# Patient Record
Sex: MALE | Race: WHITE | Employment: OTHER | URBAN - METROPOLITAN AREA
[De-identification: names, ages, dates, MRNs, and addresses within clinical notes are randomized per-mention and may not be internally consistent; named-entity substitution may affect disease eponyms.]

---

## 2017-02-17 PROBLEM — N13.8 BPH WITH URINARY OBSTRUCTION: Status: ACTIVE | Noted: 2017-02-17

## 2017-02-17 PROBLEM — N40.1 BPH WITH URINARY OBSTRUCTION: Status: ACTIVE | Noted: 2017-02-17

## 2017-02-17 PROCEDURE — 84403 ASSAY OF TOTAL TESTOSTERONE: CPT | Performed by: UROLOGY

## 2017-02-17 PROCEDURE — 84153 ASSAY OF PSA TOTAL: CPT | Performed by: UROLOGY

## 2017-03-01 ENCOUNTER — HOSPITAL ENCOUNTER (OUTPATIENT)
Dept: INTERVENTIONAL RADIOLOGY/VASCULAR | Facility: HOSPITAL | Age: 77
Discharge: HOME | End: 2017-03-01
Attending: INTERNAL MEDICINE | Admitting: INTERNAL MEDICINE
Payer: MEDICARE

## 2017-03-01 VITALS
HEART RATE: 67 BPM | OXYGEN SATURATION: 96 % | SYSTOLIC BLOOD PRESSURE: 110 MMHG | DIASTOLIC BLOOD PRESSURE: 80 MMHG | RESPIRATION RATE: 18 BRPM | TEMPERATURE: 97 F

## 2017-03-01 DIAGNOSIS — I26.99 PE (PULMONARY THROMBOEMBOLISM) (HCC): ICD-10-CM

## 2017-03-01 LAB
BUN BLD-MCNC: 18 MG/DL (ref 8–20)
CALCIUM BLD-MCNC: 9.4 MG/DL (ref 8.3–10.3)
CHLORIDE: 112 MMOL/L (ref 101–111)
CO2: 28 MMOL/L (ref 22–32)
CREAT BLD-MCNC: 1.26 MG/DL (ref 0.7–1.3)
ERYTHROCYTE [DISTWIDTH] IN BLOOD BY AUTOMATED COUNT: 23.1 % (ref 11.5–16)
GLUCOSE BLD-MCNC: 142 MG/DL (ref 65–99)
GLUCOSE BLD-MCNC: 150 MG/DL (ref 70–99)
HCT VFR BLD AUTO: 53 % (ref 37–53)
HGB BLD-MCNC: 16.4 G/DL (ref 13–17)
INR BLD: 2.92 (ref 0.89–1.11)
INR: 3.3 (ref 0.8–1.3)
MCH RBC QN AUTO: 20.4 PG (ref 27–33.2)
MCHC RBC AUTO-ENTMCNC: 30.9 G/DL (ref 31–37)
MCV RBC AUTO: 65.8 FL (ref 80–99)
PLATELET # BLD AUTO: 150 10(3)UL (ref 150–450)
POTASSIUM SERPL-SCNC: 5.4 MMOL/L (ref 3.6–5.1)
PSA SERPL DL<=0.01 NG/ML-MCNC: 31.1 SECONDS (ref 12–14.3)
RBC # BLD AUTO: 8.05 X10(6)UL (ref 3.8–5.8)
RED CELL DISTRIBUTION WIDTH-SD: 41 FL (ref 35.1–46.3)
SODIUM SERPL-SCNC: 144 MMOL/L (ref 136–144)
WBC # BLD AUTO: 10.2 X10(3) UL (ref 4–13)

## 2017-03-01 PROCEDURE — 80048 BASIC METABOLIC PNL TOTAL CA: CPT | Performed by: RADIOLOGY

## 2017-03-01 PROCEDURE — 37193 REM ENDOVAS VENA CAVA FILTER: CPT

## 2017-03-01 PROCEDURE — 06PY3DZ REMOVAL OF INTRALUMINAL DEVICE FROM LOWER VEIN, PERCUTANEOUS APPROACH: ICD-10-PCS | Performed by: RADIOLOGY

## 2017-03-01 PROCEDURE — 82962 GLUCOSE BLOOD TEST: CPT

## 2017-03-01 PROCEDURE — 99152 MOD SED SAME PHYS/QHP 5/>YRS: CPT

## 2017-03-01 PROCEDURE — 99153 MOD SED SAME PHYS/QHP EA: CPT

## 2017-03-01 PROCEDURE — 85610 PROTHROMBIN TIME: CPT | Performed by: RADIOLOGY

## 2017-03-01 PROCEDURE — 85027 COMPLETE CBC AUTOMATED: CPT | Performed by: RADIOLOGY

## 2017-03-01 RX ORDER — LIDOCAINE HYDROCHLORIDE 10 MG/ML
INJECTION, SOLUTION INFILTRATION; PERINEURAL
Status: COMPLETED
Start: 2017-03-01 | End: 2017-03-01

## 2017-03-01 RX ORDER — MIDAZOLAM HYDROCHLORIDE 1 MG/ML
INJECTION INTRAMUSCULAR; INTRAVENOUS
Status: COMPLETED
Start: 2017-03-01 | End: 2017-03-01

## 2017-03-01 RX ORDER — HEPARIN SODIUM 5000 [USP'U]/ML
INJECTION, SOLUTION INTRAVENOUS; SUBCUTANEOUS
Status: COMPLETED
Start: 2017-03-01 | End: 2017-03-01

## 2017-03-01 RX ORDER — SODIUM CHLORIDE 9 MG/ML
INJECTION, SOLUTION INTRAVENOUS CONTINUOUS
Status: DISCONTINUED | OUTPATIENT
Start: 2017-03-01 | End: 2017-03-01

## 2017-03-01 RX ADMIN — SODIUM CHLORIDE: 9 INJECTION, SOLUTION INTRAVENOUS at 07:30:00

## 2017-03-01 NOTE — PROGRESS NOTES
Pt here for IVC removal. INR 3.3, sent to lab. Dr Sherry Flores aware. Rc'd pt from IR in stable condition. VSS. Dressing to right neck with scant amount drainage, 1 drop of blood. No increase in drainage prior to d/c. See flowsheet.  No c/o pain or disco

## 2017-03-01 NOTE — H&P
Brian 64 Patient Status:  Outpatient in a Bed    1940 MRN [id number]   [place] [address] [facility] Meri Sotelo MD   [facility] Day # 0 PCP Yvonne Hagen MD     [facility] Comment BTS Cystoscopy -Dr Priscilla Plaza HISTORY  5/13/16    Comment BTS Cystoscopy-Dr Campos    OTHER SURGICAL HISTORY  8/15/16    Comment Cysto- Dr. Priscilla Plaza HISTORY  2/17/17    Comment Cysto- Dr. Franny De Jesus

## 2017-03-01 NOTE — PROCEDURES
[facility]  Procedure Note    Terressa Moment Patient Status:  Outpatient in a Bed    1940 MRN [id number]   [facility] Allison Sanders MD   [facility] Day # 0 PCP Mainor Renteria MD     Procedure: I

## 2017-07-18 PROCEDURE — 88305 TISSUE EXAM BY PATHOLOGIST: CPT | Performed by: SPECIALIST

## 2017-08-18 PROCEDURE — 36415 COLL VENOUS BLD VENIPUNCTURE: CPT | Performed by: UROLOGY

## 2017-08-18 PROCEDURE — 84153 ASSAY OF PSA TOTAL: CPT | Performed by: UROLOGY

## 2017-09-20 ENCOUNTER — OFFICE VISIT (OUTPATIENT)
Dept: HEMATOLOGY/ONCOLOGY | Facility: HOSPITAL | Age: 77
End: 2017-09-20
Attending: INTERNAL MEDICINE
Payer: MEDICARE

## 2017-09-20 VITALS
TEMPERATURE: 98 F | HEART RATE: 81 BPM | OXYGEN SATURATION: 98 % | HEIGHT: 70 IN | WEIGHT: 241.5 LBS | BODY MASS INDEX: 34.57 KG/M2 | SYSTOLIC BLOOD PRESSURE: 121 MMHG | RESPIRATION RATE: 16 BRPM | DIASTOLIC BLOOD PRESSURE: 67 MMHG

## 2017-09-20 DIAGNOSIS — D3A.090 CARCINOID TUMOR OF LEFT LUNG: ICD-10-CM

## 2017-09-20 DIAGNOSIS — R71.8 LOW MEAN CORPUSCULAR VOLUME (MCV): ICD-10-CM

## 2017-09-20 LAB
ALBUMIN SERPL-MCNC: 3.9 G/DL (ref 3.5–4.8)
ALP LIVER SERPL-CCNC: 78 U/L (ref 45–117)
ALT SERPL-CCNC: 34 U/L (ref 17–63)
AST SERPL-CCNC: 17 U/L (ref 15–41)
BASOPHILS # BLD AUTO: 0.13 X10(3) UL (ref 0–0.1)
BASOPHILS NFR BLD AUTO: 1.1 %
BILIRUB SERPL-MCNC: 1.4 MG/DL (ref 0.1–2)
BUN BLD-MCNC: 24 MG/DL (ref 8–20)
CALCIUM BLD-MCNC: 9.7 MG/DL (ref 8.3–10.3)
CHLORIDE: 110 MMOL/L (ref 101–111)
CO2: 23 MMOL/L (ref 22–32)
CREAT BLD-MCNC: 1.36 MG/DL (ref 0.7–1.3)
EOSINOPHIL # BLD AUTO: 0.5 X10(3) UL (ref 0–0.3)
EOSINOPHIL NFR BLD AUTO: 4.4 %
ERYTHROCYTE [DISTWIDTH] IN BLOOD BY AUTOMATED COUNT: 21.2 % (ref 11.5–16)
GLUCOSE BLD-MCNC: 140 MG/DL (ref 70–99)
HCT VFR BLD AUTO: 41.5 % (ref 37–53)
HGB BLD-MCNC: 14.1 G/DL (ref 13–17)
IMMATURE GRANULOCYTE COUNT: 0.26 X10(3) UL (ref 0–1)
IMMATURE GRANULOCYTE RATIO %: 2.3 %
LDH: 228 U/L (ref 84–249)
LYMPHOCYTES # BLD AUTO: 2.81 X10(3) UL (ref 0.9–4)
LYMPHOCYTES NFR BLD AUTO: 24.6 %
M PROTEIN MFR SERPL ELPH: 7.5 G/DL (ref 6.1–8.3)
MCH RBC QN AUTO: 23.7 PG (ref 27–33.2)
MCHC RBC AUTO-ENTMCNC: 34 G/DL (ref 31–37)
MCV RBC AUTO: 69.6 FL (ref 80–99)
MONOCYTES # BLD AUTO: 0.96 X10(3) UL (ref 0.1–0.6)
MONOCYTES NFR BLD AUTO: 8.4 %
NEUTROPHIL ABS PRELIM: 6.75 X10 (3) UL (ref 1.3–6.7)
NEUTROPHILS # BLD AUTO: 6.75 X10(3) UL (ref 1.3–6.7)
NEUTROPHILS NFR BLD AUTO: 59.2 %
PLATELET # BLD AUTO: 152 10(3)UL (ref 150–450)
POTASSIUM SERPL-SCNC: 4.7 MMOL/L (ref 3.6–5.1)
RBC # BLD AUTO: 5.96 X10(6)UL (ref 3.8–5.8)
RED CELL DISTRIBUTION WIDTH-SD: 38 FL (ref 35.1–46.3)
SODIUM SERPL-SCNC: 143 MMOL/L (ref 136–144)
WBC # BLD AUTO: 11.4 X10(3) UL (ref 4–13)

## 2017-09-20 PROCEDURE — 99213 OFFICE O/P EST LOW 20 MIN: CPT | Performed by: INTERNAL MEDICINE

## 2017-09-20 NOTE — PROGRESS NOTES
Cancer Center Progress Note    Patient Name: Octaviano Sheldon   YOB: 1940   Medical Record Number: [id number]   CSN: [id number]   Attending Physician: Blanca Johnson M.D.    Referring Physician: Xavi Brand MD      Date of Visit: 9/20/ (241 lb 8 oz)   SpO2 98%   BMI 34.65 kg/m²       Physical Examination:  General: Patient is alert and oriented x 3, not in acute distress. Psych:  Mood and affect appropriate  HEENT: EOMs intact. PERRL. Oropharynx is clear. Neck: No JVD.  No palpable lym x10(3) uL   Basophil Absolute 0.13 (H) 0.00 - 0.10 x10(3) uL   Immature Granulocyte Absolute 0.26 0.00 - 1.00 x10(3) uL   Neutrophil % 59.2 %   Lymphocyte % 24.6 %   Monocyte % 8.4 %   Eosinophil % 4.4 %   Basophil % 1.1 %   Immature Granulocyte % 2.3 % lower lobe. 2. No recurrent lung lesions are noted. 3. No acute process identified.   4. Stable low density left adrenal gland nodules most consistent with benign adenomas.              Dictated by: Kaitlin Sparks DO on 11/04/2016 at 12:50        Approved b

## 2017-09-23 LAB — CHROMOGRANIN A: 183 NG/ML

## 2017-09-30 ENCOUNTER — HOSPITAL ENCOUNTER (OUTPATIENT)
Dept: CT IMAGING | Facility: HOSPITAL | Age: 77
Discharge: HOME | End: 2017-09-30
Attending: INTERNAL MEDICINE
Payer: MEDICARE

## 2017-09-30 DIAGNOSIS — D3A.090 CARCINOID TUMOR OF LEFT LUNG: ICD-10-CM

## 2017-09-30 PROCEDURE — 71260 CT THORAX DX C+: CPT | Performed by: INTERNAL MEDICINE

## 2017-10-03 ENCOUNTER — TELEPHONE (OUTPATIENT)
Dept: HEMATOLOGY/ONCOLOGY | Facility: HOSPITAL | Age: 77
End: 2017-10-03

## 2017-10-03 NOTE — TELEPHONE ENCOUNTER
Pt called in looking for recent CT scan results. Called and LM on above number that CT scan results were good per Dr. Pacheco Overall, pt to call back with any ?'s.

## 2017-12-04 PROCEDURE — 84403 ASSAY OF TOTAL TESTOSTERONE: CPT | Performed by: UROLOGY

## 2017-12-04 PROCEDURE — 36415 COLL VENOUS BLD VENIPUNCTURE: CPT | Performed by: UROLOGY

## 2018-03-16 PROCEDURE — 84403 ASSAY OF TOTAL TESTOSTERONE: CPT | Performed by: UROLOGY

## 2018-05-18 PROCEDURE — 84403 ASSAY OF TOTAL TESTOSTERONE: CPT | Performed by: UROLOGY

## 2018-05-18 PROCEDURE — 36415 COLL VENOUS BLD VENIPUNCTURE: CPT | Performed by: UROLOGY

## 2018-08-10 PROCEDURE — 84154 ASSAY OF PSA FREE: CPT | Performed by: UROLOGY

## 2018-08-10 PROCEDURE — 84153 ASSAY OF PSA TOTAL: CPT | Performed by: UROLOGY

## 2018-08-10 PROCEDURE — 84403 ASSAY OF TOTAL TESTOSTERONE: CPT | Performed by: UROLOGY

## 2018-08-14 ENCOUNTER — TELEPHONE (OUTPATIENT)
Dept: HEMATOLOGY/ONCOLOGY | Facility: HOSPITAL | Age: 78
End: 2018-08-14

## 2018-08-14 DIAGNOSIS — D3A.090 CARCINOID TUMOR OF LEFT LUNG: Primary | ICD-10-CM

## 2018-08-14 NOTE — TELEPHONE ENCOUNTER
Pt calling for PL/MD f/u appt  inbasket message sent to [facility] to call pt and schedule appt

## 2018-08-14 NOTE — TELEPHONE ENCOUNTER
Pt calling for yearly CT scan order. Pt has MD f/u sched for 9/13. Order placed and given number for Central scheduling. Pt will call to schedule.

## 2018-08-25 ENCOUNTER — HOSPITAL ENCOUNTER (OUTPATIENT)
Dept: CT IMAGING | Facility: HOSPITAL | Age: 78
Discharge: HOME | End: 2018-08-25
Attending: INTERNAL MEDICINE
Payer: MEDICARE

## 2018-08-25 DIAGNOSIS — D3A.090 CARCINOID TUMOR OF LEFT LUNG: ICD-10-CM

## 2018-08-25 PROBLEM — R97.20 ELEVATED PSA: Status: ACTIVE | Noted: 2018-08-25

## 2018-08-25 LAB — CREAT SERPL-MCNC: 1.5 MG/DL (ref 0.7–1.3)

## 2018-08-25 PROCEDURE — 71260 CT THORAX DX C+: CPT | Performed by: INTERNAL MEDICINE

## 2018-08-25 PROCEDURE — 82565 ASSAY OF CREATININE: CPT

## 2018-08-27 ENCOUNTER — TELEPHONE (OUTPATIENT)
Dept: HEMATOLOGY/ONCOLOGY | Facility: HOSPITAL | Age: 78
End: 2018-08-27

## 2018-09-11 ENCOUNTER — APPOINTMENT (OUTPATIENT)
Dept: CT IMAGING | Facility: HOSPITAL | Age: 78
DRG: 690 | End: 2018-09-11
Attending: EMERGENCY MEDICINE
Payer: MEDICARE

## 2018-09-11 ENCOUNTER — HOSPITAL ENCOUNTER (INPATIENT)
Facility: HOSPITAL | Age: 78
LOS: 1 days | Discharge: HOME | DRG: 690 | End: 2018-09-14
Attending: EMERGENCY MEDICINE | Admitting: INTERNAL MEDICINE
Payer: MEDICARE

## 2018-09-11 DIAGNOSIS — N12 PYELONEPHRITIS: ICD-10-CM

## 2018-09-11 DIAGNOSIS — I82.220 IVC THROMBOSIS (HCC): ICD-10-CM

## 2018-09-11 DIAGNOSIS — R79.1 ELEVATED INR: ICD-10-CM

## 2018-09-11 DIAGNOSIS — K63.5 POLYPOSIS OF COLON: ICD-10-CM

## 2018-09-11 DIAGNOSIS — R31.9 HEMATURIA, UNSPECIFIED TYPE: Primary | ICD-10-CM

## 2018-09-11 LAB
ALBUMIN SERPL-MCNC: 2.6 G/DL (ref 3.5–4.8)
ALBUMIN/GLOB SERPL: 0.6 {RATIO} (ref 1–2)
ALP LIVER SERPL-CCNC: 70 U/L (ref 45–117)
ALT SERPL-CCNC: 33 U/L (ref 17–63)
ANION GAP SERPL CALC-SCNC: 6 MMOL/L (ref 0–18)
AST SERPL-CCNC: 19 U/L (ref 15–41)
BASOPHILS # BLD AUTO: 0.1 X10(3) UL (ref 0–0.1)
BASOPHILS NFR BLD AUTO: 0.6 %
BILIRUB SERPL-MCNC: 0.8 MG/DL (ref 0.1–2)
BILIRUB UR QL STRIP.AUTO: NEGATIVE
BUN BLD-MCNC: 31 MG/DL (ref 8–20)
BUN/CREAT SERPL: 20.4 (ref 10–20)
CALCIUM BLD-MCNC: 8.6 MG/DL (ref 8.3–10.3)
CHLORIDE SERPL-SCNC: 110 MMOL/L (ref 101–111)
CO2 SERPL-SCNC: 22 MMOL/L (ref 22–32)
CREAT BLD-MCNC: 1.52 MG/DL (ref 0.7–1.3)
EOSINOPHIL # BLD AUTO: 0.36 X10(3) UL (ref 0–0.3)
EOSINOPHIL NFR BLD AUTO: 2.3 %
ERYTHROCYTE [DISTWIDTH] IN BLOOD BY AUTOMATED COUNT: 17.8 % (ref 11.5–16)
GLOBULIN PLAS-MCNC: 4.3 G/DL (ref 2.5–4)
GLUCOSE BLD-MCNC: 88 MG/DL (ref 70–99)
GLUCOSE UR STRIP.AUTO-MCNC: 50 MG/DL
HCT VFR BLD AUTO: 42.9 % (ref 37–53)
HGB BLD-MCNC: 13.6 G/DL (ref 13–17)
IMMATURE GRANULOCYTE COUNT: 0.22 X10(3) UL (ref 0–1)
IMMATURE GRANULOCYTE RATIO %: 1.4 %
INR BLD: 5.94 (ref 0.9–1.1)
KETONES UR STRIP.AUTO-MCNC: NEGATIVE MG/DL
LYMPHOCYTES # BLD AUTO: 1.64 X10(3) UL (ref 0.9–4)
LYMPHOCYTES NFR BLD AUTO: 10.6 %
M PROTEIN MFR SERPL ELPH: 6.9 G/DL (ref 6.1–8.3)
MCH RBC QN AUTO: 19.7 PG (ref 27–33.2)
MCHC RBC AUTO-ENTMCNC: 31.7 G/DL (ref 31–37)
MCV RBC AUTO: 62 FL (ref 80–99)
MONOCYTES # BLD AUTO: 1.42 X10(3) UL (ref 0.1–1)
MONOCYTES NFR BLD AUTO: 9.2 %
NEUTROPHIL ABS PRELIM: 11.69 X10 (3) UL (ref 1.3–6.7)
NEUTROPHILS # BLD AUTO: 11.69 X10(3) UL (ref 1.3–6.7)
NEUTROPHILS NFR BLD AUTO: 75.9 %
NITRITE UR QL STRIP.AUTO: NEGATIVE
OSMOLALITY SERPL CALC.SUM OF ELEC: 292 MOSM/KG (ref 275–295)
PH UR STRIP.AUTO: 6 [PH] (ref 4.5–8)
PLATELET # BLD AUTO: 218 10(3)UL (ref 150–450)
POTASSIUM SERPL-SCNC: 4.6 MMOL/L (ref 3.6–5.1)
PROT UR STRIP.AUTO-MCNC: 100 MG/DL
PSA SERPL DL<=0.01 NG/ML-MCNC: 54.7 SECONDS (ref 12.4–14.7)
RBC # BLD AUTO: 6.92 X10(6)UL (ref 3.8–5.8)
RBC #/AREA URNS AUTO: >10 /HPF
RED CELL DISTRIBUTION WIDTH-SD: 39.5 FL (ref 35.1–46.3)
SODIUM SERPL-SCNC: 138 MMOL/L (ref 136–144)
SP GR UR STRIP.AUTO: 1.01 (ref 1–1.03)
UROBILINOGEN UR STRIP.AUTO-MCNC: <2 MG/DL
WBC # BLD AUTO: 15.4 X10(3) UL (ref 4–13)
WBC CLUMPS UR QL AUTO: PRESENT

## 2018-09-11 PROCEDURE — 80053 COMPREHEN METABOLIC PANEL: CPT | Performed by: EMERGENCY MEDICINE

## 2018-09-11 PROCEDURE — 74176 CT ABD & PELVIS W/O CONTRAST: CPT | Performed by: EMERGENCY MEDICINE

## 2018-09-11 PROCEDURE — 87086 URINE CULTURE/COLONY COUNT: CPT | Performed by: EMERGENCY MEDICINE

## 2018-09-11 PROCEDURE — 84132 ASSAY OF SERUM POTASSIUM: CPT | Performed by: EMERGENCY MEDICINE

## 2018-09-11 PROCEDURE — 99285 EMERGENCY DEPT VISIT HI MDM: CPT

## 2018-09-11 PROCEDURE — 84450 TRANSFERASE (AST) (SGOT): CPT | Performed by: EMERGENCY MEDICINE

## 2018-09-11 PROCEDURE — 96365 THER/PROPH/DIAG IV INF INIT: CPT

## 2018-09-11 PROCEDURE — 85025 COMPLETE CBC W/AUTO DIFF WBC: CPT | Performed by: EMERGENCY MEDICINE

## 2018-09-11 PROCEDURE — 96361 HYDRATE IV INFUSION ADD-ON: CPT

## 2018-09-11 PROCEDURE — 85610 PROTHROMBIN TIME: CPT | Performed by: EMERGENCY MEDICINE

## 2018-09-11 PROCEDURE — 81001 URINALYSIS AUTO W/SCOPE: CPT | Performed by: EMERGENCY MEDICINE

## 2018-09-11 NOTE — ED INITIAL ASSESSMENT (HPI)
C/o L lower back pain with hematuria since Sunday. No fevers. Instructed to come in to r/o kidney stone.

## 2018-09-12 ENCOUNTER — APPOINTMENT (OUTPATIENT)
Dept: CT IMAGING | Facility: HOSPITAL | Age: 78
DRG: 690 | End: 2018-09-12
Attending: PHYSICIAN ASSISTANT
Payer: MEDICARE

## 2018-09-12 PROBLEM — R79.1 ELEVATED INR: Status: ACTIVE | Noted: 2018-09-12

## 2018-09-12 PROBLEM — N12 PYELONEPHRITIS: Status: ACTIVE | Noted: 2018-09-12

## 2018-09-12 PROBLEM — R31.9 HEMATURIA, UNSPECIFIED TYPE: Status: ACTIVE | Noted: 2018-09-12

## 2018-09-12 LAB
GLUCOSE BLD-MCNC: 141 MG/DL (ref 65–99)
GLUCOSE BLD-MCNC: 155 MG/DL (ref 65–99)
GLUCOSE BLD-MCNC: 160 MG/DL (ref 65–99)
GLUCOSE BLD-MCNC: 94 MG/DL (ref 65–99)

## 2018-09-12 PROCEDURE — 74178 CT ABD&PLV WO CNTR FLWD CNTR: CPT | Performed by: PHYSICIAN ASSISTANT

## 2018-09-12 PROCEDURE — 82962 GLUCOSE BLOOD TEST: CPT

## 2018-09-12 PROCEDURE — 88108 CYTOPATH CONCENTRATE TECH: CPT | Performed by: PHYSICIAN ASSISTANT

## 2018-09-12 PROCEDURE — 76377 3D RENDER W/INTRP POSTPROCES: CPT

## 2018-09-12 RX ORDER — ACETAMINOPHEN 325 MG/1
650 TABLET ORAL EVERY 6 HOURS PRN
Status: DISCONTINUED | OUTPATIENT
Start: 2018-09-12 | End: 2018-09-14

## 2018-09-12 RX ORDER — PRAVASTATIN SODIUM 20 MG
20 TABLET ORAL NIGHTLY
Status: DISCONTINUED | OUTPATIENT
Start: 2018-09-12 | End: 2018-09-14

## 2018-09-12 RX ORDER — LISINOPRIL 2.5 MG/1
2.5 TABLET ORAL DAILY
Status: DISCONTINUED | OUTPATIENT
Start: 2018-09-12 | End: 2018-09-14

## 2018-09-12 RX ORDER — SODIUM CHLORIDE 9 MG/ML
INJECTION, SOLUTION INTRAVENOUS CONTINUOUS
Status: DISCONTINUED | OUTPATIENT
Start: 2018-09-12 | End: 2018-09-12

## 2018-09-12 NOTE — PROGRESS NOTES
Spoke with nurse - she reports patient having left flank pain with urination. Urine currently sawyer colored. Spoke with Dr. Ryne De La Cruz to review his history and current admission.     Plan:  Proceed with CT urogram  Increase IV fluids if ok wit

## 2018-09-12 NOTE — PLAN OF CARE
Assumed care @ 0700. A&Ox4, VSS on RA, NSR on tele  Urine dark sawyer. No bright red blood or pink coloration  C/o L flank pain. Pt states pain is moderate at rest and severe with urination. Annelise VERA updated. CT ordered  Up to the chair.  Ambulated in the ha

## 2018-09-12 NOTE — H&P
[facility] Patient Status:  Observation    1940 MRN [id number]   [facility] 7NE-A Attending Lin Boyce MD   Hosp Day # 0 PCP Nayely Esquivel MD     History of Present Illness:  M DEBRIDEMENT SKIN UP TO 10%;  Left      Comment:  leg  7/29/15: OTHER SURGICAL HISTORY      Comment:  cysto, stent removal - Dr. Aray Ayoub  10/14/15: OTHER SURGICAL HISTORY      Comment:  BTS Cystoscopy -Dr Arya Ayoub  1/16/15: OTHER SURGICAL HISTORY      Com Tab Take 1 tablet by mouth daily. Disp: 90 tablet Rfl: 0 Taking   SITagliptin-MetFORMIN HCl (JANUMET)  MG Oral Tab Take 1 tablet by mouth daily.  Disp: 28 tablet Rfl: 0 Taking       Review of Systems:    Physical Exam:  Alert male appearing stated ag The smaller 1.7 x 1.5 cm. LIVER:  Uniform parenchyma. BILIARY:  Nondistended gallbladder. Numerous calcified gallstones. No biliary dilatation. PANCREAS:  Numerous coarse calcifications.   These have increased compared to the prior consistent with chr male     Erectile dysfunction     TIA (transient ischemic attack)     Varicose veins of both lower extremities with complications     Obesity     High cholesterol     Neuropathy     Aneurysm of abdominal aorta (HCC)     Bladder tumor     Controlled type 2

## 2018-09-12 NOTE — PROGRESS NOTES
[facility] Pharmacy Note: Antimicrobial Weight Dose Adjustment for: ceftriaxone (ROCEPHIN)    Esa Ureña is a 78year old male who has been prescribed ceftriaxone (ROCEPHIN) 1000 mg X1.   CrCl is CrCl cannot be calculated (Patient's most recent lab result i

## 2018-09-12 NOTE — ED PROVIDER NOTES
Patient Seen in: [facility] Emergency Department    History   Patient presents with:  Urinary Symptoms (urologic)    Stated Complaint: hematuria    HPI    Is a 72-year-old male presenting to emergency room for hematuria.   Last week patient had hemat Cystoscopy-Dr Campos  8/15/16: OTHER SURGICAL HISTORY      Comment:  Cysto- Dr. Stephanie Arnett  2/17/17: OTHER SURGICAL HISTORY      Comment:  Cysto- Dr. Stephanie Arnett  08/18/2017: OTHER SURGICAL HISTORY      Comment:  Cystoscopy- Dr. Stephanie Arnett  No date: SKIN GR components:    PT 54.7 (*)     INR 5.94 (*)     All other components within normal limits   COMP METABOLIC PANEL (14)   CBC WITH DIFFERENTIAL WITH PLATELET    Narrative:      The following orders were created for panel order CBC WITH DIFFERENTIAL WITH PLATE

## 2018-09-13 ENCOUNTER — APPOINTMENT (OUTPATIENT)
Dept: HEMATOLOGY/ONCOLOGY | Facility: HOSPITAL | Age: 78
End: 2018-09-13
Attending: INTERNAL MEDICINE
Payer: MEDICARE

## 2018-09-13 LAB
GLUCOSE BLD-MCNC: 143 MG/DL (ref 65–99)
GLUCOSE BLD-MCNC: 143 MG/DL (ref 65–99)
GLUCOSE BLD-MCNC: 216 MG/DL (ref 65–99)
GLUCOSE BLD-MCNC: 324 MG/DL (ref 65–99)
GLUCOSE BLD-MCNC: 94 MG/DL (ref 65–99)
INR BLD: 2.75 (ref 0.9–1.1)
PSA SERPL DL<=0.01 NG/ML-MCNC: 30 SECONDS (ref 12.4–14.7)

## 2018-09-13 PROCEDURE — 82962 GLUCOSE BLOOD TEST: CPT

## 2018-09-13 PROCEDURE — 85610 PROTHROMBIN TIME: CPT | Performed by: INTERNAL MEDICINE

## 2018-09-13 RX ORDER — WARFARIN SODIUM 5 MG/1
5 TABLET ORAL
Status: COMPLETED | OUTPATIENT
Start: 2018-09-13 | End: 2018-09-13

## 2018-09-13 NOTE — PROGRESS NOTES
[facility]    Progress Note    Cherelle Yu Patient Status:  Observation    1940 MRN [id number]   [facility] 7NE-A Attending Katy Iyer MD   Hosp Day # 0 PCP Claudine Daniel MD       SUBJECTIVE:  Still voiding ex hydroureter. No right-sided urinary tract calculi. No focal renal cortical mass identified. The right ureter assumes a normal course in caliber. ADRENALS:  There is a 2.6 x 3.3 cm left adrenal mass and a 1.5 x 1.3 cm left adrenal mass.   Yolanda white enlarged. Numerous prostate calcifications are seen. The prostate effaces and indents the floor of the bladder. BONES:  Osseous structures are demineralized. Mild loss of vertebral body height of L5 may reflect compression deformity.   No change from pr (NOVOLOG) 100 UNIT/ML flexpen 1-10 Units 1-10 Units Subcutaneous TID AC and HS   acetaminophen (TYLENOL) tab 650 mg 650 mg Oral Q6H PRN         Assessment  Patient Active Problem List:     Carcinoid tumor of left lung     History of bladder cancer     Hypo

## 2018-09-13 NOTE — PROGRESS NOTES
[facility]  Urology Progress Note    Tamara Small Patient Status:  Observation    1940 MRN [id number]   [facility] 7NE-A Attending Tomas Ventura MD   Hosp Day # 0 PCP Vero Paniagua MD     Subjective:  Bita Rangel coagulopathy (INR 5.94)  history of bladder cancer  -cysto 8/24 negative for tumors, abnormal left UO from previous tur  -afebrile, hgb normal, ucx returned negative, but would still with abx given UA results, symptoms, reflux.    -voiding and comfortable

## 2018-09-13 NOTE — PROGRESS NOTES
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 Tiburcio Mullins.   Based on this ind

## 2018-09-13 NOTE — PLAN OF CARE
Patient alert and oriented times four. Meds given per MAR. Patient up with 1 person standby. Urine is tea/sawyer colored. Tylenol given for flank pain. Vital signs stable. Resting comfortably in bed. Call light in reach.      DISCHARGE PLANNING    • [address]

## 2018-09-14 VITALS
SYSTOLIC BLOOD PRESSURE: 109 MMHG | HEART RATE: 61 BPM | TEMPERATURE: 98 F | WEIGHT: 235 LBS | RESPIRATION RATE: 18 BRPM | HEIGHT: 70 IN | DIASTOLIC BLOOD PRESSURE: 70 MMHG | BODY MASS INDEX: 33.64 KG/M2 | OXYGEN SATURATION: 98 %

## 2018-09-14 LAB
ANION GAP SERPL CALC-SCNC: 7 MMOL/L (ref 0–18)
BUN BLD-MCNC: 34 MG/DL (ref 8–20)
BUN/CREAT SERPL: 24.6 (ref 10–20)
CALCIUM BLD-MCNC: 8.9 MG/DL (ref 8.3–10.3)
CHLORIDE SERPL-SCNC: 110 MMOL/L (ref 101–111)
CO2 SERPL-SCNC: 22 MMOL/L (ref 22–32)
CREAT BLD-MCNC: 1.38 MG/DL (ref 0.7–1.3)
GLUCOSE BLD-MCNC: 118 MG/DL (ref 65–99)
GLUCOSE BLD-MCNC: 176 MG/DL (ref 65–99)
GLUCOSE BLD-MCNC: 179 MG/DL (ref 70–99)
INR BLD: 2.28 (ref 0.9–1.1)
OSMOLALITY SERPL CALC.SUM OF ELEC: 300 MOSM/KG (ref 275–295)
POTASSIUM SERPL-SCNC: 5.1 MMOL/L (ref 3.6–5.1)
PSA SERPL DL<=0.01 NG/ML-MCNC: 25.9 SECONDS (ref 12.4–14.7)
SODIUM SERPL-SCNC: 139 MMOL/L (ref 136–144)

## 2018-09-14 PROCEDURE — 85610 PROTHROMBIN TIME: CPT | Performed by: INTERNAL MEDICINE

## 2018-09-14 PROCEDURE — 82962 GLUCOSE BLOOD TEST: CPT

## 2018-09-14 PROCEDURE — 80048 BASIC METABOLIC PNL TOTAL CA: CPT | Performed by: INTERNAL MEDICINE

## 2018-09-14 RX ORDER — GARLIC EXTRACT 500 MG
1 CAPSULE ORAL DAILY
Status: DISCONTINUED | OUTPATIENT
Start: 2018-09-14 | End: 2018-09-14

## 2018-09-14 RX ORDER — GARLIC EXTRACT 500 MG
1 CAPSULE ORAL DAILY
Qty: 7 CAPSULE | Refills: 0 | Status: SHIPPED | OUTPATIENT
Start: 2018-09-14 | End: 2020-09-23

## 2018-09-14 RX ORDER — WARFARIN SODIUM 10 MG/1
5 TABLET ORAL NIGHTLY
Qty: 30 TABLET | Refills: 1 | Status: ON HOLD | OUTPATIENT
Start: 2018-09-14 | End: 2020-06-15

## 2018-09-14 RX ORDER — CIPROFLOXACIN 250 MG/1
250 TABLET, FILM COATED ORAL 2 TIMES DAILY
Qty: 14 TABLET | Refills: 0 | Status: SHIPPED | OUTPATIENT
Start: 2018-09-14 | End: 2018-09-21

## 2018-09-14 RX ORDER — ASPIRIN 325 MG
325 TABLET ORAL DAILY
Refills: 0 | Status: ON HOLD | COMMUNITY
Start: 2018-09-21 | End: 2020-06-15

## 2018-09-14 RX ORDER — LEVOFLOXACIN 250 MG/1
250 TABLET ORAL DAILY
Qty: 7 TABLET | Refills: 0 | Status: SHIPPED | OUTPATIENT
Start: 2018-09-14 | End: 2018-09-14

## 2018-09-14 RX ORDER — WARFARIN SODIUM 5 MG/1
5 TABLET ORAL
Status: DISCONTINUED | OUTPATIENT
Start: 2018-09-14 | End: 2018-09-14

## 2018-09-14 NOTE — PLAN OF CARE
Assumed care at [address]. AOx4. Denies any pain or discomfort. Remains on IV fluid/ ABT. Pt stated his urine has been clear yellow color since 0500 this morning. Coumadin restarted tonight. Plan of care discussed with pt. Call light within reach.   Report

## 2018-09-14 NOTE — PLAN OF CARE
Assumed care at 2200. Pt A/O x4. RA. NSR on tele. VSS. Denies any pain. Urine remains clear yellow. Call light within reach. Will continue to monitor.     Diabetes/Glucose Control    • Glucose maintained within prescribed range Progressing        DISCHA

## 2018-09-14 NOTE — PROGRESS NOTES
[facility]  Urology Progress Note    Maggie Perez Patient Status:  Inpatient    1940 MRN [id number]   [facility] 7NE-A Attending Raudel Guzmán MD   Hosp Day # 1 PCP Jose Gutierrez MD     Subjective:  Maggie Perez

## 2018-09-14 NOTE — PLAN OF CARE
NURSING DISCHARGE NOTE    Discharged Home via Wheelchair. Accompanied by Support staff  Belongings Taken by patient/family. IV removed without s/s of complications. Discharge instructions and scripts given. Verbalized understanding.  All questions ans

## 2018-09-14 NOTE — PLAN OF CARE
Alert and oriented x 4. Steady and up ad gee. VSS. LBM 3x today. Last BM loose. Reports burning/pain, L flank pain, and frequency improving. Urine now clear yellow. Starting coumadin tonight and to monitor for hematuria.

## 2018-10-17 ENCOUNTER — OFFICE VISIT (OUTPATIENT)
Dept: HEMATOLOGY/ONCOLOGY | Facility: HOSPITAL | Age: 78
End: 2018-10-17
Attending: INTERNAL MEDICINE
Payer: MEDICARE

## 2018-10-17 VITALS
RESPIRATION RATE: 18 BRPM | HEART RATE: 62 BPM | SYSTOLIC BLOOD PRESSURE: 128 MMHG | DIASTOLIC BLOOD PRESSURE: 66 MMHG | TEMPERATURE: 97 F | BODY MASS INDEX: 33 KG/M2 | WEIGHT: 229 LBS | OXYGEN SATURATION: 97 %

## 2018-10-17 DIAGNOSIS — D3A.090 CARCINOID TUMOR OF LEFT LUNG: ICD-10-CM

## 2018-10-17 DIAGNOSIS — R71.8 LOW MEAN CORPUSCULAR VOLUME (MCV): Primary | ICD-10-CM

## 2018-10-17 PROCEDURE — 99213 OFFICE O/P EST LOW 20 MIN: CPT | Performed by: INTERNAL MEDICINE

## 2018-10-17 NOTE — PROGRESS NOTES
Cancer Center Progress Note    Patient Name: Brii Almaraz   YOB: 1940   Medical Record Number: [id number]   CSN: [id number]   Attending Physician: Jessie Aguilar M.D.    Referring Physician: Laith Mcmullen MD      Date of Visit: 10/1 breakfast., Disp: 90 tablet, Rfl: 1    Allergies:    Bacitracin-Neomycin*    RASH  Other                   OTHER (SEE COMMENTS)     Review of Systems:  A 14-point ROS was done with pertinent positives and negative per the HPI    Vital Signs:  /66 (BP CHEST+ABDOMEN DISSECT SET (CPT=71275/27582), 2/04/2016, 23:57. INDICATIONS:  hematuria     TECHNIQUE:  Unenhanced multislice CT scanning from above the kidneys to below the urinary bladder.   2D rendering are generated on the CT scanner workstation to l primarily lingula. OTHER:  None.     =====  CONCLUSION:    1. No obstructing or nonobstructing renal calculus. 2.  There is mild hydronephrosis and hydroureter on the left with perinephric stranding.   The findings may be related to recent passage of a s CHEST WALL:  No mass or axillary adenopathy. LIMITED ABDOMEN:  Cholelithiasis is again noted. No ductal dilatation.   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 No acute pulmonary disease suggested.   VASCULATURE:  Normal.  THORACIC AORTA:  Normal.  MEDIASTINUM/ALEX:  Normal.  CARDIAC:  Normal.  PLEURA:  Normal.  CHEST WALL:  There are stable low density left adrenal gland from the left ureteral vesical junction with the distal left ureter normal   caliber. There is no radiopaque ureteral calculus identified. No gross ureteral mass is appreciated.   There is stranding and relative decreased attenuation within the left kidne There are nonenlarged periaortic and common iliac chain lymph nodes. No adenopathy. BOWEL/MESENTERY:  There is diverticulosis especially in the descending , transverse and sigmoid colon. Normal-appearing appendix. No evidence for bowel obstruction.   Tracy Teran deformity. 6.  Chronic pancreatitis changes. 7.  Enlarged prostate. Dictated by: Elena Gilliland MD on 9/12/2018 at 15:49       Approved by: Elena Gilliland MD                Impression and Plan:  Has been clinically ANA.  Most recent CT s

## 2018-11-01 ENCOUNTER — LAB ENCOUNTER (OUTPATIENT)
Dept: LAB | Facility: HOSPITAL | Age: 78
End: 2018-11-01
Attending: INTERNAL MEDICINE
Payer: MEDICARE

## 2018-11-01 DIAGNOSIS — I26.99 PE (PULMONARY THROMBOEMBOLISM) (HCC): Primary | ICD-10-CM

## 2018-11-01 PROCEDURE — 85610 PROTHROMBIN TIME: CPT

## 2018-11-01 PROCEDURE — 36415 COLL VENOUS BLD VENIPUNCTURE: CPT

## 2018-11-06 ENCOUNTER — APPOINTMENT (OUTPATIENT)
Dept: LAB | Facility: HOSPITAL | Age: 78
End: 2018-11-06
Attending: INTERNAL MEDICINE
Payer: MEDICARE

## 2018-11-06 DIAGNOSIS — I26.99 PE (PULMONARY THROMBOEMBOLISM) (HCC): ICD-10-CM

## 2018-11-06 PROCEDURE — 85610 PROTHROMBIN TIME: CPT

## 2018-11-06 PROCEDURE — 36415 COLL VENOUS BLD VENIPUNCTURE: CPT

## 2018-11-12 ENCOUNTER — APPOINTMENT (OUTPATIENT)
Dept: LAB | Facility: HOSPITAL | Age: 78
End: 2018-11-12
Attending: INTERNAL MEDICINE
Payer: MEDICARE

## 2018-11-12 DIAGNOSIS — I26.99 PE (PULMONARY THROMBOEMBOLISM) (HCC): ICD-10-CM

## 2018-11-12 PROCEDURE — 36415 COLL VENOUS BLD VENIPUNCTURE: CPT

## 2018-11-12 PROCEDURE — 85610 PROTHROMBIN TIME: CPT

## 2018-11-19 ENCOUNTER — APPOINTMENT (OUTPATIENT)
Dept: LAB | Facility: HOSPITAL | Age: 78
End: 2018-11-19
Attending: INTERNAL MEDICINE
Payer: MEDICARE

## 2018-11-19 DIAGNOSIS — I26.99 PE (PULMONARY THROMBOEMBOLISM) (HCC): ICD-10-CM

## 2018-11-19 PROCEDURE — 85610 PROTHROMBIN TIME: CPT

## 2018-11-19 PROCEDURE — 36415 COLL VENOUS BLD VENIPUNCTURE: CPT

## 2018-11-27 ENCOUNTER — APPOINTMENT (OUTPATIENT)
Dept: LAB | Facility: HOSPITAL | Age: 78
End: 2018-11-27
Attending: INTERNAL MEDICINE
Payer: MEDICARE

## 2018-11-27 DIAGNOSIS — I26.99 PE (PULMONARY THROMBOEMBOLISM) (HCC): ICD-10-CM

## 2018-11-27 PROCEDURE — 85610 PROTHROMBIN TIME: CPT

## 2018-11-27 PROCEDURE — 36415 COLL VENOUS BLD VENIPUNCTURE: CPT

## 2018-12-03 ENCOUNTER — APPOINTMENT (OUTPATIENT)
Dept: LAB | Facility: HOSPITAL | Age: 78
End: 2018-12-03
Attending: INTERNAL MEDICINE
Payer: MEDICARE

## 2018-12-03 DIAGNOSIS — I26.99 PE (PULMONARY THROMBOEMBOLISM) (HCC): ICD-10-CM

## 2018-12-03 PROCEDURE — 36415 COLL VENOUS BLD VENIPUNCTURE: CPT

## 2018-12-03 PROCEDURE — 85610 PROTHROMBIN TIME: CPT

## 2018-12-11 ENCOUNTER — APPOINTMENT (OUTPATIENT)
Dept: LAB | Facility: HOSPITAL | Age: 78
End: 2018-12-11
Attending: INTERNAL MEDICINE
Payer: MEDICARE

## 2018-12-11 DIAGNOSIS — I26.99 PE (PULMONARY THROMBOEMBOLISM) (HCC): ICD-10-CM

## 2018-12-11 PROCEDURE — 36415 COLL VENOUS BLD VENIPUNCTURE: CPT

## 2018-12-11 PROCEDURE — 85610 PROTHROMBIN TIME: CPT

## 2019-01-16 ENCOUNTER — APPOINTMENT (OUTPATIENT)
Dept: LAB | Facility: HOSPITAL | Age: 79
End: 2019-01-16
Attending: INTERNAL MEDICINE
Payer: MEDICARE

## 2019-01-16 DIAGNOSIS — I26.99 PE (PULMONARY THROMBOEMBOLISM) (HCC): ICD-10-CM

## 2019-01-16 LAB
INR BLD: 1.84 (ref 0.9–1.1)
PSA SERPL DL<=0.01 NG/ML-MCNC: 21.9 SECONDS (ref 12.4–14.7)

## 2019-01-16 PROCEDURE — 36415 COLL VENOUS BLD VENIPUNCTURE: CPT

## 2019-01-16 PROCEDURE — 85610 PROTHROMBIN TIME: CPT

## 2019-01-25 ENCOUNTER — APPOINTMENT (OUTPATIENT)
Dept: LAB | Facility: HOSPITAL | Age: 79
End: 2019-01-25
Attending: INTERNAL MEDICINE
Payer: MEDICARE

## 2019-01-25 DIAGNOSIS — I26.99 PE (PULMONARY THROMBOEMBOLISM) (HCC): ICD-10-CM

## 2019-01-25 LAB
INR BLD: 1.96 (ref 0.9–1.1)
PSA SERPL DL<=0.01 NG/ML-MCNC: 23 SECONDS (ref 12.4–14.7)

## 2019-01-25 PROCEDURE — 85610 PROTHROMBIN TIME: CPT

## 2019-01-25 PROCEDURE — 36415 COLL VENOUS BLD VENIPUNCTURE: CPT

## 2019-02-18 ENCOUNTER — APPOINTMENT (OUTPATIENT)
Dept: LAB | Facility: HOSPITAL | Age: 79
End: 2019-02-18
Attending: INTERNAL MEDICINE
Payer: MEDICARE

## 2019-02-18 DIAGNOSIS — I26.99 PE (PULMONARY THROMBOEMBOLISM) (HCC): ICD-10-CM

## 2019-02-18 LAB
INR BLD: 1.84 (ref 0.9–1.1)
PSA SERPL DL<=0.01 NG/ML-MCNC: 21.9 SECONDS (ref 12.4–14.7)

## 2019-02-18 PROCEDURE — 36415 COLL VENOUS BLD VENIPUNCTURE: CPT

## 2019-02-18 PROCEDURE — 85610 PROTHROMBIN TIME: CPT

## 2019-02-28 ENCOUNTER — APPOINTMENT (OUTPATIENT)
Dept: LAB | Facility: HOSPITAL | Age: 79
End: 2019-02-28
Attending: INTERNAL MEDICINE
Payer: MEDICARE

## 2019-02-28 DIAGNOSIS — I26.99 PE (PULMONARY THROMBOEMBOLISM) (HCC): ICD-10-CM

## 2019-02-28 LAB
INR BLD: 3.08 (ref 0.9–1.1)
PSA SERPL DL<=0.01 NG/ML-MCNC: 33.9 SECONDS (ref 12.5–14.7)

## 2019-02-28 PROCEDURE — 36415 COLL VENOUS BLD VENIPUNCTURE: CPT

## 2019-02-28 PROCEDURE — 85610 PROTHROMBIN TIME: CPT

## 2019-03-14 ENCOUNTER — APPOINTMENT (OUTPATIENT)
Dept: LAB | Facility: HOSPITAL | Age: 79
End: 2019-03-14
Attending: INTERNAL MEDICINE
Payer: MEDICARE

## 2019-03-14 DIAGNOSIS — I26.99 PE (PULMONARY THROMBOEMBOLISM) (HCC): ICD-10-CM

## 2019-03-14 LAB
INR BLD: 2.48 (ref 0.9–1.1)
PSA SERPL DL<=0.01 NG/ML-MCNC: 28.5 SECONDS (ref 12.5–14.7)

## 2019-03-14 PROCEDURE — 85610 PROTHROMBIN TIME: CPT

## 2019-03-14 PROCEDURE — 36415 COLL VENOUS BLD VENIPUNCTURE: CPT

## 2019-03-28 ENCOUNTER — APPOINTMENT (OUTPATIENT)
Dept: LAB | Facility: HOSPITAL | Age: 79
End: 2019-03-28
Attending: INTERNAL MEDICINE
Payer: MEDICARE

## 2019-03-28 DIAGNOSIS — I26.99 PE (PULMONARY THROMBOEMBOLISM) (HCC): ICD-10-CM

## 2019-03-28 PROCEDURE — 85610 PROTHROMBIN TIME: CPT

## 2019-03-28 PROCEDURE — 36415 COLL VENOUS BLD VENIPUNCTURE: CPT

## 2019-05-01 ENCOUNTER — APPOINTMENT (OUTPATIENT)
Dept: LAB | Facility: HOSPITAL | Age: 79
End: 2019-05-01
Attending: INTERNAL MEDICINE
Payer: MEDICARE

## 2019-05-01 DIAGNOSIS — I26.99 PE (PULMONARY THROMBOEMBOLISM) (HCC): ICD-10-CM

## 2019-05-01 PROCEDURE — 36415 COLL VENOUS BLD VENIPUNCTURE: CPT

## 2019-05-01 PROCEDURE — 85610 PROTHROMBIN TIME: CPT

## 2019-05-16 ENCOUNTER — APPOINTMENT (OUTPATIENT)
Dept: LAB | Facility: HOSPITAL | Age: 79
End: 2019-05-16
Attending: INTERNAL MEDICINE
Payer: MEDICARE

## 2019-05-16 DIAGNOSIS — I26.99 PE (PULMONARY THROMBOEMBOLISM) (HCC): ICD-10-CM

## 2019-05-16 PROCEDURE — 36415 COLL VENOUS BLD VENIPUNCTURE: CPT

## 2019-05-16 PROCEDURE — 85610 PROTHROMBIN TIME: CPT

## 2019-05-17 NOTE — CONSULTS
Pharmacy Dosing Service: Warfarin (Coumadin)  Olga Torres is a 78year old male for whom pharmacy has been dosing warfarin (Coumadin).  Goal INR is 2-3    Recent Labs   Lab  09/11/18   2106  09/13/18   1239  09/14/18   0455   INR  5.94*  2.75*  2.28* severe fat loss & muscle wasting

## 2019-05-30 ENCOUNTER — APPOINTMENT (OUTPATIENT)
Dept: LAB | Facility: HOSPITAL | Age: 79
End: 2019-05-30
Attending: INTERNAL MEDICINE
Payer: MEDICARE

## 2019-05-30 DIAGNOSIS — I26.99 PE (PULMONARY THROMBOEMBOLISM) (HCC): ICD-10-CM

## 2019-05-30 PROCEDURE — 85610 PROTHROMBIN TIME: CPT

## 2019-05-30 PROCEDURE — 36415 COLL VENOUS BLD VENIPUNCTURE: CPT

## 2019-06-14 ENCOUNTER — APPOINTMENT (OUTPATIENT)
Dept: LAB | Facility: HOSPITAL | Age: 79
End: 2019-06-14
Attending: INTERNAL MEDICINE
Payer: MEDICARE

## 2019-06-14 DIAGNOSIS — I26.99 PE (PULMONARY THROMBOEMBOLISM) (HCC): ICD-10-CM

## 2019-06-14 PROCEDURE — 36415 COLL VENOUS BLD VENIPUNCTURE: CPT

## 2019-06-14 PROCEDURE — 85610 PROTHROMBIN TIME: CPT

## 2019-06-20 ENCOUNTER — APPOINTMENT (OUTPATIENT)
Dept: LAB | Facility: HOSPITAL | Age: 79
End: 2019-06-20
Attending: INTERNAL MEDICINE
Payer: MEDICARE

## 2019-06-20 DIAGNOSIS — I26.99 PE (PULMONARY THROMBOEMBOLISM) (HCC): ICD-10-CM

## 2019-06-20 PROCEDURE — 85610 PROTHROMBIN TIME: CPT

## 2019-06-20 PROCEDURE — 36415 COLL VENOUS BLD VENIPUNCTURE: CPT

## 2019-07-05 ENCOUNTER — APPOINTMENT (OUTPATIENT)
Dept: LAB | Facility: HOSPITAL | Age: 79
End: 2019-07-05
Attending: INTERNAL MEDICINE
Payer: MEDICARE

## 2019-07-05 DIAGNOSIS — I26.99 PE (PULMONARY THROMBOEMBOLISM) (HCC): ICD-10-CM

## 2019-07-05 LAB
INR BLD: 2.6 (ref 0.9–1.1)
PSA SERPL DL<=0.01 NG/ML-MCNC: 29.6 SECONDS (ref 12.5–14.7)

## 2019-07-05 PROCEDURE — 36415 COLL VENOUS BLD VENIPUNCTURE: CPT

## 2019-07-05 PROCEDURE — 85610 PROTHROMBIN TIME: CPT

## 2019-07-18 ENCOUNTER — APPOINTMENT (OUTPATIENT)
Dept: LAB | Facility: HOSPITAL | Age: 79
End: 2019-07-18
Attending: INTERNAL MEDICINE
Payer: MEDICARE

## 2019-07-18 DIAGNOSIS — I26.99 PE (PULMONARY THROMBOEMBOLISM) (HCC): ICD-10-CM

## 2019-07-18 LAB
INR BLD: 2.67 (ref 0.9–1.1)
PSA SERPL DL<=0.01 NG/ML-MCNC: 30.2 SECONDS (ref 12.5–14.7)

## 2019-07-18 PROCEDURE — 85610 PROTHROMBIN TIME: CPT

## 2019-07-18 PROCEDURE — 36415 COLL VENOUS BLD VENIPUNCTURE: CPT

## 2019-07-29 ENCOUNTER — HOSPITAL ENCOUNTER (OUTPATIENT)
Dept: ULTRASOUND IMAGING | Age: 79
Discharge: HOME | End: 2019-07-29
Attending: INTERNAL MEDICINE
Payer: MEDICARE

## 2019-07-29 ENCOUNTER — TELEPHONE (OUTPATIENT)
Dept: HEMATOLOGY/ONCOLOGY | Facility: HOSPITAL | Age: 79
End: 2019-07-29

## 2019-07-29 ENCOUNTER — HOSPITAL ENCOUNTER (OUTPATIENT)
Dept: CT IMAGING | Age: 79
Discharge: HOME | End: 2019-07-29
Attending: INTERNAL MEDICINE
Payer: MEDICARE

## 2019-07-29 DIAGNOSIS — D3A.090 CARCINOID TUMOR OF LEFT LUNG: ICD-10-CM

## 2019-07-29 DIAGNOSIS — I71.4 ABDOMINAL AORTIC ANEURYSM (AAA) (HCC): ICD-10-CM

## 2019-07-29 PROCEDURE — 71250 CT THORAX DX C-: CPT | Performed by: INTERNAL MEDICINE

## 2019-07-29 PROCEDURE — 76770 US EXAM ABDO BACK WALL COMP: CPT | Performed by: INTERNAL MEDICINE

## 2019-08-20 ENCOUNTER — APPOINTMENT (OUTPATIENT)
Dept: LAB | Facility: HOSPITAL | Age: 79
End: 2019-08-20
Attending: INTERNAL MEDICINE
Payer: MEDICARE

## 2019-08-20 DIAGNOSIS — I26.99 PE (PULMONARY THROMBOEMBOLISM) (HCC): ICD-10-CM

## 2019-08-20 LAB
INR BLD: 1.8 (ref 0.9–1.1)
PSA SERPL DL<=0.01 NG/ML-MCNC: 21.9 SECONDS (ref 12.5–14.7)

## 2019-08-20 PROCEDURE — 85610 PROTHROMBIN TIME: CPT

## 2019-08-20 PROCEDURE — 36415 COLL VENOUS BLD VENIPUNCTURE: CPT

## 2019-08-27 ENCOUNTER — APPOINTMENT (OUTPATIENT)
Dept: LAB | Facility: HOSPITAL | Age: 79
End: 2019-08-27
Attending: INTERNAL MEDICINE
Payer: MEDICARE

## 2019-08-27 DIAGNOSIS — I26.99 PE (PULMONARY THROMBOEMBOLISM) (HCC): ICD-10-CM

## 2019-08-27 LAB
INR BLD: 2.09 (ref 0.9–1.1)
PSA SERPL DL<=0.01 NG/ML-MCNC: 24.8 SECONDS (ref 12.5–14.7)

## 2019-08-27 PROCEDURE — 85610 PROTHROMBIN TIME: CPT

## 2019-08-27 PROCEDURE — 36415 COLL VENOUS BLD VENIPUNCTURE: CPT

## 2019-09-03 ENCOUNTER — APPOINTMENT (OUTPATIENT)
Dept: LAB | Facility: HOSPITAL | Age: 79
End: 2019-09-03
Attending: INTERNAL MEDICINE
Payer: MEDICARE

## 2019-09-03 DIAGNOSIS — I26.99 PE (PULMONARY THROMBOEMBOLISM) (HCC): ICD-10-CM

## 2019-09-03 LAB
INR BLD: 1.87 (ref 0.9–1.1)
PSA SERPL DL<=0.01 NG/ML-MCNC: 22.6 SECONDS (ref 12.5–14.7)

## 2019-09-03 PROCEDURE — 85610 PROTHROMBIN TIME: CPT

## 2019-09-03 PROCEDURE — 36415 COLL VENOUS BLD VENIPUNCTURE: CPT

## 2019-09-06 ENCOUNTER — LAB ENCOUNTER (OUTPATIENT)
Dept: LAB | Facility: HOSPITAL | Age: 79
End: 2019-09-06
Attending: INTERNAL MEDICINE
Payer: MEDICARE

## 2019-09-06 DIAGNOSIS — R53.83 FATIGUE: Primary | ICD-10-CM

## 2019-09-06 DIAGNOSIS — I26.99 PE (PULMONARY THROMBOEMBOLISM) (HCC): ICD-10-CM

## 2019-09-06 DIAGNOSIS — N40.0 BPH (BENIGN PROSTATIC HYPERPLASIA): ICD-10-CM

## 2019-09-06 LAB
ALBUMIN SERPL-MCNC: 3.8 G/DL (ref 3.4–5)
ALBUMIN/GLOB SERPL: 1.2 {RATIO} (ref 1–2)
ALP LIVER SERPL-CCNC: 82 U/L (ref 45–117)
ALT SERPL-CCNC: 37 U/L (ref 16–61)
ANION GAP SERPL CALC-SCNC: 7 MMOL/L (ref 0–18)
AST SERPL-CCNC: 26 U/L (ref 15–37)
BASOPHILS # BLD AUTO: 0.1 X10(3) UL (ref 0–0.2)
BASOPHILS NFR BLD AUTO: 1.1 %
BILIRUB SERPL-MCNC: 0.6 MG/DL (ref 0.1–2)
BUN BLD-MCNC: 29 MG/DL (ref 7–18)
BUN/CREAT SERPL: 20.3 (ref 10–20)
CALCIUM BLD-MCNC: 9.6 MG/DL (ref 8.5–10.1)
CHLORIDE SERPL-SCNC: 111 MMOL/L (ref 98–112)
CHOLEST SMN-MCNC: 130 MG/DL (ref ?–200)
CO2 SERPL-SCNC: 25 MMOL/L (ref 21–32)
CREAT BLD-MCNC: 1.43 MG/DL (ref 0.7–1.3)
DEPRECATED RDW RBC AUTO: 39.5 FL (ref 35.1–46.3)
EOSINOPHIL # BLD AUTO: 0.43 X10(3) UL (ref 0–0.7)
EOSINOPHIL NFR BLD AUTO: 4.6 %
ERYTHROCYTE [DISTWIDTH] IN BLOOD BY AUTOMATED COUNT: 21.6 % (ref 11–15)
EST. AVERAGE GLUCOSE BLD GHB EST-MCNC: 171 MG/DL (ref 68–126)
GLOBULIN PLAS-MCNC: 3.2 G/DL (ref 2.8–4.4)
GLUCOSE BLD-MCNC: 79 MG/DL (ref 70–99)
HBA1C MFR BLD HPLC: 7.6 % (ref ?–5.7)
HCT VFR BLD AUTO: 38 % (ref 39–53)
HDLC SERPL-MCNC: 40 MG/DL (ref 40–59)
HGB BLD-MCNC: 13.1 G/DL (ref 13–17.5)
IMM GRANULOCYTES # BLD AUTO: 0.13 X10(3) UL (ref 0–1)
IMM GRANULOCYTES NFR BLD: 1.4 %
LDH SERPL L TO P-CCNC: 195 U/L (ref 87–241)
LDLC SERPL CALC-MCNC: 42 MG/DL (ref ?–100)
LYMPHOCYTES # BLD AUTO: 2.26 X10(3) UL (ref 1–4)
LYMPHOCYTES NFR BLD AUTO: 23.9 %
M PROTEIN MFR SERPL ELPH: 7 G/DL (ref 6.4–8.2)
MCH RBC QN AUTO: 25.2 PG (ref 26–34)
MCHC RBC AUTO-ENTMCNC: 34.5 G/DL (ref 31–37)
MCV RBC AUTO: 73.1 FL (ref 80–100)
MONOCYTES # BLD AUTO: 0.93 X10(3) UL (ref 0.1–1)
MONOCYTES NFR BLD AUTO: 9.9 %
NEUTROPHILS # BLD AUTO: 5.59 X10 (3) UL (ref 1.5–7.7)
NEUTROPHILS # BLD AUTO: 5.59 X10(3) UL (ref 1.5–7.7)
NEUTROPHILS NFR BLD AUTO: 59.1 %
NONHDLC SERPL-MCNC: 90 MG/DL (ref ?–130)
OSMOLALITY SERPL CALC.SUM OF ELEC: 301 MOSM/KG (ref 275–295)
PLATELET # BLD AUTO: 157 10(3)UL (ref 150–450)
POTASSIUM SERPL-SCNC: 5.3 MMOL/L (ref 3.5–5.1)
PSA SERPL-MCNC: 2.86 NG/ML (ref ?–4)
RBC # BLD AUTO: 5.2 X10(6)UL (ref 3.8–5.8)
SODIUM SERPL-SCNC: 143 MMOL/L (ref 136–145)
T4 FREE SERPL-MCNC: 0.8 NG/DL (ref 0.8–1.7)
TRIGL SERPL-MCNC: 240 MG/DL (ref 30–149)
TSI SER-ACNC: 1.42 MIU/ML (ref 0.36–3.74)
VLDLC SERPL CALC-MCNC: 48 MG/DL (ref 0–30)
WBC # BLD AUTO: 9.4 X10(3) UL (ref 4–11)

## 2019-09-06 PROCEDURE — 84439 ASSAY OF FREE THYROXINE: CPT

## 2019-09-06 PROCEDURE — 84443 ASSAY THYROID STIM HORMONE: CPT

## 2019-09-06 PROCEDURE — 80053 COMPREHEN METABOLIC PANEL: CPT

## 2019-09-06 PROCEDURE — 83615 LACTATE (LD) (LDH) ENZYME: CPT

## 2019-09-06 PROCEDURE — 85025 COMPLETE CBC W/AUTO DIFF WBC: CPT

## 2019-09-06 PROCEDURE — 80061 LIPID PANEL: CPT

## 2019-09-06 PROCEDURE — 84153 ASSAY OF PSA TOTAL: CPT

## 2019-09-06 PROCEDURE — 36415 COLL VENOUS BLD VENIPUNCTURE: CPT

## 2019-09-06 PROCEDURE — 83036 HEMOGLOBIN GLYCOSYLATED A1C: CPT

## 2019-09-10 ENCOUNTER — APPOINTMENT (OUTPATIENT)
Dept: LAB | Facility: HOSPITAL | Age: 79
End: 2019-09-10
Attending: INTERNAL MEDICINE
Payer: MEDICARE

## 2019-09-10 DIAGNOSIS — I26.99 PE (PULMONARY THROMBOEMBOLISM) (HCC): ICD-10-CM

## 2019-09-10 LAB
INR BLD: 2.46 (ref 0.9–1.1)
PSA SERPL DL<=0.01 NG/ML-MCNC: 28.3 SECONDS (ref 12.5–14.7)

## 2019-09-10 PROCEDURE — 36415 COLL VENOUS BLD VENIPUNCTURE: CPT

## 2019-09-10 PROCEDURE — 85610 PROTHROMBIN TIME: CPT

## 2019-09-16 PROCEDURE — 88108 CYTOPATH CONCENTRATE TECH: CPT | Performed by: UROLOGY

## 2019-09-24 ENCOUNTER — LAB ENCOUNTER (OUTPATIENT)
Dept: LAB | Facility: HOSPITAL | Age: 79
End: 2019-09-24
Attending: INTERNAL MEDICINE
Payer: MEDICARE

## 2019-09-24 DIAGNOSIS — D64.9 ANEMIA: Primary | ICD-10-CM

## 2019-09-24 DIAGNOSIS — E87.5 SERUM POTASSIUM ELEVATED: ICD-10-CM

## 2019-09-24 DIAGNOSIS — I26.99 PE (PULMONARY THROMBOEMBOLISM) (HCC): ICD-10-CM

## 2019-09-24 LAB
DEPRECATED HBV CORE AB SER IA-ACNC: 144.7 NG/ML (ref 30–530)
INR BLD: 2.66 (ref 0.9–1.1)
IRON SATURATION: 23 % (ref 20–50)
IRON SERPL-MCNC: 87 UG/DL (ref 65–175)
POTASSIUM SERPL-SCNC: 4.8 MMOL/L (ref 3.5–5.1)
PSA SERPL DL<=0.01 NG/ML-MCNC: 30.1 SECONDS (ref 12.5–14.7)
TOTAL IRON BINDING CAPACITY: 380 UG/DL (ref 240–450)
TRANSFERRIN SERPL-MCNC: 255 MG/DL (ref 200–360)

## 2019-09-24 PROCEDURE — 82728 ASSAY OF FERRITIN: CPT

## 2019-09-24 PROCEDURE — 85610 PROTHROMBIN TIME: CPT

## 2019-09-24 PROCEDURE — 83540 ASSAY OF IRON: CPT

## 2019-09-24 PROCEDURE — 84132 ASSAY OF SERUM POTASSIUM: CPT

## 2019-09-24 PROCEDURE — 83550 IRON BINDING TEST: CPT

## 2019-09-24 PROCEDURE — 36415 COLL VENOUS BLD VENIPUNCTURE: CPT

## 2019-10-10 ENCOUNTER — OFFICE VISIT (OUTPATIENT)
Dept: HEMATOLOGY/ONCOLOGY | Facility: HOSPITAL | Age: 79
End: 2019-10-10
Attending: INTERNAL MEDICINE
Payer: MEDICARE

## 2019-10-10 VITALS
WEIGHT: 248 LBS | BODY MASS INDEX: 36 KG/M2 | OXYGEN SATURATION: 95 % | DIASTOLIC BLOOD PRESSURE: 73 MMHG | TEMPERATURE: 98 F | SYSTOLIC BLOOD PRESSURE: 116 MMHG | HEART RATE: 80 BPM | RESPIRATION RATE: 18 BRPM

## 2019-10-10 DIAGNOSIS — I26.99 PE (PULMONARY THROMBOEMBOLISM) (HCC): ICD-10-CM

## 2019-10-10 DIAGNOSIS — R71.8 LOW MEAN CORPUSCULAR VOLUME (MCV): ICD-10-CM

## 2019-10-10 DIAGNOSIS — D3A.090 CARCINOID TUMOR OF LEFT LUNG: ICD-10-CM

## 2019-10-10 PROCEDURE — 99213 OFFICE O/P EST LOW 20 MIN: CPT | Performed by: INTERNAL MEDICINE

## 2019-10-10 NOTE — PROGRESS NOTES
Cancer Center Progress Note    Patient Name: Brii Almaraz   YOB: 1940   Medical Record Number: [id number]   CSN: [id number]   Attending Physician: Jessie Aguilar M.D.    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  Dose reduction techniques were used.  Dose information is transmitted to the [facility]) Donte Gibson 35 Adriana Benedict intervention needed. Iron studies showed no evidence of JOLENE. Renal disease could be contributing to mild anemia as well. 4. Indigestion/constipation- trial OTC antacids and bowel regimen. If persistent he will discuss with his PCP.  Takes probiotic as w

## 2019-10-10 NOTE — PATIENT INSTRUCTIONS
For triage nurse: [phone] Monday through Friday 7:30-5:00.  *Please note this is a new phone number*    After hours or weekends for emergent needs:  [phone].      To schedule diagnostic testing: Central Scheduling: [place] [id number]

## 2019-10-24 ENCOUNTER — APPOINTMENT (OUTPATIENT)
Dept: LAB | Facility: HOSPITAL | Age: 79
End: 2019-10-24
Attending: INTERNAL MEDICINE
Payer: MEDICARE

## 2019-10-24 DIAGNOSIS — I26.99 PE (PULMONARY THROMBOEMBOLISM) (HCC): ICD-10-CM

## 2019-10-24 PROCEDURE — 85610 PROTHROMBIN TIME: CPT

## 2019-10-24 PROCEDURE — 36415 COLL VENOUS BLD VENIPUNCTURE: CPT

## 2019-11-14 ENCOUNTER — LAB ENCOUNTER (OUTPATIENT)
Dept: LAB | Facility: HOSPITAL | Age: 79
End: 2019-11-14
Attending: INTERNAL MEDICINE
Payer: MEDICARE

## 2019-11-14 DIAGNOSIS — I26.99 PULMONARY EMBOLISM (HCC): Primary | ICD-10-CM

## 2019-11-14 PROCEDURE — 85610 PROTHROMBIN TIME: CPT

## 2019-11-14 PROCEDURE — 36415 COLL VENOUS BLD VENIPUNCTURE: CPT

## 2019-11-29 ENCOUNTER — APPOINTMENT (OUTPATIENT)
Dept: LAB | Facility: HOSPITAL | Age: 79
End: 2019-11-29
Attending: INTERNAL MEDICINE
Payer: MEDICARE

## 2019-11-29 DIAGNOSIS — I26.99 PULMONARY EMBOLISM (HCC): ICD-10-CM

## 2019-11-29 PROCEDURE — 36415 COLL VENOUS BLD VENIPUNCTURE: CPT

## 2019-11-29 PROCEDURE — 85610 PROTHROMBIN TIME: CPT

## 2019-12-13 ENCOUNTER — APPOINTMENT (OUTPATIENT)
Dept: LAB | Facility: HOSPITAL | Age: 79
End: 2019-12-13
Attending: INTERNAL MEDICINE
Payer: MEDICARE

## 2019-12-13 DIAGNOSIS — I26.99 PULMONARY EMBOLISM (HCC): ICD-10-CM

## 2019-12-13 PROCEDURE — 36415 COLL VENOUS BLD VENIPUNCTURE: CPT

## 2019-12-13 PROCEDURE — 85610 PROTHROMBIN TIME: CPT

## 2020-01-13 ENCOUNTER — APPOINTMENT (OUTPATIENT)
Dept: LAB | Facility: HOSPITAL | Age: 80
End: 2020-01-13
Attending: INTERNAL MEDICINE
Payer: MEDICARE

## 2020-01-13 DIAGNOSIS — I26.99 PULMONARY EMBOLISM (HCC): ICD-10-CM

## 2020-01-13 LAB
INR BLD: 2.23 (ref 0.9–1.1)
PSA SERPL DL<=0.01 NG/ML-MCNC: 26.1 SECONDS (ref 12.5–14.7)

## 2020-01-13 PROCEDURE — 85610 PROTHROMBIN TIME: CPT

## 2020-01-13 PROCEDURE — 36415 COLL VENOUS BLD VENIPUNCTURE: CPT

## 2020-01-21 ENCOUNTER — LAB ENCOUNTER (OUTPATIENT)
Dept: LAB | Facility: HOSPITAL | Age: 80
End: 2020-01-21
Attending: INTERNAL MEDICINE
Payer: MEDICARE

## 2020-01-21 DIAGNOSIS — E87.5 HIGH POTASSIUM: ICD-10-CM

## 2020-01-21 DIAGNOSIS — D64.9 ANEMIA: ICD-10-CM

## 2020-01-21 DIAGNOSIS — E78.5 SERUM LIPIDS HIGH: Primary | ICD-10-CM

## 2020-01-21 DIAGNOSIS — E11.9 DMII (DIABETES MELLITUS, TYPE 2) (HCC): ICD-10-CM

## 2020-01-21 LAB
ANION GAP SERPL CALC-SCNC: 4 MMOL/L (ref 0–18)
BUN BLD-MCNC: 21 MG/DL (ref 7–18)
BUN/CREAT SERPL: 17.1 (ref 10–20)
CALCIUM BLD-MCNC: 9.4 MG/DL (ref 8.5–10.1)
CHLORIDE SERPL-SCNC: 115 MMOL/L (ref 98–112)
CHOLEST SMN-MCNC: 126 MG/DL (ref ?–200)
CO2 SERPL-SCNC: 25 MMOL/L (ref 21–32)
CREAT BLD-MCNC: 1.23 MG/DL (ref 0.7–1.3)
DEPRECATED HBV CORE AB SER IA-ACNC: 84.5 NG/ML (ref 30–530)
DEPRECATED RDW RBC AUTO: 39.2 FL (ref 35.1–46.3)
ERYTHROCYTE [DISTWIDTH] IN BLOOD BY AUTOMATED COUNT: 20.9 % (ref 11–15)
EST. AVERAGE GLUCOSE BLD GHB EST-MCNC: 169 MG/DL (ref 68–126)
GLUCOSE BLD-MCNC: 71 MG/DL (ref 70–99)
HBA1C MFR BLD HPLC: 7.5 % (ref ?–5.7)
HCT VFR BLD AUTO: 38.8 % (ref 39–53)
HDLC SERPL-MCNC: 47 MG/DL (ref 40–59)
HGB BLD-MCNC: 12.6 G/DL (ref 13–17.5)
IRON SATURATION: 24 % (ref 20–50)
IRON SERPL-MCNC: 90 UG/DL (ref 65–175)
LDLC SERPL CALC-MCNC: 43 MG/DL (ref ?–100)
MCH RBC QN AUTO: 23.5 PG (ref 26–34)
MCHC RBC AUTO-ENTMCNC: 32.5 G/DL (ref 31–37)
MCV RBC AUTO: 72.3 FL (ref 80–100)
NONHDLC SERPL-MCNC: 79 MG/DL (ref ?–130)
OSMOLALITY SERPL CALC.SUM OF ELEC: 299 MOSM/KG (ref 275–295)
PATIENT FASTING Y/N/NP: YES
PATIENT FASTING Y/N/NP: YES
PLATELET # BLD AUTO: 153 10(3)UL (ref 150–450)
POTASSIUM SERPL-SCNC: 4.9 MMOL/L (ref 3.5–5.1)
RBC # BLD AUTO: 5.37 X10(6)UL (ref 3.8–5.8)
SODIUM SERPL-SCNC: 144 MMOL/L (ref 136–145)
TOTAL IRON BINDING CAPACITY: 368 UG/DL (ref 240–450)
TRANSFERRIN SERPL-MCNC: 247 MG/DL (ref 200–360)
TRIGL SERPL-MCNC: 180 MG/DL (ref 30–149)
VLDLC SERPL CALC-MCNC: 36 MG/DL (ref 0–30)
WBC # BLD AUTO: 9.5 X10(3) UL (ref 4–11)

## 2020-01-21 PROCEDURE — 80048 BASIC METABOLIC PNL TOTAL CA: CPT

## 2020-01-21 PROCEDURE — 82728 ASSAY OF FERRITIN: CPT

## 2020-01-21 PROCEDURE — 36415 COLL VENOUS BLD VENIPUNCTURE: CPT

## 2020-01-21 PROCEDURE — 83036 HEMOGLOBIN GLYCOSYLATED A1C: CPT

## 2020-01-21 PROCEDURE — 83550 IRON BINDING TEST: CPT

## 2020-01-21 PROCEDURE — 83540 ASSAY OF IRON: CPT

## 2020-01-21 PROCEDURE — 80061 LIPID PANEL: CPT

## 2020-01-21 PROCEDURE — 85027 COMPLETE CBC AUTOMATED: CPT

## 2020-02-11 ENCOUNTER — APPOINTMENT (OUTPATIENT)
Dept: LAB | Facility: HOSPITAL | Age: 80
End: 2020-02-11
Attending: INTERNAL MEDICINE
Payer: MEDICARE

## 2020-02-11 DIAGNOSIS — I26.99 PULMONARY EMBOLISM (HCC): ICD-10-CM

## 2020-02-11 LAB
INR BLD: 1.84 (ref 0.9–1.1)
PSA SERPL DL<=0.01 NG/ML-MCNC: 22.3 SECONDS (ref 12.5–14.7)

## 2020-02-11 PROCEDURE — 36415 COLL VENOUS BLD VENIPUNCTURE: CPT

## 2020-02-11 PROCEDURE — 85610 PROTHROMBIN TIME: CPT

## 2020-03-17 ENCOUNTER — APPOINTMENT (OUTPATIENT)
Dept: LAB | Facility: HOSPITAL | Age: 80
End: 2020-03-17
Attending: INTERNAL MEDICINE
Payer: MEDICARE

## 2020-03-17 DIAGNOSIS — I26.99 PULMONARY EMBOLISM (HCC): ICD-10-CM

## 2020-03-17 LAB
INR BLD: 2.58 (ref 0.9–1.1)
PSA SERPL DL<=0.01 NG/ML-MCNC: 29.4 SECONDS (ref 12.5–14.7)

## 2020-03-17 PROCEDURE — 36415 COLL VENOUS BLD VENIPUNCTURE: CPT

## 2020-03-17 PROCEDURE — 85610 PROTHROMBIN TIME: CPT

## 2020-05-07 ENCOUNTER — APPOINTMENT (OUTPATIENT)
Dept: CT IMAGING | Facility: HOSPITAL | Age: 80
End: 2020-05-07
Attending: EMERGENCY MEDICINE
Payer: MEDICARE

## 2020-05-07 ENCOUNTER — APPOINTMENT (OUTPATIENT)
Dept: GENERAL RADIOLOGY | Facility: HOSPITAL | Age: 80
End: 2020-05-07
Attending: EMERGENCY MEDICINE
Payer: MEDICARE

## 2020-05-07 ENCOUNTER — HOSPITAL ENCOUNTER (EMERGENCY)
Facility: HOSPITAL | Age: 80
Discharge: HOME | End: 2020-05-07
Attending: EMERGENCY MEDICINE
Payer: MEDICARE

## 2020-05-07 VITALS
TEMPERATURE: 97 F | BODY MASS INDEX: 34.36 KG/M2 | OXYGEN SATURATION: 97 % | HEART RATE: 82 BPM | HEIGHT: 70 IN | DIASTOLIC BLOOD PRESSURE: 56 MMHG | WEIGHT: 240 LBS | SYSTOLIC BLOOD PRESSURE: 117 MMHG | RESPIRATION RATE: 18 BRPM
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
## 2020-05-07 DIAGNOSIS — D64.9 ANEMIA, UNSPECIFIED TYPE: ICD-10-CM

## 2020-05-07 PROCEDURE — 73060 X-RAY EXAM OF HUMERUS: CPT | Performed by: EMERGENCY MEDICINE

## 2020-05-07 PROCEDURE — 36415 COLL VENOUS BLD VENIPUNCTURE: CPT

## 2020-05-07 PROCEDURE — 99285 EMERGENCY DEPT VISIT HI MDM: CPT

## 2020-05-07 PROCEDURE — 73502 X-RAY EXAM HIP UNI 2-3 VIEWS: CPT | Performed by: EMERGENCY MEDICINE

## 2020-05-07 PROCEDURE — 73552 X-RAY EXAM OF FEMUR 2/>: CPT | Performed by: EMERGENCY MEDICINE

## 2020-05-07 PROCEDURE — 73030 X-RAY EXAM OF SHOULDER: CPT | Performed by: EMERGENCY MEDICINE

## 2020-05-07 PROCEDURE — 85610 PROTHROMBIN TIME: CPT | Performed by: EMERGENCY MEDICINE

## 2020-05-07 PROCEDURE — 72125 CT NECK SPINE W/O DYE: CPT | Performed by: EMERGENCY MEDICINE

## 2020-05-07 PROCEDURE — 85025 COMPLETE CBC W/AUTO DIFF WBC: CPT | Performed by: EMERGENCY MEDICINE

## 2020-05-07 PROCEDURE — 70450 CT HEAD/BRAIN W/O DYE: CPT | Performed by: EMERGENCY MEDICINE

## 2020-05-07 PROCEDURE — 80053 COMPREHEN METABOLIC PANEL: CPT | Performed by: EMERGENCY MEDICINE

## 2020-05-07 PROCEDURE — 85730 THROMBOPLASTIN TIME PARTIAL: CPT | Performed by: EMERGENCY MEDICINE

## 2020-05-07 RX ORDER — ACETAMINOPHEN 500 MG
1000 TABLET ORAL ONCE
Status: COMPLETED | OUTPATIENT
Start: 2020-05-07 | End: 2020-05-07

## 2020-05-07 NOTE — ED INITIAL ASSESSMENT (HPI)
Pt presented to ED via EMS s/p fall. Pt slipped on water in [facility] c/o left shoulder and left thigh pain.

## 2020-05-07 NOTE — ED NOTES
Attempt made to ambulate pt with assistance of Kendal Gonsales and use of walker. Pt expressed discomfort to left thigh with sitting upright. Pt unable to bear weight to legs without significant discomfort although pt able to easily move legs while lying on cart.  Yary Moss

## 2020-05-07 NOTE — ED NOTES
Pt awake and alert, skin w/d,resps reg/unlabored. Pt states he is feeling better, pain now 2-3/10 when not moving and 5/10 with movement. Pt able to lift both left arm and leg up off cart. Leg and arm supported with blanket rolls.

## 2020-05-07 NOTE — ED PROVIDER NOTES
Patient Seen in: [facility] Emergency Department      History   Patient presents with:  Fall    Stated Complaint: Fall    HPI    Patient is a 68-year-old male who arrives to the ER by ambulance after a fall at the grocery store.   He reports he slipp SURGERY      left leg-screws,pins   • HC CLOSED TX DISTAL TIBIA FRACTURE W MANIPULATION Left    • HC DEBRIDEMENT SKIN UP TO 10% Left     leg   • OTHER SURGICAL HISTORY  7/29/15    cysto, stent removal - Dr. Leger Mu   • OTHER SURGICAL HISTORY  10/14/15 Normocephalic and atraumatic. Eyes:      Conjunctiva/sclera: Conjunctivae normal.      Pupils: Pupils are equal, round, and reactive to light. Neck:      Comments: Cervical collar will be left in place pending CT scan.   Cardiovascular:      Rate and Rh RDW 21.4 (*)     All other components within normal limits   PTT, ACTIVATED - Normal   CBC WITH DIFFERENTIAL WITH PLATELET    Narrative: The following orders were created for panel order CBC WITH DIFFERENTIAL WITH PLATELET.   Procedure Austin Casiano MD on 5/07/2020 at 11:13 AM     Finalized by: Hermes Arellano MD on 5/07/2020 at 11:14 AM          Xr Shoulder, Complete (min 2 Views), Left (cpt=73030)    Result Date: 5/7/2020  PROCEDURE:  XR SHOULDER, COMPLETE (MIN 2 VIEWS), LEFT (CPT=73030) abnormality. Dictated by: Sarah Rea MD on 5/07/2020 at 9:30 AM     Finalized by: Sarah Rea MD on 5/07/2020 at 9:34 AM          Ct Spine Cervical (cpt=72125)    Result Date: 5/7/2020  PROCEDURE:  CT SPINE CERVICAL (CPT=72125)  COMPARISON:  None.   CLEMENTE Mike Manrique MD on 5/07/2020 at 10:56 AM     Finalized by: Ambreen Franco MD on 5/07/2020 at 10:57 AM                MDM     61-year-old male who presents via ambulance after mechanical fall at a grocery store.   He did strike his head, is on Coumadin, has Prescribed:  Current Discharge Medication List

## 2020-05-07 NOTE — ED NOTES
Pt made comfortable on cart. Left arm supported with blanket rolls. Ice pack to left side of head, pt with small amount swelling to left side of head with laceration with no active bleeding. Left leg supported with blanket roll under left knee for comfort.

## 2020-05-07 NOTE — ED NOTES
Patient able to ambulate without assistance from staff. Patient used a walker and felt comfortable going home. Patients wife @ beside states she is comfortable with him going home and will be there 24/7 to help. RN and MD notified.

## 2020-05-07 NOTE — CM/SW NOTE
Pt insistent on wanting to go home. He reports he has a walker at home \"somewhere\" and his granddaughter also lives at home with him and his wife. He is refusing all of my resources including private duty caregiver and PT.   RN and MD updated.

## 2020-06-09 ENCOUNTER — APPOINTMENT (OUTPATIENT)
Dept: ULTRASOUND IMAGING | Facility: HOSPITAL | Age: 80
DRG: 300 | End: 2020-06-09
Payer: MEDICARE

## 2020-06-09 ENCOUNTER — HOSPITAL ENCOUNTER (INPATIENT)
Facility: HOSPITAL | Age: 80
LOS: 6 days | Discharge: HOME | DRG: 300 | End: 2020-06-15
Admitting: HOSPITALIST
Payer: MEDICARE

## 2020-06-09 DIAGNOSIS — D50.8 OTHER IRON DEFICIENCY ANEMIA: ICD-10-CM

## 2020-06-09 DIAGNOSIS — I82.4Y2 ACUTE DEEP VEIN THROMBOSIS (DVT) OF PROXIMAL VEIN OF LEFT LOWER EXTREMITY (HCC): Primary | ICD-10-CM

## 2020-06-09 PROBLEM — I82.402 LEG DVT (DEEP VENOUS THROMBOEMBOLISM), ACUTE, LEFT (HCC): Status: ACTIVE | Noted: 2020-06-09

## 2020-06-09 PROCEDURE — 99223 1ST HOSP IP/OBS HIGH 75: CPT | Performed by: HOSPITALIST

## 2020-06-09 PROCEDURE — 93971 EXTREMITY STUDY: CPT

## 2020-06-09 RX ORDER — SODIUM CHLORIDE 9 MG/ML
INJECTION, SOLUTION INTRAVENOUS CONTINUOUS
Status: ACTIVE | OUTPATIENT
Start: 2020-06-09 | End: 2020-06-09

## 2020-06-09 RX ORDER — HYDROCODONE BITARTRATE AND ACETAMINOPHEN 5; 325 MG/1; MG/1
1 TABLET ORAL EVERY 6 HOURS PRN
Status: DISCONTINUED | OUTPATIENT
Start: 2020-06-09 | End: 2020-06-10

## 2020-06-09 RX ORDER — HEPARIN SODIUM AND DEXTROSE 10000; 5 [USP'U]/100ML; G/100ML
18 INJECTION INTRAVENOUS ONCE
Status: COMPLETED | OUTPATIENT
Start: 2020-06-09 | End: 2020-06-09

## 2020-06-09 RX ORDER — DEXTROSE MONOHYDRATE 25 G/50ML
50 INJECTION, SOLUTION INTRAVENOUS
Status: DISCONTINUED | OUTPATIENT
Start: 2020-06-09 | End: 2020-06-15

## 2020-06-09 RX ORDER — GARLIC EXTRACT 500 MG
1 CAPSULE ORAL DAILY
Status: DISCONTINUED | OUTPATIENT
Start: 2020-06-09 | End: 2020-06-15

## 2020-06-09 RX ORDER — HEPARIN SODIUM AND DEXTROSE 10000; 5 [USP'U]/100ML; G/100ML
INJECTION INTRAVENOUS CONTINUOUS
Status: DISCONTINUED | OUTPATIENT
Start: 2020-06-09 | End: 2020-06-15

## 2020-06-09 RX ORDER — MORPHINE SULFATE 4 MG/ML
1 INJECTION, SOLUTION INTRAMUSCULAR; INTRAVENOUS ONCE
Status: COMPLETED | OUTPATIENT
Start: 2020-06-09 | End: 2020-06-09

## 2020-06-09 RX ORDER — ACETAMINOPHEN 325 MG/1
650 TABLET ORAL EVERY 6 HOURS PRN
Status: DISCONTINUED | OUTPATIENT
Start: 2020-06-09 | End: 2020-06-15

## 2020-06-09 RX ORDER — MORPHINE SULFATE 4 MG/ML
2 INJECTION, SOLUTION INTRAMUSCULAR; INTRAVENOUS ONCE
Status: DISCONTINUED | OUTPATIENT
Start: 2020-06-09 | End: 2020-06-09

## 2020-06-09 RX ORDER — HYDROCODONE BITARTRATE AND ACETAMINOPHEN 5; 325 MG/1; MG/1
1 TABLET ORAL EVERY 6 HOURS PRN
COMMUNITY
End: 2020-06-29 | Stop reason: DRUGHIGH

## 2020-06-09 RX ORDER — HEPARIN SODIUM 5000 [USP'U]/ML
80 INJECTION INTRAVENOUS; SUBCUTANEOUS ONCE
Status: COMPLETED | OUTPATIENT
Start: 2020-06-09 | End: 2020-06-09

## 2020-06-09 RX ORDER — ASPIRIN 325 MG
325 TABLET ORAL DAILY
Status: DISCONTINUED | OUTPATIENT
Start: 2020-06-10 | End: 2020-06-14

## 2020-06-09 RX ORDER — HYDROCODONE BITARTRATE AND ACETAMINOPHEN 5; 325 MG/1; MG/1
1 TABLET ORAL ONCE
Status: COMPLETED | OUTPATIENT
Start: 2020-06-09 | End: 2020-06-09

## 2020-06-09 NOTE — ED PROVIDER NOTES
Patient Seen in: [facility] Emergency Department      History   Patient presents with:  Swelling Edema    Stated Complaint: swelling to left lower extremity, rule out dvt    HPI    Patient is a 70-year-old male presents emergency room with a history lower extremities with complications    • Visual impairment     4-5 yrs ago had blurriness prob, evaled.  no diagnosis              Past Surgical History:   Procedure Laterality Date   • COLONOSCOPY     • COLONOSCOPY      x3, polpypectomy x1, third one liyah All other systems reviewed and negative except as noted above.     Physical Exam     ED Triage Vitals [06/09/20 1458]   /71   Pulse 86   Resp 18   Temp 97.9 °F (36.6 °C)   Temp src Temporal   SpO2 97 %   O2 Device None (Room air)       Current:BP for the following components:       Result Value    Glucose 125 (*)     Chloride 113 (*)     BUN 19 (*)     Calculated Osmolality 298 (*)     Albumin 3.3 (*)     A/G Ratio 0.9 (*)     All other components within normal limits   PROTHROMBIN TIME (PT) - Abno and blood sugar which showed evidence of a hemoglobin of 8.2 which is stable compared to previous. Patient's INR is found to be 2.4 which is therapeutic. Patient's liver function tests are negative. Patient's COVID is negative.   The patient does have ev

## 2020-06-09 NOTE — ED INITIAL ASSESSMENT (HPI)
Patient with increased swelling to LLE after having a fall six weeks ago. Patient with history of DVT, currently on warfarin.

## 2020-06-10 PROCEDURE — 99232 SBSQ HOSP IP/OBS MODERATE 35: CPT | Performed by: HOSPITALIST

## 2020-06-10 PROCEDURE — 99223 1ST HOSP IP/OBS HIGH 75: CPT | Performed by: INTERNAL MEDICINE

## 2020-06-10 RX ORDER — FOLIC ACID 1 MG/1
1 TABLET ORAL DAILY
Status: DISCONTINUED | OUTPATIENT
Start: 2020-06-10 | End: 2020-06-15

## 2020-06-10 RX ORDER — HYDROCODONE BITARTRATE AND ACETAMINOPHEN 5; 325 MG/1; MG/1
1 TABLET ORAL EVERY 4 HOURS PRN
Status: DISCONTINUED | OUTPATIENT
Start: 2020-06-10 | End: 2020-06-12

## 2020-06-10 RX ORDER — POLYETHYLENE GLYCOL 3350 17 G/17G
17 POWDER, FOR SOLUTION ORAL DAILY PRN
Status: DISCONTINUED | OUTPATIENT
Start: 2020-06-10 | End: 2020-06-15

## 2020-06-10 RX ORDER — DOCUSATE SODIUM 100 MG/1
100 CAPSULE, LIQUID FILLED ORAL 2 TIMES DAILY
Status: DISCONTINUED | OUTPATIENT
Start: 2020-06-10 | End: 2020-06-15

## 2020-06-10 NOTE — ED NOTES
Patient sitting up in bed eating dinner. Denies any needs at this time.   Awaiting room assignment to be cleaned

## 2020-06-10 NOTE — PROGRESS NOTES
NURSING ADMISSION NOTE      Patient admitted via cart. Oriented to room. Safety precautions initiated. Bed in low position. Call light in reach. Pt admitted from ED to room 7610. Pt a/o x 4. Reports pain 10/10 to lle upon arrival to the unit.   Hepa

## 2020-06-10 NOTE — PROGRESS NOTES
[place] [facility]  Progress note     Oniel Vernon Patient Status:  Emergency    1940 MRN [id number]   Location [facility] [facility] Attending Miller Esteban MD   Hosp Day # 1 PCP Vince Chan MD     Chief Complaint: / PLAN:     1. Acute DVT   1. Previously on coumadin with therapeutic INR  2. Heparin gtt  3. Hematology on consult  4. Probably provoked by fall several weeks ago; unclear what INR previously has been last several weeks  2.  Carcinoid tumor s/p SOMMER lobecto

## 2020-06-10 NOTE — CONSULTS
Hematology-Oncology Consultation Note    Patient Name: Gene Madrigal   YOB: 1940   Medical Record Number: [id number]   CSN: [id number]   Consulting Physician: Seth Moralez MD  Date of Consultation: 6/10/2020     Reason for Consultatio history of antineoplastic chemotherapy     JUly/2015   • Pulmonary embolism (HCC) 20 YEARS AGO    ON COUMADIN NOW   • TIA (transient ischemic attack)    • Type II or unspecified type diabetes mellitus without mention of complication, not stated as [place] [facility] stone) Brother    • Other (hernia) Brother        Psychosocial History:  Social History    Socioeconomic History      Marital status:       Spouse name: Not on file      Number of children: Not on file      Years of education: Not on file      Highe packet 17 g, 17 g, Oral, Daily PRN  bisacodyl (DULCOLAX) EC tab 5 mg, 5 mg, Oral, Daily PRN  [] 0.9% NaCl infusion, , Intravenous, Continuous  [COMPLETED] Heparin Sodium (Porcine) 5000 UNIT/ML injection 8,900 Units, 80 Units/kg, Intravenous, Once  [ erythema   Neurological: Grossly intact. Lymphatics: There is no palpable lymphadenopathy  Psych/Depression: Mood and affect are appropriate.     Laboratory:  Lab Component   Component Value Date/Time    RBC 3.55 (L) 06/09/2020 2143    RBC 3.36 (L) 06/09/ 09/06/2017 0935    Platelet Count 092 (L) 06/05/2017 0817    .0 06/10/2020 0024    .0 06/09/2020 2143    .0 06/09/2020 1628       Lab Component   Component Value Date/Time    Glucose 164 (H) 06/09/2020 2143    Glucose 125 (H) 06/09/202 1628    Chloride 113 (H) 05/07/2020 0859    Chloride 109 12/06/2017 0804    Chloride 108 06/05/2017 0817    Chloride 107 03/06/2017 0911    CO2 28.0 06/09/2020 2143    CO2 26.0 06/09/2020 1628    CO2 22.0 05/07/2020 0859    Carbon Dioxide 24.7 12/06/2017 0 PATIENT STATED HISTORY: (As transcribed by Technologist)  Patient offered no additional history at this time. FINDINGS:    EXTREMITY EXAMINED:  Left  SAPHENOFEMORAL JUNCTION:  No reflux.   THROMBI:  Partially obstructing acute DVT distal left pop

## 2020-06-10 NOTE — PLAN OF CARE
Assumed care @0730  VSS,   A&Ox4,   RA    NSR , Heparin gtt @15ml/hr. PRN norco has been given for Pain, with relief. Up with 1 assist and cane as tolerated. Tolerating carb controlled diet. Intake and outputs w/d/l.     L thigh hard/redness    Update for signs and symptoms of internal bleeding  - Monitor lab trends  - Patient is to report abnormal signs of bleeding to staff  - Avoid use of toothpicks and dental floss  - Use electric shaver for shaving  - Use soft bristle tooth brush  - Limit straining

## 2020-06-10 NOTE — PHYSICAL THERAPY NOTE
Attempted PT evaluation, pt (+) DVT and on heparin drip starting at 2137 on 6/9. Will hold 24 hours per dept. policy. Will continue to follow.

## 2020-06-10 NOTE — H&P
[facility]  History and Physical     Naseem Guzman Patient Status:  Emergency    1940 MRN [id number]   Location [address] [facility] Attending Michelle Diaz MD   Hosp Day # 0 PCP Britni Romero MD     Chief Comp third one clear   • COLONOSCOPY, POSSIBLE BIOPSY, POSSIBLE POLYPECTOMY 05131 N/A 7/18/2017    Performed by Rafaela Davison MD at [address] TUMOR N/A 7/14/2015    Performed by Blaze Solorio Disp: , Rfl:   MetFORMIN HCl 500 MG Oral Tab, Take 500 mg by mouth 3 (three) times daily. , Disp: , Rfl:   Warfarin Sodium 10 MG Oral Tab, Take 0.5 tablets (5 mg total) by mouth nightly.  (Patient taking differently: Take 10 mg by mouth nightly.  ), Disp: 30 311.0   INR 2.44*       Recent Labs   Lab 06/09/20  1628   *   BUN 19*   CREATSERUM 1.17   GFRAA 69   GFRNAA 60   CA 8.9   ALB 3.3*      K 4.6   *   CO2 26.0   ALKPHO 74   AST 29   ALT 26   BILT 0.7   TP 6.8       Estimated Creatinine Cl

## 2020-06-11 PROCEDURE — 99232 SBSQ HOSP IP/OBS MODERATE 35: CPT | Performed by: INTERNAL MEDICINE

## 2020-06-11 PROCEDURE — 99232 SBSQ HOSP IP/OBS MODERATE 35: CPT | Performed by: HOSPITALIST

## 2020-06-11 RX ORDER — FOLIC ACID 1 MG/1
1 TABLET ORAL DAILY
Qty: 30 TABLET | Refills: 11 | Status: SHIPPED | OUTPATIENT
Start: 2020-06-11 | End: 2020-06-15

## 2020-06-11 RX ORDER — MELATONIN
325
Qty: 30 TABLET | Refills: 11 | Status: SHIPPED | OUTPATIENT
Start: 2020-06-11 | End: 2020-06-15

## 2020-06-11 NOTE — PROGRESS NOTES
[facility]  Progress note     Octaviano Sheldon Patient Status:  Emergency    1940 MRN [id number]   Location [address] [facility] Attending Nilda Doe MD   Hosp Day # 2 PCP Mook Whitney MD     Chief Complaint: for input(s): TROP, CK in the last 168 hours. Imaging: Imaging data reviewed in Epic. ASSESSMENT / PLAN:     1. Acute DVT   1. Seemingly occurred due to leg trauma while therapeutic on warfarin  2. Heparin drip for now in case needs endoscopy  3.  v

## 2020-06-11 NOTE — PROGRESS NOTES
Heme/Onc Progress Note    Patient Name: oRcio Hernadez   YOB: 1940   Medical Record Number: [id number]   CSN: [id number]   Attending Physician: Christo Valdez M.D. Subjective:  He says he is doing ok. Leg maybe a tad better.      Yoselin Hagen 193 - 986 pg/mL 038      FOLATE (FOLIC ACID), SERUM Latest Ref Range: >=8.7 ng/mL    8.0 (L)     Current Medications:  HYDROcodone-acetaminophen (NORCO) 5-325 MG per tab 1 tablet, 1 tablet, Oral, Q4H PRN  docusate sodium (COLACE) cap 100 mg, 100 mg, Oral, rhythm. Abdomen: Soft, non tender with good bowel sounds. Extremities: Pedal pulses are present. LLE edema with erythema   Neurological: Grossly intact. Lymphatics:  There is no palpable lymphadenopathy  Psych/Depression: Mood and affect are appropriat folic acid deficiency. TSH, B12 normal. No evidence of hemolysis. DESIRAE, SPEP and STR pending. Has been started on folic acid and iron supplementation.      3. H/o carcinoid tumor- due for scans in July which has been scheduled. Has been clinically ANA.

## 2020-06-11 NOTE — PHYSICAL THERAPY NOTE
PHYSICAL THERAPY QUICK EVALUATION - INPATIENT    Room Number: 8262/0993-G  Evaluation Date: 6/11/2020  Presenting Problem: LLE DVT  Physician Order: PT Eval and Treat    Problem List  Principal Problem:    Acute deep vein thrombosis (DVT) of proximal vei 10% Left     leg   • OTHER SURGICAL HISTORY  7/29/15    cysto, stent removal - Dr. Snehal Velez   • OTHER SURGICAL HISTORY  10/14/15    BTS Cystoscopy -Dr Snehal Velez   • OTHER SURGICAL HISTORY  1/16/15    BTS Cystoscopy -Dr Snehal Velez   • OTHER SURGICAL HISTORY FORM - BASIC MOBILITY  How much difficulty does the patient currently have. ..  -   Turning over in bed (including adjusting bedclothes, sheets and blankets)?: None   -   Sitting down on and standing up from a chair with arms (e.g., wheelchair, bedside comm presents with no skilled Physical Therapy needs at this time. Patient discharged from Physical Therapy services. Please re-order if a new functional limitation presents during this admission. GOALS  Patient was able to achieve the following goals . ..

## 2020-06-11 NOTE — PLAN OF CARE
Assumed care @0730  VSS,   A&Ox4,   RA    NSR , Heparin gtt @15ml/hr. PRN norco has been given for Pain, with relief. Up with 1 assist and cane as tolerated. Tolerating carb controlled diet. Intake and outputs w/d/l.     L thigh hard/redness  DMG GI c achieve adequate hemostasis  - Assess for signs and symptoms of internal bleeding  - Monitor lab trends  - Patient is to report abnormal signs of bleeding to staff  - Avoid use of toothpicks and dental floss  - Use electric shaver for shaving  - Use soft b

## 2020-06-11 NOTE — PLAN OF CARE
Assumed care at [address]. Denies pain/discomfort at this time. Hep gtt infusing at 15 ml/hr. L thigh swollen and hard. L calf red and hot.   Plan: anemia workup      Problem: METABOLIC/FLUID AND ELECTROLYTES - ADULT  Goal: Glucose maintained within prescribe dental floss  - Use electric shaver for shaving  - Use soft bristle tooth brush  - Limit straining and forceful nose blowing  Outcome: Progressing     Problem: MUSCULOSKELETAL - ADULT  Goal: Return mobility to safest level of function  Description  INTERVE

## 2020-06-12 ENCOUNTER — ANESTHESIA EVENT (OUTPATIENT)
Dept: ENDOSCOPY | Facility: HOSPITAL | Age: 80
DRG: 300 | End: 2020-06-12
Payer: MEDICARE

## 2020-06-12 PROCEDURE — 99232 SBSQ HOSP IP/OBS MODERATE 35: CPT | Performed by: INTERNAL MEDICINE

## 2020-06-12 PROCEDURE — 99232 SBSQ HOSP IP/OBS MODERATE 35: CPT | Performed by: HOSPITALIST

## 2020-06-12 RX ORDER — TRAMADOL HYDROCHLORIDE 50 MG/1
50 TABLET ORAL EVERY 6 HOURS PRN
Status: DISCONTINUED | OUTPATIENT
Start: 2020-06-12 | End: 2020-06-15

## 2020-06-12 RX ORDER — SODIUM CHLORIDE 9 MG/ML
INJECTION, SOLUTION INTRAVENOUS CONTINUOUS
Status: DISCONTINUED | OUTPATIENT
Start: 2020-06-12 | End: 2020-06-15

## 2020-06-12 RX ORDER — MAGNESIUM CARB/ALUMINUM HYDROX 105-160MG
296 TABLET,CHEWABLE ORAL ONCE
Status: COMPLETED | OUTPATIENT
Start: 2020-06-12 | End: 2020-06-12

## 2020-06-12 RX ORDER — OXYCODONE HYDROCHLORIDE 5 MG/1
5 TABLET ORAL EVERY 4 HOURS PRN
Status: DISCONTINUED | OUTPATIENT
Start: 2020-06-12 | End: 2020-06-15

## 2020-06-12 NOTE — PROGRESS NOTES
[facility]    Progress Note    Brii Almaraz Patient Status:  Inpatient    1940 MRN [id number]   [facility] 7NE-A Attending Berenice Will MD   Hosp Day # 3 PCP Laith Mcmullen MD     Subjective:  Brii Almaraz is a( unspecified type     Pyelonephritis     Elevated INR     Gangrene (HCC)     Acute deep vein thrombosis (DVT) of proximal vein of left lower extremity (HCC)     Leg DVT (deep venous thromboembolism), acute, left (HCC)     Microcytic anemia     Benign carcin

## 2020-06-12 NOTE — PROGRESS NOTES
Heme/Onc Progress Note    Patient Name: Adriana Dillon   YOB: 1940   Medical Record Number: [id number]   CSN: [id number]   Attending Physician: Wendel Brunner, M.D. Subjective:  No new complaints.  No bleeding    Objective:    Vitals: Latest Ref Range: 240 - 450 ug/dL 337      Iron Saturation Latest Ref Range: 20 - 50 % 12 (L)      FERRITIN Latest Ref Range: 30.0 - 530.0 ng/mL 84.5      Vitamin B12 Latest Ref Range: 193 - 986 pg/mL 463      FOLATE (FOLIC ACID), SERUM Latest Ref Range: > Examination:  General: Patient is alert and oriented x 3, not in acute distress. HEENT: EOMs intact. PERRL. Oropharynx is clear. Neck: No JVD. No palpable lymphadenopathy. Neck is supple. Chest: Clear to auscultation. Heart: Regular rate and rhythm. planned for tomorrow     2. Microcytic anemia- has h/o thalassemia trait but baseline Hb usually low normal/normal. Drop is new and down to 8s. . Labs so far show evidence of iron and folic acid deficiency.  Hb already improved on folic acid supplementation

## 2020-06-12 NOTE — ANESTHESIA PREPROCEDURE EVALUATION
PRE-OP EVALUATION    Patient Name: Oliver Garcia    Pre-op Diagnosis: Acute Anemia    Procedure(s):  ESOPHAGOGASTRODUODENOSCOPY, COLONOSCOPY  X    Surgeon(s) and Role:     * Nehal Gill MD - Primary    Pre-op vitals reviewed.   Temp: 98.4 °F (36 injection 50 mL, 50 mL, Intravenous, Q15 Min PRN    Or  glucose (DEX4) oral liquid 30 g, 30 g, Oral, Q15 Min PRN    Or  Glucose-Vitamin C (DEX-4) chewable tab 8 tablet, 8 tablet, Oral, Q15 Min PRN  Insulin Aspart Pen (NOVOLOG) 100 UNIT/ML flexpen 1-10 Unit 2,                          Pulmonary  Comment: Lung carcinoid, history of lung partial resection. Negative pulmonary ROS.                        Neuro/Psych                              As per Epic:  Patient Active Problem List:     Carcinoid tumor of lef Cystoscopy-Dr Campos   • OTHER SURGICAL HISTORY  8/15/16    Cysto- Dr. Marry Spring   • OTHER SURGICAL HISTORY  2/17/17    Cysto- Dr. Marry Spring   • OTHER SURGICAL HISTORY  08/18/2017    Cystoscopy- Dr. Marry Spring   • OTHER SURGICAL HISTORY  09/16/2019     B reasonable and comfortable experience with this anesthetic. Aware that general anesthesia is not intended though necessary care may involve deep sedation with transient moments of general anesthesia.   The backup plan for safe patient care may require martinez

## 2020-06-12 NOTE — PROGRESS NOTES
[facility]  Progress note     Ali Bowels Patient Status:  Emergency    1940 MRN [id number]   Location [address] [facility] Attending Zakia Miller MD   Hosp Day # 3 PCP Gato Tilley MD     Chief Complaint: INR 2.44*       No results for input(s): TROP, CK in the last 168 hours. Imaging: Imaging data reviewed in Epic. ASSESSMENT / PLAN:     1. Acute DVT   1. Seemingly occurred due to leg trauma while therapeutic on warfarin  2.  Heparin drip for now

## 2020-06-12 NOTE — CONSULTS
[facility]    Report of Gastroenterology Consultation    Cherelle Yu Patient Status:  Inpatient    1940 MRN [id number]   [facility] 7NE-A Attending Red Holm MD   Hosp Day # 3 PCP Claudine Daniel MD     Date of Ad CYSTOSCOPY TRANSURETHRAL RESECTION BLADDER TUMOR N/A 7/14/2015    Performed by Mesha Ortega DO at [facility] MAIN OR   • EYE SURGERY  plastic sx eye lids   • FRACTURE SURGERY      left leg-screws,pins   • [place] [facility] [place]. CLOSED TX DISTAL TIBIA FRACTURE W MANIPULATION Atul Esqueda Intravenous, Daily  •  folic acid (FOLVITE) tab 1 mg, 1 mg, Oral, Daily  •  heparin (PORCINE) drip 39275kmzow/250mL infusion CONTINUOUS, 200-3,000 Units/hr, Intravenous, Continuous  •  Acidophilus/Pectin (PROBIOTIC) CAPS 1 capsule, 1 capsule, Oral, Daily tuberculosis, chronic cough, spitting up blood, cough up sputum, sleep apnea.   Genitourinary: Denies kidney stones, painful/difficult urination, frequent urinary infections, frequent urination, blood in urine, incontinence, kidney failure, prostate problem 9.2 06/12/2020    HCT 27.3 06/12/2020    .0 06/12/2020    CREATSERUM 1.34 06/12/2020    BUN 26 06/12/2020     06/12/2020    K 4.7 06/12/2020     06/12/2020    CO2 26.0 06/12/2020     06/12/2020    CA 9.7 06/12/2020    PTT 79.0 06/

## 2020-06-12 NOTE — PLAN OF CARE
Assumed care of pt. At 1900  A&O x4  Room Air- lungs clear/diminished  NSR on telemetry  Heparin gtt for positive DVT- PTT redraw this am  Norco PRN for pain management  GI consulted for decrease in hemoglobin- will see pt.  Tomorrow  Up with walker x 1 ass venipuncture sites to achieve adequate hemostasis  - Assess for signs and symptoms of internal bleeding  - Monitor lab trends  - Patient is to report abnormal signs of bleeding to staff  - Avoid use of toothpicks and dental floss  - Use electric shaver for

## 2020-06-13 ENCOUNTER — ANESTHESIA EVENT (OUTPATIENT)
Dept: ENDOSCOPY | Facility: HOSPITAL | Age: 80
DRG: 300 | End: 2020-06-13
Payer: MEDICARE

## 2020-06-13 ENCOUNTER — ANESTHESIA (OUTPATIENT)
Dept: ENDOSCOPY | Facility: HOSPITAL | Age: 80
DRG: 300 | End: 2020-06-13
Payer: MEDICARE

## 2020-06-13 PROCEDURE — 99232 SBSQ HOSP IP/OBS MODERATE 35: CPT | Performed by: INTERNAL MEDICINE

## 2020-06-13 PROCEDURE — 99232 SBSQ HOSP IP/OBS MODERATE 35: CPT | Performed by: HOSPITALIST

## 2020-06-13 PROCEDURE — 0DB98ZX EXCISION OF DUODENUM, VIA NATURAL OR ARTIFICIAL OPENING ENDOSCOPIC, DIAGNOSTIC: ICD-10-PCS | Performed by: INTERNAL MEDICINE

## 2020-06-13 PROCEDURE — 0DB68ZX EXCISION OF STOMACH, VIA NATURAL OR ARTIFICIAL OPENING ENDOSCOPIC, DIAGNOSTIC: ICD-10-PCS | Performed by: INTERNAL MEDICINE

## 2020-06-13 RX ORDER — NALOXONE HYDROCHLORIDE 0.4 MG/ML
80 INJECTION, SOLUTION INTRAMUSCULAR; INTRAVENOUS; SUBCUTANEOUS AS NEEDED
Status: DISCONTINUED | OUTPATIENT
Start: 2020-06-13 | End: 2020-06-13 | Stop reason: HOSPADM

## 2020-06-13 RX ORDER — SODIUM CHLORIDE, SODIUM LACTATE, POTASSIUM CHLORIDE, CALCIUM CHLORIDE 600; 310; 30; 20 MG/100ML; MG/100ML; MG/100ML; MG/100ML
INJECTION, SOLUTION INTRAVENOUS CONTINUOUS
Status: DISCONTINUED | OUTPATIENT
Start: 2020-06-13 | End: 2020-06-15

## 2020-06-13 RX ORDER — MAGNESIUM CARB/ALUMINUM HYDROX 105-160MG
296 TABLET,CHEWABLE ORAL ONCE
Status: COMPLETED | OUTPATIENT
Start: 2020-06-13 | End: 2020-06-13

## 2020-06-13 RX ORDER — SODIUM PHOSPHATE, DIBASIC AND SODIUM PHOSPHATE, MONOBASIC 7; 19 G/133ML; G/133ML
2 ENEMA RECTAL ONCE
Status: COMPLETED | OUTPATIENT
Start: 2020-06-13 | End: 2020-06-13

## 2020-06-13 RX ORDER — LIDOCAINE HYDROCHLORIDE 10 MG/ML
INJECTION, SOLUTION EPIDURAL; INFILTRATION; INTRACAUDAL; PERINEURAL AS NEEDED
Status: DISCONTINUED | OUTPATIENT
Start: 2020-06-13 | End: 2020-06-13 | Stop reason: SURG

## 2020-06-13 RX ORDER — DEXTROSE MONOHYDRATE 25 G/50ML
50 INJECTION, SOLUTION INTRAVENOUS
Status: DISCONTINUED | OUTPATIENT
Start: 2020-06-13 | End: 2020-06-13 | Stop reason: HOSPADM

## 2020-06-13 RX ORDER — SODIUM CHLORIDE, SODIUM LACTATE, POTASSIUM CHLORIDE, CALCIUM CHLORIDE 600; 310; 30; 20 MG/100ML; MG/100ML; MG/100ML; MG/100ML
INJECTION, SOLUTION INTRAVENOUS CONTINUOUS PRN
Status: DISCONTINUED | OUTPATIENT
Start: 2020-06-13 | End: 2020-06-13 | Stop reason: SURG

## 2020-06-13 RX ADMIN — LIDOCAINE HYDROCHLORIDE 25 MG: 10 INJECTION, SOLUTION EPIDURAL; INFILTRATION; INTRACAUDAL; PERINEURAL at 09:07:00

## 2020-06-13 RX ADMIN — SODIUM CHLORIDE, SODIUM LACTATE, POTASSIUM CHLORIDE, CALCIUM CHLORIDE: 600; 310; 30; 20 INJECTION, SOLUTION INTRAVENOUS at 09:05:00

## 2020-06-13 RX ADMIN — SODIUM CHLORIDE, SODIUM LACTATE, POTASSIUM CHLORIDE, CALCIUM CHLORIDE: 600; 310; 30; 20 INJECTION, SOLUTION INTRAVENOUS at 09:26:00

## 2020-06-13 NOTE — PLAN OF CARE
Assumed care at 1900. Alert and oriented x4. Telemetry monitor reading SR. IVF and Heparin drip infusing assessed and maintained. Pain relieved by Oxy. Lt leg redness and edema. Plan for EGD at 0800. Call light in reach at the bedside.  Will continue to mon

## 2020-06-13 NOTE — PLAN OF CARE
Assumed pt care @ 0730. Heparin gtt infusing @ 15cc/hr. Clear liquid diet started, IVF started. Plan for EGD/Colonoscopy tmr, consent signed. Bowel Prep started 1600 per MAR.    Pt c/o left shoulder, left leg pain, prn Norco given with minimal relie venipuncture sites to achieve adequate hemostasis  - Assess for signs and symptoms of internal bleeding  - Monitor lab trends  - Patient is to report abnormal signs of bleeding to staff  - Avoid use of toothpicks and dental floss  - Use electric shaver for evaluate response  - Implement non-pharmacological measures as appropriate and evaluate response  - Consider cultural and social influences on pain and pain management  - Manage/alleviate anxiety  - Utilize distraction and/or relaxation techniques  - Monit support system  Outcome: Progressing

## 2020-06-13 NOTE — ANESTHESIA POSTPROCEDURE EVALUATION
[address] Patient Status:  Inpatient   Age/Gender 70year old male MRN [id number]   Location [address] [facility]. Attending Vivi Payton MD   [facility] Day # 4 PCP Frannie Vázquez MD       Anesthesia Post-op Note    Procedure

## 2020-06-13 NOTE — H&P
History & Physical Examination    Patient Name: Mariann Anderson  MRN: [id number]  CSN: [id number]  YOB: 1940    Diagnosis: Acute deep vein thrombosis (DVT) of proximal vein of left lower extremity ([facility].) [I82.4Y2]      Present Illness:   Donna Martinez 1 mg, 1 mg, Oral, Daily  heparin (PORCINE) drip 32455pznav/250mL infusion CONTINUOUS, 200-3,000 Units/hr, Intravenous, Continuous  Acidophilus/Pectin (PROBIOTIC) CAPS 1 capsule, 1 capsule, Oral, Daily  aspirin tab 325 mg, 325 mg, Oral, Daily  acetaminophen Dr. Abigail Rivero   • OTHER SURGICAL HISTORY  2/17/17    Cysto- Dr. Abigail Rivero   • OTHER SURGICAL HISTORY  08/18/2017    Cystoscopy- Dr. Abigail Rivero   • OTHER SURGICAL HISTORY  09/16/2019     BTS cysto - dr. Ester Joyner    • SKIN GRAFT PROCEDURE      multiple to Tequila Ochoa

## 2020-06-13 NOTE — PROGRESS NOTES
[facility]  Progress note     Olga Torres Patient Status:  Emergency    1940 MRN [id number]   Location [address] [facility] [address] Attending Breann Booth MD   [facility] Day # 4 PCP Yuniel Verduzco MD     Chief Complaint: 47.7 mL/min (A) (based on SCr of 1.34 mg/dL (H)). Recent Labs   Lab 06/09/20  1628 06/12/20  0510   PTP 26.9* 16.6*   INR 2.44* 1.30*       No results for input(s): TROP, CK in the last 168 hours. Imaging: Imaging data reviewed in Epic.       ASSESSME

## 2020-06-13 NOTE — ANESTHESIA PREPROCEDURE EVALUATION
PRE-OP EVALUATION    Patient Name: Jana Khan    Pre-op Diagnosis: acute anemia    Procedure(s): colonoscopy. Surgeon(s) and Role:     * Charles Flores MD - Primary    Pre-op vitals reviewed.   Temp: 98 °F (36.7 °C)  Pulse: 75  Resp: 22  BP Once  [COMPLETED] morphINE sulfate (PF) 4 MG/ML injection 1 mg, 1 mg, Intravenous, Once  Acidophilus/Pectin (PROBIOTIC) CAPS 1 capsule, 1 capsule, Oral, Daily  aspirin tab 325 mg, 325 mg, Oral, Daily  acetaminophen (TYLENOL) tab 650 mg, 650 mg, Oral, Q6H P Other      Anesthesia Evaluation    Patient summary reviewed. Anesthetic Complications  (+) history of anesthetic complications  History of: PONV       GI/Hepatic/Renal    Negative GI/hepatic/renal ROS.                              Cardiovascular    Nega Performed by Taylor Villagomez DO at [facility] MAIN OR   • EYE SURGERY  plastic sx eye lids   • FRACTURE SURGERY      left leg-screws,pins   • HC CLOSED TX DISTAL TIBIA FRACTURE W MANIPULATION Left    • HC DEBRIDEMENT SKIN UP TO 10% Left     leg   • OTHER COBURN distance: > 6 cm  Neck ROM: full Cardiovascular      Rhythm: regular  Rate: normal  (-) murmur   Dental  Comment: Dentition is grossly intact; Patient does not demonstrate loose teeth to inspection. No notable dental history.   Dental appliance(s): upper

## 2020-06-13 NOTE — PROGRESS NOTES
Hematology/Oncology Progress Note    Patient Name: Catherine Burkett Record Number: [id number]    YOB: 1940     Reason for Consultation:  Anthon Sever was seen today for the diagnosis of DVT, anemia    Interval events: Patient edema    Laboratory:  Recent Labs   Lab 06/09/20  1628 06/09/20  2143  06/11/20  0524 06/12/20  0510 06/13/20  0525   WBC 8.1 8.3  --   --  8.4 9.7   HGB 8.2* 8.0*  --   --  9.2* 9.3*   HCT 25.4* 26.2*  --   --  27.3* 33.1*   .0 295.0   < > 314.0 30 carcinoid tumor- due for scans in July which has been scheduled. Has been clinically ANA.      4. H/o high grade noninvasive urothelial carcinoma- followed by  with regular cystoscopies. Has also been ANA.         Carmelo Bedoya MD  Hematology/Medical On

## 2020-06-13 NOTE — OPERATIVE REPORT
OPERATIVE REPORT   PATIENT NAME: Surinder Rust  MRN: [id number]  DATE OF OPERATION: 6/13/2020  PREOPERATIVE DIAGNOSIS: Iron deficiency anemia  POSTOPERATIVE DIAGNOSES   1. Tiny gastric antral erosions  2.  Poor bowel prep in the sigmoid and descending co solid stool material was met. Moderate sigmoid diverticulosis was noted. Inspection of the colon was limited endoscope was removed. FINDINGS   1. Iron deficiency anemia of unknown etiology.   Despite magnesium citrate and a full gallon of GoLYTELYhiro

## 2020-06-14 ENCOUNTER — ANESTHESIA (OUTPATIENT)
Dept: ENDOSCOPY | Facility: HOSPITAL | Age: 80
DRG: 300 | End: 2020-06-14
Payer: MEDICARE

## 2020-06-14 PROCEDURE — 0DBL8ZX EXCISION OF TRANSVERSE COLON, VIA NATURAL OR ARTIFICIAL OPENING ENDOSCOPIC, DIAGNOSTIC: ICD-10-PCS | Performed by: INTERNAL MEDICINE

## 2020-06-14 PROCEDURE — 99232 SBSQ HOSP IP/OBS MODERATE 35: CPT | Performed by: HOSPITALIST

## 2020-06-14 PROCEDURE — 99232 SBSQ HOSP IP/OBS MODERATE 35: CPT | Performed by: INTERNAL MEDICINE

## 2020-06-14 RX ORDER — DEXTROSE MONOHYDRATE 25 G/50ML
50 INJECTION, SOLUTION INTRAVENOUS
Status: DISCONTINUED | OUTPATIENT
Start: 2020-06-14 | End: 2020-06-14

## 2020-06-14 RX ORDER — SODIUM CHLORIDE, SODIUM LACTATE, POTASSIUM CHLORIDE, CALCIUM CHLORIDE 600; 310; 30; 20 MG/100ML; MG/100ML; MG/100ML; MG/100ML
INJECTION, SOLUTION INTRAVENOUS CONTINUOUS
Status: DISCONTINUED | OUTPATIENT
Start: 2020-06-14 | End: 2020-06-15

## 2020-06-14 RX ORDER — NALOXONE HYDROCHLORIDE 0.4 MG/ML
80 INJECTION, SOLUTION INTRAMUSCULAR; INTRAVENOUS; SUBCUTANEOUS AS NEEDED
Status: ACTIVE | OUTPATIENT
Start: 2020-06-14 | End: 2020-06-14

## 2020-06-14 RX ORDER — MIDAZOLAM HYDROCHLORIDE 1 MG/ML
INJECTION INTRAMUSCULAR; INTRAVENOUS AS NEEDED
Status: DISCONTINUED | OUTPATIENT
Start: 2020-06-14 | End: 2020-06-14 | Stop reason: SURG

## 2020-06-14 RX ORDER — HEPARIN SODIUM 5000 [USP'U]/ML
30 INJECTION, SOLUTION INTRAVENOUS; SUBCUTANEOUS ONCE
Status: COMPLETED | OUTPATIENT
Start: 2020-06-14 | End: 2020-06-14

## 2020-06-14 RX ORDER — LIDOCAINE HYDROCHLORIDE 10 MG/ML
INJECTION, SOLUTION EPIDURAL; INFILTRATION; INTRACAUDAL; PERINEURAL AS NEEDED
Status: DISCONTINUED | OUTPATIENT
Start: 2020-06-14 | End: 2020-06-14 | Stop reason: SURG

## 2020-06-14 RX ADMIN — LIDOCAINE HYDROCHLORIDE 50 MG: 10 INJECTION, SOLUTION EPIDURAL; INFILTRATION; INTRACAUDAL; PERINEURAL at 08:02:00

## 2020-06-14 RX ADMIN — MIDAZOLAM HYDROCHLORIDE 2 MG: 1 INJECTION INTRAMUSCULAR; INTRAVENOUS at 08:02:00

## 2020-06-14 NOTE — H&P
History & Physical Examination    Patient Name: Mimi Saunders  MRN: [id number]  CSN: [id number]  YOB: 1940    Diagnosis: Acute deep vein thrombosis (DVT) of proximal vein of left lower extremity ([facility] [I82.4Y2]      Present Illness:   Lyn Ball EC tab 5 mg, 5 mg, Oral, Daily PRN  folic acid (FOLVITE) tab 1 mg, 1 mg, Oral, Daily  heparin (PORCINE) drip 62866kdglm/250mL infusion CONTINUOUS, 200-3,000 Units/hr, Intravenous, Continuous  Acidophilus/Pectin (PROBIOTIC) CAPS 1 capsule, 1 capsule, Oral, 5/13/16    BTS Cystoscopy-Dr Campos   • OTHER SURGICAL HISTORY  8/15/16    Cysto- Dr. Begum Argue   • OTHER SURGICAL HISTORY  2/17/17    Cysto- Dr. Begum Argue   • OTHER SURGICAL HISTORY  08/18/2017    Cystoscopy- Dr. Begum Argue   • OTHER SURGICAL HISTORY  0

## 2020-06-14 NOTE — PLAN OF CARE
Patient is alert and oriented x4. NSR on tele. Oxygenation is 100% on RA. L sounds clear. VSS. Patient denies chest pain, shortness of breath, palpitations. Denies pain. Patient is resting comfortably in the chair at this time.     POC: heparin gtt, colonos to achieve adequate hemostasis  - Assess for signs and symptoms of internal bleeding  - Monitor lab trends  - Patient is to report abnormal signs of bleeding to staff  - Avoid use of toothpicks and dental floss  - Use electric shaver for shaving  - Use sof evaluate response  - Consider cultural and social influences on pain and pain management  - Manage/alleviate anxiety  - Utilize distraction and/or relaxation techniques  - Monitor for opioid side effects  - Notify MD/LIP if interventions unsuccessful or pa

## 2020-06-14 NOTE — PROGRESS NOTES
[facility]  Progress note     Tamara Alva Patient Status:  Emergency    1940 MRN [id number]   Location [address] [facility] Attending Micky Murphy MD   [facility] Day # 5 PCP Vero Paniagua MD     Chief Complaint: 0.7  --   --   --    TP 6.3*  6.8  --   --   --        Estimated Creatinine Clearance: 54.1 mL/min (based on SCr of 1.18 mg/dL).     Recent Labs   Lab 06/09/20  1628 06/12/20  0510   PTP 26.9* 16.6*   INR 2.44* 1.30*       No results for input(s): TROP, CK

## 2020-06-14 NOTE — PROGRESS NOTES
Hematology/Oncology Progress Note    Patient Name: Catherine Burkett Record Number: [id number]    YOB: 1940     Reason for Consultation:  Shalini Moody was seen today for the diagnosis of DVT, anemia    Interval events: Koki Hawkins auscultation bilaterally  GI/Abd: Soft, non-tender   Skin: no rashes or petechiae  Ext: +LLE tense edema. Trace distal erythemia.      Laboratory:  Recent Labs   Lab 06/12/20  0510 06/13/20  0525 06/14/20  0517   WBC 8.4 9.7 8.7   HGB 9.2* 9.3* 8.6*   HCT found for: Po Poe   No results found for: [facility],   No results found for: [facility]    I personally reviewed his peripheral blood smear from today which showed marked anisopoikilocytosis with prominent RBC clumping noted.   Discussed with heme lab t

## 2020-06-14 NOTE — OPERATIVE REPORT
OPERATIVE REPORT   PATIENT NAME: Sweta Valdez  MRN: [id number]  DATE OF OPERATION: 6/14/2020  PREOPERATIVE DIAGNOSIS: poor prep for colonoscopy yesterday; iron deficiency anemia  POSTOPERATIVE DIAGNOSES   1.  Diminutive 5 mm TC polyp removed with cold s in 5 years if good health persists. Resume heparin now without bolus. If progressive JOLENE as inpatient or outpatient, advised small bowel video capsule to determine etiology of JOLENE.     DISCHARGE: The patient was given an after visit summary detailing the

## 2020-06-14 NOTE — ANESTHESIA POSTPROCEDURE EVALUATION
[address] Patient Status:  Inpatient   Age/Gender 70year old male MRN [id number]   Location [address] [facility]. Attending Darnell Logan MD   [facility] Day # 5 PCP Janae Fernando MD       Anesthesia Post-op Note    Procedure 31-Jan-2017

## 2020-06-14 NOTE — PLAN OF CARE
Assumed care at 1900. Alert and oriented x4. Telemetry monitor reading SR. IVF infusing assessed and maintained. Colonoscopy prep given and stools clear. Heparin stopped at 0415 per MD order. Oxy given for pain with relief.  Buttocks excoriated with cream a

## 2020-06-14 NOTE — PLAN OF CARE
Assumed care @0730  VSS,   A&Ox4,   RA    NSR , heparin gtt retarted @15ml/hr. Pain controlled with prn oxy. Up with 1 standby assist as tolerated. Tolerating clear liquid diet. Magnesium citrate given x2.  Intake and outputs   L leg warm/red/tight  I pressure on venipuncture sites to achieve adequate hemostasis  - Assess for signs and symptoms of internal bleeding  - Monitor lab trends  - Patient is to report abnormal signs of bleeding to staff  - Avoid use of toothpicks and dental floss  - Use electri non-pharmacological measures as appropriate and evaluate response  - Consider cultural and social influences on pain and pain management  - Manage/alleviate anxiety  - Utilize distraction and/or relaxation techniques  - Monitor for opioid side effects  - N Progressing

## 2020-06-15 VITALS
SYSTOLIC BLOOD PRESSURE: 117 MMHG | RESPIRATION RATE: 18 BRPM | TEMPERATURE: 98 F | HEIGHT: 70 IN | OXYGEN SATURATION: 96 % | HEART RATE: 74 BPM | DIASTOLIC BLOOD PRESSURE: 59 MMHG | BODY MASS INDEX: 35.48 KG/M2 | WEIGHT: 247.81 LBS

## 2020-06-15 DIAGNOSIS — D50.9 MICROCYTIC ANEMIA: Primary | ICD-10-CM

## 2020-06-15 PROCEDURE — 99232 SBSQ HOSP IP/OBS MODERATE 35: CPT | Performed by: INTERNAL MEDICINE

## 2020-06-15 PROCEDURE — 99239 HOSP IP/OBS DSCHRG MGMT >30: CPT | Performed by: HOSPITALIST

## 2020-06-15 RX ORDER — MELATONIN
325
Qty: 30 TABLET | Refills: 11 | Status: SHIPPED | OUTPATIENT
Start: 2020-06-15 | End: 2020-08-25

## 2020-06-15 RX ORDER — FOLIC ACID 1 MG/1
1 TABLET ORAL DAILY
Qty: 30 TABLET | Refills: 11 | Status: SHIPPED | OUTPATIENT
Start: 2020-06-15 | End: 2020-08-25

## 2020-06-15 NOTE — PROGRESS NOTES
Hematology/Oncology Progress Note    Patient Name: Catherine Burkett Record Number: [id number]    YOB: 1940     Reason for Consultation:  [facility] was seen today for the diagnosis of DVT, anemia    Interval events:   Joanne Bacon < > 139 142 139   K 4.6   < > 4.7 4.2 4.4   *   < > 108 108 108   CO2 26.0   < > 26.0 29.0 28.0   BUN 19*   < > 26* 16 20*   CREATSERUM 1.17   < > 1.34* 1.18 1.31*   GFRAA 69   < > 59* 68 60   GFRNAA 60   < > 51* 59* 52*   *   < > 162* 133* 1 high grade noninvasive urothelial carcinoma- followed by  with regular cystoscopies. Has also been ANA.         May d/c home from a heme/onc standpoint when cleared by everyone else. He will see me in a week for f/u at the [facility].  He will call for

## 2020-06-15 NOTE — PROGRESS NOTES
[facility]OSPITAL[facility]  Progress note     Sally Forward Patient Status:  Emergency    1940 MRN [id number]   Location [address] [facility] Attending Berenice Castelan MD   [facility] Day # 6 PCP Jana Dillon MD     Chief Complaint: 4.6   < > 4.7 4.2 4.4   *   < > 108 108 108   CO2 26.0   < > 26.0 29.0 28.0   ALKPHO 74  --   --  79  --    AST 29  --   --  45*  --    ALT 26  --   --  44  --    BILT 0.7  --   --  1.0  --    TP 6.3*  6.8  --   --  6.3*  --     < > = values in this

## 2020-06-15 NOTE — PLAN OF CARE
Assumed care at [address] [facility]. Patient A&Ox4. Tele SR, on room air. On hep gtt per PE/DVT protocol. Therapeutic this evening. PTT in AM.  Up with SBA and cane, gait steady. Will cont to monitor.

## 2020-06-15 NOTE — PLAN OF CARE
Patient awake, alert and oriented x4  Telemetry, Sinus Rhythm   Left leg DVT   C/o left leg discomfort  Left leg redness noted; left upper leg edema- Neurovascular assessment remains at baseline; pedals palpable; denies sensation disturbances   Left Camille Sánchez Administer supportive blood products/factors as ordered and appropriate  Outcome: Progressing  Goal: Free from bleeding injury  Description  (Example usage: patient with low platelets)  INTERVENTIONS:  - Avoid intramuscular injections, enemas and rectal me PAIN - ADULT  Goal: Verbalizes/displays adequate comfort level or patient's stated pain goal  Description  INTERVENTIONS:  - Encourage pt to monitor pain and request assistance  - Assess pain using appropriate pain scale  - Administer analgesics based on t ability to be responsible for managing their own health  - Refer to Case Management Department for coordinating discharge planning if the patient needs post-hospital services based on physician/LIP order or complex needs related to functional status, cogni

## 2020-06-15 NOTE — PLAN OF CARE
NURSING DISCHARGE NOTE    Discharged Home via Wheelchair. Accompanied by Support staff  Belongings Taken by patient/family. Cleared by all consulting physicians for discharge. AVS Discharge paperwork completed and reviewed with patient.  Reinforced appropriate  6/15/2020 1422 by Avelina Black RN  Outcome: Adequate for Discharge  6/15/2020 1211 by Avelina Black RN  Outcome: Progressing     Problem: HEMATOLOGIC - ADULT  Goal: Maintains hematologic stability  Description  INTERVENTIONS  - 1422 by Josey Cheek, RN  Outcome: Adequate for Discharge  6/15/2020 1211 by Josey Cheek, RN  Outcome: Progressing  Goal: Maintain proper alignment of affected body part  Description  INTERVENTIONS:  - Support and protect limb and body align Free from fall injury  Description  INTERVENTIONS:  - Assess pt frequently for physical needs  - Identify cognitive and physical deficits and behaviors that affect risk of falls.   - [place] fall precautions as indicated by assessment.  - Educate pt/famil

## 2020-06-16 NOTE — DISCHARGE SUMMARY
[facility]OSPITALIST  DISCHARGE SUMMARY     Tamara Alva Patient Status:  Inpatient    1940 MRN [id number]   [facility] 7NE-A Attending No att. 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 Abigail Zavaleta. Some studies still pending at d/c. Patient to f/u closely in clinic with dr. Zehra Guadarrama in one week. No complications.     Lace+ Score mouth every 6 (six) hours as needed for Pain. Refills:  0     lisinopril 2.5 MG Tabs      Take 1 tablet (2.5 mg total) by mouth daily.    Quantity:  90 tablet  Refills:  1     metFORMIN HCl 500 MG Tabs  Commonly known as:  GLUCOPHAGE      Take 500 mg by m nondistended. No rebound, guarding or organomegaly. Neurologic: No focal neurological deficits. Musculoskeletal: Moves all extremities. Extremities: left leg less swollen and tender with ana hose today  Integument: No rashes or lesions.    Psychiatric

## 2020-06-19 ENCOUNTER — HOSPITAL ENCOUNTER (OUTPATIENT)
Dept: MRI IMAGING | Facility: HOSPITAL | Age: 80
Discharge: HOME | End: 2020-06-19
Attending: INTERNAL MEDICINE
Payer: MEDICARE

## 2020-06-19 ENCOUNTER — TELEPHONE (OUTPATIENT)
Dept: HEMATOLOGY/ONCOLOGY | Facility: HOSPITAL | Age: 80
End: 2020-06-19

## 2020-06-19 ENCOUNTER — OFFICE VISIT (OUTPATIENT)
Dept: HEMATOLOGY/ONCOLOGY | Facility: HOSPITAL | Age: 80
End: 2020-06-19
Attending: INTERNAL MEDICINE
Payer: MEDICARE

## 2020-06-19 VITALS
RESPIRATION RATE: 16 BRPM | SYSTOLIC BLOOD PRESSURE: 133 MMHG | BODY MASS INDEX: 35.13 KG/M2 | DIASTOLIC BLOOD PRESSURE: 79 MMHG | TEMPERATURE: 98 F | WEIGHT: 245.38 LBS | HEIGHT: 70 IN | HEART RATE: 90 BPM | OXYGEN SATURATION: 95 %

## 2020-06-19 DIAGNOSIS — C68.9 UROTHELIAL CANCER (HCC): ICD-10-CM

## 2020-06-19 DIAGNOSIS — I26.99 PE (PULMONARY THROMBOEMBOLISM) (HCC): ICD-10-CM

## 2020-06-19 DIAGNOSIS — D3A.090 CARCINOID TUMOR OF LEFT LUNG: ICD-10-CM

## 2020-06-19 DIAGNOSIS — R71.8 LOW MEAN CORPUSCULAR VOLUME (MCV): ICD-10-CM

## 2020-06-19 DIAGNOSIS — M25.512 SEVERE PAIN OF LEFT SHOULDER: ICD-10-CM
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
## 2020-06-19 DIAGNOSIS — D59.12 COLD AGGLUTININ DISEASE (HCC): ICD-10-CM

## 2020-06-19 DIAGNOSIS — E53.8 FOLIC ACID DEFICIENCY: ICD-10-CM

## 2020-06-19 DIAGNOSIS — D50.8 OTHER IRON DEFICIENCY ANEMIA: ICD-10-CM

## 2020-06-19 DIAGNOSIS — D59.19 OTHER AUTOIMMUNE HEMOLYTIC ANEMIAS: Primary | ICD-10-CM

## 2020-06-19 PROCEDURE — 73221 MRI JOINT UPR EXTREM W/O DYE: CPT | Performed by: INTERNAL MEDICINE

## 2020-06-19 PROCEDURE — 99215 OFFICE O/P EST HI 40 MIN: CPT | Performed by: INTERNAL MEDICINE

## 2020-06-19 RX ORDER — DOCUSATE SODIUM 100 MG/1
100 CAPSULE, LIQUID FILLED ORAL 2 TIMES DAILY
COMMUNITY
End: 2020-09-23

## 2020-06-19 RX ORDER — CYCLOBENZAPRINE HCL 10 MG
10 TABLET ORAL NIGHTLY
COMMUNITY
End: 2020-09-23

## 2020-06-19 RX ORDER — ALLOPURINOL 300 MG/1
TABLET ORAL EVERY 8 HOURS PRN
COMMUNITY
End: 2020-09-23

## 2020-06-19 NOTE — PROGRESS NOTES
Cancer Center Progress Note    Patient Name: Sunitha Cotton   YOB: 1940   Medical Record Number: [id number]   CSN: [id number]   Attending Physician: Harpal Valdez M.D.    Referring Physician: Yunior Agee MD      Date of Visit: 6/19 held.     He was noted to have Hb of 8. He has a h/o thalassemia trait but baseline Hb usually low normal/normal. Work-up revealed iron and folate deficiency. TSH and B12 normal. LDH and retic elevated, haptoglobin low.  ROCIO poly positive, IgG negative and Oral Tab, Take 500 mg by mouth 4 (four) times daily. , Disp: , Rfl:   •  Acidophilus/Pectin Oral Cap, Take 1 capsule by mouth daily. , Disp: 7 capsule, Rfl: 0  •  simvastatin 20 MG Oral Tab, Take 1 tablet (20 mg total) by mouth nightly.  (Patient taking dif 06/14/2020    CREATSERUM 1.31 (H) 06/14/2020    ANIONGAP 3 06/14/2020    GFR 51 (L) 09/20/2017    GFRNAA 52 (L) 06/14/2020    GFRAA 60 06/14/2020    CA 9.2 06/14/2020    OSMOCALC 295 06/14/2020    ALKPHO 79 06/14/2020    AST 45 (H) 06/14/2020    ALT 44 06/ Absolute Latest Ref Range: 0.00 - 1.00 x10(3) uL 0.25    Neutrophils % Latest Units: % 56.7    Lymphocytes % Latest Units: % 23.3    Monocytes % Latest Units: % 11.5    Eosinophils % Latest Units: % 5.0    Basophils % Latest Units: % 1.0    Immature Granul 9/30/2017, 8:05. ELENA , CT CHEST(CONTRAST ONLY) (CPT=71260), 8/25/2018, 14:23.      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  Dose reduction te -will continue compression stocking for ongoing swelling.   Encouraged activity but to keep elevated when at rest.       2. Microcytic anemia  - has h/o thalassemia trait but baseline Hb usually low normal/normal.  - Work-up revealed iron and folate defic and social support systems and currently there are no active problems.     Isauro Rodriguez MD  [facility]

## 2020-06-28 ENCOUNTER — APPOINTMENT (OUTPATIENT)
Dept: ULTRASOUND IMAGING | Facility: HOSPITAL | Age: 80
End: 2020-06-28
Attending: EMERGENCY MEDICINE
Payer: MEDICARE

## 2020-06-28 ENCOUNTER — HOSPITAL ENCOUNTER (EMERGENCY)
Facility: HOSPITAL | Age: 80
Discharge: HOME | End: 2020-06-28
Attending: EMERGENCY MEDICINE
Payer: MEDICARE

## 2020-06-28 VITALS
DIASTOLIC BLOOD PRESSURE: 67 MMHG | WEIGHT: 240 LBS | HEIGHT: 70 IN | TEMPERATURE: 98 F | BODY MASS INDEX: 34.36 KG/M2 | SYSTOLIC BLOOD PRESSURE: 106 MMHG | HEART RATE: 80 BPM | RESPIRATION RATE: 18 BRPM | OXYGEN SATURATION: 98 %

## 2020-06-28 DIAGNOSIS — S80.12XA HEMATOMA OF LEG, LEFT, INITIAL ENCOUNTER: Primary | ICD-10-CM

## 2020-06-28 DIAGNOSIS — I82.532 CHRONIC DEEP VEIN THROMBOSIS (DVT) OF POPLITEAL VEIN OF LEFT LOWER EXTREMITY (HCC): ICD-10-CM

## 2020-06-28 LAB
ALBUMIN SERPL-MCNC: 3.6 G/DL (ref 3.4–5)
ALBUMIN/GLOB SERPL: 0.9 {RATIO} (ref 1–2)
ALP LIVER SERPL-CCNC: 80 U/L (ref 45–117)
ALT SERPL-CCNC: 41 U/L (ref 16–61)
ANION GAP SERPL CALC-SCNC: 5 MMOL/L (ref 0–18)
AST SERPL-CCNC: 29 U/L (ref 15–37)
BASOPHILS # BLD AUTO: 0.08 X10(3) UL (ref 0–0.2)
BASOPHILS NFR BLD AUTO: 0.9 %
BILIRUB SERPL-MCNC: 0.4 MG/DL (ref 0.1–2)
BUN BLD-MCNC: 29 MG/DL (ref 7–18)
BUN/CREAT SERPL: 21 (ref 10–20)
CALCIUM BLD-MCNC: 9.4 MG/DL (ref 8.5–10.1)
CHLORIDE SERPL-SCNC: 112 MMOL/L (ref 98–112)
CO2 SERPL-SCNC: 25 MMOL/L (ref 21–32)
CREAT BLD-MCNC: 1.38 MG/DL (ref 0.7–1.3)
D-DIMER: 2.61 UG/ML FEU (ref ?–0.77)
DEPRECATED RDW RBC AUTO: 46.1 FL (ref 35.1–46.3)
EOSINOPHIL # BLD AUTO: 0.4 X10(3) UL (ref 0–0.7)
EOSINOPHIL NFR BLD AUTO: 4.4 %
ERYTHROCYTE [DISTWIDTH] IN BLOOD BY AUTOMATED COUNT: 24.2 % (ref 11–15)
GLOBULIN PLAS-MCNC: 3.9 G/DL (ref 2.8–4.4)
GLUCOSE BLD-MCNC: 138 MG/DL (ref 70–99)
HCT VFR BLD AUTO: 33.3 % (ref 39–53)
HGB BLD-MCNC: 10.1 G/DL (ref 13–17.5)
IMM GRANULOCYTES # BLD AUTO: 0.1 X10(3) UL (ref 0–1)
IMM GRANULOCYTES NFR BLD: 1.1 %
LYMPHOCYTES # BLD AUTO: 2.07 X10(3) UL (ref 1–4)
LYMPHOCYTES NFR BLD AUTO: 22.7 %
M PROTEIN MFR SERPL ELPH: 7.5 G/DL (ref 6.4–8.2)
MCH RBC QN AUTO: 23.4 PG (ref 26–34)
MCHC RBC AUTO-ENTMCNC: 30.3 G/DL (ref 31–37)
MCV RBC AUTO: 77.3 FL (ref 80–100)
MONOCYTES # BLD AUTO: 0.68 X10(3) UL (ref 0.1–1)
MONOCYTES NFR BLD AUTO: 7.5 %
NEUTROPHILS # BLD AUTO: 5.77 X10 (3) UL (ref 1.5–7.7)
NEUTROPHILS # BLD AUTO: 5.77 X10(3) UL (ref 1.5–7.7)
NEUTROPHILS NFR BLD AUTO: 63.4 %
OSMOLALITY SERPL CALC.SUM OF ELEC: 302 MOSM/KG (ref 275–295)
PLATELET # BLD AUTO: 226 10(3)UL (ref 150–450)
PLATELET MORPHOLOGY: NORMAL
POTASSIUM SERPL-SCNC: 4.7 MMOL/L (ref 3.5–5.1)
RBC # BLD AUTO: 4.31 X10(6)UL (ref 3.8–5.8)
SODIUM SERPL-SCNC: 142 MMOL/L (ref 136–145)
WBC # BLD AUTO: 9.1 X10(3) UL (ref 4–11)

## 2020-06-28 PROCEDURE — 99284 EMERGENCY DEPT VISIT MOD MDM: CPT

## 2020-06-28 PROCEDURE — 93971 EXTREMITY STUDY: CPT | Performed by: EMERGENCY MEDICINE

## 2020-06-28 PROCEDURE — 36415 COLL VENOUS BLD VENIPUNCTURE: CPT

## 2020-06-28 PROCEDURE — 85379 FIBRIN DEGRADATION QUANT: CPT | Performed by: EMERGENCY MEDICINE

## 2020-06-28 PROCEDURE — 85025 COMPLETE CBC W/AUTO DIFF WBC: CPT | Performed by: EMERGENCY MEDICINE

## 2020-06-28 PROCEDURE — 76882 US LMTD JT/FCL EVL NVASC XTR: CPT | Performed by: EMERGENCY MEDICINE

## 2020-06-28 PROCEDURE — 80053 COMPREHEN METABOLIC PANEL: CPT | Performed by: EMERGENCY MEDICINE

## 2020-06-28 NOTE — ED INITIAL ASSESSMENT (HPI)
71 yo M complaining of bruise and swelling of posterior left thigh. Hx of DVT of LLE. States a family member noticed it this morning. Pt also reports he has been having a lot of pain since may 7, 2020 since he had the fall. Pt denies chest pain, SOB.

## 2020-06-28 NOTE — ED PROVIDER NOTES
Patient Seen in: [facility] Emergency Department      History   Patient presents with:  Pain    Stated Complaint: c/o bruise on back of L leg from fall in May.  History of DVT in L leg     HPI    14-year-old male presents to the emerge department for polpypectomy x1, third one clear   • COLONOSCOPY N/A 6/14/2020    Performed by Nataliia Phillips MD at [facility] ENDOSCOPY   • COLONOSCOPY N/A 6/13/2020    Performed by Nataliia Phillips MD at [facility] ENDOSCOPY   • COLONOSCOPY, POSSIBLE BIOPSY, POSSIBLE POLYPECTOM from fall in May. History of DVT in L leg   Other systems are as noted in HPI. Constitutional and vital signs reviewed. All other systems reviewed and negative except as noted above.     Physical Exam     ED Triage Vitals [06/28/20 1633]   /66 within normal limits   RBC MORPHOLOGY SCAN - Abnormal; Notable for the following components:    RBC Morphology See morphology below (*)     Microcytosis 2+ (*)     All other components within normal limits   CBC W/ DIFFERENTIAL - Abnormal; Notable for the and the mid and distal posterior tibial veins are patent. There is no evidence of DVT in the common femoral, profunda femoral, superficial femoral and proximal popliteal vein. The severe of femoral junction is patent. CONCLUSION:  No significant change. Us Leg Left Limited (FKE=71605)    Result Date: 6/28/2020  PROCEDURE:  US LEG LEFT LIMITED (NJQ=56548)  COMPARISON:  None.   INDICATIONS:  Eval of hematoma on left hamstring  TECHNIQUE:  Sonography was performed of the clinically requested area of inter for a visit      Rayna García MD  [address] [facility] [address]  [phone]    Call in 1 day            Medications Prescribed:  Discharge Medication List as of 6/28/2020  6:51 PM

## 2020-06-29 ENCOUNTER — TELEPHONE (OUTPATIENT)
Dept: HEMATOLOGY/ONCOLOGY | Facility: HOSPITAL | Age: 80
End: 2020-06-29

## 2020-06-29 ENCOUNTER — OFFICE VISIT (OUTPATIENT)
Dept: HEMATOLOGY/ONCOLOGY | Facility: HOSPITAL | Age: 80
End: 2020-06-29
Attending: INTERNAL MEDICINE
Payer: MEDICARE

## 2020-06-29 DIAGNOSIS — S80.12XA LEG HEMATOMA, LEFT, INITIAL ENCOUNTER: Primary | ICD-10-CM

## 2020-06-29 DIAGNOSIS — I82.4Y2 ACUTE DEEP VEIN THROMBOSIS (DVT) OF PROXIMAL VEIN OF LEFT LOWER EXTREMITY (HCC): ICD-10-CM

## 2020-06-29 PROCEDURE — 99213 OFFICE O/P EST LOW 20 MIN: CPT | Performed by: CLINICAL NURSE SPECIALIST

## 2020-06-29 RX ORDER — HYDROCODONE BITARTRATE AND ACETAMINOPHEN 10; 325 MG/1; MG/1
TABLET ORAL
COMMUNITY
Start: 2020-05-21 | End: 2020-09-23

## 2020-06-29 NOTE — TELEPHONE ENCOUNTER
Gudelia Estrada called saying he was in the ER yesterday. They took him off his blood thinners and told him to see Juancarlos Borja today. The patient is scared he will have to go back to the ER. Is there any time I can schedule him for today?

## 2020-06-29 NOTE — PROGRESS NOTES
Patient presents with:  Contusion: APN assessment - sick call    Pt is here for a sick call - new bruise/hematoma in left calf. Pt has a known DVT in leg and was on coumadin - switched to eliquis after DVT.  New bruise/hematoma without trauma; was seen in E

## 2020-06-30 ENCOUNTER — LAB ENCOUNTER (OUTPATIENT)
Dept: LAB | Facility: HOSPITAL | Age: 80
End: 2020-06-30
Attending: CLINICAL NURSE SPECIALIST
Payer: MEDICARE

## 2020-06-30 DIAGNOSIS — I82.409 DVT (DEEP VENOUS THROMBOSIS) (HCC): ICD-10-CM

## 2020-06-30 LAB — SARS-COV-2 RNA RESP QL NAA+PROBE: NOT DETECTED

## 2020-07-01 ENCOUNTER — TELEPHONE (OUTPATIENT)
Dept: HEMATOLOGY/ONCOLOGY | Facility: HOSPITAL | Age: 80
End: 2020-07-01

## 2020-07-01 ENCOUNTER — HOSPITAL ENCOUNTER (OUTPATIENT)
Dept: INTERVENTIONAL RADIOLOGY/VASCULAR | Facility: HOSPITAL | Age: 80
Discharge: HOME | End: 2020-07-01
Attending: INTERNAL MEDICINE | Admitting: INTERNAL MEDICINE
Payer: MEDICARE

## 2020-07-01 VITALS
SYSTOLIC BLOOD PRESSURE: 115 MMHG | DIASTOLIC BLOOD PRESSURE: 56 MMHG | OXYGEN SATURATION: 97 % | TEMPERATURE: 97 F | HEART RATE: 63 BPM | RESPIRATION RATE: 16 BRPM

## 2020-07-01 DIAGNOSIS — I82.4Y2 ACUTE DEEP VEIN THROMBOSIS (DVT) OF PROXIMAL VEIN OF LEFT LOWER EXTREMITY (HCC): ICD-10-CM

## 2020-07-01 DIAGNOSIS — S80.12XA LEG HEMATOMA, LEFT, INITIAL ENCOUNTER: ICD-10-CM

## 2020-07-01 DIAGNOSIS — I82.409 DVT (DEEP VENOUS THROMBOSIS) (HCC): Primary | ICD-10-CM

## 2020-07-01 LAB
GLUCOSE BLD-MCNC: 202 MG/DL (ref 70–99)
INR: 1 (ref 0.8–1.3)

## 2020-07-01 PROCEDURE — 85610 PROTHROMBIN TIME: CPT

## 2020-07-01 PROCEDURE — 06H03DZ INSERTION OF INTRALUMINAL DEVICE INTO INFERIOR VENA CAVA, PERCUTANEOUS APPROACH: ICD-10-PCS | Performed by: RADIOLOGY

## 2020-07-01 PROCEDURE — 82962 GLUCOSE BLOOD TEST: CPT

## 2020-07-01 PROCEDURE — 37191 INS ENDOVAS VENA CAVA FILTR: CPT

## 2020-07-01 RX ORDER — MIDAZOLAM HYDROCHLORIDE 1 MG/ML
INJECTION INTRAMUSCULAR; INTRAVENOUS
Status: COMPLETED
Start: 2020-07-01 | End: 2020-07-01

## 2020-07-01 RX ORDER — HEPARIN SODIUM 5000 [USP'U]/ML
INJECTION, SOLUTION INTRAVENOUS; SUBCUTANEOUS
Status: COMPLETED
Start: 2020-07-01 | End: 2020-07-01

## 2020-07-01 RX ORDER — LIDOCAINE HYDROCHLORIDE 10 MG/ML
INJECTION, SOLUTION INFILTRATION; PERINEURAL
Status: COMPLETED
Start: 2020-07-01 | End: 2020-07-01

## 2020-07-01 RX ORDER — SODIUM CHLORIDE 9 MG/ML
INJECTION, SOLUTION INTRAVENOUS CONTINUOUS
Status: DISCONTINUED | OUTPATIENT
Start: 2020-07-01 | End: 2020-07-01

## 2020-07-01 NOTE — PROCEDURES
[facility]  Procedure Note    Ruby Fenton Patient Status:  Outpatient in a Bed    1940 MRN [id number]   Location [address] [facility] Attending Dhiraj Allen MD   Hosp Day # 0 PCP Frannie Vázquez MD     Procedure:

## 2020-07-01 NOTE — TELEPHONE ENCOUNTER
Daughter called, patient had filter placed today and had been holding Apixaban as instructed.   They need to know if he should restart and when, also they were told to make apt with Dr Dylon Montoya to discuss medication but was unable to make that apt because of

## 2020-07-01 NOTE — TELEPHONE ENCOUNTER
Jihan Enrique at [phone] patient's daughter os calling to get an appointment for next week for her father with Dr. Leticia Strickland.

## 2020-07-01 NOTE — PROGRESS NOTES
Pt s/p IVC filter in IR. Pt recovered in holding. Pt daughter at bedside. Pt flat for one hour, then HOB elevated one hour. +pedals. Discharge instructions reviewed with pt and pt daughter, copy given.  After recovery, pt up to bathroom and pillai with RN wit

## 2020-07-02 ENCOUNTER — TELEPHONE (OUTPATIENT)
Dept: HEMATOLOGY/ONCOLOGY | Facility: HOSPITAL | Age: 80
End: 2020-07-02

## 2020-07-06 ENCOUNTER — OFFICE VISIT (OUTPATIENT)
Dept: HEMATOLOGY/ONCOLOGY | Facility: HOSPITAL | Age: 80
End: 2020-07-06
Attending: INTERNAL MEDICINE
Payer: MEDICARE

## 2020-07-06 VITALS
WEIGHT: 243 LBS | SYSTOLIC BLOOD PRESSURE: 120 MMHG | HEART RATE: 75 BPM | HEIGHT: 70 IN | OXYGEN SATURATION: 99 % | TEMPERATURE: 97 F | DIASTOLIC BLOOD PRESSURE: 74 MMHG | RESPIRATION RATE: 16 BRPM | BODY MASS INDEX: 34.79 KG/M2

## 2020-07-06 DIAGNOSIS — C68.9 UROTHELIAL CANCER (HCC): ICD-10-CM

## 2020-07-06 DIAGNOSIS — M79.605 LEFT LEG PAIN: ICD-10-CM

## 2020-07-06 DIAGNOSIS — I82.4Y2 ACUTE DEEP VEIN THROMBOSIS (DVT) OF PROXIMAL VEIN OF LEFT LOWER EXTREMITY (HCC): Primary | ICD-10-CM

## 2020-07-06 DIAGNOSIS — D59.19 OTHER AUTOIMMUNE HEMOLYTIC ANEMIAS: ICD-10-CM

## 2020-07-06 DIAGNOSIS — S80.12XS HEMATOMA OF LEFT LOWER EXTREMITY, SEQUELA: ICD-10-CM

## 2020-07-06 DIAGNOSIS — M79.89 LEFT LEG SWELLING: ICD-10-CM

## 2020-07-06 LAB
ANION GAP SERPL CALC-SCNC: 6 MMOL/L (ref 0–18)
BASOPHILS # BLD AUTO: 0.09 X10(3) UL (ref 0–0.2)
BASOPHILS NFR BLD AUTO: 0.9 %
BUN BLD-MCNC: 28 MG/DL (ref 7–18)
BUN/CREAT SERPL: 21.1 (ref 10–20)
CALCIUM BLD-MCNC: 9.4 MG/DL (ref 8.5–10.1)
CHLORIDE SERPL-SCNC: 110 MMOL/L (ref 98–112)
CO2 SERPL-SCNC: 24 MMOL/L (ref 21–32)
CREAT BLD-MCNC: 1.33 MG/DL (ref 0.7–1.3)
DEPRECATED RDW RBC AUTO: 45.7 FL (ref 35.1–46.3)
EOSINOPHIL # BLD AUTO: 0.3 X10(3) UL (ref 0–0.7)
EOSINOPHIL NFR BLD AUTO: 3 %
ERYTHROCYTE [DISTWIDTH] IN BLOOD BY AUTOMATED COUNT: 24.4 % (ref 11–15)
GLUCOSE BLD-MCNC: 133 MG/DL (ref 70–99)
HCT VFR BLD AUTO: 32 % (ref 39–53)
HGB BLD-MCNC: 10.9 G/DL (ref 13–17.5)
IMM GRANULOCYTES # BLD AUTO: 0.11 X10(3) UL (ref 0–1)
IMM GRANULOCYTES NFR BLD: 1.1 %
LYMPHOCYTES # BLD AUTO: 1.84 X10(3) UL (ref 1–4)
LYMPHOCYTES NFR BLD AUTO: 18.5 %
MCH RBC QN AUTO: 27.1 PG (ref 26–34)
MCHC RBC AUTO-ENTMCNC: 34.1 G/DL (ref 31–37)
MCV RBC AUTO: 79.6 FL (ref 80–100)
MONOCYTES # BLD AUTO: 0.65 X10(3) UL (ref 0.1–1)
MONOCYTES NFR BLD AUTO: 6.5 %
NEUTROPHILS # BLD AUTO: 6.97 X10 (3) UL (ref 1.5–7.7)
NEUTROPHILS # BLD AUTO: 6.97 X10(3) UL (ref 1.5–7.7)
NEUTROPHILS NFR BLD AUTO: 70 %
OSMOLALITY SERPL CALC.SUM OF ELEC: 297 MOSM/KG (ref 275–295)
PATIENT FASTING Y/N/NP: NO
PLATELET # BLD AUTO: 195 10(3)UL (ref 150–450)
PLATELET MORPHOLOGY: NORMAL
POTASSIUM SERPL-SCNC: 4.8 MMOL/L (ref 3.5–5.1)
RBC # BLD AUTO: 4.02 X10(6)UL (ref 3.8–5.8)
SODIUM SERPL-SCNC: 140 MMOL/L (ref 136–145)
WBC # BLD AUTO: 10 X10(3) UL (ref 4–11)

## 2020-07-06 PROCEDURE — 99214 OFFICE O/P EST MOD 30 MIN: CPT | Performed by: NURSE PRACTITIONER

## 2020-07-06 NOTE — PROGRESS NOTES
ANP Visit Note    Patient Name: Gene Madrigal   YOB: 1940   Medical Record Number: [id number]   [facility]: [id number]   Date of visit: 7/6/2020       Chief Complaint/Reason for Visit:  Patient presents with:   Follow - Up: APN assessment s/p IVC COMMENTS)    Medications:    Current Outpatient Medications:   •  HYDROcodone-acetaminophen  MG Oral Tab, 1 tablet as needed every 6 hrs Orally 30 days, Disp: , Rfl:   •  docusate sodium (STOOL SOFTENER) 100 MG Oral Cap, Take 100 mg by mouth 2 (two) (07/06 1140)  General: Well developed, casually dressed, chronically ill appearing,  appropriately groomed, alert and oriented x 3, not in acute distress.   HEENT: Anicteric, conjunctivae and sclerae clear  Chest: Clear to auscultation and percussion, respi RDW-SD 45.7 35.1 - 46.3 fL    Neutrophil Absolute Prelim 6.97 1.50 - 7.70 x10 (3) uL    Neutrophil Absolute 6.97 1.50 - 7.70 x10(3) uL    Lymphocyte Absolute 1.84 1.00 - 4.00 x10(3) uL    Monocyte Absolute 0.65 0.10 - 1.00 x10(3) uL    Eosinophil Absolu NP-C  Nurse Practitioner  [address] [facility]

## 2020-07-06 NOTE — PROGRESS NOTES
Patient presents with: Follow - Up: APN assessment s/p IVC filter    Pt is here for follow up; s/p IVC filter 7/1/2020. Pt reports he has continued pain in the left lower extremity; pain in the thigh and calf. He believes the hematoma is improving.  Eating

## 2020-07-07 NOTE — PROGRESS NOTES
Cancer Center Progress Note    Patient Name: Cherelle Yu   YOB: 1940   Medical Record Number: [id number]   CSN: [id number]   Date of visit: 6/29/2020   Provider: ERIK Zamarripa  Referring Physician: Claudine Boogie HYDROcodone-acetaminophen  MG Oral Tab, 1 tablet as needed every 6 hrs Orally 30 days, Disp: , Rfl:   •  docusate sodium (STOOL SOFTENER) 100 MG Oral Cap, Take 100 mg by mouth 2 (two) times daily. , Disp: , Rfl:   •  cyclobenzaprine 10 MG Oral Tab, Ta intact          Impression/Plan:    Large L posterior thigh hematoma due to anticoagulation:  Holding Eliquis. IVC filter. Follow up afterward for reevaluation for resumption of anticoagulation. Case d/w Dr. Cassie Agustin.     LLE DVT:  Hold Eliquis as above

## 2020-07-09 ENCOUNTER — TELEPHONE (OUTPATIENT)
Dept: HEMATOLOGY/ONCOLOGY | Facility: HOSPITAL | Age: 80
End: 2020-07-09

## 2020-07-09 DIAGNOSIS — M79.605 LEFT LEG PAIN: Primary | ICD-10-CM

## 2020-07-09 DIAGNOSIS — M79.89 LEFT LEG SWELLING: ICD-10-CM

## 2020-07-09 NOTE — TELEPHONE ENCOUNTER
Patient was not able to do his MRI today because he was choking on his spit and he need to discuss the order that was placed, please call Cathy Gaming.

## 2020-07-10 ENCOUNTER — HOSPITAL ENCOUNTER (OUTPATIENT)
Dept: CT IMAGING | Facility: HOSPITAL | Age: 80
Discharge: HOME | End: 2020-07-10
Attending: INTERNAL MEDICINE
Payer: MEDICARE

## 2020-07-10 DIAGNOSIS — M79.605 LEFT LEG PAIN: ICD-10-CM

## 2020-07-10 DIAGNOSIS — M79.89 LEFT LEG SWELLING: ICD-10-CM

## 2020-07-10 PROCEDURE — 73701 CT LOWER EXTREMITY W/DYE: CPT | Performed by: INTERNAL MEDICINE

## 2020-07-13 ENCOUNTER — TELEPHONE (OUTPATIENT)
Dept: HEMATOLOGY/ONCOLOGY | Facility: HOSPITAL | Age: 80
End: 2020-07-13

## 2020-07-16 ENCOUNTER — OFFICE VISIT (OUTPATIENT)
Dept: HEMATOLOGY/ONCOLOGY | Facility: HOSPITAL | Age: 80
End: 2020-07-16
Attending: INTERNAL MEDICINE
Payer: MEDICARE

## 2020-07-16 VITALS
OXYGEN SATURATION: 98 % | HEIGHT: 70 IN | DIASTOLIC BLOOD PRESSURE: 69 MMHG | RESPIRATION RATE: 16 BRPM | TEMPERATURE: 98 F | WEIGHT: 244.63 LBS | SYSTOLIC BLOOD PRESSURE: 124 MMHG | HEART RATE: 79 BPM | BODY MASS INDEX: 35.02 KG/M2

## 2020-07-16 DIAGNOSIS — M79.89 LEFT LEG SWELLING: ICD-10-CM

## 2020-07-16 DIAGNOSIS — L03.116 CELLULITIS OF LEFT LOWER EXTREMITY: ICD-10-CM

## 2020-07-16 DIAGNOSIS — D59.12 COLD AGGLUTININ DISEASE (HCC): ICD-10-CM

## 2020-07-16 DIAGNOSIS — I82.4Y2 ACUTE DEEP VEIN THROMBOSIS (DVT) OF PROXIMAL VEIN OF LEFT LOWER EXTREMITY (HCC): ICD-10-CM

## 2020-07-16 DIAGNOSIS — D3A.090 CARCINOID TUMOR OF LEFT LUNG: ICD-10-CM

## 2020-07-16 DIAGNOSIS — C68.9 UROTHELIAL CANCER (HCC): ICD-10-CM

## 2020-07-16 DIAGNOSIS — E53.8 FOLIC ACID DEFICIENCY: ICD-10-CM

## 2020-07-16 DIAGNOSIS — S80.12XS HEMATOMA OF LEFT LOWER EXTREMITY, SEQUELA: ICD-10-CM

## 2020-07-16 DIAGNOSIS — D69.9 HEMORRHAGIC CONDITION, UNSPECIFIED (HCC): ICD-10-CM

## 2020-07-16 DIAGNOSIS — M79.605 PAIN OF LEFT LOWER EXTREMITY: Primary | ICD-10-CM

## 2020-07-16 DIAGNOSIS — D50.8 OTHER IRON DEFICIENCY ANEMIA: ICD-10-CM

## 2020-07-16 DIAGNOSIS — R71.8 LOW MEAN CORPUSCULAR VOLUME (MCV): ICD-10-CM

## 2020-07-16 DIAGNOSIS — I26.99 PE (PULMONARY THROMBOEMBOLISM) (HCC): ICD-10-CM

## 2020-07-16 DIAGNOSIS — D59.19 OTHER AUTOIMMUNE HEMOLYTIC ANEMIAS: ICD-10-CM

## 2020-07-16 PROCEDURE — 99215 OFFICE O/P EST HI 40 MIN: CPT | Performed by: INTERNAL MEDICINE

## 2020-07-16 RX ORDER — CEPHALEXIN 500 MG/1
500 CAPSULE ORAL 4 TIMES DAILY
Qty: 28 CAPSULE | Refills: 0 | Status: SHIPPED | OUTPATIENT
Start: 2020-07-16 | End: 2020-07-27

## 2020-07-16 NOTE — PROGRESS NOTES
Cancer Center Progress Note    Patient Name: Katya Dewitt   YOB: 1940   Medical Record Number: [id number]   CSN: [id number]   Attending Physician: Lizett Stoner M.D.    Referring Physician: Alvin Jacob MD      Date of Visit: 7/16 and retic elevated, haptoglobin low. ROCIO poly positive, IgG negative and C3D positive. Serum immunoglobulins were normal, SPEP and GENNY showed no monoclonal protein. Kappa light chains were elevated. Cold agglutinin titer was 128.  Findings c/w cold agglutin ankle. Area is very tender. His is unable to sit for a prolonged periods. 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 He just lays at home he says as the pain limits his activity. He has not noticed any bleeding elsewhere.  He has been [place] glimepiride 1 MG Oral Tab, Take 1 tablet (1 mg total) by mouth daily with breakfast., Disp: 90 tablet, Rfl: 1    Past Medical History:  Past Medical History:   Diagnosis Date   • Anesthesia complication     broke out in rashes generalized - unknown source Dr. Brittni Pace   • OTHER SURGICAL HISTORY  10/14/15    BTS Cystoscopy -Dr Brittni Pace   • OTHER SURGICAL HISTORY  1/16/15    BTS Cystoscopy -Dr Brittni Pace   • OTHER SURGICAL HISTORY  5/13/16    BTS Cystoscopy-Dr Campos   • OTHER SURGICAL HISTORY  8/15/16 connections:        Talks on phone: Not on file        Gets together: Not on file        Attends Mandaeism service: Not on file        Active member of club or organization: Not on file        Attends meetings of clubs or organizations: Not on file Take 1 tablet (325 mg total) by mouth daily with breakfast., Disp: 30 tablet, Rfl: 11  •  MetFORMIN HCl 500 MG Oral Tab, Take 500 mg by mouth 4 (four) times daily. , Disp: , Rfl:   •  Acidophilus/Pectin Oral Cap, Take 1 capsule by mouth daily. , Disp: 7 ca Collection Time: 07/16/20  9:44 AM   Result Value Ref Range    Retic% 1.1 0.5 - 2.5 %    Retic Absolute 63.6 22.5 - 147.5 x10(3) uL    Retic IRF 0.124 0.100 - 0.300 Ratio    Reticulocyte Hemoglobin Equivalent 23.7 (L) 28.2 - 36.6 pg   BILIRUBIN, DIRECT (C3D) Unknown  Positive   WBC Latest Ref Range: 4.0 - 11.0 x10(3) uL 10.1 10.7   Hemoglobin Latest Ref Range: 13.0 - 17.5 g/dL 8.8 (L) 9.3 (L)   Hematocrit Latest Ref Range: 39.0 - 53.0 % 31.8 (L) 33.2 (L)   Platelet Count Latest Ref Range: 150.0 - 450.0 1 Latest Ref Range: 240 - 450 ug/dL  337    Iron Saturation Latest Ref Range: 20 - 50 %  12 (L)    FERRITIN Latest Ref Range: 30.0 - 530.0 ng/mL  84.5    Vitamin B12 Latest Ref Range: 193 - 986 pg/mL  881    FOLATE (FOLIC ACID), SERUM Latest Ref Range: >=8.7 common femoral,   profunda femoral, superficial femoral and proximal popliteal vein. The severe of femoral junction is patent. CONCLUSION:  No significant change.   Nonocclusive thrombus of the distal left popliteal vein is again demonstrated with no n postsurgical changes with pleural parenchymal scarring. Right middle and lower lobe linear densities consistent with atelectasis and or scar.     VASCULATURE:  Thrombus cannot be excluded without intravenous contrast.    ALEX:  Postsurgical changes with le anticoagulation. Unsure when he developed the tear but could be from his fall in May. He has had significant pain in his leg since that fall.  Symptoms were initially attributed to his DVT but pain seemed to be persistent despite anticoagulation and out of folic acid supplementation   - Hb today improved to 11.3. Haptoglobin, LDH and retic have normalized and does not appear to be actively hemolyzing. As levels improved will continue close monitoring with expectant management.      3. Lung carcinoid- Has been

## 2020-07-24 DIAGNOSIS — L03.116 CELLULITIS OF LEFT LOWER EXTREMITY: ICD-10-CM

## 2020-07-24 DIAGNOSIS — I82.4Y2 ACUTE DEEP VEIN THROMBOSIS (DVT) OF PROXIMAL VEIN OF LEFT LOWER EXTREMITY (HCC): Primary | ICD-10-CM

## 2020-07-24 DIAGNOSIS — M79.89 LEFT LEG SWELLING: ICD-10-CM

## 2020-07-27 RX ORDER — CEPHALEXIN 500 MG/1
500 CAPSULE ORAL 4 TIMES DAILY
Qty: 28 CAPSULE | Refills: 0 | Status: SHIPPED | OUTPATIENT
Start: 2020-07-27 | End: 2020-08-06

## 2020-07-27 NOTE — TELEPHONE ENCOUNTER
Called patient back to get update on the left extremity cellulitis. He said it is not completely better, still will some redness. Per Dr. Diana Guerrero, ok to refill Cephalexin for 7 more days if not better. Refill sent to patient's preferred pharmacy.

## 2020-07-29 ENCOUNTER — HOSPITAL ENCOUNTER (OUTPATIENT)
Dept: CT IMAGING | Facility: HOSPITAL | Age: 80
Discharge: HOME | End: 2020-07-29
Attending: INTERNAL MEDICINE
Payer: MEDICARE

## 2020-07-29 DIAGNOSIS — C68.9 UROTHELIAL CANCER (HCC): ICD-10-CM

## 2020-07-29 DIAGNOSIS — D59.19 OTHER AUTOIMMUNE HEMOLYTIC ANEMIAS: ICD-10-CM

## 2020-07-29 DIAGNOSIS — D3A.090 CARCINOID TUMOR OF LEFT LUNG: ICD-10-CM

## 2020-07-29 PROCEDURE — 71260 CT THORAX DX C+: CPT | Performed by: INTERNAL MEDICINE

## 2020-07-29 PROCEDURE — 74177 CT ABD & PELVIS W/CONTRAST: CPT | Performed by: INTERNAL MEDICINE

## 2020-08-06 ENCOUNTER — TELEPHONE (OUTPATIENT)
Dept: HEMATOLOGY/ONCOLOGY | Facility: HOSPITAL | Age: 80
End: 2020-08-06

## 2020-08-06 ENCOUNTER — OFFICE VISIT (OUTPATIENT)
Dept: HEMATOLOGY/ONCOLOGY | Facility: HOSPITAL | Age: 80
End: 2020-08-06
Attending: INTERNAL MEDICINE
Payer: MEDICARE

## 2020-08-06 VITALS
HEIGHT: 70 IN | DIASTOLIC BLOOD PRESSURE: 77 MMHG | BODY MASS INDEX: 34.33 KG/M2 | TEMPERATURE: 98 F | OXYGEN SATURATION: 98 % | HEART RATE: 70 BPM | RESPIRATION RATE: 18 BRPM | SYSTOLIC BLOOD PRESSURE: 126 MMHG | WEIGHT: 239.81 LBS

## 2020-08-06 DIAGNOSIS — M79.605 PAIN OF LEFT LOWER EXTREMITY: ICD-10-CM

## 2020-08-06 DIAGNOSIS — D50.8 OTHER IRON DEFICIENCY ANEMIA: ICD-10-CM

## 2020-08-06 DIAGNOSIS — D3A.090 CARCINOID TUMOR OF LEFT LUNG: ICD-10-CM

## 2020-08-06 DIAGNOSIS — I82.4Y2 ACUTE DEEP VEIN THROMBOSIS (DVT) OF PROXIMAL VEIN OF LEFT LOWER EXTREMITY (HCC): Primary | ICD-10-CM

## 2020-08-06 DIAGNOSIS — E53.8 FOLIC ACID DEFICIENCY: ICD-10-CM

## 2020-08-06 DIAGNOSIS — C68.9 UROTHELIAL CANCER (HCC): ICD-10-CM

## 2020-08-06 DIAGNOSIS — D59.12 COLD AGGLUTININ DISEASE (HCC): ICD-10-CM

## 2020-08-06 DIAGNOSIS — M79.89 LEFT LEG SWELLING: ICD-10-CM

## 2020-08-06 DIAGNOSIS — L03.116 CELLULITIS OF LEFT LOWER EXTREMITY: ICD-10-CM

## 2020-08-06 DIAGNOSIS — S80.12XS HEMATOMA OF LEFT LOWER EXTREMITY, SEQUELA: ICD-10-CM

## 2020-08-06 DIAGNOSIS — I26.99 PE (PULMONARY THROMBOEMBOLISM) (HCC): ICD-10-CM

## 2020-08-06 PROCEDURE — 99215 OFFICE O/P EST HI 40 MIN: CPT | Performed by: INTERNAL MEDICINE

## 2020-08-06 NOTE — PROGRESS NOTES
Cancer Center Progress Note    Patient Name: Sincere Boyd   YOB: 1940   Medical Record Number: [id number]   CSN: [id number]   Attending Physician: Rajesh Cole M.D.    Referring Physician: MD Dr. Ximena Correa Dr. re-evaluation by one of our APNs 7/6. He continues to c/o of pain and swelling of the LLE. Not worse but still pretty significant and is frustrated.  At his insistence as he was worried about being off anticoagulation despite having a filter in place, Larry venous insuffiiciency and possible lymphedema. He recommended repeat doppler US of the LLE with plan of having IVC filter removed in 3-6 months. He then saw ortho who discussed treatment options for the hematoma.  As he is on blood thinners aspiration under daily., Disp: 90 tablet, Rfl: 1  •  glimepiride 1 MG Oral Tab, Take 1 tablet (1 mg total) by mouth daily with breakfast., Disp: 90 tablet, Rfl: 1    Past Medical History:  Past Medical History:   Diagnosis Date   • Anesthesia complication     broke out in 7/29/15    cysto, stent removal - Dr. Begum Argue   • OTHER SURGICAL HISTORY  10/14/15    BTS Cystoscopy -Dr Begum Argue   • OTHER SURGICAL HISTORY  1/16/15    BTS Cystoscopy -Dr Begum Argue   • OTHER SURGICAL HISTORY  5/13/16    BTS Cystoscopy-Dr Campos   • Relationships      Social connections:        Talks on phone: Not on file        Gets together: Not on file        Attends Nondenominational service: Not on file        Active member of club or organization: Not on file        Attends meetings of clubs or [place] 500 MG Oral Tab, Take 500 mg by mouth 4 (four) times daily. , Disp: , Rfl:   •  Acidophilus/Pectin Oral Cap, Take 1 capsule by mouth daily. , Disp: 7 capsule, Rfl: 0  •  simvastatin 20 MG Oral Tab, Take 1 tablet (20 mg total) by mouth nightly.  (Patient art 08/06/2020    NEPRELIM 6.47 08/06/2020    NEPERCENT 65.8 08/06/2020    LYPERCENT 21.2 08/06/2020    MOPERCENT 8.3 08/06/2020    EOPERCENT 2.6 08/06/2020    BAPERCENT 1.1 08/06/2020    NE 6.47 08/06/2020    LYMABS 2.09 08/06/2020    MOABSO 0.82 08/06/2020 34.0 pg 19.4 (L) 19.5 (L)   MCHC Latest Ref Range: 31.0 - 37.0 g/dL 27.7 (L) 28.0 (L)   MCV Latest Ref Range: 80.0 - 100.0 fL 70.0 (L) 69.7 (L)   RDW Latest Ref Range: 11.0 - 15.0 % 18.6 (H) 18.6 (H)   RDW-SD Latest Ref Range: 35.1 - 46.3 fL 44.8 45.5   Pr 5.21 g/dL 3.63 (L)     ALPHA-1-GLOBULINS Latest Ref Range: 0.19 - 0.46 g/dL 0.40     ALPHA-2-GLOBULINS Latest Ref Range: 0.48 - 1.05 g/dL 0.63     BETA GLOBULINS Latest Ref Range: 0.68 - 1.23 g/dL 0.70     GAMMA GLOBULINS Latest Ref Range: 0.62 - 1.70 g/dL aortic valve leaflets. PLEURA:  No mass or effusion. CHEST WALL:  No mass or axillary adenopathy. AORTA:  No aneurysm or dissection. Minimal mural calcified plaque. No dissection.     VASCULATURE:  No visible pulmonary arterial thrombus or attenuat unchanged. Moderate size bridging osteophytes mid to lower dorsal spine without fracture. Mild scoliosis.          =====  CONCLUSION:    1. Stable CT of the chest.  Status post left lung surgery. 2. No acute intra-abdominal or pelvic process.   Stable in Dictated by: Crist Seip, MD on 6/28/2020 at 6:23 PM       Finalized by: Crist Seip, MD on 6/28/2020 at 6:24 PM      Dictated by: Crist Seip, MD on 6/28/2020 at 6:26 PM       Finalized by: Crist Seip, MD on 6/28/2020 at 6:26 PM        PROCEDURE: or adenopathy. CARDIAC:  Coronary artery calcifications. PLEURA:  No mass or effusion. Lingular/lower lobe pleural thickening with subjacent interstitial densities are stable most likely postsurgical.  THORACIC AORTA:  Slight atherosclerosis.    CHEST improved may defer      2. Microcytic anemia  - has h/o thalassemia trait but baseline Hb usually low normal/normal.  - Work-up during his hospital stay in June revealed iron and folate deficiency.  TSH and B12 normal. LDH and retic elevated, haptoglobin lo distress, coping difficulties and social support systems and currently there are no active problems.     MD [facility]

## 2020-08-24 ENCOUNTER — TELEPHONE (OUTPATIENT)
Dept: HEMATOLOGY/ONCOLOGY | Facility: HOSPITAL | Age: 80
End: 2020-08-24

## 2020-08-24 ENCOUNTER — HOSPITAL ENCOUNTER (OUTPATIENT)
Dept: ULTRASOUND IMAGING | Facility: HOSPITAL | Age: 80
Discharge: HOME | End: 2020-08-24
Attending: SURGERY
Payer: MEDICARE

## 2020-08-24 DIAGNOSIS — I82.409 DVT (DEEP VENOUS THROMBOSIS) (HCC): ICD-10-CM

## 2020-08-24 DIAGNOSIS — I82.432: ICD-10-CM

## 2020-08-24 PROCEDURE — 93970 EXTREMITY STUDY: CPT | Performed by: SURGERY

## 2020-08-24 NOTE — TELEPHONE ENCOUNTER
Patient's daughter just heard the voice mail message Sam Koyanagi, RN left. Her father wants to know since Dr Melton Child reviewed the ultra sound report, does he need to go back and see Dr Coffey Mom?   She is requesting the call be returned to her father at [phone]

## 2020-08-31 ENCOUNTER — APPOINTMENT (OUTPATIENT)
Dept: LAB | Facility: HOSPITAL | Age: 80
End: 2020-08-31
Attending: INTERNAL MEDICINE
Payer: MEDICARE

## 2020-08-31 ENCOUNTER — TELEPHONE (OUTPATIENT)
Dept: HEMATOLOGY/ONCOLOGY | Facility: HOSPITAL | Age: 80
End: 2020-08-31

## 2020-08-31 DIAGNOSIS — I82.409 DVT (DEEP VENOUS THROMBOSIS) (HCC): ICD-10-CM

## 2020-09-01 LAB — SARS-COV-2 RNA RESP QL NAA+PROBE: NOT DETECTED

## 2020-09-03 ENCOUNTER — HOSPITAL ENCOUNTER (OUTPATIENT)
Dept: INTERVENTIONAL RADIOLOGY/VASCULAR | Facility: HOSPITAL | Age: 80
Discharge: HOME | End: 2020-09-03
Attending: INTERNAL MEDICINE | Admitting: INTERNAL MEDICINE
Payer: MEDICARE

## 2020-09-03 VITALS
OXYGEN SATURATION: 95 % | SYSTOLIC BLOOD PRESSURE: 117 MMHG | HEART RATE: 66 BPM | DIASTOLIC BLOOD PRESSURE: 63 MMHG | BODY MASS INDEX: 33.64 KG/M2 | WEIGHT: 235 LBS | TEMPERATURE: 97 F | RESPIRATION RATE: 15 BRPM | HEIGHT: 70 IN

## 2020-09-03 DIAGNOSIS — I82.4Y2 ACUTE DEEP VEIN THROMBOSIS (DVT) OF PROXIMAL VEIN OF LEFT LOWER EXTREMITY (HCC): ICD-10-CM

## 2020-09-03 DIAGNOSIS — I82.409 DVT (DEEP VENOUS THROMBOSIS) (HCC): Primary | ICD-10-CM

## 2020-09-03 LAB
GLUCOSE BLD-MCNC: 184 MG/DL (ref 70–99)
INR: 1 (ref 0.8–1.3)

## 2020-09-03 PROCEDURE — 99152 MOD SED SAME PHYS/QHP 5/>YRS: CPT

## 2020-09-03 PROCEDURE — 82962 GLUCOSE BLOOD TEST: CPT

## 2020-09-03 PROCEDURE — 37193 REM ENDOVAS VENA CAVA FILTER: CPT

## 2020-09-03 PROCEDURE — 06PY3DZ REMOVAL OF INTRALUMINAL DEVICE FROM LOWER VEIN, PERCUTANEOUS APPROACH: ICD-10-PCS | Performed by: RADIOLOGY

## 2020-09-03 PROCEDURE — 85610 PROTHROMBIN TIME: CPT

## 2020-09-03 RX ORDER — SODIUM CHLORIDE 9 MG/ML
INJECTION, SOLUTION INTRAVENOUS CONTINUOUS
Status: DISCONTINUED | OUTPATIENT
Start: 2020-09-03 | End: 2020-09-03

## 2020-09-03 RX ORDER — HEPARIN SODIUM 5000 [USP'U]/ML
INJECTION, SOLUTION INTRAVENOUS; SUBCUTANEOUS
Status: COMPLETED
Start: 2020-09-03 | End: 2020-09-03

## 2020-09-03 RX ORDER — MIDAZOLAM HYDROCHLORIDE 1 MG/ML
INJECTION INTRAMUSCULAR; INTRAVENOUS
Status: COMPLETED
Start: 2020-09-03 | End: 2020-09-03

## 2020-09-03 RX ORDER — LIDOCAINE HYDROCHLORIDE 10 MG/ML
INJECTION, SOLUTION INFILTRATION; PERINEURAL
Status: COMPLETED
Start: 2020-09-03 | End: 2020-09-03

## 2020-09-03 RX ADMIN — SODIUM CHLORIDE: 9 INJECTION, SOLUTION INTRAVENOUS at 10:15:00

## 2020-09-03 NOTE — PROGRESS NOTES
Patient had IVC filter removed today with Dr. Brett Marie. Right neck dressing CDI, soft. VSS. Patient denies any pain. Wife @ bedside. Patient completed recovery time. Patient tolerating po intake. Discharge instructions reviewed.  Patient instructed on resu

## 2020-09-03 NOTE — PROCEDURES
[facility]  Procedure Note    Niki Van Patient Status:  Outpatient in a Bed    1940 MRN [id number]   [facility]   Hosp Day # 0 PCP Rosaura Song MD     Procedure:

## 2020-09-21 PROCEDURE — 88108 CYTOPATH CONCENTRATE TECH: CPT | Performed by: UROLOGY

## 2020-09-23 ENCOUNTER — OFFICE VISIT (OUTPATIENT)
Dept: HEMATOLOGY/ONCOLOGY | Facility: HOSPITAL | Age: 80
End: 2020-09-23
Attending: INTERNAL MEDICINE
Payer: MEDICARE

## 2020-09-23 VITALS
BODY MASS INDEX: 34 KG/M2 | TEMPERATURE: 98 F | OXYGEN SATURATION: 96 % | WEIGHT: 240 LBS | HEART RATE: 102 BPM | DIASTOLIC BLOOD PRESSURE: 67 MMHG | SYSTOLIC BLOOD PRESSURE: 129 MMHG | RESPIRATION RATE: 16 BRPM

## 2020-09-23 DIAGNOSIS — D50.9 MICROCYTIC ANEMIA: ICD-10-CM

## 2020-09-23 DIAGNOSIS — I82.4Y2 ACUTE DEEP VEIN THROMBOSIS (DVT) OF PROXIMAL VEIN OF LEFT LOWER EXTREMITY (HCC): Primary | ICD-10-CM

## 2020-09-23 DIAGNOSIS — D50.8 OTHER IRON DEFICIENCY ANEMIA: ICD-10-CM

## 2020-09-23 DIAGNOSIS — D3A.090 CARCINOID TUMOR OF LEFT LUNG: ICD-10-CM

## 2020-09-23 DIAGNOSIS — D59.12 COLD AGGLUTININ DISEASE (HCC): ICD-10-CM

## 2020-09-23 DIAGNOSIS — C68.9 UROTHELIAL CANCER (HCC): ICD-10-CM

## 2020-09-23 DIAGNOSIS — L03.116 CELLULITIS OF LEFT LOWER EXTREMITY: ICD-10-CM

## 2020-09-23 DIAGNOSIS — I26.99 PE (PULMONARY THROMBOEMBOLISM) (HCC): ICD-10-CM

## 2020-09-23 DIAGNOSIS — E53.8 FOLIC ACID DEFICIENCY: ICD-10-CM

## 2020-09-23 DIAGNOSIS — M79.89 LEFT LEG SWELLING: ICD-10-CM

## 2020-09-23 DIAGNOSIS — M79.605 PAIN OF LEFT LOWER EXTREMITY: ICD-10-CM

## 2020-09-23 DIAGNOSIS — S80.12XS HEMATOMA OF LEFT LOWER EXTREMITY, SEQUELA: ICD-10-CM

## 2020-09-23 LAB
BASOPHILS # BLD AUTO: 0.09 X10(3) UL (ref 0–0.2)
BASOPHILS NFR BLD AUTO: 0.6 %
DEPRECATED RDW RBC AUTO: 41.2 FL (ref 35.1–46.3)
EOSINOPHIL # BLD AUTO: 0.25 X10(3) UL (ref 0–0.7)
EOSINOPHIL NFR BLD AUTO: 1.6 %
ERYTHROCYTE [DISTWIDTH] IN BLOOD BY AUTOMATED COUNT: 23.1 % (ref 11–15)
HCT VFR BLD AUTO: 36.6 %
HGB BLD-MCNC: 12.4 G/DL
HGB RETIC QN AUTO: 22.7 PG (ref 28.2–36.6)
IMM GRANULOCYTES # BLD AUTO: 0.16 X10(3) UL (ref 0–1)
IMM GRANULOCYTES NFR BLD: 1 %
IMM RETICS NFR: 0.18 RATIO (ref 0.1–0.3)
IRON SATURATION: 6 %
IRON SERPL-MCNC: 20 UG/DL
LDH SERPL L TO P-CCNC: 174 U/L
LYMPHOCYTES # BLD AUTO: 1.27 X10(3) UL (ref 1–4)
LYMPHOCYTES NFR BLD AUTO: 8.2 %
MCH RBC QN AUTO: 25.4 PG (ref 26–34)
MCHC RBC AUTO-ENTMCNC: 33.9 G/DL (ref 31–37)
MCV RBC AUTO: 74.8 FL
MONOCYTES # BLD AUTO: 1.21 X10(3) UL (ref 0.1–1)
MONOCYTES NFR BLD AUTO: 7.8 %
NEUTROPHILS # BLD AUTO: 12.48 X10 (3) UL (ref 1.5–7.7)
NEUTROPHILS # BLD AUTO: 12.48 X10(3) UL (ref 1.5–7.7)
NEUTROPHILS NFR BLD AUTO: 80.8 %
PLATELET # BLD AUTO: 146 10(3)UL (ref 150–450)
PLATELET MORPHOLOGY: NORMAL
RBC # BLD AUTO: 4.89 X10(6)UL
RETICS # AUTO: 53.3 X10(3) UL (ref 22.5–147.5)
RETICS/RBC NFR AUTO: 1.1 %
TOTAL IRON BINDING CAPACITY: 334 UG/DL (ref 240–450)
TRANSFERRIN SERPL-MCNC: 224 MG/DL (ref 200–360)
WBC # BLD AUTO: 15.5 X10(3) UL (ref 4–11)

## 2020-09-23 PROCEDURE — 99215 OFFICE O/P EST HI 40 MIN: CPT | Performed by: INTERNAL MEDICINE

## 2020-09-23 NOTE — PROGRESS NOTES
Cancer Center Progress Note    Patient Name: Jesu City   YOB: 1940   Medical Record Number: [id number]   CSN: [id number]   Attending Physician: Apoorva Mckeon M.D.    Referring Physician: MD Dr. Miguel Ángel Lopez Dr. Not worse but still pretty significant and is frustrated. At his insistence as he was worried about being off anticoagulation despite having a filter in place, Eliquis was resumed. MRI of the LLE was ordered to evaluate his leg.  This was attempted but coul venous thromboembolism. EGD/c-scope showed tiny gastric erosions. Bowel prep was poor in the sigmoid and descending colon and procedure was aborted. Colonoscopy was done 6/14/20 which showed extensive pan-diverticulosis and a small TC polyp.             His chemotherapy     JUly/2015   • Pulmonary embolism (HCC) 20 YEARS AGO    ON COUMADIN NOW   • TIA (transient ischemic attack)    • Type II or unspecified type diabetes mellitus without mention of complication, not stated as uncontrolled    • Varicose veins o Performed by Margareth Farrar, Erskin Brittle, MD at [address] GRAFT,FEM-FEM      to left leg       Family Medical History:  Family History   Problem Relation Age of Onset   • Heart Disorder Father    • Hypertension Mother    • Arthri file        Allergies:    Bacitracin-Neomycin*    RASH  Other                   OTHER (SEE COMMENTS)     Review of Systems:  A 14-point ROS was done with pertinent positives and negative per the HPI    Vital Signs:  /67 (BP Location: Left arm, Patien Pedal pulses are present. BLE edema L>>R. Area of previous hematoma much softer with no ecchymosis posterior thigh. Left leg is brawny and taut with varicosities noted and no erythema but hyperpigmented  Neurological: Grossly intact.        Laboratory:  Re 08/06/2020    CREATSERUM 1.24 08/06/2020    ANIONGAP 3 08/06/2020    GFR 51 (L) 09/20/2017    GFRNAA 56 (L) 08/06/2020    GFRAA 64 08/06/2020    CA 9.3 08/06/2020    OSMOCALC 296 (H) 08/06/2020    ALKPHO 77 08/06/2020    AST 21 08/06/2020    ALT 34 08/06/2 Latest Ref Range: 1.00 - 4.00 x10(3) uL 2.36    Monocytes Absolute Latest Ref Range: 0.10 - 1.00 x10(3) uL 1.16 (H)    Eosinophils Absolute Latest Ref Range: 0.00 - 0.70 x10(3) uL 0.51    Basophils Absolute Latest Ref Range: 0.00 - 0.20 x10(3) uL 0.10    I electrophoresis. ... IMMUNOFIXATION Unknown No monoclonal protein detected by immunofixation. ...      SOLUBLE TRANSFERRIN RECEPTOR Latest Ref Range: 2.2 - 5.0 mg/L  9.7 (H)        Radiology:  PROCEDURE:  US VENOUS DOPPLER LEG BILAT - DIAG IMG (CPT=93970) 2:10 PM       Finalized by (CST): Marycarmen Peng MD on 8/24/2020 at 2:19 PM         PROCEDURE:  CT CHEST+ABDOMEN+PELVIS(ALL CNTRST ONLY)(CPT=71260/10125)     COMPARISON:  [facility], CT CHEST (CPT=71250), 7/29/2019, 12:16 PM.     INDICATIONS:  C68.9 Malig dilation. PANCREAS:  Atrophic pancreas with multiple diffusely distributed calcifications ranging in size from 1-3 mm in consistent with changes of chronic pancreatitis. No evidence of acute pancreatitis or mass. No dilation of the pancreatic duct.   SPL characteristics 6.           Dictated by (CST): Donna Forbes MD on 7/30/2020 at 7:18 AM       Finalized by (CST): Donna Forbes MD on 7/30/2020 at 7:31 AM       CORRECTION  Corrected on: 6/28/2020;         PROCEDURE:  [address] LEFT - Technologist)           FINDINGS:    A complex hypoechoic mass consistent with hematoma is noted within the posterior right thigh along the superficial aspect of the muscles which corresponds to the visible area of bruising.   This hematoma measures 19.3 x a sequela of chronic pancreatitis. BONES:  Stable.       =====  CONCLUSION:    1. Allowing for differences in technique, stable findings when compared to most recent CT of the chest performed 8/25/2018.         Dictated by: James Shoemaker MD on 7/29/2019 at thromboembolism. - w/u to determine whether CAD is primary or secondary. DESIRAE is negative and unlikely autoimmune. No monoclonal protein seen, IgM levels normal and unlikely associated with lymphoid malignancy. He reports no prior/recent infection.  Scans

## 2020-10-05 DIAGNOSIS — I82.4Y2 ACUTE DEEP VEIN THROMBOSIS (DVT) OF PROXIMAL VEIN OF LEFT LOWER EXTREMITY (HCC): ICD-10-CM

## 2020-10-05 RX ORDER — APIXABAN 5 MG/1
TABLET, FILM COATED ORAL
Qty: 60 TABLET | Refills: 2 | Status: SHIPPED | OUTPATIENT
Start: 2020-10-05 | End: 2020-12-28

## 2020-12-16 ENCOUNTER — APPOINTMENT (OUTPATIENT)
Dept: HEMATOLOGY/ONCOLOGY | Facility: HOSPITAL | Age: 80
End: 2020-12-16
Attending: INTERNAL MEDICINE
Payer: MEDICARE

## 2020-12-17 ENCOUNTER — OFFICE VISIT (OUTPATIENT)
Dept: HEMATOLOGY/ONCOLOGY | Facility: HOSPITAL | Age: 80
End: 2020-12-17
Attending: INTERNAL MEDICINE
Payer: MEDICARE

## 2020-12-17 VITALS
TEMPERATURE: 97 F | HEART RATE: 84 BPM | HEIGHT: 70 IN | WEIGHT: 230.5 LBS | SYSTOLIC BLOOD PRESSURE: 120 MMHG | RESPIRATION RATE: 16 BRPM | OXYGEN SATURATION: 98 % | BODY MASS INDEX: 33 KG/M2 | DIASTOLIC BLOOD PRESSURE: 76 MMHG

## 2020-12-17 DIAGNOSIS — D59.12 COLD AGGLUTININ DISEASE (HCC): ICD-10-CM

## 2020-12-17 DIAGNOSIS — L03.116 CELLULITIS OF LEFT LOWER EXTREMITY: ICD-10-CM

## 2020-12-17 DIAGNOSIS — D50.9 MICROCYTIC ANEMIA: ICD-10-CM

## 2020-12-17 DIAGNOSIS — M79.89 LEFT LEG SWELLING: ICD-10-CM

## 2020-12-17 DIAGNOSIS — I26.99 PE (PULMONARY THROMBOEMBOLISM) (HCC): ICD-10-CM

## 2020-12-17 DIAGNOSIS — D3A.090 CARCINOID TUMOR OF LEFT LUNG: ICD-10-CM

## 2020-12-17 DIAGNOSIS — I82.4Y2 ACUTE DEEP VEIN THROMBOSIS (DVT) OF PROXIMAL VEIN OF LEFT LOWER EXTREMITY (HCC): ICD-10-CM

## 2020-12-17 DIAGNOSIS — D50.8 OTHER IRON DEFICIENCY ANEMIA: ICD-10-CM

## 2020-12-17 DIAGNOSIS — C68.9 UROTHELIAL CANCER (HCC): ICD-10-CM

## 2020-12-17 DIAGNOSIS — R82.90 CLOUDY URINE: Primary | ICD-10-CM

## 2020-12-17 PROCEDURE — 99215 OFFICE O/P EST HI 40 MIN: CPT | Performed by: INTERNAL MEDICINE

## 2020-12-17 NOTE — PROGRESS NOTES
Cancer Center Progress Note    Patient Name: Anthon Sever   YOB: 1940   Medical Record Number: [id number]   CSN: [id number]   Attending Physician: Sharlee Lesches, M.D.    Referring Physician: MD Dr. Yaz Lopez Dr. placed by IR on 7/1/20. He was seen for re-evaluation by one of our APNs 7/6. He continues to c/o of pain and swelling of the LLE. Not worse but still pretty significant and is frustrated.  At his insistence as he was worried about being off anticoagulat and GENNY showed no monoclonal protein. Kappa light chains were elevated. Cold agglutinin titer was 128. Findings c/w cold agglutinin disease (CAD) which can be associated with venous thromboembolism. EGD/c-scope showed tiny gastric erosions.  Bowel prep was NOW   • TIA (transient ischemic attack)    • Type II or unspecified type diabetes mellitus without mention of complication, not stated as uncontrolled    • Varicose veins of both lower extremities with complications    • Visual impairment     4-5 yrs ago h GRAFT,FEM-FEM      to left leg       Family Medical History:  Family History   Problem Relation Age of Onset   • Heart Disorder Father    • Hypertension Mother    • Arthritis Mother    • Heart Disorder Son    • Other (kidney stone) Brother    • Other (gerardo COMMENTS)     Review of Systems:  A 14-point ROS was done with pertinent positives and negative per the HPI    Vital Signs:  /76 (BP Location: Left arm, Patient Position: Sitting, Cuff Size: large)   Pulse 84   Temp 97.1 °F (36.2 °C) (Temporal)   Res L>>R. Area of previous hematoma much softer with no ecchymosis posterior thigh. Left leg is brawny and taut with varicosities noted and no erythema but hyperpigmented  Neurological: Grossly intact.        Laboratory:  Lab Results   Component Value Date    W Ref Range: 0.100 - 0.300 Ratio 0.120   Reticulocyte Hemoglobin Equivalent Latest Ref Range: 28.2 - 36.6 pg 24.5 (L)      Ref.  Range 7/16/2020 09:44   Total Bilirubin Latest Ref Range: 0.1 - 2.0 mg/dL 0.5   Bilirubin, Direct Latest Ref Range: 0.0 - 0.2 mg/d involves the right mid superficial femoral vein of indeterminate age and re-identified within the left popliteal vein. Continued clinical correlation recommended.            Dictated by (CST): Shu Crowell MD on 8/24/2020 at 2:10 PM       Finalized by (CST): There is mild fatty infiltration of the liver without suspicious mass. Mild hepatomegaly. BILIARY:  Numerous gallstones within a contracted gallbladder. The largest gallstone measures 1.8 cm. No biliary tree dilation.   PANCREAS:  Atrophic pancreas wit infiltration of the liver, uncomplicated cholelithiasis, moderate to severe uncomplicated diverticulosis, chronic pancreatitis type changes. No acute   pancreatitis, stable left adrenal nodule with benign characteristics 6.           Dictated by (CST): Carlos COMPARISON:  None.      INDICATIONS:  Eval of hematoma on left hamstring     TECHNIQUE:  Sonography was performed of the clinically requested area of interest.     PATIENT STATED HISTORY: (As transcribed by Technologist)           FINDINGS:    A complex h adenopathy. LIMITED ABDOMEN:  Limited images of the upper abdomen re-demonstrate bilateral adrenal adenomas measuring 2.7 cm on the left and 1.1 cm on the right as well as pancreatic calcifications most consistent a sequela of chronic pancreatitis.     B positive, IgG negative and C3D positive. Serum immunoglobulins were normal, SPEP and GENNY showed no monoclonal protein. Kappa light chains were elevated.  Cold agglutinin titer was 128.   - Findings c/w cold agglutinin disease (CAD) which can be associated w

## 2020-12-18 RX ORDER — LEVOFLOXACIN 500 MG/1
500 TABLET, FILM COATED ORAL DAILY
Qty: 3 TABLET | Refills: 0 | Status: SHIPPED | OUTPATIENT
Start: 2020-12-18 | End: 2020-12-21

## 2020-12-26 DIAGNOSIS — I82.4Y2 ACUTE DEEP VEIN THROMBOSIS (DVT) OF PROXIMAL VEIN OF LEFT LOWER EXTREMITY (HCC): ICD-10-CM

## 2020-12-28 RX ORDER — APIXABAN 5 MG/1
TABLET, FILM COATED ORAL
Qty: 60 TABLET | Refills: 2 | Status: SHIPPED | OUTPATIENT
Start: 2020-12-28 | End: 2021-03-17

## 2021-01-26 DIAGNOSIS — Z23 NEED FOR VACCINATION: ICD-10-CM

## 2021-01-26 NOTE — TELEPHONE ENCOUNTER
Daughter called had not received call about apt for Monday, please call [phone] with apt time and place.
show

## 2021-01-29 ENCOUNTER — IMMUNIZATION (OUTPATIENT)
Dept: LAB | Age: 81
End: 2021-01-29
Attending: HOSPITALIST
Payer: MEDICARE

## 2021-01-29 DIAGNOSIS — Z23 NEED FOR VACCINATION: Primary | ICD-10-CM

## 2021-01-29 PROCEDURE — 0001A SARSCOV2 VAC 30MCG/0.3ML IM: CPT

## 2021-02-19 ENCOUNTER — IMMUNIZATION (OUTPATIENT)
Dept: LAB | Age: 81
End: 2021-02-19
Attending: HOSPITALIST
Payer: MEDICARE

## 2021-02-19 DIAGNOSIS — Z23 NEED FOR VACCINATION: Primary | ICD-10-CM

## 2021-02-19 PROCEDURE — 0002A SARSCOV2 VAC 30MCG/0.3ML IM: CPT

## 2021-03-17 DIAGNOSIS — I82.4Y2 ACUTE DEEP VEIN THROMBOSIS (DVT) OF PROXIMAL VEIN OF LEFT LOWER EXTREMITY (HCC): ICD-10-CM

## 2021-03-17 RX ORDER — APIXABAN 5 MG/1
TABLET, FILM COATED ORAL
Qty: 60 TABLET | Refills: 2 | Status: SHIPPED | OUTPATIENT
Start: 2021-03-17 | End: 2021-06-08

## 2021-06-08 DIAGNOSIS — I82.4Y2 ACUTE DEEP VEIN THROMBOSIS (DVT) OF PROXIMAL VEIN OF LEFT LOWER EXTREMITY (HCC): ICD-10-CM

## 2021-06-08 RX ORDER — APIXABAN 5 MG/1
TABLET, FILM COATED ORAL
Qty: 60 TABLET | Refills: 3 | Status: SHIPPED | OUTPATIENT
Start: 2021-06-08

## 2021-06-17 ENCOUNTER — OFFICE VISIT (OUTPATIENT)
Dept: HEMATOLOGY/ONCOLOGY | Facility: HOSPITAL | Age: 81
End: 2021-06-17
Attending: INTERNAL MEDICINE
Payer: MEDICARE

## 2021-06-17 VITALS
OXYGEN SATURATION: 96 % | SYSTOLIC BLOOD PRESSURE: 121 MMHG | HEART RATE: 79 BPM | BODY MASS INDEX: 35.07 KG/M2 | DIASTOLIC BLOOD PRESSURE: 74 MMHG | RESPIRATION RATE: 16 BRPM | HEIGHT: 70 IN | WEIGHT: 245 LBS | TEMPERATURE: 98 F

## 2021-06-17 DIAGNOSIS — D59.12 COLD AGGLUTININ DISEASE (HCC): ICD-10-CM

## 2021-06-17 DIAGNOSIS — I82.4Y2 ACUTE DEEP VEIN THROMBOSIS (DVT) OF PROXIMAL VEIN OF LEFT LOWER EXTREMITY (HCC): ICD-10-CM

## 2021-06-17 DIAGNOSIS — Z85.51 HISTORY OF BLADDER CANCER: ICD-10-CM

## 2021-06-17 DIAGNOSIS — D50.8 OTHER IRON DEFICIENCY ANEMIA: ICD-10-CM

## 2021-06-17 DIAGNOSIS — D3A.090 CARCINOID TUMOR OF LEFT LUNG: Primary | ICD-10-CM

## 2021-06-17 DIAGNOSIS — D50.9 MICROCYTIC ANEMIA: ICD-10-CM

## 2021-06-17 DIAGNOSIS — M79.89 LEFT LEG SWELLING: ICD-10-CM

## 2021-06-17 PROCEDURE — 99214 OFFICE O/P EST MOD 30 MIN: CPT | Performed by: INTERNAL MEDICINE

## 2021-07-26 ENCOUNTER — HOSPITAL ENCOUNTER (OUTPATIENT)
Dept: CT IMAGING | Facility: HOSPITAL | Age: 81
Discharge: HOME | End: 2021-07-26
Attending: INTERNAL MEDICINE
Payer: MEDICARE

## 2021-07-26 DIAGNOSIS — D3A.090 CARCINOID TUMOR OF LEFT LUNG: ICD-10-CM

## 2021-07-26 PROCEDURE — 71250 CT THORAX DX C-: CPT | Performed by: INTERNAL MEDICINE

## 2021-09-09 ENCOUNTER — TELEPHONE (OUTPATIENT)
Dept: HEMATOLOGY/ONCOLOGY | Facility: HOSPITAL | Age: 81
End: 2021-09-09

## 2021-09-09 NOTE — TELEPHONE ENCOUNTER
Patient is calling and asking if he can go back to Warfarin instead of Eliquis because he lost his insurance for medication and cannot get it again until next year. He can not afford $580.00 per month for Eliquis. Please call patient to discuss.

## 2022-01-12 ENCOUNTER — LAB ENCOUNTER (OUTPATIENT)
Dept: LAB | Facility: HOSPITAL | Age: 82
End: 2022-01-12
Attending: INTERNAL MEDICINE
Payer: MEDICARE

## 2022-01-12 DIAGNOSIS — D50.8 OTHER IRON DEFICIENCY ANEMIA: ICD-10-CM

## 2022-01-12 DIAGNOSIS — D3A.090 CARCINOID TUMOR OF LEFT LUNG: ICD-10-CM

## 2022-01-12 DIAGNOSIS — D50.9 MICROCYTIC ANEMIA: ICD-10-CM

## 2022-01-12 DIAGNOSIS — D59.12 COLD AGGLUTININ DISEASE (HCC): ICD-10-CM

## 2022-01-12 LAB
BASOPHILS # BLD AUTO: 0.19 X10(3) UL (ref 0–0.2)
BASOPHILS NFR BLD AUTO: 1.8 %
DEPRECATED HBV CORE AB SER IA-ACNC: 77.2 NG/ML
EOSINOPHIL # BLD AUTO: 0.7 X10(3) UL (ref 0–0.7)
EOSINOPHIL NFR BLD AUTO: 6.7 %
ERYTHROCYTE [DISTWIDTH] IN BLOOD BY AUTOMATED COUNT: 18.6 %
HCT VFR BLD AUTO: 42.8 %
HGB BLD-MCNC: 12.5 G/DL
IMM GRANULOCYTES # BLD AUTO: 0.36 X10(3) UL (ref 0–1)
IMM GRANULOCYTES NFR BLD: 3.4 %
IRON SATN MFR SERPL: 20 %
IRON SERPL-MCNC: 76 UG/DL
LDH SERPL L TO P-CCNC: 173 U/L
LYMPHOCYTES # BLD AUTO: 2.78 X10(3) UL (ref 1–4)
LYMPHOCYTES NFR BLD AUTO: 26.5 %
MCH RBC QN AUTO: 19.6 PG (ref 26–34)
MCHC RBC AUTO-ENTMCNC: 29.2 G/DL (ref 31–37)
MCV RBC AUTO: 67.1 FL
MONOCYTES # BLD AUTO: 0.72 X10(3) UL (ref 0.1–1)
MONOCYTES NFR BLD AUTO: 6.9 %
NEUTROPHILS # BLD AUTO: 5.73 X10 (3) UL (ref 1.5–7.7)
NEUTROPHILS # BLD AUTO: 5.73 X10(3) UL (ref 1.5–7.7)
NEUTROPHILS NFR BLD AUTO: 54.7 %
PLATELET # BLD AUTO: 181 10(3)UL (ref 150–450)
RBC # BLD AUTO: 6.38 X10(6)UL
TIBC SERPL-MCNC: 373 UG/DL (ref 240–450)
TRANSFERRIN SERPL-MCNC: 250 MG/DL (ref 200–360)
WBC # BLD AUTO: 10.5 X10(3) UL (ref 4–11)

## 2022-01-12 PROCEDURE — 36415 COLL VENOUS BLD VENIPUNCTURE: CPT

## 2022-01-12 PROCEDURE — 85025 COMPLETE CBC W/AUTO DIFF WBC: CPT

## 2022-01-12 PROCEDURE — 83615 LACTATE (LD) (LDH) ENZYME: CPT

## 2022-01-12 PROCEDURE — 83550 IRON BINDING TEST: CPT

## 2022-01-12 PROCEDURE — 83540 ASSAY OF IRON: CPT

## 2022-01-12 PROCEDURE — 82728 ASSAY OF FERRITIN: CPT

## 2022-04-11 ENCOUNTER — TELEPHONE (OUTPATIENT)
Dept: ORTHOPEDICS CLINIC | Facility: CLINIC | Age: 82
End: 2022-04-11

## 2022-04-11 NOTE — TELEPHONE ENCOUNTER
Spoke with patient who wanted to know if Dr. Milady Prasad would perform a carpal tunnel release. Pt notified that, depending on his symptoms/prognosis, this could be something discussed with Dr. Milady Prasad. Pt asked if he could schedule an appt to discuss this with him. Pt transferred to the appt desk for scheduling.

## 2022-04-11 NOTE — TELEPHONE ENCOUNTER
Patient has never seen Dr. Joseph Alcazar before, but he would like to know what kind of surgery he performs for carpal tunnel. Patient is interested in surgery. Please advise.

## 2022-09-02 DIAGNOSIS — I82.4Y2 ACUTE DEEP VEIN THROMBOSIS (DVT) OF PROXIMAL VEIN OF LEFT LOWER EXTREMITY (HCC): ICD-10-CM

## 2022-09-02 RX ORDER — APIXABAN 5 MG/1
TABLET, FILM COATED ORAL
Qty: 60 TABLET | Refills: 0 | Status: SHIPPED | OUTPATIENT
Start: 2022-09-02

## 2022-10-04 DIAGNOSIS — I82.4Y2 ACUTE DEEP VEIN THROMBOSIS (DVT) OF PROXIMAL VEIN OF LEFT LOWER EXTREMITY (HCC): ICD-10-CM

## 2022-10-05 RX ORDER — APIXABAN 5 MG/1
TABLET, FILM COATED ORAL
Qty: 60 TABLET | Refills: 0 | Status: SHIPPED | OUTPATIENT
Start: 2022-10-05 | End: 2022-11-14

## 2022-10-14 ENCOUNTER — OFFICE VISIT (OUTPATIENT)
Dept: HEMATOLOGY/ONCOLOGY | Facility: HOSPITAL | Age: 82
End: 2022-10-14
Attending: INTERNAL MEDICINE
Payer: MEDICARE

## 2022-10-14 VITALS
DIASTOLIC BLOOD PRESSURE: 76 MMHG | SYSTOLIC BLOOD PRESSURE: 136 MMHG | TEMPERATURE: 97 F | OXYGEN SATURATION: 98 % | BODY MASS INDEX: 34 KG/M2 | RESPIRATION RATE: 18 BRPM | HEART RATE: 76 BPM | WEIGHT: 238 LBS

## 2022-10-14 DIAGNOSIS — I82.4Y2 ACUTE DEEP VEIN THROMBOSIS (DVT) OF PROXIMAL VEIN OF LEFT LOWER EXTREMITY (HCC): ICD-10-CM

## 2022-10-14 DIAGNOSIS — D59.12 COLD AGGLUTININ DISEASE (HCC): ICD-10-CM

## 2022-10-14 DIAGNOSIS — I71.41 PARARENAL ABDOMINAL AORTIC ANEURYSM (AAA) WITHOUT RUPTURE: ICD-10-CM

## 2022-10-14 DIAGNOSIS — Z85.51 HISTORY OF BLADDER CANCER: ICD-10-CM

## 2022-10-14 DIAGNOSIS — D50.8 OTHER IRON DEFICIENCY ANEMIA: ICD-10-CM

## 2022-10-14 DIAGNOSIS — D50.9 MICROCYTIC ANEMIA: Primary | ICD-10-CM

## 2022-10-14 DIAGNOSIS — C68.9 UROTHELIAL CANCER (HCC): ICD-10-CM

## 2022-10-14 DIAGNOSIS — D3A.090 CARCINOID TUMOR OF LEFT LUNG: ICD-10-CM

## 2022-10-14 LAB
ALBUMIN SERPL-MCNC: 3.4 G/DL (ref 3.4–5)
ALBUMIN/GLOB SERPL: 1.1 {RATIO} (ref 1–2)
ALP LIVER SERPL-CCNC: 73 U/L
ALT SERPL-CCNC: 21 U/L
ANION GAP SERPL CALC-SCNC: 2 MMOL/L (ref 0–18)
AST SERPL-CCNC: 12 U/L (ref 15–37)
BASOPHILS # BLD AUTO: 0.13 X10(3) UL (ref 0–0.2)
BASOPHILS NFR BLD AUTO: 1.7 %
BILIRUB SERPL-MCNC: 0.6 MG/DL (ref 0.1–2)
BUN BLD-MCNC: 31 MG/DL (ref 7–18)
CALCIUM BLD-MCNC: 9.3 MG/DL (ref 8.5–10.1)
CHLORIDE SERPL-SCNC: 116 MMOL/L (ref 98–112)
CO2 SERPL-SCNC: 20 MMOL/L (ref 21–32)
CREAT BLD-MCNC: 1.4 MG/DL
DEPRECATED HBV CORE AB SER IA-ACNC: 112.7 NG/ML
EOSINOPHIL # BLD AUTO: 0.42 X10(3) UL (ref 0–0.7)
EOSINOPHIL NFR BLD AUTO: 5.4 %
ERYTHROCYTE [DISTWIDTH] IN BLOOD BY AUTOMATED COUNT: 19.5 %
GFR SERPLBLD BASED ON 1.73 SQ M-ARVRAT: 51 ML/MIN/1.73M2 (ref 60–?)
GLOBULIN PLAS-MCNC: 3.1 G/DL (ref 2.8–4.4)
GLUCOSE BLD-MCNC: 204 MG/DL (ref 70–99)
HCT VFR BLD AUTO: 40.9 %
HGB BLD-MCNC: 11.7 G/DL
IMM GRANULOCYTES # BLD AUTO: 0.18 X10(3) UL (ref 0–1)
IMM GRANULOCYTES NFR BLD: 2.3 %
IRON SATN MFR SERPL: 21 %
IRON SERPL-MCNC: 77 UG/DL
LDH SERPL L TO P-CCNC: 170 U/L
LYMPHOCYTES # BLD AUTO: 2.02 X10(3) UL (ref 1–4)
LYMPHOCYTES NFR BLD AUTO: 25.8 %
MCH RBC QN AUTO: 21.2 PG (ref 26–34)
MCHC RBC AUTO-ENTMCNC: 28.6 G/DL (ref 31–37)
MCV RBC AUTO: 74 FL
MONOCYTES # BLD AUTO: 0.64 X10(3) UL (ref 0.1–1)
MONOCYTES NFR BLD AUTO: 8.2 %
NEUTROPHILS # BLD AUTO: 4.44 X10 (3) UL (ref 1.5–7.7)
NEUTROPHILS # BLD AUTO: 4.44 X10(3) UL (ref 1.5–7.7)
NEUTROPHILS NFR BLD AUTO: 56.6 %
OSMOLALITY SERPL CALC.SUM OF ELEC: 298 MOSM/KG (ref 275–295)
PLATELET # BLD AUTO: 71 10(3)UL (ref 150–450)
POTASSIUM SERPL-SCNC: 5 MMOL/L (ref 3.5–5.1)
PROT SERPL-MCNC: 6.5 G/DL (ref 6.4–8.2)
RBC # BLD AUTO: 5.53 X10(6)UL
SODIUM SERPL-SCNC: 138 MMOL/L (ref 136–145)
TIBC SERPL-MCNC: 359 UG/DL (ref 240–450)
TRANSFERRIN SERPL-MCNC: 241 MG/DL (ref 200–360)
WBC # BLD AUTO: 7.8 X10(3) UL (ref 4–11)

## 2022-10-14 PROCEDURE — 99215 OFFICE O/P EST HI 40 MIN: CPT | Performed by: INTERNAL MEDICINE

## 2022-10-14 RX ORDER — FOLIC ACID 1 MG/1
1 TABLET ORAL DAILY
COMMUNITY

## 2022-10-14 RX ORDER — FERROUS SULFATE TAB EC 324 MG (65 MG FE EQUIVALENT) 324 (65 FE) MG
TABLET DELAYED RESPONSE ORAL 2 TIMES DAILY
COMMUNITY

## 2022-10-26 ENCOUNTER — TELEPHONE (OUTPATIENT)
Dept: HEMATOLOGY/ONCOLOGY | Facility: HOSPITAL | Age: 82
End: 2022-10-26

## 2022-10-26 ENCOUNTER — HOSPITAL ENCOUNTER (OUTPATIENT)
Dept: CT IMAGING | Facility: HOSPITAL | Age: 82
Discharge: HOME | End: 2022-10-26
Attending: INTERNAL MEDICINE
Payer: MEDICARE

## 2022-10-26 DIAGNOSIS — D3A.090 CARCINOID TUMOR OF LEFT LUNG: ICD-10-CM

## 2022-10-26 DIAGNOSIS — D50.9 MICROCYTIC ANEMIA: ICD-10-CM

## 2022-10-26 DIAGNOSIS — Z85.51 HISTORY OF BLADDER CANCER: ICD-10-CM

## 2022-10-26 DIAGNOSIS — I71.41 PARARENAL ABDOMINAL AORTIC ANEURYSM (AAA) WITHOUT RUPTURE: ICD-10-CM

## 2022-10-26 DIAGNOSIS — I82.4Y2 ACUTE DEEP VEIN THROMBOSIS (DVT) OF PROXIMAL VEIN OF LEFT LOWER EXTREMITY (HCC): ICD-10-CM

## 2022-10-26 PROCEDURE — 71250 CT THORAX DX C-: CPT | Performed by: INTERNAL MEDICINE

## 2022-10-26 PROCEDURE — 74176 CT ABD & PELVIS W/O CONTRAST: CPT | Performed by: INTERNAL MEDICINE

## 2022-10-26 NOTE — TELEPHONE ENCOUNTER
Called Tayo and went over CT results. From my standpoint scans look great but did show new moderate left hydro and hydroureter. AA ectasia slightly increased. He is followed by  given h/o urothelial cancer and had cysto just about 2 weeks ago. Will forward results to his PCP and  and also advised to reach out to  to discuss next steps.

## 2022-11-13 DIAGNOSIS — I82.4Y2 ACUTE DEEP VEIN THROMBOSIS (DVT) OF PROXIMAL VEIN OF LEFT LOWER EXTREMITY (HCC): ICD-10-CM

## 2022-11-14 RX ORDER — APIXABAN 5 MG/1
TABLET, FILM COATED ORAL
Qty: 60 TABLET | Refills: 2 | Status: SHIPPED | OUTPATIENT
Start: 2022-11-14

## 2023-01-20 ENCOUNTER — HOSPITAL ENCOUNTER (EMERGENCY)
Facility: HOSPITAL | Age: 83
Discharge: HOME | End: 2023-01-20
Attending: EMERGENCY MEDICINE
Payer: MEDICARE

## 2023-01-20 VITALS
SYSTOLIC BLOOD PRESSURE: 111 MMHG | DIASTOLIC BLOOD PRESSURE: 68 MMHG | TEMPERATURE: 97 F | HEART RATE: 67 BPM | OXYGEN SATURATION: 99 % | HEIGHT: 70.87 IN | RESPIRATION RATE: 16 BRPM | BODY MASS INDEX: 32.9 KG/M2 | WEIGHT: 235 LBS

## 2023-01-20 DIAGNOSIS — N40.1 BPH WITH URINARY OBSTRUCTION: Primary | ICD-10-CM

## 2023-01-20 DIAGNOSIS — N13.8 BPH WITH URINARY OBSTRUCTION: Primary | ICD-10-CM

## 2023-01-20 LAB
ALBUMIN SERPL-MCNC: 3.6 G/DL (ref 3.4–5)
ALBUMIN/GLOB SERPL: 1.1 {RATIO} (ref 1–2)
ALP LIVER SERPL-CCNC: 86 U/L
ALT SERPL-CCNC: 22 U/L
ANION GAP SERPL CALC-SCNC: 0 MMOL/L (ref 0–18)
AST SERPL-CCNC: 12 U/L (ref 15–37)
BASOPHILS # BLD AUTO: 0.11 X10(3) UL (ref 0–0.2)
BASOPHILS NFR BLD AUTO: 1.2 %
BILIRUB SERPL-MCNC: 0.8 MG/DL (ref 0.1–2)
BILIRUB UR QL STRIP.AUTO: NEGATIVE
BUN BLD-MCNC: 30 MG/DL (ref 7–18)
CALCIUM BLD-MCNC: 9.7 MG/DL (ref 8.5–10.1)
CHLORIDE SERPL-SCNC: 110 MMOL/L (ref 98–112)
CLARITY UR REFRACT.AUTO: CLEAR
CO2 SERPL-SCNC: 27 MMOL/L (ref 21–32)
COLOR UR AUTO: COLORLESS
CREAT BLD-MCNC: 1.5 MG/DL
EOSINOPHIL # BLD AUTO: 0.65 X10(3) UL (ref 0–0.7)
EOSINOPHIL NFR BLD AUTO: 6.9 %
ERYTHROCYTE [DISTWIDTH] IN BLOOD BY AUTOMATED COUNT: 21.3 %
GFR SERPLBLD BASED ON 1.73 SQ M-ARVRAT: 47 ML/MIN/1.73M2 (ref 60–?)
GLOBULIN PLAS-MCNC: 3.3 G/DL (ref 2.8–4.4)
GLUCOSE BLD-MCNC: 136 MG/DL (ref 70–99)
GLUCOSE UR STRIP.AUTO-MCNC: NEGATIVE MG/DL
HCT VFR BLD AUTO: 37.6 %
HGB BLD-MCNC: 12.3 G/DL
IMM GRANULOCYTES # BLD AUTO: 0.18 X10(3) UL (ref 0–1)
IMM GRANULOCYTES NFR BLD: 1.9 %
KETONES UR STRIP.AUTO-MCNC: NEGATIVE MG/DL
LEUKOCYTE ESTERASE UR QL STRIP.AUTO: NEGATIVE
LYMPHOCYTES # BLD AUTO: 2.21 X10(3) UL (ref 1–4)
LYMPHOCYTES NFR BLD AUTO: 23.4 %
MCH RBC QN AUTO: 23.7 PG (ref 26–34)
MCHC RBC AUTO-ENTMCNC: 32.7 G/DL (ref 31–37)
MCV RBC AUTO: 72.3 FL
MONOCYTES # BLD AUTO: 0.74 X10(3) UL (ref 0.1–1)
MONOCYTES NFR BLD AUTO: 7.8 %
NEUTROPHILS # BLD AUTO: 5.54 X10 (3) UL (ref 1.5–7.7)
NEUTROPHILS # BLD AUTO: 5.54 X10(3) UL (ref 1.5–7.7)
NEUTROPHILS NFR BLD AUTO: 58.8 %
NITRITE UR QL STRIP.AUTO: NEGATIVE
OSMOLALITY SERPL CALC.SUM OF ELEC: 292 MOSM/KG (ref 275–295)
PH UR STRIP.AUTO: 6 [PH] (ref 5–8)
PLATELET # BLD AUTO: 167 10(3)UL (ref 150–450)
POTASSIUM SERPL-SCNC: 4.8 MMOL/L (ref 3.5–5.1)
POTASSIUM SERPL-SCNC: 5.6 MMOL/L (ref 3.5–5.1)
PROT SERPL-MCNC: 6.9 G/DL (ref 6.4–8.2)
PROT UR STRIP.AUTO-MCNC: NEGATIVE MG/DL
RBC # BLD AUTO: 5.2 X10(6)UL
RBC UR QL AUTO: NEGATIVE
SODIUM SERPL-SCNC: 137 MMOL/L (ref 136–145)
SP GR UR STRIP.AUTO: 1.01 (ref 1–1.03)
UROBILINOGEN UR STRIP.AUTO-MCNC: <2 MG/DL
WBC # BLD AUTO: 9.4 X10(3) UL (ref 4–11)

## 2023-01-20 PROCEDURE — 80053 COMPREHEN METABOLIC PANEL: CPT | Performed by: EMERGENCY MEDICINE

## 2023-01-20 PROCEDURE — 36415 COLL VENOUS BLD VENIPUNCTURE: CPT

## 2023-01-20 PROCEDURE — 81003 URINALYSIS AUTO W/O SCOPE: CPT | Performed by: EMERGENCY MEDICINE

## 2023-01-20 PROCEDURE — 99285 EMERGENCY DEPT VISIT HI MDM: CPT

## 2023-01-20 PROCEDURE — 85025 COMPLETE CBC W/AUTO DIFF WBC: CPT

## 2023-01-20 PROCEDURE — 80053 COMPREHEN METABOLIC PANEL: CPT

## 2023-01-20 PROCEDURE — 85025 COMPLETE CBC W/AUTO DIFF WBC: CPT | Performed by: EMERGENCY MEDICINE

## 2023-01-20 PROCEDURE — 99283 EMERGENCY DEPT VISIT LOW MDM: CPT

## 2023-01-20 PROCEDURE — 84132 ASSAY OF SERUM POTASSIUM: CPT | Performed by: EMERGENCY MEDICINE

## 2023-01-20 NOTE — ED INITIAL ASSESSMENT (HPI)
Pt states that two weeks ago he had a fall and has low back pain that worsened today. Pt denies having an evaluation after the fall. Denies head injury. + blood thinners. Pt also c/o of urinary retention since this morning.  Pt c/o of lower abdominal pain

## 2023-01-20 NOTE — ED QUICK NOTES
Assisted pt to use urinal, Pt voided 225ml clear yellow urine. Pt states \"he is squeezing his muscles to be able to void and it hurts his back. \"

## 2023-02-17 DIAGNOSIS — I82.4Y2 ACUTE DEEP VEIN THROMBOSIS (DVT) OF PROXIMAL VEIN OF LEFT LOWER EXTREMITY (HCC): ICD-10-CM

## 2023-02-17 RX ORDER — APIXABAN 5 MG/1
TABLET, FILM COATED ORAL
Qty: 60 TABLET | Refills: 11 | Status: SHIPPED | OUTPATIENT
Start: 2023-02-17

## 2023-05-31 ENCOUNTER — TELEPHONE (OUTPATIENT)
Dept: HEMATOLOGY/ONCOLOGY | Facility: HOSPITAL | Age: 83
End: 2023-05-31

## 2023-06-03 ENCOUNTER — APPOINTMENT (OUTPATIENT)
Dept: NUCLEAR MEDICINE | Facility: HOSPITAL | Age: 83
End: 2023-06-03
Attending: EMERGENCY MEDICINE
Payer: MEDICARE

## 2023-06-03 ENCOUNTER — APPOINTMENT (OUTPATIENT)
Dept: GENERAL RADIOLOGY | Facility: HOSPITAL | Age: 83
End: 2023-06-03
Attending: EMERGENCY MEDICINE
Payer: MEDICARE

## 2023-06-03 ENCOUNTER — HOSPITAL ENCOUNTER (EMERGENCY)
Facility: HOSPITAL | Age: 83
Discharge: HOME | End: 2023-06-03
Attending: EMERGENCY MEDICINE
Payer: MEDICARE

## 2023-06-03 VITALS
SYSTOLIC BLOOD PRESSURE: 126 MMHG | TEMPERATURE: 98 F | BODY MASS INDEX: 35.77 KG/M2 | HEART RATE: 64 BPM | DIASTOLIC BLOOD PRESSURE: 65 MMHG | WEIGHT: 236 LBS | OXYGEN SATURATION: 97 % | HEIGHT: 68 IN | RESPIRATION RATE: 20 BRPM

## 2023-06-03 DIAGNOSIS — R07.9 CHEST PAIN OF UNCERTAIN ETIOLOGY: Primary | ICD-10-CM

## 2023-06-03 LAB
ALBUMIN SERPL-MCNC: 3.7 G/DL (ref 3.4–5)
ALBUMIN/GLOB SERPL: 1.1 {RATIO} (ref 1–2)
ALP LIVER SERPL-CCNC: 94 U/L
ALT SERPL-CCNC: 28 U/L
ANION GAP SERPL CALC-SCNC: 3 MMOL/L (ref 0–18)
AST SERPL-CCNC: 13 U/L (ref 15–37)
ATRIAL RATE: 77 BPM
BASOPHILS # BLD AUTO: 0.1 X10(3) UL (ref 0–0.2)
BASOPHILS NFR BLD AUTO: 1 %
BILIRUB SERPL-MCNC: 1 MG/DL (ref 0.1–2)
BUN BLD-MCNC: 34 MG/DL (ref 7–18)
CALCIUM BLD-MCNC: 10.3 MG/DL (ref 8.5–10.1)
CHLORIDE SERPL-SCNC: 106 MMOL/L (ref 98–112)
CO2 SERPL-SCNC: 27 MMOL/L (ref 21–32)
CREAT BLD-MCNC: 1.66 MG/DL
EOSINOPHIL # BLD AUTO: 0.57 X10(3) UL (ref 0–0.7)
EOSINOPHIL NFR BLD AUTO: 5.5 %
ERYTHROCYTE [DISTWIDTH] IN BLOOD BY AUTOMATED COUNT: 20.8 %
GFR SERPLBLD BASED ON 1.73 SQ M-ARVRAT: 41 ML/MIN/1.73M2 (ref 60–?)
GLOBULIN PLAS-MCNC: 3.5 G/DL (ref 2.8–4.4)
GLUCOSE BLD-MCNC: 264 MG/DL (ref 70–99)
HCT VFR BLD AUTO: 40 %
HGB BLD-MCNC: 12.8 G/DL
IMM GRANULOCYTES # BLD AUTO: 0.16 X10(3) UL (ref 0–1)
IMM GRANULOCYTES NFR BLD: 1.5 %
LYMPHOCYTES # BLD AUTO: 2.17 X10(3) UL (ref 1–4)
LYMPHOCYTES NFR BLD AUTO: 20.9 %
MCH RBC QN AUTO: 22.6 PG (ref 26–34)
MCHC RBC AUTO-ENTMCNC: 32 G/DL (ref 31–37)
MCV RBC AUTO: 70.5 FL
MONOCYTES # BLD AUTO: 0.71 X10(3) UL (ref 0.1–1)
MONOCYTES NFR BLD AUTO: 6.9 %
NEUTROPHILS # BLD AUTO: 6.65 X10 (3) UL (ref 1.5–7.7)
NEUTROPHILS # BLD AUTO: 6.65 X10(3) UL (ref 1.5–7.7)
NEUTROPHILS NFR BLD AUTO: 64.2 %
OSMOLALITY SERPL CALC.SUM OF ELEC: 299 MOSM/KG (ref 275–295)
P AXIS: 20 DEGREES
P-R INTERVAL: 206 MS
PLATELET # BLD AUTO: 162 10(3)UL (ref 150–450)
POTASSIUM SERPL-SCNC: 5.1 MMOL/L (ref 3.5–5.1)
PROT SERPL-MCNC: 7.2 G/DL (ref 6.4–8.2)
Q-T INTERVAL: 406 MS
QRS DURATION: 158 MS
QTC CALCULATION (BEZET): 459 MS
R AXIS: 77 DEGREES
RBC # BLD AUTO: 5.67 X10(6)UL
SODIUM SERPL-SCNC: 136 MMOL/L (ref 136–145)
T AXIS: 1 DEGREES
TROPONIN I HIGH SENSITIVITY: 5 NG/L
TROPONIN I HIGH SENSITIVITY: 5 NG/L
VENTRICULAR RATE: 77 BPM
WBC # BLD AUTO: 10.4 X10(3) UL (ref 4–11)

## 2023-06-03 PROCEDURE — 71045 X-RAY EXAM CHEST 1 VIEW: CPT | Performed by: EMERGENCY MEDICINE

## 2023-06-03 PROCEDURE — 93005 ELECTROCARDIOGRAM TRACING: CPT

## 2023-06-03 PROCEDURE — 36415 COLL VENOUS BLD VENIPUNCTURE: CPT

## 2023-06-03 PROCEDURE — 99285 EMERGENCY DEPT VISIT HI MDM: CPT

## 2023-06-03 PROCEDURE — 80053 COMPREHEN METABOLIC PANEL: CPT | Performed by: EMERGENCY MEDICINE

## 2023-06-03 PROCEDURE — 93010 ELECTROCARDIOGRAM REPORT: CPT

## 2023-06-03 PROCEDURE — 84484 ASSAY OF TROPONIN QUANT: CPT | Performed by: EMERGENCY MEDICINE

## 2023-06-03 PROCEDURE — 85025 COMPLETE CBC W/AUTO DIFF WBC: CPT | Performed by: EMERGENCY MEDICINE

## 2023-06-03 PROCEDURE — 78582 LUNG VENTILAT&PERFUS IMAGING: CPT | Performed by: EMERGENCY MEDICINE

## 2023-06-03 NOTE — ED INITIAL ASSESSMENT (HPI)
Patient to ED with c/o right sided CP that began three hours ago when he was getting out of bed. Dyspnea at rest, no nausea. Pain does not radiate.

## 2023-08-31 ENCOUNTER — OFFICE VISIT (OUTPATIENT)
Dept: HEMATOLOGY/ONCOLOGY | Facility: HOSPITAL | Age: 83
End: 2023-08-31
Attending: INTERNAL MEDICINE
Payer: MEDICARE

## 2023-08-31 VITALS
DIASTOLIC BLOOD PRESSURE: 83 MMHG | HEIGHT: 67.99 IN | OXYGEN SATURATION: 96 % | WEIGHT: 249.5 LBS | BODY MASS INDEX: 37.81 KG/M2 | TEMPERATURE: 98 F | RESPIRATION RATE: 18 BRPM | SYSTOLIC BLOOD PRESSURE: 148 MMHG | HEART RATE: 81 BPM

## 2023-08-31 DIAGNOSIS — I82.4Y2 ACUTE DEEP VEIN THROMBOSIS (DVT) OF PROXIMAL VEIN OF LEFT LOWER EXTREMITY (HCC): ICD-10-CM

## 2023-08-31 DIAGNOSIS — D50.9 MICROCYTIC ANEMIA: ICD-10-CM

## 2023-08-31 DIAGNOSIS — D59.12 COLD AGGLUTININ DISEASE (HCC): ICD-10-CM

## 2023-08-31 DIAGNOSIS — Z79.01 CHRONIC ANTICOAGULATION: ICD-10-CM

## 2023-08-31 DIAGNOSIS — I71.41 PARARENAL ABDOMINAL AORTIC ANEURYSM (AAA) WITHOUT RUPTURE (HCC): ICD-10-CM

## 2023-08-31 DIAGNOSIS — Z85.51 HISTORY OF BLADDER CANCER: ICD-10-CM

## 2023-08-31 DIAGNOSIS — R05.9 COUGH IN ADULT: Primary | ICD-10-CM

## 2023-08-31 DIAGNOSIS — C68.9 UROTHELIAL CANCER (HCC): ICD-10-CM

## 2023-08-31 DIAGNOSIS — D3A.090 CARCINOID TUMOR OF LEFT LUNG: ICD-10-CM

## 2023-08-31 LAB
BASOPHILS # BLD AUTO: 0.1 X10(3) UL (ref 0–0.2)
BASOPHILS NFR BLD AUTO: 1 %
CREAT BLD-MCNC: 1.62 MG/DL
DEPRECATED HBV CORE AB SER IA-ACNC: 101.9 NG/ML
EGFRCR SERPLBLD CKD-EPI 2021: 43 ML/MIN/1.73M2 (ref 60–?)
EOSINOPHIL # BLD AUTO: 0.94 X10(3) UL (ref 0–0.7)
EOSINOPHIL NFR BLD AUTO: 9.6 %
ERYTHROCYTE [DISTWIDTH] IN BLOOD BY AUTOMATED COUNT: 20.2 %
HCT VFR BLD AUTO: 39.3 %
HGB BLD-MCNC: 12.3 G/DL
IMM GRANULOCYTES # BLD AUTO: 0.2 X10(3) UL (ref 0–1)
IMM GRANULOCYTES NFR BLD: 2 %
IRON SATN MFR SERPL: 18 %
IRON SERPL-MCNC: 59 UG/DL
LDH SERPL L TO P-CCNC: 167 U/L
LYMPHOCYTES # BLD AUTO: 2.17 X10(3) UL (ref 1–4)
LYMPHOCYTES NFR BLD AUTO: 22.2 %
MCH RBC QN AUTO: 21.2 PG (ref 26–34)
MCHC RBC AUTO-ENTMCNC: 31.3 G/DL (ref 31–37)
MCV RBC AUTO: 67.8 FL
MONOCYTES # BLD AUTO: 0.66 X10(3) UL (ref 0.1–1)
MONOCYTES NFR BLD AUTO: 6.8 %
NEUTROPHILS # BLD AUTO: 5.69 X10 (3) UL (ref 1.5–7.7)
NEUTROPHILS # BLD AUTO: 5.69 X10(3) UL (ref 1.5–7.7)
NEUTROPHILS NFR BLD AUTO: 58.4 %
PLATELET # BLD AUTO: 160 10(3)UL (ref 150–450)
RBC # BLD AUTO: 5.8 X10(6)UL
TIBC SERPL-MCNC: 323 UG/DL (ref 240–450)
TRANSFERRIN SERPL-MCNC: 217 MG/DL (ref 200–360)
VIT B12 SERPL-MCNC: 1058 PG/ML (ref 193–986)
WBC # BLD AUTO: 9.8 X10(3) UL (ref 4–11)

## 2023-08-31 PROCEDURE — 99215 OFFICE O/P EST HI 40 MIN: CPT | Performed by: INTERNAL MEDICINE

## 2023-09-08 ENCOUNTER — HOSPITAL ENCOUNTER (OUTPATIENT)
Dept: CT IMAGING | Facility: HOSPITAL | Age: 83
Discharge: HOME | End: 2023-09-08
Attending: INTERNAL MEDICINE
Payer: MEDICARE

## 2023-09-08 DIAGNOSIS — D3A.090 CARCINOID TUMOR OF LEFT LUNG: ICD-10-CM

## 2023-09-08 DIAGNOSIS — R05.9 COUGH IN ADULT: ICD-10-CM

## 2023-09-08 PROCEDURE — 74160 CT ABDOMEN W/CONTRAST: CPT | Performed by: INTERNAL MEDICINE

## 2023-09-08 PROCEDURE — 71260 CT THORAX DX C+: CPT | Performed by: INTERNAL MEDICINE

## 2023-09-11 ENCOUNTER — TELEPHONE (OUTPATIENT)
Dept: HEMATOLOGY/ONCOLOGY | Facility: HOSPITAL | Age: 83
End: 2023-09-11

## 2023-09-22 ENCOUNTER — APPOINTMENT (OUTPATIENT)
Dept: CT IMAGING | Facility: HOSPITAL | Age: 83
End: 2023-09-22
Attending: EMERGENCY MEDICINE
Payer: MEDICARE

## 2023-09-22 ENCOUNTER — HOSPITAL ENCOUNTER (EMERGENCY)
Facility: HOSPITAL | Age: 83
Discharge: HOME | End: 2023-09-22
Attending: EMERGENCY MEDICINE
Payer: MEDICARE

## 2023-09-22 ENCOUNTER — APPOINTMENT (OUTPATIENT)
Dept: GENERAL RADIOLOGY | Facility: HOSPITAL | Age: 83
End: 2023-09-22
Attending: EMERGENCY MEDICINE
Payer: MEDICARE

## 2023-09-22 VITALS
WEIGHT: 245 LBS | HEIGHT: 68 IN | OXYGEN SATURATION: 100 % | DIASTOLIC BLOOD PRESSURE: 82 MMHG | BODY MASS INDEX: 37.13 KG/M2 | SYSTOLIC BLOOD PRESSURE: 124 MMHG | HEART RATE: 69 BPM | RESPIRATION RATE: 16 BRPM | TEMPERATURE: 98 F
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
## 2023-09-22 DIAGNOSIS — S01.511A LIP LACERATION, INITIAL ENCOUNTER: ICD-10-CM

## 2023-09-22 PROCEDURE — 99285 EMERGENCY DEPT VISIT HI MDM: CPT

## 2023-09-22 PROCEDURE — 73562 X-RAY EXAM OF KNEE 3: CPT | Performed by: EMERGENCY MEDICINE

## 2023-09-22 PROCEDURE — 12011 RPR F/E/E/N/L/M 2.5 CM/<: CPT

## 2023-09-22 PROCEDURE — 70450 CT HEAD/BRAIN W/O DYE: CPT | Performed by: EMERGENCY MEDICINE

## 2023-09-22 PROCEDURE — 99284 EMERGENCY DEPT VISIT MOD MDM: CPT

## 2023-09-22 NOTE — ED INITIAL ASSESSMENT (HPI)
Tripped and fell landing on knees, forearms. Witness reports pt hitting head. Laceration to upper lip. No LOC. C/O head pain 10/10 worse at forehead. C/O left forearm pain. Abrasion and pain to left knee.

## 2024-01-04 ENCOUNTER — LAB ENCOUNTER (OUTPATIENT)
Dept: LAB | Facility: HOSPITAL | Age: 84
End: 2024-01-04
Attending: INTERNAL MEDICINE
Payer: MEDICARE

## 2024-01-04 DIAGNOSIS — R74.8 ABNORMAL LIVER ENZYMES: Primary | ICD-10-CM

## 2024-01-04 DIAGNOSIS — R53.83 FATIGUE: ICD-10-CM

## 2024-01-04 DIAGNOSIS — Z79.899 MEDICATION MANAGEMENT: ICD-10-CM

## 2024-01-04 DIAGNOSIS — M19.90 ARTHRITIS: ICD-10-CM

## 2024-01-04 LAB
ALBUMIN SERPL-MCNC: 3.2 G/DL (ref 3.4–5)
ALBUMIN/GLOB SERPL: 0.9 {RATIO} (ref 1–2)
ALP LIVER SERPL-CCNC: 87 U/L
ALT SERPL-CCNC: 19 U/L
ANION GAP SERPL CALC-SCNC: 4 MMOL/L (ref 0–18)
AST SERPL-CCNC: 13 U/L (ref 15–37)
BILIRUB SERPL-MCNC: 0.5 MG/DL (ref 0.1–2)
BUN BLD-MCNC: 34 MG/DL (ref 9–23)
CALCIUM BLD-MCNC: 9.6 MG/DL (ref 8.5–10.1)
CHLORIDE SERPL-SCNC: 109 MMOL/L (ref 98–112)
CO2 SERPL-SCNC: 28 MMOL/L (ref 21–32)
CREAT BLD-MCNC: 1.4 MG/DL
EGFRCR SERPLBLD CKD-EPI 2021: 50 ML/MIN/1.73M2 (ref 60–?)
ERYTHROCYTE [SEDIMENTATION RATE] IN BLOOD: 20 MM/HR
FASTING STATUS PATIENT QL REPORTED: NO
GLOBULIN PLAS-MCNC: 3.4 G/DL (ref 2.8–4.4)
GLUCOSE BLD-MCNC: 290 MG/DL (ref 70–99)
HAV IGM SER QL: NONREACTIVE
HBV CORE IGM SER QL: NONREACTIVE
HBV SURFACE AG SERPL QL IA: NONREACTIVE
HCV AB SERPL QL IA: NONREACTIVE
OSMOLALITY SERPL CALC.SUM OF ELEC: 310 MOSM/KG (ref 275–295)
POTASSIUM SERPL-SCNC: 5.3 MMOL/L (ref 3.5–5.1)
PROT SERPL-MCNC: 6.6 G/DL (ref 6.4–8.2)
SODIUM SERPL-SCNC: 141 MMOL/L (ref 136–145)

## 2024-01-04 PROCEDURE — 80074 ACUTE HEPATITIS PANEL: CPT

## 2024-01-04 PROCEDURE — 83516 IMMUNOASSAY NONANTIBODY: CPT

## 2024-01-04 PROCEDURE — 86376 MICROSOMAL ANTIBODY EACH: CPT

## 2024-01-04 PROCEDURE — 36415 COLL VENOUS BLD VENIPUNCTURE: CPT

## 2024-01-04 PROCEDURE — 80053 COMPREHEN METABOLIC PANEL: CPT

## 2024-01-04 PROCEDURE — 85652 RBC SED RATE AUTOMATED: CPT

## 2024-01-05 LAB
ACTIN SMOOTH MUSCLE AB: 16 UNITS
LIVER-KIDNEY MICRO AB: 1.7 UNITS

## 2024-01-11 DIAGNOSIS — I82.4Y2 ACUTE DEEP VEIN THROMBOSIS (DVT) OF PROXIMAL VEIN OF LEFT LOWER EXTREMITY (HCC): ICD-10-CM

## 2024-01-11 RX ORDER — APIXABAN 5 MG/1
TABLET, FILM COATED ORAL
Qty: 60 TABLET | Refills: 8 | Status: SHIPPED | OUTPATIENT
Start: 2024-01-11

## 2024-02-13 ENCOUNTER — LAB ENCOUNTER (OUTPATIENT)
Dept: LAB | Facility: HOSPITAL | Age: 84
End: 2024-02-13
Attending: INTERNAL MEDICINE
Payer: MEDICARE

## 2024-02-13 DIAGNOSIS — R73.09 ELEVATED GLUCOSE: Primary | ICD-10-CM

## 2024-02-13 DIAGNOSIS — N28.9 IMPAIRED RENAL FUNCTION: ICD-10-CM

## 2024-02-13 DIAGNOSIS — E87.5 SERUM POTASSIUM ELEVATED: ICD-10-CM

## 2024-02-13 LAB
ALBUMIN SERPL-MCNC: 3.5 G/DL (ref 3.4–5)
ALBUMIN/GLOB SERPL: 1.1 {RATIO} (ref 1–2)
ALP LIVER SERPL-CCNC: 91 U/L
ALT SERPL-CCNC: 34 U/L
ANION GAP SERPL CALC-SCNC: 8 MMOL/L (ref 0–18)
AST SERPL-CCNC: 22 U/L (ref 15–37)
BILIRUB SERPL-MCNC: 1 MG/DL (ref 0.1–2)
BUN BLD-MCNC: 35 MG/DL (ref 9–23)
CALCIUM BLD-MCNC: 9.9 MG/DL (ref 8.5–10.1)
CHLORIDE SERPL-SCNC: 108 MMOL/L (ref 98–112)
CO2 SERPL-SCNC: 23 MMOL/L (ref 21–32)
CREAT BLD-MCNC: 1.53 MG/DL
EGFRCR SERPLBLD CKD-EPI 2021: 45 ML/MIN/1.73M2 (ref 60–?)
FASTING STATUS PATIENT QL REPORTED: YES
GLOBULIN PLAS-MCNC: 3.2 G/DL (ref 2.8–4.4)
GLUCOSE BLD-MCNC: 290 MG/DL (ref 70–99)
OSMOLALITY SERPL CALC.SUM OF ELEC: 307 MOSM/KG (ref 275–295)
POTASSIUM SERPL-SCNC: 5.4 MMOL/L (ref 3.5–5.1)
PROT SERPL-MCNC: 6.7 G/DL (ref 6.4–8.2)
SODIUM SERPL-SCNC: 139 MMOL/L (ref 136–145)

## 2024-02-13 PROCEDURE — 80053 COMPREHEN METABOLIC PANEL: CPT

## 2024-02-13 PROCEDURE — 36415 COLL VENOUS BLD VENIPUNCTURE: CPT

## 2024-03-03 ENCOUNTER — LAB ENCOUNTER (OUTPATIENT)
Dept: LAB | Facility: HOSPITAL | Age: 84
End: 2024-03-03
Attending: INTERNAL MEDICINE
Payer: MEDICARE

## 2024-03-03 DIAGNOSIS — R39.9 ABNORMAL RENAL FINDING: ICD-10-CM

## 2024-03-03 DIAGNOSIS — E87.5 HYPERKALEMIA: ICD-10-CM

## 2024-03-03 DIAGNOSIS — Z79.899 ENCOUNTER FOR LONG-TERM (CURRENT) DRUG USE: ICD-10-CM

## 2024-03-03 LAB
ANION GAP SERPL CALC-SCNC: 4 MMOL/L (ref 0–18)
BUN BLD-MCNC: 31 MG/DL (ref 9–23)
CALCIUM BLD-MCNC: 9.8 MG/DL (ref 8.5–10.1)
CHLORIDE SERPL-SCNC: 107 MMOL/L (ref 98–112)
CO2 SERPL-SCNC: 27 MMOL/L (ref 21–32)
CREAT BLD-MCNC: 1.74 MG/DL
EGFRCR SERPLBLD CKD-EPI 2021: 39 ML/MIN/1.73M2 (ref 60–?)
FASTING STATUS PATIENT QL REPORTED: YES
GLUCOSE BLD-MCNC: 189 MG/DL (ref 70–99)
OSMOLALITY SERPL CALC.SUM OF ELEC: 298 MOSM/KG (ref 275–295)
POTASSIUM SERPL-SCNC: 4.6 MMOL/L (ref 3.5–5.1)
SODIUM SERPL-SCNC: 138 MMOL/L (ref 136–145)

## 2024-03-03 PROCEDURE — 36415 COLL VENOUS BLD VENIPUNCTURE: CPT

## 2024-03-03 PROCEDURE — 80048 BASIC METABOLIC PNL TOTAL CA: CPT

## 2024-04-02 ENCOUNTER — HOSPITAL ENCOUNTER (EMERGENCY)
Facility: HOSPITAL | Age: 84
Discharge: HOME | End: 2024-04-02
Attending: EMERGENCY MEDICINE
Payer: MEDICARE

## 2024-04-02 VITALS
TEMPERATURE: 98 F | SYSTOLIC BLOOD PRESSURE: 131 MMHG | BODY MASS INDEX: 36.37 KG/M2 | HEIGHT: 68 IN | WEIGHT: 240 LBS | RESPIRATION RATE: 18 BRPM | HEART RATE: 88 BPM | OXYGEN SATURATION: 95 % | DIASTOLIC BLOOD PRESSURE: 78 MMHG

## 2024-04-02 DIAGNOSIS — N17.9 ACUTE KIDNEY INJURY (HCC): ICD-10-CM

## 2024-04-02 DIAGNOSIS — E87.5 HYPERKALEMIA: Primary | ICD-10-CM

## 2024-04-02 LAB
ALBUMIN SERPL-MCNC: 3.4 G/DL (ref 3.4–5)
ALBUMIN/GLOB SERPL: 1.1 {RATIO} (ref 1–2)
ALP LIVER SERPL-CCNC: 79 U/L
ALT SERPL-CCNC: 24 U/L
ANION GAP SERPL CALC-SCNC: 4 MMOL/L (ref 0–18)
AST SERPL-CCNC: 19 U/L (ref 15–37)
BASOPHILS # BLD AUTO: 0.13 X10(3) UL (ref 0–0.2)
BASOPHILS NFR BLD AUTO: 0.9 %
BILIRUB SERPL-MCNC: 0.6 MG/DL (ref 0.1–2)
BUN BLD-MCNC: 45 MG/DL (ref 9–23)
CALCIUM BLD-MCNC: 9.8 MG/DL (ref 8.5–10.1)
CHLORIDE SERPL-SCNC: 112 MMOL/L (ref 98–112)
CO2 SERPL-SCNC: 27 MMOL/L (ref 21–32)
CREAT BLD-MCNC: 2.16 MG/DL
EGFRCR SERPLBLD CKD-EPI 2021: 30 ML/MIN/1.73M2 (ref 60–?)
EOSINOPHIL # BLD AUTO: 0.43 X10(3) UL (ref 0–0.7)
EOSINOPHIL NFR BLD AUTO: 3.1 %
ERYTHROCYTE [DISTWIDTH] IN BLOOD BY AUTOMATED COUNT: 23 %
GLOBULIN PLAS-MCNC: 3.2 G/DL (ref 2.8–4.4)
GLUCOSE BLD-MCNC: 125 MG/DL (ref 70–99)
HCT VFR BLD AUTO: 35.7 %
HGB BLD-MCNC: 10.7 G/DL
IMM GRANULOCYTES # BLD AUTO: 0.5 X10(3) UL (ref 0–1)
IMM GRANULOCYTES NFR BLD: 3.6 %
LYMPHOCYTES # BLD AUTO: 2.96 X10(3) UL (ref 1–4)
LYMPHOCYTES NFR BLD AUTO: 21.1 %
MCH RBC QN AUTO: 20.7 PG (ref 26–34)
MCHC RBC AUTO-ENTMCNC: 30 G/DL (ref 31–37)
MCV RBC AUTO: 69.2 FL
MONOCYTES # BLD AUTO: 1.03 X10(3) UL (ref 0.1–1)
MONOCYTES NFR BLD AUTO: 7.3 %
NEUTROPHILS # BLD AUTO: 8.97 X10 (3) UL (ref 1.5–7.7)
NEUTROPHILS # BLD AUTO: 8.97 X10(3) UL (ref 1.5–7.7)
NEUTROPHILS NFR BLD AUTO: 64 %
OSMOLALITY SERPL CALC.SUM OF ELEC: 309 MOSM/KG (ref 275–295)
PLATELET # BLD AUTO: 216 10(3)UL (ref 150–450)
PLATELET MORPHOLOGY: NORMAL
PLATELETS.RETICULATED NFR BLD AUTO: 6.8 % (ref 0–7)
POTASSIUM SERPL-SCNC: 5.2 MMOL/L (ref 3.5–5.1)
PROT SERPL-MCNC: 6.6 G/DL (ref 6.4–8.2)
RBC # BLD AUTO: 5.16 X10(6)UL
SODIUM SERPL-SCNC: 143 MMOL/L (ref 136–145)
WBC # BLD AUTO: 14 X10(3) UL (ref 4–11)

## 2024-04-02 PROCEDURE — 36415 COLL VENOUS BLD VENIPUNCTURE: CPT

## 2024-04-02 PROCEDURE — 99284 EMERGENCY DEPT VISIT MOD MDM: CPT

## 2024-04-02 PROCEDURE — 80053 COMPREHEN METABOLIC PANEL: CPT

## 2024-04-02 PROCEDURE — 80053 COMPREHEN METABOLIC PANEL: CPT | Performed by: EMERGENCY MEDICINE

## 2024-04-02 PROCEDURE — 99283 EMERGENCY DEPT VISIT LOW MDM: CPT

## 2024-04-02 PROCEDURE — 85025 COMPLETE CBC W/AUTO DIFF WBC: CPT

## 2024-04-02 PROCEDURE — 85025 COMPLETE CBC W/AUTO DIFF WBC: CPT | Performed by: EMERGENCY MEDICINE

## 2024-04-02 PROCEDURE — 93010 ELECTROCARDIOGRAM REPORT: CPT

## 2024-04-02 PROCEDURE — 93005 ELECTROCARDIOGRAM TRACING: CPT

## 2024-04-03 LAB
ATRIAL RATE: 80 BPM
ATRIAL RATE: 85 BPM
P AXIS: 29 DEGREES
P AXIS: 33 DEGREES
P-R INTERVAL: 198 MS
P-R INTERVAL: 198 MS
Q-T INTERVAL: 392 MS
Q-T INTERVAL: 398 MS
QRS DURATION: 146 MS
QRS DURATION: 84 MS
QTC CALCULATION (BEZET): 459 MS
QTC CALCULATION (BEZET): 466 MS
R AXIS: 35 DEGREES
R AXIS: 63 DEGREES
T AXIS: 21 DEGREES
T AXIS: 29 DEGREES
VENTRICULAR RATE: 80 BPM
VENTRICULAR RATE: 85 BPM

## 2024-04-03 NOTE — ED INITIAL ASSESSMENT (HPI)
PATIENT  HAD HIS FOLLOW UP VISIT TO HIS DOCTOR AND BLOOD TEST DONE TODAY . HIS BLOOD TEST SHOWS HIGH POTASSIUM. HIS DOCTOR CALLED HIM AND ADVISED TO GO TO ER . DENIES OTHER COMPLAINTS

## 2024-04-03 NOTE — ED PROVIDER NOTES
Patient Seen in: [facility] Emergency Department      History     Chief Complaint   Patient presents with    Abnormal Result     Stated Complaint: ABNORMAL BLOODWORK TODAY, ELEVATED K+    Subjective:   HPI    83-year-old male here for hyperkalemia.  The patient states he has had some joint swelling and pain diagnosis arthritis and been seeing a rheumatologist recently has been on prednisone he had outpatient labs drawn earlier today yesterday and was told by his doctor his potassium was high and he needs to come the ER.  He states otherwise he feels fine he is able to drive here and ambulates without difficulty no chest pain vomiting diarrhea.    Objective:   Past Medical History:   Diagnosis Date    Anesthesia complication     broke out in rashes generalized - unknown source (after every anaesthesia)    Aneurysm of abdominal aorta (HCC)     Bladder tumor     recesected July/2015    Cancer (HCC)     SKIN CANCERS ON ARM AND NOSE    Deep vein thrombosis (HCC)     Erectile dysfunction     Gall stones     High blood pressure     High cholesterol     History of blood clots     Neuropathy     HAnds from DM    Obesity     Osteoarthritis     Personal history of antineoplastic chemotherapy     JUly/2015    Pulmonary embolism (HCC) 20 YEARS AGO    ON COUMADIN NOW    TIA (transient ischemic attack)     Type II or unspecified type diabetes mellitus without mention of complication, not stated as uncontrolled     Varicose veins of both lower extremities with complications     Visual impairment     4-5 yrs ago had blurriness prob, evaled. no diagnosis              Past Surgical History:   Procedure Laterality Date    COLONOSCOPY      COLONOSCOPY      x3, polpypectomy x1, third one clear    COLONOSCOPY N/A 7/18/2017    Procedure: COLONOSCOPY, POSSIBLE BIOPSY, POSSIBLE POLYPECTOMY 10318;  Surgeon: Marisela Morris MD;  [place] [facility] [place] [facility]    COLONOSCOPY N/A 6/14/2020    Procedure: COLONOSCOPY;  Surgeon: Domingo  Jewel MUSE MD;  Location:  ENDOSCOPY    COLONOSCOPY N/A 2020    adenoma- repeat 5 yrs    EYE SURGERY  plastic sx eye lids    FRACTURE SURGERY      left leg-screws,pins    HC CLOSED TX DISTAL TIBIA FRACTURE W MANIPULATION Left     HC DEBRIDEMENT SKIN UP TO 10% Left     leg    OTHER SURGICAL HISTORY  7/29/15    cysto, stent removal - Dr. Campos    OTHER SURGICAL HISTORY  10/14/15    BTS Cystoscopy -Dr Campos    OTHER SURGICAL HISTORY  1/16/15    BTS Cystoscopy -Dr Campos    OTHER SURGICAL HISTORY  16    BTS Cystoscopy-Dr Campos    OTHER SURGICAL HISTORY  8/15/16    Cysto- Dr. Campos    OTHER SURGICAL HISTORY  17    Cysto- Dr. Campos    OTHER SURGICAL HISTORY  2017    Cystoscopy- Dr. Campos    OTHER SURGICAL HISTORY  2019     BTS cysto - dr. campos     OTHER SURGICAL HISTORY  2020    Cysto Dr. Campos    OTHER SURGICAL HISTORY  2021    Cystoscopy- Dr. Campos     SKIN GRAFT PROCEDURE      multiple to Left leg    TONSILLECTOMY      VEIN BYPASS GRAFT,FEM-FEM      to left leg                Social History     Socioeconomic History    Marital status:    Tobacco Use    Smoking status: Former     Packs/day: 1.00     Years: 30.00     Additional pack years: 0.00     Total pack years: 30.00     Types: Cigarettes     Quit date: 1995     Years since quittin.7    Smokeless tobacco: Never   Vaping Use    Vaping Use: Never used   Substance and Sexual Activity    Alcohol use: Not Currently    Drug use: No              Review of Systems    Positive for stated complaint: ABNORMAL BLOODWORK TODAY, ELEVATED K+  Other systems are as noted in HPI.  Constitutional and vital signs reviewed.      All other systems reviewed and negative except as noted above.    Physical Exam     ED Triage Vitals [24]   /78   Pulse 88   Resp 18   Temp 97.8 °F (36.6 °C)   Temp src Temporal   SpO2 95 %   O2 Device None (Room air)       Current:/78   Pulse  88   Temp 97.8 °F (36.6 °C) (Temporal)   Resp 18   Ht 172.7 cm (5' 8\")   Wt 108.9 kg   SpO2 95%   BMI 36.49 kg/m²         Physical Exam    Patient is alert orient x 3 no acute distress HEENT exam within normal limits neck there is no lymphadenopathy or JVD lungs are clear cardiovascular exam shows regular rate and rhythm without murmurs abdomen soft nontender.    ED Course     Labs Reviewed   COMP METABOLIC PANEL (14) - Abnormal; Notable for the following components:       Result Value    Glucose 125 (*)     Potassium 5.2 (*)     BUN 45 (*)     Creatinine 2.16 (*)     Calculated Osmolality 309 (*)     eGFR-Cr 30 (*)     All other components within normal limits   RBC MORPHOLOGY SCAN - Abnormal; Notable for the following components:    RBC Morphology See morphology below (*)     Microcytosis 2+ (*)     All other components within normal limits   CBC W/ DIFFERENTIAL - Abnormal; Notable for the following components:    WBC 14.0 (*)     HGB 10.7 (*)     HCT 35.7 (*)     MCV 69.2 (*)     MCH 20.7 (*)     MCHC 30.0 (*)     Neutrophil Absolute Prelim 8.97 (*)     Neutrophil Absolute 8.97 (*)     Monocyte Absolute 1.03 (*)     All other components within normal limits   CBC WITH DIFFERENTIAL WITH PLATELET    Narrative:     The following orders were created for panel order CBC With Differential With Platelet.  Procedure                               Abnormality         Status                     ---------                               -----------         ------                     CBC W/ DIFFERENTIAL[[id number]]          Abnormal            Final result                 Please view results for these tests on the individual orders.   RAINBOW DRAW LAVENDER   RAINBOW DRAW LIGHT GREEN   RAINBOW DRAW BLUE     EKG    Rate, intervals and axes as noted on EKG Report.  Rate: 85  Rhythm: Sinus Rhythm  Reading: Nonspecific intraventricular conduction delay this is an abnormal EKG.  The computer was reading this as ST elevation MI I  disagree this EKG is unchanged from prior EKGs.  This is an abnormal EKG    Repeat EKG at 2044 shows a ventricular rate of 80 normal sinus rhythm with the rate axis interval reviewed there is a nonspecific interventricular conduction delay this is unchanged from the earlier EKG and from the EKG from Olivia 3, 2023 this is an abnormal EKG         Labs reviewed from care everywhere showed a potassium 6.2 creatinine 1.70 BUN 40  Labs today in the emergency department showed a potassium 5.2 creatinine 2.14 BUN 45 white blood cell count 14                 MDM      Initial differential diagnosis includes hyperkalemia related to medications for renal failure or factitious hyperkalemia related to hemolysis.    The patient's potassium is just slightly elevated here I do not think this is warrants hospital admission I believe that his previous potassium earlier today of 6.2 was related to probable hemolysis I did discuss his increase of his creatinine from 1.70-2.14 with nephrology who stated he could follow-up as an outpatient.                                   MDM    Disposition and Plan     Clinical Impression:  1. Hyperkalemia    2. Acute kidney injury (HCC)         Disposition:  Discharge  4/2/2024 10:08 pm    Follow-up:  Tiffany Pierce MD  [address] [facility]   [facility] [address]  [facility] [address]  [phone]    Follow up in 1 week(s)      Lissette Sauceda MD  [address]  [address]  [facility] [address] [phone]    Follow up in 1 week(s)            Medications Prescribed:  Discharge Medication List as of 4/2/2024 10:13 PM

## 2024-04-03 NOTE — DISCHARGE INSTRUCTIONS
Follow-up with your primary care doctor and the nephrologist over the next few days return for worsening symptoms.

## 2024-04-19 ENCOUNTER — APPOINTMENT (OUTPATIENT)
Dept: ULTRASOUND IMAGING | Facility: HOSPITAL | Age: 84
End: 2024-04-19
Attending: EMERGENCY MEDICINE
Payer: MEDICARE

## 2024-04-19 ENCOUNTER — APPOINTMENT (OUTPATIENT)
Dept: GENERAL RADIOLOGY | Facility: HOSPITAL | Age: 84
End: 2024-04-19
Attending: EMERGENCY MEDICINE
Payer: MEDICARE

## 2024-04-19 ENCOUNTER — HOSPITAL ENCOUNTER (INPATIENT)
Facility: HOSPITAL | Age: 84
LOS: 5 days | Discharge: HOME | End: 2024-04-24
Attending: EMERGENCY MEDICINE | Admitting: HOSPITALIST
Payer: MEDICARE

## 2024-04-19 ENCOUNTER — APPOINTMENT (OUTPATIENT)
Dept: CT IMAGING | Facility: HOSPITAL | Age: 84
End: 2024-04-19
Attending: EMERGENCY MEDICINE
Payer: MEDICARE

## 2024-04-19 DIAGNOSIS — L03.116 CELLULITIS OF LEFT LOWER EXTREMITY: ICD-10-CM

## 2024-04-19 DIAGNOSIS — G93.41 ENCEPHALOPATHY IN SEPSIS: ICD-10-CM

## 2024-04-19 DIAGNOSIS — N18.9 CHRONIC KIDNEY DISEASE, UNSPECIFIED CKD STAGE: ICD-10-CM

## 2024-04-19 DIAGNOSIS — R65.20 SEVERE SEPSIS (HCC): Primary | ICD-10-CM

## 2024-04-19 DIAGNOSIS — A41.9 SEVERE SEPSIS (HCC): Primary | ICD-10-CM

## 2024-04-19 DIAGNOSIS — N30.00 ACUTE CYSTITIS WITHOUT HEMATURIA: ICD-10-CM

## 2024-04-19 DIAGNOSIS — E87.5 HYPERKALEMIA: ICD-10-CM

## 2024-04-19 PROBLEM — R65.21 SEPTIC SHOCK (HCC): Status: ACTIVE | Noted: 2024-04-19

## 2024-04-19 LAB
ALBUMIN SERPL-MCNC: 3.6 G/DL (ref 3.4–5)
ALBUMIN/GLOB SERPL: 1.1 {RATIO} (ref 1–2)
ALP LIVER SERPL-CCNC: 88 U/L
ALT SERPL-CCNC: 27 U/L
ANION GAP SERPL CALC-SCNC: 7 MMOL/L (ref 0–18)
ANION GAP SERPL CALC-SCNC: 9 MMOL/L (ref 0–18)
APTT PPP: 28.2 SECONDS (ref 23.3–35.6)
ARTERIAL PATENCY WRIST A: POSITIVE
AST SERPL-CCNC: 13 U/L (ref 15–37)
ATRIAL RATE: 150 BPM
BASE EXCESS BLDA CALC-SCNC: -8.8 MMOL/L (ref ?–2)
BASOPHILS # BLD AUTO: 0.08 X10(3) UL (ref 0–0.2)
BASOPHILS NFR BLD AUTO: 0.4 %
BILIRUB SERPL-MCNC: 0.8 MG/DL (ref 0.1–2)
BILIRUB UR QL STRIP.AUTO: NEGATIVE
BODY TEMPERATURE: 98.6 F
BUN BLD-MCNC: 43 MG/DL (ref 9–23)
BUN BLD-MCNC: 44 MG/DL (ref 9–23)
CA-I BLD-SCNC: 1.35 MMOL/L (ref 0.95–1.32)
CALCIUM BLD-MCNC: 10 MG/DL (ref 8.5–10.1)
CALCIUM BLD-MCNC: 8.1 MG/DL (ref 8.5–10.1)
CHLORIDE SERPL-SCNC: 108 MMOL/L (ref 98–112)
CHLORIDE SERPL-SCNC: 113 MMOL/L (ref 98–112)
CO2 SERPL-SCNC: 18 MMOL/L (ref 21–32)
CO2 SERPL-SCNC: 22 MMOL/L (ref 21–32)
COHGB MFR BLD: 0 % SAT (ref 0–3)
COLOR UR AUTO: YELLOW
CREAT BLD-MCNC: 1.97 MG/DL
CREAT BLD-MCNC: 2.12 MG/DL
EGFRCR SERPLBLD CKD-EPI 2021: 31 ML/MIN/1.73M2 (ref 60–?)
EGFRCR SERPLBLD CKD-EPI 2021: 34 ML/MIN/1.73M2 (ref 60–?)
EOSINOPHIL # BLD AUTO: 0.01 X10(3) UL (ref 0–0.7)
EOSINOPHIL NFR BLD AUTO: 0 %
ERYTHROCYTE [DISTWIDTH] IN BLOOD BY AUTOMATED COUNT: 23.9 %
EST. AVERAGE GLUCOSE BLD GHB EST-MCNC: 212 MG/DL (ref 68–126)
FLUAV + FLUBV RNA SPEC NAA+PROBE: NEGATIVE
FLUAV + FLUBV RNA SPEC NAA+PROBE: NEGATIVE
GLOBULIN PLAS-MCNC: 3.3 G/DL (ref 2.8–4.4)
GLUCOSE BLD-MCNC: 203 MG/DL (ref 70–99)
GLUCOSE BLD-MCNC: 222 MG/DL (ref 70–99)
GLUCOSE BLD-MCNC: 225 MG/DL (ref 70–99)
GLUCOSE BLD-MCNC: 242 MG/DL (ref 70–99)
GLUCOSE BLD-MCNC: 254 MG/DL (ref 70–99)
GLUCOSE UR STRIP.AUTO-MCNC: NORMAL MG/DL
HBA1C MFR BLD: 9 % (ref ?–5.7)
HCO3 BLDA-SCNC: 18 MEQ/L (ref 21–27)
HCT VFR BLD AUTO: 35.8 %
HGB BLD-MCNC: 11.8 G/DL
HGB BLD-MCNC: 12 G/DL
IMM GRANULOCYTES # BLD AUTO: 0.43 X10(3) UL (ref 0–1)
IMM GRANULOCYTES NFR BLD: 2.1 %
INR BLD: 1.31 (ref 0.8–1.2)
KETONES UR STRIP.AUTO-MCNC: NEGATIVE MG/DL
L/M: 6 L/MIN
LACTATE BLD-SCNC: 5.2 MMOL/L (ref 0.5–2)
LACTATE SERPL-SCNC: 3.2 MMOL/L (ref 0.4–2)
LACTATE SERPL-SCNC: 3.4 MMOL/L (ref 0.4–2)
LACTATE SERPL-SCNC: 3.7 MMOL/L (ref 0.4–2)
LEUKOCYTE ESTERASE UR QL STRIP.AUTO: 500
LYMPHOCYTES # BLD AUTO: 1.26 X10(3) UL (ref 1–4)
LYMPHOCYTES NFR BLD AUTO: 6.2 %
MCH RBC QN AUTO: 24 PG (ref 26–34)
MCHC RBC AUTO-ENTMCNC: 33 G/DL (ref 31–37)
MCV RBC AUTO: 72.9 FL
METHGB MFR BLD: 0 % SAT (ref 0.4–1.5)
MONOCYTES # BLD AUTO: 0.94 X10(3) UL (ref 0.1–1)
MONOCYTES NFR BLD AUTO: 4.6 %
MRSA DNA SPEC QL NAA+PROBE: NEGATIVE
NEUTROPHILS # BLD AUTO: 17.51 X10 (3) UL (ref 1.5–7.7)
NEUTROPHILS # BLD AUTO: 17.51 X10(3) UL (ref 1.5–7.7)
NEUTROPHILS NFR BLD AUTO: 86.7 %
NITRITE UR QL STRIP.AUTO: NEGATIVE
NT-PROBNP SERPL-MCNC: 143 PG/ML (ref ?–450)
OSMOLALITY SERPL CALC.SUM OF ELEC: 302 MOSM/KG (ref 275–295)
OSMOLALITY SERPL CALC.SUM OF ELEC: 310 MOSM/KG (ref 275–295)
OXYHGB MFR BLDA: 93.6 % (ref 92–100)
PCO2 BLDA: 41 MM HG (ref 35–45)
PH BLDA: 7.25 [PH] (ref 7.35–7.45)
PH UR STRIP.AUTO: 5.5 [PH] (ref 5–8)
PLATELET # BLD AUTO: 186 10(3)UL (ref 150–450)
PLATELET MORPHOLOGY: NORMAL
PLATELETS.RETICULATED NFR BLD AUTO: 7.5 % (ref 0–7)
PO2 BLDA: 76 MM HG (ref 80–100)
POTASSIUM BLD-SCNC: 5.1 MMOL/L (ref 3.6–5.1)
POTASSIUM SERPL-SCNC: 4.3 MMOL/L (ref 3.5–5.1)
POTASSIUM SERPL-SCNC: 5.3 MMOL/L (ref 3.5–5.1)
PROT SERPL-MCNC: 6.9 G/DL (ref 6.4–8.2)
PROT UR STRIP.AUTO-MCNC: 50 MG/DL
PROTHROMBIN TIME: 16.4 SECONDS (ref 11.6–14.8)
Q-T INTERVAL: 302 MS
QRS DURATION: 118 MS
QTC CALCULATION (BEZET): 485 MS
R AXIS: 122 DEGREES
RBC # BLD AUTO: 4.91 X10(6)UL
RBC #/AREA URNS AUTO: >10 /HPF
RSV RNA SPEC NAA+PROBE: NEGATIVE
SARS-COV-2 RNA RESP QL NAA+PROBE: NOT DETECTED
SODIUM BLD-SCNC: 134 MMOL/L (ref 135–145)
SODIUM SERPL-SCNC: 137 MMOL/L (ref 136–145)
SODIUM SERPL-SCNC: 140 MMOL/L (ref 136–145)
SP GR UR STRIP.AUTO: 1.01 (ref 1–1.03)
T AXIS: -2 DEGREES
TROPONIN I SERPL HS-MCNC: 15 NG/L
UROBILINOGEN UR STRIP.AUTO-MCNC: NORMAL MG/DL
VENTRICULAR RATE: 155 BPM
WBC # BLD AUTO: 20.2 X10(3) UL (ref 4–11)
WBC #/AREA URNS AUTO: >50 /HPF
WBC CLUMPS UR QL AUTO: PRESENT /HPF

## 2024-04-19 PROCEDURE — 75635 CT ANGIO ABDOMINAL ARTERIES: CPT | Performed by: EMERGENCY MEDICINE

## 2024-04-19 PROCEDURE — 93971 EXTREMITY STUDY: CPT | Performed by: EMERGENCY MEDICINE

## 2024-04-19 PROCEDURE — 99291 CRITICAL CARE FIRST HOUR: CPT | Performed by: NURSE PRACTITIONER

## 2024-04-19 PROCEDURE — 99291 CRITICAL CARE FIRST HOUR: CPT | Performed by: HOSPITALIST

## 2024-04-19 PROCEDURE — 71045 X-RAY EXAM CHEST 1 VIEW: CPT | Performed by: EMERGENCY MEDICINE

## 2024-04-19 RX ORDER — DEXTROSE MONOHYDRATE 25 G/50ML
50 INJECTION, SOLUTION INTRAVENOUS
Status: DISCONTINUED | OUTPATIENT
Start: 2024-04-19 | End: 2024-04-24

## 2024-04-19 RX ORDER — HYDROMORPHONE HYDROCHLORIDE 1 MG/ML
0.5 INJECTION, SOLUTION INTRAMUSCULAR; INTRAVENOUS; SUBCUTANEOUS ONCE
Status: COMPLETED | OUTPATIENT
Start: 2024-04-19 | End: 2024-04-19

## 2024-04-19 RX ORDER — LIDOCAINE HYDROCHLORIDE 20 MG/ML
10 JELLY TOPICAL ONCE
Status: COMPLETED | OUTPATIENT
Start: 2024-04-19 | End: 2024-04-19

## 2024-04-19 RX ORDER — ACETAMINOPHEN 650 MG/1
SUPPOSITORY RECTAL
Status: COMPLETED
Start: 2024-04-19 | End: 2024-04-19

## 2024-04-19 RX ORDER — HYDROMORPHONE HYDROCHLORIDE 1 MG/ML
INJECTION, SOLUTION INTRAMUSCULAR; INTRAVENOUS; SUBCUTANEOUS
Status: COMPLETED
Start: 2024-04-19 | End: 2024-04-19

## 2024-04-19 RX ORDER — METOCLOPRAMIDE HYDROCHLORIDE 5 MG/ML
5 INJECTION INTRAMUSCULAR; INTRAVENOUS EVERY 8 HOURS PRN
Status: DISCONTINUED | OUTPATIENT
Start: 2024-04-19 | End: 2024-04-24

## 2024-04-19 RX ORDER — VANCOMYCIN HYDROCHLORIDE
1250
Status: DISCONTINUED | OUTPATIENT
Start: 2024-04-21 | End: 2024-04-22

## 2024-04-19 RX ORDER — NICOTINE POLACRILEX 4 MG
15 LOZENGE BUCCAL
Status: DISCONTINUED | OUTPATIENT
Start: 2024-04-19 | End: 2024-04-24

## 2024-04-19 RX ORDER — LIDOCAINE HYDROCHLORIDE 20 MG/ML
JELLY TOPICAL
Status: COMPLETED
Start: 2024-04-19 | End: 2024-04-19

## 2024-04-19 RX ORDER — SODIUM CHLORIDE 9 MG/ML
INJECTION, SOLUTION INTRAVENOUS CONTINUOUS
Status: DISCONTINUED | OUTPATIENT
Start: 2024-04-19 | End: 2024-04-19

## 2024-04-19 RX ORDER — ACETAMINOPHEN 500 MG
1000 TABLET ORAL ONCE
Status: DISCONTINUED | OUTPATIENT
Start: 2024-04-19 | End: 2024-04-19

## 2024-04-19 RX ORDER — BISACODYL 10 MG
10 SUPPOSITORY, RECTAL RECTAL
Status: DISCONTINUED | OUTPATIENT
Start: 2024-04-19 | End: 2024-04-24

## 2024-04-19 RX ORDER — ACETAMINOPHEN 650 MG/1
1300 SUPPOSITORY RECTAL ONCE
Status: DISCONTINUED | OUTPATIENT
Start: 2024-04-19 | End: 2024-04-19

## 2024-04-19 RX ORDER — IPRATROPIUM BROMIDE AND ALBUTEROL SULFATE 2.5; .5 MG/3ML; MG/3ML
3 SOLUTION RESPIRATORY (INHALATION) ONCE
Status: COMPLETED | OUTPATIENT
Start: 2024-04-19 | End: 2024-04-19

## 2024-04-19 RX ORDER — ACETAMINOPHEN 500 MG
1000 TABLET ORAL EVERY 6 HOURS PRN
Status: DISCONTINUED | OUTPATIENT
Start: 2024-04-19 | End: 2024-04-24

## 2024-04-19 RX ORDER — HYDROMORPHONE HYDROCHLORIDE 1 MG/ML
0.2 INJECTION, SOLUTION INTRAMUSCULAR; INTRAVENOUS; SUBCUTANEOUS EVERY 2 HOUR PRN
Status: DISCONTINUED | OUTPATIENT
Start: 2024-04-19 | End: 2024-04-24

## 2024-04-19 RX ORDER — LORAZEPAM 2 MG/ML
INJECTION INTRAMUSCULAR
Status: DISPENSED
Start: 2024-04-19 | End: 2024-04-20

## 2024-04-19 RX ORDER — ACETAMINOPHEN 650 MG/1
650 SUPPOSITORY RECTAL ONCE
Status: COMPLETED | OUTPATIENT
Start: 2024-04-19 | End: 2024-04-19

## 2024-04-19 RX ORDER — POLYETHYLENE GLYCOL 3350 17 G/17G
17 POWDER, FOR SOLUTION ORAL DAILY PRN
Status: DISCONTINUED | OUTPATIENT
Start: 2024-04-19 | End: 2024-04-24

## 2024-04-19 RX ORDER — HYDROMORPHONE HYDROCHLORIDE 1 MG/ML
0.8 INJECTION, SOLUTION INTRAMUSCULAR; INTRAVENOUS; SUBCUTANEOUS EVERY 2 HOUR PRN
Status: DISCONTINUED | OUTPATIENT
Start: 2024-04-19 | End: 2024-04-24

## 2024-04-19 RX ORDER — LORAZEPAM 2 MG/ML
2 INJECTION INTRAMUSCULAR ONCE
Status: COMPLETED | OUTPATIENT
Start: 2024-04-19 | End: 2024-04-19

## 2024-04-19 RX ORDER — FOLIC ACID 1 MG/1
1 TABLET ORAL DAILY
Status: DISCONTINUED | OUTPATIENT
Start: 2024-04-20 | End: 2024-04-24

## 2024-04-19 RX ORDER — LORAZEPAM 2 MG/ML
1 INJECTION INTRAMUSCULAR ONCE
Status: DISCONTINUED | OUTPATIENT
Start: 2024-04-19 | End: 2024-04-19

## 2024-04-19 RX ORDER — NICOTINE POLACRILEX 4 MG
30 LOZENGE BUCCAL
Status: DISCONTINUED | OUTPATIENT
Start: 2024-04-19 | End: 2024-04-24

## 2024-04-19 RX ORDER — HYDROMORPHONE HYDROCHLORIDE 1 MG/ML
0.4 INJECTION, SOLUTION INTRAMUSCULAR; INTRAVENOUS; SUBCUTANEOUS EVERY 2 HOUR PRN
Status: DISCONTINUED | OUTPATIENT
Start: 2024-04-19 | End: 2024-04-24

## 2024-04-19 RX ORDER — ACETAMINOPHEN 500 MG
TABLET ORAL
Status: DISCONTINUED
Start: 2024-04-19 | End: 2024-04-19 | Stop reason: WASHOUT

## 2024-04-19 RX ORDER — SENNOSIDES 8.6 MG
17.2 TABLET ORAL NIGHTLY PRN
Status: DISCONTINUED | OUTPATIENT
Start: 2024-04-19 | End: 2024-04-24

## 2024-04-19 RX ORDER — HYDROMORPHONE HYDROCHLORIDE 1 MG/ML
0.5 INJECTION, SOLUTION INTRAMUSCULAR; INTRAVENOUS; SUBCUTANEOUS EVERY 30 MIN PRN
Status: DISCONTINUED | OUTPATIENT
Start: 2024-04-19 | End: 2024-04-19

## 2024-04-19 RX ORDER — SODIUM CHLORIDE, SODIUM LACTATE, POTASSIUM CHLORIDE, CALCIUM CHLORIDE 600; 310; 30; 20 MG/100ML; MG/100ML; MG/100ML; MG/100ML
INJECTION, SOLUTION INTRAVENOUS CONTINUOUS
Status: DISCONTINUED | OUTPATIENT
Start: 2024-04-19 | End: 2024-04-21

## 2024-04-19 NOTE — ED QUICK NOTES
This RN spoke with patient's wife, Felicity, who will drive to the ED with family. 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 No neuro or physical deficits.

## 2024-04-19 NOTE — ED INITIAL ASSESSMENT (HPI)
To the ED via [facility] from [facility] with c/o full body pains. [facility] was [facility] for left calf pain, during exam patient started c/o full body pains. Patient arrives screaming in pain, unable to touch patient or place patient in gown without patient screaming in pain. Patient was also tachy at [facility] and with EMS. No significant hx except diabetes.

## 2024-04-19 NOTE — CONSULTS
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 He was noted to have wide complex tachycardia and cardiology was consulted. LE ultrasound negative for DVT. CTA pelvis with bilateral leg runoff showed soft tissue edema within the left lower leg representing likely cellulitis and patent bilateral leg arteries.  He has been intermittently screaming in pain followed by lethargy induced by pain medication.  He was started on Levophed due to hypotension.      PMH:  Past Medical History:    Anesthesia complication    broke out in rashes generalized - unknown source (after every anaesthesia)    Aneurysm of abdominal aorta (HCC)    Bladder tumor    recesected 2015    Cancer (HCC)    SKIN CANCERS ON ARM AND NOSE    Deep vein thrombosis (HCC)    Erectile dysfunction    Gall stones    High blood pressure    High cholesterol    History of blood clots    Neuropathy    HAnds from DM    Obesity    Osteoarthritis    Personal history of antineoplastic chemotherapy    2015    Pulmonary embolism (HCC)    ON COUMADIN NOW    TIA (transient ischemic attack)    Type II or unspecified type diabetes mellitus without mention of complication, not stated as uncontrolled    Varicose veins of both lower extremities with complications    Visual impairment    4-5 yrs ago had blurriness prob, evaled. no diagnosis       Social Hx:  Social History     Socioeconomic History    Marital status:    Tobacco Use    Smoking status: Former     Current packs/day: 0.00     Average packs/day: 1 pack/day for 30.0 years (30.0 ttl pk-yrs)     Types: Cigarettes     Start date: 1965     Quit  date: 1995     Years since quittin.8    Smokeless tobacco: Never   Vaping Use    Vaping status: Never Used   Substance and Sexual Activity    Alcohol use: Not Currently    Drug use: No       Family Hx:  Family History   Problem Relation Age of Onset    Heart Disorder Father     Hypertension Mother     Arthritis Mother     Heart Disorder Son     Other (kidney stone) Brother     Other (hernia) Brother          Review of Systems:   A comprehensive 10 point review of systems was completed.  Pertinent positives and negatives noted in the HPI.    Current Facility-Administered Medications   Medication Dose Route Frequency    HYDROmorphone (Dilaudid) 1 MG/ML injection 0.5 mg  0.5 mg Intravenous Q30 Min PRN    amiodarone (Cordarone) 150 mg in dextrose 5% 100 mL IV bolus  150 mg Intravenous Once    Followed by    amiodarone (Cordarone) 450 mg in dextrose 5% 250 mL infusion  1 mg/min Intravenous Continuous    Followed by    amiodarone (Cordarone) 450 mg in dextrose 5% 250 mL infusion  0.5 mg/min Intravenous Continuous    vancomycin (Vancocin) 2.25 g in sodium chloride 0.9% 500 mL IVPB premix  25 mg/kg Intravenous Once    norepinephrine (Levophed) 4 mg/250mL infusion premix  0.5-30 mcg/min Intravenous Continuous       OBJECTIVE  Vitals:  /64   Pulse (!) 147   Temp (!) 100.6 °F (38.1 °C) (Temporal)   Resp 23   Wt 240 lb 1.3 oz (108.9 kg)   SpO2 100%   BMI 36.50 kg/m²       Physical Exam:    General Appearance: Lethargic, awakens easily but falls back asleep, cooperative, appears stated age  Neck: No JVD, neck supple, no adenopathy, trachea midline, no carotid bruits  Lungs: Clear to auscultation bilaterally, diminished left base, respirations unlabored  Heart: Regular rate and rhythm, S1 and S2 normal, no murmur, rub or gallop  Abdomen: Soft, tender with palpation over mid lower quadrant, bowel sounds active all four quadrants, no masses, no organomegaly  Extremities:  no cyanosis, reddened left calf,  lesions to left outer ankle with small open wounds, 3+pitting LLE edema, capillary refill <3 sec.    Pulses: 2+ and symmetric all extremities  Skin: Skin color, texture, turgor normal for ethnicity, no rashes, warm and dry, LLE with reddened calf and foot, left outer ankle with small open wounds  Neurologic: CNII-XII intact, normal strength    Data this admission:  CTA PELVIS W CTA BILAT LEG RUNOFF W IV CONTRAST(CPT=75635)    Result Date: 4/19/2024  CONCLUSION:  1. Vasculature as described above.  There is poor evaluation of the vessels within the lower leg secondary to motion artifact and poor contrast opacification. 2. Soft tissue edema noted within the left lower leg which is nonspecific but could represent cellulitis. 3. Diffuse bladder wall thickening.  Correlation with urinalysis is recommended. 4. Scrotal wall thickening which is nonspecific but could be secondary to infectious etiology. 5. Prominent left inguinal lymph node with surrounding inflammatory stranding measuring 2.8 x 1.9 cm which may be reactive in nature. 6. Ectasia of the infrarenal abdominal aorta measuring 3.3 x 3.2 cm. 7. Hepatic steatosis. 8. Dilated left ureter without evidence of obstructing urolithiasis. 9. Colonic diverticulosis without evidence of diverticulitis.    LOCATION:  [id number]   Dictated by (CST): Claire Antonio MD on 4/19/2024 at 3:14 PM     Finalized by (CST): Claire Antonio MD on 4/19/2024 at 3:23 PM       XR CHEST AP PORTABLE  (CPT=71045)    Result Date: 4/19/2024  CONCLUSION:  Mild interstitial opacities which may represent mild edema.   LOCATION:  [id number]      Dictated by (CST): Claire Antonio MD on 4/19/2024 at 2:32 PM     Finalized by (CST): Claire Antonio MD on 4/19/2024 at 2:32 PM       US VENOUS DOPPLER LEG LEFT - DIAG IMG (CPT=93971)    Result Date: 4/19/2024  CONCLUSION:  No acute deep vein thrombosis.   LOCATION:  [id number]    Dictated by (CST): Claire Antonio MD on 4/19/2024 at 2:16 PM     Finalized by  (CST): Claire Antonio MD on 4/19/2024 at 2:18 PM         Labs:  Lab Results   Component Value Date    WBC 20.2 04/19/2024    HGB 11.8 04/19/2024    HCT 35.8 04/19/2024    .0 04/19/2024    CREATSERUM 2.12 04/19/2024    BUN 44 04/19/2024     04/19/2024    K 4.3 04/19/2024     04/19/2024    CO2 18.0 04/19/2024     04/19/2024    CA 8.1 04/19/2024    ALB 3.6 04/19/2024    ALKPHO 88 04/19/2024    BILT 0.8 04/19/2024    TP 6.9 04/19/2024    AST 13 04/19/2024    ALT 27 04/19/2024           Assessment/Plan:  Septic shock 2/2 to cellulitis LLE  - Leukocytosis, tmax 104.0- PRN tylenol  - Lactic acid elevated, trend for clearance  - Check Pct  - Urine culture pending- cystitis on CT  - Blood cultures pending  - Chest xray with mild edema, no focal consolidation  - s/p sepsis bolus in ED  - Vasopressors to maitain MAP >65, currently on norepinephrine  - Stress dose steroids  - Zosyn (4/19- ) Vanc (4/19- )    NAGMA  TONJA on CKD  - IVF- change to LR due to hyperchloremia  - Strict I/O  - Avoid nephrotoxins  - Daily BMP  - Consider renal consult if no improvement    Wide complex tachycardia - EKG with ST  - IV amiodarone- held  - Cardiology following  - TTE pending    LLE pain  - LE doppler negative for DVT  - LLE cellulitis on CT scan, Patent arteries  - Pain management  - IV antibiotics as above    HTN  - Home meds on hold in the setting of shock    DM2  - A1c pending  - Home PO meds on hold  - Accuchecks  - SSI    History of PE/DVT  - Eliquis    Seronegative rheumatoid arthritis        - Hold Prednisone while on IV steroids        - Methotrexate on hold    History of lung and bladder cancer    F/E/N  - IVF  - Replete electrolytes per protocol  - ADAT    Proph  - Eliquis  - SCDs  - PT/OT    Dispo  - Full code  - ICU monitoring    Plan of care discussed with intensivist on-call, Dr. Maria.    A total of 35 minutes of critical care time (exclusive of billable procedures) was administered. This involved  direct patient intervention, complex decision making, and/or extensive discussions (>50% face to face time) with the patient, family, and clinical staff.    Nancy VALENCIA  Critical Care Nurse Practitioner  [facility] [address]

## 2024-04-19 NOTE — H&P
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 Patient was in significant amount of pain on arrival he was given analgesics, anxiolytics and is snoring at the time of visit. Family at bedside. Patient is private and does not share information. Per wife patient with LLE pain prompting IC visit today and was then sent to the ER. Patient hypotensive after IVF and IV abx, vasopressors started.     Patient with wide complex tachycardia on arrival > Amiodarone     History/Other:    Past Medical History:  Past Medical History:    Anesthesia complication    broke out in rashes generalized - unknown source (after every anaesthesia)    Aneurysm of abdominal aorta (HCC)    Bladder tumor    recesected 2015    Cancer (HCC)    SKIN CANCERS ON ARM AND NOSE    Deep vein thrombosis (HCC)    Erectile dysfunction    Gall stones    High blood pressure    High cholesterol    History of blood clots    Neuropathy    HAnds from DM    Obesity    Osteoarthritis    Personal history of antineoplastic chemotherapy    2015    Pulmonary embolism (HCC)    ON COUMADIN NOW    TIA (transient ischemic attack)    Type II or unspecified type diabetes mellitus without mention of complication, not stated as uncontrolled    Varicose veins of both lower extremities with complications    Visual impairment    4-5 yrs ago had blurriness prob, evaled. no diagnosis     Past Surgical History:   Past Surgical History:   Procedure Laterality Date    Colonoscopy      Colonoscopy       x3, polpypectomy x1, third one clear    Colonoscopy N/A 7/18/2017    Procedure: COLONOSCOPY, POSSIBLE BIOPSY, POSSIBLE POLYPECTOMY 33128;  Surgeon: Marisela Morris MD;  [place] [facility] [place] [facility]    Colonoscopy N/A 6/14/2020    Procedure: COLONOSCOPY;  Surgeon: Jewel Lane MD;  Location:  ENDOSCOPY    Colonoscopy N/A 6/13/2020    adenoma- repeat 5 yrs    Eye surgery  plastic sx eye lids    Fracture surgery      left leg-screws,pins    Hc closed tx distal tibia fracture w manipulation Left     Hc debridement skin up to 10% Left     leg    Other surgical history  7/29/15    cysto, stent removal - Dr. Campos    Other surgical history  10/14/15    BTS Cystoscopy -Dr Campos    Other surgical history  1/16/15    BTS Cystoscopy -Dr Campos    Other surgical history  5/13/16    BTS Cystoscopy-Dr Campos    Other surgical history  8/15/16    Cysto- Dr. Campos    Other surgical history  2/17/17    Cysto- Dr. Campos    Other surgical history  08/18/2017    Cystoscopy- Dr. Campos    Other surgical history  09/16/2019     BTS cysto - dr. campos     Other surgical history  09/21/2020    Cysto Dr. Campos    Other surgical history  09/27/2021    Cystoscopy- Dr. Campos     Skin graft procedure      multiple to Left leg    Tonsillectomy      Vein bypass graft,fem-fem      to left leg      Family History:   Family History   Problem Relation Age of Onset    Heart Disorder Father     Hypertension Mother     Arthritis Mother     Heart Disorder Son     Other (kidney stone) Brother     Other (hernia) Brother      Social History:    reports that he quit smoking about 28 years ago. He started smoking about 58 years ago. He has a 30 pack-year smoking history. He has never used smokeless tobacco. He reports that he does not currently use alcohol. He reports that he does not use drugs.     Allergies:   Allergies   Allergen Reactions    Bacitracin-Neomycin-Polymyxin [Neomycin-Bacitracin-Polymyxin]  RASH    Other OTHER (SEE COMMENTS)       Medications:    No current facility-administered medications on file prior to encounter.     Current Outpatient Medications on File Prior to Encounter   Medication Sig Dispense Refill    apixaban (ELIQUIS) 5 MG Oral Tab Take 1 tablet (5 mg total) by mouth 2 (two) times daily. 60 tablet 8    folic acid 1 MG Oral Tab Take 1 tablet (1 mg total) by mouth daily.      Cyanocobalamin (VITAMIN B12) 1000 MCG Oral Tab CR Take by mouth.      MetFORMIN HCl 500 MG Oral Tab Take 1 tablet (500 mg total) by mouth 4 (four) times daily.      simvastatin 20 MG Oral Tab Take 1 tablet (20 mg total) by mouth nightly. (Patient taking differently: Take 1 tablet (20 mg total) by mouth every morning.) 90 tablet 1    lisinopril 2.5 MG Oral Tab Take 1 tablet (2.5 mg total) by mouth daily. 90 tablet 1    glimepiride 1 MG Oral Tab Take 1 tablet (1 mg total) by mouth daily with breakfast. 90 tablet 1       Review of Systems:   Limited d/t patient factors    Objective:   Physical Exam:    /55   Pulse (!) 126   Temp 98.2 °F (36.8 °C) (Temporal)   Resp 20   Wt 241 lb 6.5 oz (109.5 kg)   SpO2 100%   BMI 36.71 kg/m²   General: No acute distress  Respiratory: Poor effort  Cardiovascular: S1, S2. Regular rate and rhythm  Abdomen: Soft, Non-tender, non-distended, positive bowel sounds, obese  Neuro: Unable to assess  Extremities: No edema, erythema noted to LLE, prior surgical site, per wife has hardware    Results:    Labs:      Labs Last 24 Hours:    Recent Labs   Lab 04/19/24  1325   RBC 4.91   HGB 11.8*   HCT 35.8*   MCV 72.9*   MCH 24.0*   MCHC 33.0   RDW 23.9   NEPRELIM 17.51*   WBC 20.2*   .0       Recent Labs   Lab 04/19/24  1325 04/19/24  1622   * 254*   BUN 43* 44*   CREATSERUM 1.97* 2.12*   EGFRCR 34* 31*   CA 10.0 8.1*   ALB 3.6  --     140   K 5.3* 4.3    113*   CO2 22.0 18.0*   ALKPHO 88  --    AST 13*  --    ALT 27  --    BILT 0.8  --    TP 6.9  --        Lab  Results   Component Value Date    INR 1.31 (H) 04/19/2024    INR 1.0 09/03/2020    INR 1.0 07/01/2020       Recent Labs   Lab 04/19/24  1325   TROPHS 15       Recent Labs   Lab 04/19/24  1325   PBNP 143       No results for input(s): \"PCT\" in the last 168 hours.    Imaging: Imaging data reviewed in Epic.    Assessment & Plan:      #Acute encephalopathy d/t medication  #Cerebrovascular disease  -If mental status does not improve, would proceed with imaging    #Septic shock d/t LLE cellulitis and UTI  #Leukocytosis  #Lactic acidosis  -IVF bolus (additional) given in ER  -IV abx  -Start vasopressors  -Stress dose steroids (hold PTA Prednisone for seronegative RA)  -Follow-up cultures  -Will need further imaging of LLE  -Monitor hemodynamics  -Telemetry  -CCM consult  -Consider ID consult     #Wide complex tachycardia  #Essential hypertension  #Dyslipidemia  #AAA  #PVD  -Amiodarone  -ECHO  -Telemetry  -Cardiology consult    #Acute kidney injury on chronic kidney disease - on Bactrim Px d/t steroid use   -IVF  -Monitor UOP, creatinine and electrolytes  -May need nephrology consult if renal function worse tomorrow    #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    Elia Church MD    Critical care time 67 minutes    Supplementary Documentation:     The 21st Century Cures Act makes medical notes like these available to patients in the interest of transparency. Please be advised this is a medical document. Medical documents are intended to carry relevant information, facts as evident, and the clinical opinion of the practitioner. The medical note is intended as peer to peer communication and may appear blunt or direct. It is written in medical language and may  contain abbreviations or verbiage that are unfamiliar.

## 2024-04-19 NOTE — HISTORICAL OFFICE NOTE
Facility Logo [facility]  [address], 4th floor, [place] [address]  [phone]      Thiago Alcantara  Consult  Demographics:  Name: Thiago Alcantara YOB: 1940  Age: 83, Male Medical Record No: [id number]  Visited Date/Time: 01/26/2024 09:10 AM    Chief Complaints  Consult per [place] - AAA  History of Present Illness  New patient visit for Mr. Alcantara.  83-year-old male with history of abdominal aortic aneurysm measured at 3.3 cm on most recent imaging here in our office.  Also has a history of prior PE in the form of a saddle embolus currently on Eliquis without any bleeding problems.  He has had lung cancer with majority of his left lung being surgically resected about 10 years ago but is currently in remission.  Most recently he has been bothered by significant arthritis and is currently on methotrexate.  In the office today he denies any active cardiac complaints.  He has no limitations from shortness of breath or chest pain.  Denies palpitations.  Stress test last month showed normal perfusion.  Cardiac risk factors Former smoker  Past Medical History  1.Abdominal aortic aneurysm, without rupture, unspecified  2.Edema of lower extremity  3.Transient ischemic attack (TIA)  4.Chest pain, unspecified  5.Hypercholesteremia  6.DM (diabetes mellitus) Type 2  7.History of DVT (deep vein thrombosis) - Hx  Family History  Family history unknown.  Social History  Smoking status Former smoker  Tobacco usage - No (Non-smoker (finding))  Review of systems  Cardiovascular RUIZ  No history of Chest pain, Palpitations, Syncope, PND, Orthopnea, Edema and Claudication  Physical Examination  Vitals Right Arm Sitting  / 60 mmHg, Pulse rate 86 bpm, Height in 5' 8\", BMI: 37.6, Weight in 247 lbs (or) 112.04 kgs and BSA : 2.37 cc/m²  General Appearance No Acute Distress  Cardiovascular s1, s2 reg, no murmur and  Lower Extremities no edema  EKG/Other abnormalities  HEENT:  EOMI,  PERRL  Neck: trachea midline  Lungs: CTAB  Skin: warm and dry  Neuro: moving extremities appropriately  Allergies  1.Bacitracin-neomycin-polymyxin [neomycin-bacitracin-polymyxin](Reaction:Rash, Severity:Unknown)  Medications  1.Eliquis 5 mg tablet, Take 1 tablet orally 2 times a day.  2.folic acid 1 mg tablet, Take 1 tablet orally once a day.  3.glimepiride 1 mg tablet, Take 1 tablet orally once a day.  4.lisinopriL 2.5 mg tablet, Take 1 tablet orally once a day.  5.metFORMIN 500 mg tablet, Take 1 tablet orally 4 times a day.  6.methotrexate sodium 2.5 mg tablet, Take 6 tablets orally once a week  7.omeprazole 20 mg capsule,delayed release, Take 1 capsule orally once a day.  8.predniSONE 10 mg tablet, Take 1 tablet orally 2 times a day.  9.simvastatin 20 mg tablet, Take 1 tablet orally once a day.  Impression  1.Abdominal aortic aneurysm, without rupture, unspecified  2.Hypercholesteremia  3.DM (diabetes mellitus) Type 2  4.History of DVT (deep vein thrombosis) - Hx  Assessment & Plan  Compensated cardiac status.  Blood pressure under excellent control.  I would like to repeat an abdominal ultrasound in 12 months prior to return visit.  At that time we will also check a fast lipid profile and a CMP.  His risk factors otherwise well-controlled.  Follow-up with me in a year.  Labs and Diagnostics ordered  1.Lipid Panel_Fasting (1 Year)  2.CMP (comprehensive metabolic panel) (1 Year)  3.Abdominal Aorta Ultrasound (1 Year)  Future appointments  1.Referral Visit - Tiffany Pierce ([email or web address]) : (Today)  2.Follow up visit - Massimo Junior MD (1 Year)  3.Referral Visit - Tiffany Pierce ([email or web address]) : (Today)  Miscellaneous  1.Weight monitoring (regime/therapy)  Nurses documentation  Upcoming surgeries: None  Assistive devices: None   Refills: None  EKG: No   (TISH GO)   Patient instructions  Medications:   1. Continue current medications.     Testin.  Fasting bloodwork in 1 year  2. Schedule an abdominal ultrasound in 1 year    AAA (abdominal aortic arterial ultrasound):   Your provider has ordered an ultrasound of the abdominal aorta. This is the main blood vessel leading away from the heart. Please have a low fiber diet the day before and nothing to eat or drink after midnight prior to procedure. You are allowed to take your medications with a sip of water. Diabetics may have a few saltine crackers in the morning if needed. Diabetics are ALLOWED /NOT ALLOWED to take insulin the morning of the exam.  You will be required to expose your abdominal area.  This test takes approximately 30 to 60 minute       Provider Follow Up:  1. Follow up with Dr. Junior in 1 year    Please bring in you medication bottles or updated medicine list to your next appointment.  Call [facility] if you have any problems or concerns at [phone]     Diagnostics Details  Nuclear PET 12/11/2023  1.Stress EKG is normal.    1.This is a normal perfusion study, no perfusion defects noted.    2.The left ventricular cavity is noted to be normal on the stress studies. The stress left ventricular ejection fraction was calculated to be 63% and left ventricular global function is normal. The rest left ventricular cavity is noted to be normal. The rest left ventricular ejection fraction was calculated to be 68% and rest left ventricular global function is normal.    Lower Extremity Venous Ultrasound 12/07/2023  1.The study quality is good.    2.Evidence of non-occlusive chronic thrombus noted in the right mid femoral and popliteal veins with recanalization of blood flow and fibrin strands.    3.Deep venous reflux noted in the right mid and distal femoral veins, popliteal vein, and posterior tibial vein x1.    4.Evidence of a possible right Baker's Cyst at the popliteal fossa measuring 5.7 x3.1 x1.2 cm.    5.Evidence of non-occlusive chronic thrombus noted in one of the duplicated left proximal, mid and  distal femoral veins, popliteal vein , and gastrocnemius vein x1 with recanalization of blood flow and fibrin strands.    6.Deep venous reflux noted in the left femoral, popliteal, and gastrocnemius vein x1.    7.Remaining deep veins exhibits normal compressibility, augmentation and phasic flow in the lower extremity venous sysptem bilaterally with no evidence of thrombus.    Abdominal Aorta 12/07/2023  1.The study quality is average.    2.Technically challenging study due to body habitus and overlying bowel gas.    3.Evidence of an infrarenal abdominal aneurysm measuring 3.2 x 3.2 cm .    4.Scattered atherosclerotic plaque but no elevated velocity or velocity gradient to suggest any significant stenosis.    Trans Thoracic Echocardiogram 12/04/2023  1.The study quality is below average.    2.Technically difficult study due to patients body habitus.    3.The left ventricle is normal in size. Global left ventricular systolic function is normal. The left ventricular ejection fraction is 52%. The left ventricle diastolic function is impaired (Grade I) with normal left atrial pressure. The wall thickness is within normal limits. No regional wall motion abnormality is noted    4.The right ventricle is normal in size. Right ventricular systolic function is normal.    5.No significant valvular regurgitation or stenosis.    CPOE Orders carried out by: Abigail Vail  Care Providers: Massimo Junior MD, Abigail Vail and Angelika Nash  Electronically Authenticated by  Massimo Junior MD  01/26/2024 10:13:12 AM  Disclaimer: Components of this note were documented using voice recognition system and are subject to errors not corrected at proofreading. Contact the author of this note for any clarifications.

## 2024-04-19 NOTE — ED PROVIDER NOTES
Patient Seen in: [facility] Emergency Department      History     Chief Complaint   Patient presents with    Pain     Stated Complaint: Full body pains    Subjective:   HPI    83-year-old male history of abdominal aortic aneurysm, carcinoid tumor of left lung, diabetes, hypertension, bladder cancer presents to ED with complaint of severe left leg pain, screaming in pain.  Patient arrived temp 104, heart rate 165.  Patient unable to answer any questions due to screaming in pain.     Objective:   Past Medical History:    Anesthesia complication    broke out in rashes generalized - unknown source (after every anaesthesia)    Aneurysm of abdominal aorta (HCC)    Bladder tumor    recesected July/2015    Cancer (HCC)    SKIN CANCERS ON ARM AND NOSE    Deep vein thrombosis (HCC)    Erectile dysfunction    Gall stones    High blood pressure    High cholesterol    History of blood clots    Neuropathy    HAnds from DM    Obesity    Osteoarthritis    Personal history of antineoplastic chemotherapy    JUly/2015    Pulmonary embolism (HCC)    ON COUMADIN NOW    TIA (transient ischemic attack)    Type II or unspecified type diabetes mellitus without mention of complication, not stated as uncontrolled    Varicose veins of both lower extremities with complications    Visual impairment    4-5 yrs ago had blurriness prob, evaled. no diagnosis              Past Surgical History:   Procedure Laterality Date    Colonoscopy      Colonoscopy      x3, polpypectomy x1, third one clear    Colonoscopy N/A 7/18/2017    Procedure: COLONOSCOPY, POSSIBLE BIOPSY, POSSIBLE POLYPECTOMY 64378;  Surgeon: Marisela Morris MD;  [place] [facility] [place] [facility]    Colonoscopy N/A 6/14/2020    Procedure: COLONOSCOPY;  Surgeon: Jewel Lane MD;  Location:  ENDOSCOPY    Colonoscopy N/A 6/13/2020    adenoma- repeat 5 yrs    Eye surgery  plastic sx eye lids    Fracture surgery      left leg-screws,pins    Hc closed tx distal tibia fracture w  manipulation Left     Hc debridement skin up to 10% Left     leg    Other surgical history  7/29/15    cysto, stent removal - Dr. Campos    Other surgical history  10/14/15    BTS Cystoscopy -Dr Campos    Other surgical history  1/16/15    BTS Cystoscopy -Dr Campos    Other surgical history  16    BTS Cystoscopy-Dr Campos    Other surgical history  8/15/16    Cysto- Dr. Campos    Other surgical history  17    Cysto- Dr. Campos    Other surgical history  2017    Cystoscopy- Dr. Campos    Other surgical history  2019     BTS cysto - dr. campos     Other surgical history  2020    Cysto Dr. Campos    Other surgical history  2021    Cystoscopy- Dr. Campos     Skin graft procedure      multiple to Left leg    Tonsillectomy      Vein bypass graft,fem-fem      to left leg                Social History     Socioeconomic History    Marital status:    Tobacco Use    Smoking status: Former     Current packs/day: 0.00     Average packs/day: 1 pack/day for 30.0 years (30.0 ttl pk-yrs)     Types: Cigarettes     Start date: 1965     Quit date: 1995     Years since quittin.8    Smokeless tobacco: Never   Vaping Use    Vaping status: Never Used   Substance and Sexual Activity    Alcohol use: Not Currently    Drug use: No              Review of Systems    Positive for stated complaint: Full body pains  Other systems are as noted in HPI.  Constitutional and vital signs reviewed.      All other systems reviewed and negative except as noted above.    Physical Exam     ED Triage Vitals   BP 24 1323 131/76   Pulse 24 1323 (!) 165   Resp 24 1323 24   Temp 24 1343 (!) 104 °F (40 °C)   Temp src 24 1343 Temporal   SpO2 24 1323 100 %   O2 Device 24 1323 None (Room air)       Current:/59   Pulse 90   Temp 97.2 °F (36.2 °C) (Temporal)   Resp 26   Wt 109.5 kg   SpO2 100%   BMI 36.71 kg/m²         Physical  Exam    Vital signs reviewed.  Nursing note reviewed.  Constitutional: Alert, screaming in pain, febrile  Head: Normocephalic, atraumatic  Mouth: Moist  Eyes: Extraocular muscles intact, pupils equal  Cardiovascular: Tachycardic rate and rhythm.  2+ DP pulse bilaterally  Pulmonary: Effort normal, breath sounds normal  Abdomen: Soft, nontender obese  Skin: Left leg erythema, warmth in lower leg noted  Musculoskeletal range of motion grossly normal all extremities  Neuro: Alert, screaming in pain, not cooperative.  Moves all extremities against gravity  Psych: Agitated          ED Course     Labs Reviewed   COMP METABOLIC PANEL (14) - Abnormal; Notable for the following components:       Result Value    Glucose 222 (*)     Potassium 5.3 (*)     BUN 43 (*)     Creatinine 1.97 (*)     Calculated Osmolality 302 (*)     eGFR-Cr 34 (*)     AST 13 (*)     All other components within normal limits   LACTIC ACID, PLASMA - Abnormal; Notable for the following components:    Lactic Acid 3.7 (*)     All other components within normal limits   URINALYSIS WITH CULTURE REFLEX - Abnormal; Notable for the following components:    Clarity Urine Turbid (*)     Blood Urine 2+ (*)     Protein Urine 50 (*)     Leukocyte Esterase Urine 500 (*)     WBC Urine >50 (*)     RBC Urine >10 (*)     WBC Clump Present (*)     All other components within normal limits   RBC MORPHOLOGY SCAN - Abnormal; Notable for the following components:    RBC Morphology See morphology below (*)     All other components within normal limits   ABG PANEL W ELECT AND LACTATE - Abnormal; Notable for the following components:    ABG pH 7.25 (*)     ABG pO2 76 (*)     ABG HCO3 18.0 (*)     ABG Base Excess -8.8 (*)     Total Hemoglobin 12.0 (*)     Methemoglobin 0.0 (*)     Ionized Calcium 1.35 (*)     Sodium Blood Gas 134 (*)     Lactic Acid (Blood Gas) 5.2 (*)     All other components within normal limits   LACTIC ACID REFLEX POST POSTIVE - Abnormal; Notable for the  following components:    Lactic Acid 3.2 (*)     All other components within normal limits   BASIC METABOLIC PANEL (8) - Abnormal; Notable for the following components:    Glucose 254 (*)     Chloride 113 (*)     CO2 18.0 (*)     BUN 44 (*)     Creatinine 2.12 (*)     Calcium, Total 8.1 (*)     Calculated Osmolality 310 (*)     eGFR-Cr 31 (*)     All other components within normal limits   LACTIC ACID REFLEX POST POSITIVE [id number] - Abnormal; Notable for the following components:    Lactic Acid 3.4 (*)     All other components within normal limits   HEMOGLOBIN A1C - Abnormal; Notable for the following components:    HgbA1C 9.0 (*)     Estimated Average Glucose 212 (*)     All other components within normal limits   PROTHROMBIN TIME (PT) - Abnormal; Notable for the following components:    PT 16.4 (*)     INR 1.31 (*)     All other components within normal limits   COMP METABOLIC PANEL (14) - Abnormal; Notable for the following components:    Glucose 266 (*)     Chloride 113 (*)     BUN 39 (*)     Creatinine 1.87 (*)     Calculated Osmolality 307 (*)     eGFR-Cr 36 (*)     AST 11 (*)     Total Protein 5.6 (*)     Albumin 2.6 (*)     A/G Ratio 0.9 (*)     All other components within normal limits   PROCALCITONIN - Abnormal; Notable for the following components:    Procalcitonin 4.94 (*)     All other components within normal limits    Narrative:     Resulted by: batch: K, CO2, CL, NA, CA, CREA, BUN, GLUC,    POCT GLUCOSE - Abnormal; Notable for the following components:    POC Glucose 203 (*)     All other components within normal limits   POCT GLUCOSE - Abnormal; Notable for the following components:    POC Glucose 225 (*)     All other components within normal limits   POCT GLUCOSE - Abnormal; Notable for the following components:    POC Glucose 242 (*)     All other components within normal limits   POCT GLUCOSE - Abnormal; Notable for the following components:    POC Glucose 244 (*)     All other components within  normal limits   CBC W/ DIFFERENTIAL - Abnormal; Notable for the following components:    WBC 20.2 (*)     HGB 11.8 (*)     HCT 35.8 (*)     Immature Platelet Fraction 7.5 (*)     MCV 72.9 (*)     MCH 24.0 (*)     Neutrophil Absolute Prelim 17.51 (*)     Neutrophil Absolute 17.51 (*)     All other components within normal limits   CBC W/ DIFFERENTIAL - Abnormal; Notable for the following components:    WBC 29.9 (*)     HGB 10.8 (*)     HCT 32.9 (*)     MCV 71.2 (*)     MCH 23.4 (*)     Neutrophil Absolute Prelim 26.66 (*)     Neutrophil Absolute 26.66 (*)     Monocyte Absolute 1.55 (*)     All other components within normal limits   TROPONIN I HIGH SENSITIVITY - Normal   PRO BETA NATRIURETIC PEPTIDE - Normal   PTT, ACTIVATED - Normal   SARS-COV-2/FLU A AND B/RSV BY PCR (GENEXPERT) - Normal    Narrative:     This test is intended for the qualitative detection and differentiation of SARS-CoV-2, influenza A, influenza B, and respiratory syncytial virus (RSV) viral RNA in nasopharyngeal or nares swabs from individuals suspected of respiratory viral infection consistent with COVID-19 by their healthcare provider. Signs and symptoms of respiratory viral infection due to SARS-CoV-2, influenza, and RSV can be similar.    Test performed using the Xpert Xpress SARS-CoV-2/FLU/RSV (real time RT-PCR)  assay on the GeneXpert instrument, [email or web address], [email or web address] [address].   This test is being used under the Food and Drug Administration's Emergency Use Authorization.    The authorized Fact Sheet for Healthcare Providers for this assay is available upon request from the laboratory.   ED/MRSA SCREEN BY PCR-CC - Normal   CBC WITH DIFFERENTIAL WITH PLATELET    Narrative:     The following orders were created for panel order CBC With Differential With Platelet.  Procedure                               Abnormality         Status                     ---------                               -----------         ------                     CBC W/  DIFFERENTIAL[[id number]]          Abnormal            Final result                 Please view results for these tests on the individual orders.   CBC WITH DIFFERENTIAL WITH PLATELET    Narrative:     The following orders were created for panel order CBC With Differential With Platelet.  Procedure                               Abnormality         Status                     ---------                               -----------         ------                     CBC W/ DIFFERENTIAL[[id number]]          Abnormal            Final result                 Please view results for these tests on the individual orders.   RAINBOW DRAW LAVENDER   RAINBOW DRAW LIGHT GREEN   RAINBOW DRAW BLUE   BLOOD CULTURE   BLOOD CULTURE   URINE CULTURE, ROUTINE     EKG #1    Rate, intervals and axes as noted on EKG Report.  Rate: 155  Rhythm: Undetermined rhythm  Reading: QRS prolonged 118, similar T wave inversions in lead III as seen on prior EKG         EKG #2 rate 137, sinus tachycardia, nonspecific intraventricular block, no new ST-T wave changes        CTA PELVIS W CTA BILAT LEG RUNOFF W IV CONTRAST(CPT=75635)    Result Date: 4/19/2024  PROCEDURE:  CTA PELVIS W CTA BILAT LEG RUNOFF W IV CONTRAST(CPT=75635)  COMPARISON:  [facility] , CT, CT CHEST+ABDOMEN+PELVIS(CPT=71250/48880), 10/26/2022, 7:19 AM.  [facility] , CT, CT CHEST+ABDOMEN+PELVIS(ALL [facility] ONLY)(CPT=71260/28859), 7/29/2020, 5:46 PM.  INDICATIONS:  left leg pain  TECHNIQUE:  CT images of the pelvis, and lower extremities were obtained pre- and post- injection of non-ionic intravenous contrast material. Multi-planar reformatted/3-D images were created to optimize visualization of vascular anatomy. Dose reduction techniques were used. Dose information is transmitted to the ACR (American College of Radiology) NRDR (National Radiology Data Registry) which includes the Dose Index Registry.  PATIENT STATED HISTORY:(As transcribed by Technologist)    CONTRAST USED:   FINDINGS:   LIVER:  Only a  portion liver is visualized.  Hepatic steatosis. BILIARY:  Cholelithiasis with no gallbladder wall thickening. KIDNEYS:  Kidneys are symmetrical in size without evidence of hydronephrosis.  There is left ureteral dilatation without obstructing urolithiasis. BOWEL/MESENTERY:  Bowel is normal in caliber. No evidence of obstruction.  The appendix is normal appearance.  Colonic diverticulosis without evidence of diverticulitis.  Portions the colon are not visualized on this examination. PELVIS:  Diffuse bladder wall thickening.  Prostatic calcifications.  No free pelvic fluid.  Surgical clips within the left inguinal region.  There is a prominent left inguinal lymph node measuring 2.8 x 1.9 cm with surrounding inflammatory stranding.  There is scrotal wall thickening.   BONES:  Normal.  No bony lesion or fracture.  VASCULATURE:   ABDOMEN/PELVIS: There is ectasia of the infrarenal abdominal aorta measuring 3.3 x 3.2 cm.  Atheromatous calcifications of the aorta.  The common iliac, internal iliac, and external iliac arteries are patent without significant stenosis.  RIGHT LOWER EXTREMITY: The right common femoral artery is patent.  The profunda femoral and superficial femoral arteries are patent without significant stenosis.  There are atheromatous calcifications present.  The popliteal artery is patent.  Atheromatous calcifications is within the lower leg are present.  Poor contrast opacification limits evaluation as there is considerable motion artifact on initial run within the lower leg.  The anterior and posterior tibial artery appear to provide some flow to the foot.  The peroneal artery terminates within the distal calf.  LEFT LOWER EXTREMITY: Considerable edema within the subcutaneous fat of the left lower extremity.  The femoral, superficial femoral, and profunda femoral arteries are patent.  There is motion artifact which limits interpretation.  The popliteal artery is patent.  Atheromatous  calcifications and  poor contrast opacification within lower legs limits evaluation.  Anterior tibial artery does appear patent to the foot.  The peroneal artery likely terminates within distal calf.  The posterior tibial artery appears to provide flow to the foot.             CONCLUSION:  1. Vasculature as described above.  There is poor evaluation of the vessels within the lower leg secondary to motion artifact and poor contrast opacification. 2. Soft tissue edema noted within the left lower leg which is nonspecific but could represent cellulitis. 3. Diffuse bladder wall thickening.  Correlation with urinalysis is recommended. 4. Scrotal wall thickening which is nonspecific but could be secondary to infectious etiology. 5. Prominent left inguinal lymph node with surrounding inflammatory stranding measuring 2.8 x 1.9 cm which may be reactive in nature. 6. Ectasia of the infrarenal abdominal aorta measuring 3.3 x 3.2 cm. 7. Hepatic steatosis. 8. Dilated left ureter without evidence of obstructing urolithiasis. 9. Colonic diverticulosis without evidence of diverticulitis.    LOCATION:  [id number]   Dictated by (CST): Claire Antonio MD on 4/19/2024 at 3:14 PM     Finalized by (CST): Claire Antonio MD on 4/19/2024 at 3:23 PM       XR CHEST AP PORTABLE  (CPT=71045)    Result Date: 4/19/2024  PROCEDURE:  XR CHEST AP PORTABLE  (CPT=71045)  TECHNIQUE:  AP chest radiograph was obtained.  COMPARISON:  [facility] , XR, XR CHEST AP PORTABLE  (CPT=71045), 6/03/2023, 8:35 AM.  [facility] , XR, XR CHEST AP PORTABLE  (CPT=71010), 4/04/2016, 6:32 AM.  INDICATIONS:  Full body pains  PATIENT STATED HISTORY: (As transcribed by Technologist)  Patient offered no additional history at this time.    FINDINGS:  Stable cardiac size.  Defibrillator pad overlies left hemithorax.  Mild interstitial opacities.  No pleural effusions.  No pneumothorax.  Osteoarthritic changes within the shoulders.            CONCLUSION:  Mild interstitial opacities which may represent mild  edema.   LOCATION:  [id number]      Dictated by (CST): Claire Antonio MD on 4/19/2024 at 2:32 PM     Finalized by (CST): Claire Antonio MD on 4/19/2024 at 2:32 PM       US VENOUS DOPPLER LEG LEFT - DIAG IMG (CPT=93971)    Result Date: 4/19/2024  PROCEDURE:  US VENOUS DOPPLER LEG LEFT - DIAG IMG (CPT=93971)  COMPARISON:  [facility] , US, US LEG LEFT LIMITED (CPT=76882), 6/28/2020, 5:37 PM.  [facility] , US, US VENOUS DOPPLER LEG LEFT - DIAG IMG (CPT=93971), 6/28/2020, 5:18 PM.  INDICATIONS:  eval for DVT, redness to left leg, pain  TECHNIQUE:  Real time, grey scale, and duplex ultrasound was used to evaluate the lower extremity venous system. B-mode two-dimensional images of the vascular structures, Doppler spectral analysis, and color flow.  Doppler imaging were performed.  The following veins were imaged:  Common, deep, and superficial femoral, popliteal, sapheno-femoral junction, posterior tibial veins, and the contralateral common femoral vein.  PATIENT STATED HISTORY: (As transcribed by Technologist)     FINDINGS:  EXTREMITY EXAMINED:  Left lower extremity THROMBI:  None visible. COMPRESSION:  Normal compressibility, phasicity, and augmentation. OTHER:  Negative.            CONCLUSION:  No acute deep vein thrombosis.   LOCATION:  [id number]    Dictated by (CST): Claire Antonio MD on 4/19/2024 at 2:16 PM     Finalized by (CST): Claire Antonio MD on 4/19/2024 at 2:18 PM               [facility]      Medical Decision Making:    Differential diagnosis before testing includes sepsis due to cellulitis, UTI ; necrotizing fasciitis potential life threatening diagnosis which is a medical condition that poses a threat to life/function.     Comorbidities that add complexity to management: See HPI    I reviewed prior external ED notes including Dr. Harvey, oncology, regarding history of carcinoid tumor of left lung st 1 dx 2016 per note 6/21, history of high-grade urothelial carcinoma diagnosed 2015 followed by urology    Discussions  of management with: Cardiology    I personally reviewed the radiographs and my independent interpretation includes mild edema    Shared decision making:  Admission disposition: 4/19/2024  4:18 PM         #1 sepsis due to cellulitis/UTI  Broad-spectrum antibiotics, IV fluids, Levophed  Admitted to ICU, d/w pulm/crit care     #2 leg pain consistent with cellulitis  Neurovascular intact  DVT ultrasound negative  CT leg with runoff consistent with cellulitis    #3 tachycardic with wide-complex QRS V. Tach vs sinus tach  amiodarone 150 mg IV bolus and drip started  Cardiology consulted, Dr. Junior saw pt in ED, stopped Amio drip, repeat EKG reveals sinus tach   Continue IVFs    #4 Altered mental from baseline  Encephalopathic likely secondary to #1   Patient very agitated and screaming on arrival, he was given Dilaudid 0.5 mg x 2, Ativan 2 mg IV and fentanyl; after these meds, he was sonorous      Actually elevated 5.3, repeated was 4.3 after IV fluids.          Patient was reevaluated in the ED several hours after arrival, he was sitting upright with eyes open answering questions.  Wife and daughter at bedside.  Patient calm and cooperative at this time, much improved mental status from earlier.  Although, he was still complaining of left leg pain.  IV fentanyl ordered.    Critical Care Statement:   Upon my evaluation, this patient had a high probability of imminent or life-threatening deterioration which required my direct attention, intervention and personal management. Critical care time is exclusive of time spent on separately billable procedures. Time includes review of laboratory data, radiology results, discussion with consultants, and close monitoring for potential decompensation. It involved high complexity of medical decision making to assess, manipulate and support vital system functions to treat single or multiple organ system failure and/or to prevent further life threatening deterioration of the  patient's condition. Interventions were performed as documented above.  This involved direct patient intervention, complex decision making and/or extensive discussions with patient, family and clinical staff.    TOTAL CRITICAL CARE TIME ELAPSED: 75   BODY SYSTEM AT RISK/SPECIFIC INDICATION FOR CRITICAL CARE: One-on-one at bedside significant amount of time for V. tach concerns initially, patient screaming in pain assuring no life-threatening etiology. After pain meds and Ativan, patient more sedate to the point of sonorous require me to bedside to ensure airway secure as questionable hypoxia 84%. He was on NRB and eventually weaned down to 4L NC.            Medical Decision Making      Disposition and Plan     Clinical Impression:  1. Severe sepsis (HCC)    2. Cellulitis of left lower extremity    3. Chronic kidney disease, unspecified CKD stage    4. Hyperkalemia    5. Encephalopathy in sepsis    6. Acute cystitis without hematuria         Disposition:  Admit  4/19/2024  4:18 pm    Follow-up:  No follow-up provider specified.        Medications Prescribed:  Current Discharge Medication List                            Hospital Problems       Present on Admission  Date Reviewed: 4/5/2024            ICD-10-CM Noted POA    * (Principal) Severe sepsis (HCC) A41.9, R65.20 4/19/2024 Unknown    Acute cystitis without hematuria N30.00 4/19/2024 Unknown    TONJA (acute kidney injury) (HCC) N17.9 4/20/2024 Unknown    Cellulitis of left lower extremity L03.116 4/19/2024 Unknown    Chronic kidney disease, unspecified CKD stage N18.9 4/19/2024 Unknown    Encephalopathy in sepsis G93.41 4/19/2024 Unknown    Hyperkalemia E87.5 4/19/2024 Unknown    Metabolic acidosis E87.20 4/20/2024 Unknown    Pain of left lower extremity M79.605 4/20/2024 Unknown    Septic shock (HCC) A41.9, R65.21 4/19/2024 Unknown    Tachycardia R00.0 4/20/2024 Unknown

## 2024-04-19 NOTE — HISTORICAL OFFICE NOTE
[facility]ility [facility]  [address] [facility] [address] [facility], 4th floor, [place] [address]  [phone]      Thiago Alcantara  Progress Note  Demographics:  Name: Thiago Alcantara YOB: 1940  Age: 82, Male Medical Record No: [id number]  Visited Date/Time: 12/04/2023 08:10 AM    Chief Complaints  6 month follow up, patient has swelling in hands and legs. Patient is concerned about AAA that was diagnosed  years ago.  History of Present Illness  82-year-old male here for evaluation for cardiac risk assessment for retina surgery    Patient was in the hospital recently with chest discomfort that was likely to be musculoskeletal.  It was nonexertional.  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  No uncontrolled hypertension or arrhythmias.    The surgery he is going to have his going to be local without general anesthesia    Patient has a baseline right bundle branch block which is chronic    No prior cardiac testing    Cardiac risk factors include diabetes and hypertension    Current visit:  Patient had his cataract surgery.  He is having more swelling in his arms and legs.  He also has a history of a AAA and is concerned about this.  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  Currently has CCS Class 1 symptoms.  Cardiac risk factors Former smoker  Past Medical History  1.Edema of lower extremity  2.Transient ischemic attack (TIA)  3.Chest pain, unspecified  4.Hypercholesteremia  5.Abdominal aortic aneurysm, without rupture, unspecified  6.DM (diabetes mellitus) Type 2  7.History of DVT (deep vein thrombosis) - Hx  Family History  Family history unknown.  Social History  Smoking status Former smoker  Tobacco  usage - No (Smokeless tobacco non-user (finding))  Review of systems  Cardiovascular Edema  No history of Chest pain, RUIZ, Palpitations, Syncope, PND, Orthopnea and Claudication  Physical Examination  Vitals Right Arm Sitting  / 61 mmHg, Pulse rate 73 bpm, Height in 5' 8\", BMI: 38.2, Weight in 251 lbs (or) 114 kgs and BSA : 2.39 cc/m²  General Appearance No Acute Distress, Well groomed and Normal Body habitus  Cardiovascular   EKG/Other abnormalities  General - NAD  PEERLA/EOMI  JVD - normal  CTA Bilaterally  CV- RRR, S1/S2, No murmurs  GI- Soft, Nontender  Extremities normal pulses  Mood/Affect Congruent  Skin no lesions  Joint/Musculoskeletal - Normal  Allergies  1.Bacitracin-neomycin-polymyxin [neomycin-bacitracin-polymyxin](Reaction:Rash, Severity:Unknown)  Medications  1.Eliquis 5 mg tablet, Take 1 tablet orally 2 times a day.  2.folic acid 1 mg tablet, Take 1 tablet orally once a day.  3.glimepiride 1 mg tablet, Take 1 tablet orally once a day.  4.lisinopriL 2.5 mg tablet, Take 1 tablet orally once a day.  5.metFORMIN 500 mg tablet, Take 1 tablet orally 4 times a day.  6.simvastatin 20 mg tablet, Take 1 tablet orally once a day.  Impression  1.Edema of lower extremity  2.Transient ischemic attack (TIA)  3.Chest pain, unspecified  4.Hypercholesteremia  5.Abdominal aortic aneurysm, without rupture, unspecified  6.DM (diabetes mellitus) Type 2  7.History of DVT (deep vein thrombosis) - Hx  Assessment & Plan  ===============================  Cardiac Testing Personally Reviewed    ECG Reviewed -sinus rhythm right bundle branch block    Echo Results []    Stress Test Results []    Monitor Results []    EP Procedures: []  ==============================    Problem List:         #Risk factors  - Patient has a history of a AAA and I would like to do an abdominal ultrasound for screening  - Patient history of a asymmetric lower extremity edema would like to get bilateral lower extremity venous ultrasounds  -  Because of increasing lower extremity edema I would like to do a 2D echo to evaluate LV function  - Vonnie PET to evaluate for coronary artery disease in a patient with new cardiovascular symptoms     #Prior saddle pulmonary embolism on Eliquis     #Type 2 diabetes     #Essential hypertension      Continue current meds except as outlined above.  =====================================    Check out Items:  After the above testing  Labs and Diagnostics ordered  1.EKG (electrocardiogram) (Today)  2.Echocardiography - Complete (Schedule next available)  3.Abdominal Aorta Ultrasound (Schedule next available)  4.Cardiac PET/CT Scan (28055) (Schedule next available)  5.Venous (Lower Bilateral) duplex study to assess for any evidence of DVT (deep venous thrombosis) (Schedule next available)  Future appointments  1.Referral Visit - Tiffany Pierce ([email or web address]) : (Today)  Miscellaneous  1.Weight monitoring (regime/therapy)  Nurses documentation  Refills: none  Upcoming surgeries: none   Use of assisted devices: none  EKG: Done  (AR, CMA)     Patient instructions  - Abdominal ultrasound for screening:   Your provider has ordered an ultrasound of the abdominal aorta. This is the main blood vessel leading away from the heart. Please have a low fiber diet the day before and nothing to eat or drink 12 hours  prior to procedure. You are allowed to take your medications with a sip of water. Diabetics may have a few saltine crackers in the morning if needed.  No smoking or gum chewing.  You will be required to expose your abdominal area.  This test takes approximately 30 to 60 minutes.    - Venous ultrasound:   Your provider has ordered a venous ultrasound.  This test will check the circulation in the large veins.   This will require compression of your veins which may cause some discomfort. Do not wear compression stockings 3 days prior to ultrasound as it may give invalid results.   You will be required  to remove your shoes, socks, and pants. If the ultrasound is of the arm, you will be required to remove your shirt. Please no coveralls.  You are allowed to wear underwear; the test will stop in your groin area. This test takes approximately 1-2 hours.    -Echocardiogram:  Your provider has ordered an echocardiogram. This is an ultrasound of your heart to further assess its function and valves. You may require an IV.  You will be required to remove your shirt, no all in-in-ones, dresses or coveralls. This test takes approximately 45 to 60 minutes.     -PET Perfusion study :   Your provider has ordered a nuclear PET perfusion study. This is a stress test for your heart. It will help detect the presence of blockages in your heart. Please refer to the Patient Guide for Perfusion Study for further information.             It is important not to have any caffeine for 12 hours before the test.  Caffeine includes soft drinks, tea, coffee - including decaffeinated products, chocolate, energy drinks, diet pills, Excedrin, Anacin, Butalbital, Fioricet, and some other migraine medications.             Do not have anything to eat or drink, with the exception of small sips of water to take medications for 6-8 hours prior to your test.  -Wear loose fitting clothing and wear your bra.  -[facility]. Walk up the ramp towards the Emergency Dpt.  At the top of the ramp you will see an information desk. To the right of the information desk there is a seating area with a large blue sign for CARDIAC PET testing with our phone number for you to call once you arrive.  Please have a seat and contact the phone number [phone].  They will first get you checked in over the phone, then they will greet you and escort you to the testing unit.      follow up after testing is completed  Please bring in you medication bottles or updated medicine list to your next appointment.  Call [facility] if you have any  problems or concerns at [phone]   CPOE Orders carried out by: Lindsey JOHNSON  Care Providers: Norma Colón MD, Lindsey JOHNSON and Leta Christianson  Electronically Authenticated by  Norma Colón MD  12/05/2023 08:19:21 AM  Disclaimer: Components of this note were documented using voice recognition system and are subject to errors not corrected at proofreading. Contact the author of this note for any clarifications.

## 2024-04-19 NOTE — CONSULTS
[facility]  Cardiology Consultation    Thiago Alcantara Patient Status:  Emergency    1940 MRN [id number]   Location [facility] EMERGENCY DEPARTMENT Attending Radha Vera,    Hosp Day # 0 PCP Tiffany Pierce MD     Reason for Consultation:  Abnormal ECG    History of Present Illness:  Thiago Alcantara is a a(n) 83 year old diabetic male with hx of HTN, HLP, prior saddle PE/DVT on eliquis who is brought by EMS from [facility] immediate care.  He has altered mental status and appears ill.  On exam he is tachycardic with warm extremities and purpura. Initial ECG with baseline artifact - wide complex tachycardia suspect sinus tach with BBB.    Blood gas shows lactic acidosis  Repeat ECG with him more sedated clearly shows sinus tachycardia    LE US negative for DVT today  CT abemonen/pelvis with runoff pending  History:  Past Medical History:    Anesthesia complication    broke out in rashes generalized - unknown source (after every anaesthesia)    Aneurysm of abdominal aorta (HCC)    Bladder tumor    recesected 2015    Cancer (HCC)    SKIN CANCERS ON ARM AND NOSE    Deep vein thrombosis (HCC)    Erectile dysfunction    Gall stones    High blood pressure    High cholesterol    History of blood clots    Neuropathy    HAnds from DM    Obesity    Osteoarthritis    Personal history of antineoplastic chemotherapy    2015    Pulmonary embolism (HCC)    ON COUMADIN NOW    TIA (transient ischemic attack)    Type II or unspecified type diabetes mellitus without mention of complication, not stated as uncontrolled    Varicose veins of both lower extremities with complications    Visual impairment    4-5 yrs ago had blurriness prob, evaled. no diagnosis     Past Surgical History:   Procedure Laterality Date    Colonoscopy      Colonoscopy      x3, polpypectomy x1, third one clear    Colonoscopy N/A 2017    Procedure: COLONOSCOPY, POSSIBLE BIOPSY, POSSIBLE POLYPECTOMY 09681;  Surgeon: Alexandra  MD Marisela;  Location: [facility] SURGICAL [facility]    Colonoscopy N/A 6/14/2020    Procedure: COLONOSCOPY;  Surgeon: Jewel Lane MD;  Location:  ENDOSCOPY    Colonoscopy N/A 6/13/2020    adenoma- repeat 5 yrs    Eye surgery  plastic sx eye lids    Fracture surgery      left leg-screws,pins    Hc closed tx distal tibia fracture w manipulation Left     Hc debridement skin up to 10% Left     leg    Other surgical history  7/29/15    cysto, stent removal - Dr. Campos    Other surgical history  10/14/15    BTS Cystoscopy -Dr Campos    Other surgical history  1/16/15    BTS Cystoscopy -Dr Campos    Other surgical history  5/13/16    BTS Cystoscopy-Dr Campos    Other surgical history  8/15/16    Cysto- Dr. Campos    Other surgical history  2/17/17    Cysto- Dr. Campos    Other surgical history  08/18/2017    Cystoscopy- Dr. Campos    Other surgical history  09/16/2019     BTS cysto - dr. campos     Other surgical history  09/21/2020    Cysto Dr. Campos    Other surgical history  09/27/2021    Cystoscopy- Dr. Campos     Skin graft procedure      multiple to Left leg    Tonsillectomy      Vein bypass graft,fem-fem      to left leg     Family History   Problem Relation Age of Onset    Heart Disorder Father     Hypertension Mother     Arthritis Mother     Heart Disorder Son     Other (kidney stone) Brother     Other (hernia) Brother       reports that he quit smoking about 28 years ago. He started smoking about 58 years ago. He has a 30 pack-year smoking history. He has never used smokeless tobacco. He reports that he does not currently use alcohol. He reports that he does not use drugs.    Allergies:  Allergies   Allergen Reactions    Bacitracin-Neomycin-Polymyxin [Neomycin-Bacitracin-Polymyxin] RASH    Other OTHER (SEE COMMENTS)       Medications:    Current Facility-Administered Medications:     HYDROmorphone (Dilaudid) 1 MG/ML injection 0.5 mg, 0.5 mg, Intravenous, Q30 Min PRN     amiodarone (Cordarone) 150 mg in dextrose 5% 100 mL IV bolus, 150 mg, Intravenous, Once **FOLLOWED BY** amiodarone (Cordarone) 450 mg in dextrose 5% 250 mL infusion, 1 mg/min, Intravenous, Continuous **FOLLOWED BY** amiodarone (Cordarone) 450 mg in dextrose 5% 250 mL infusion, 0.5 mg/min, Intravenous, Continuous    ipratropium-albuterol (Duoneb) 0.5-2.5 (3) MG/3ML inhalation solution 3 mL, 3 mL, Nebulization, Once    vancomycin (Vancocin) 2.25 g in sodium chloride 0.9% 500 mL IVPB premix, 25 mg/kg, Intravenous, Once    sodium chloride 0.9 % IV bolus 1,000 mL, 1,000 mL, Intravenous, Once    Review of Systems:  A comprehensive review of systems was negative if not otherwise mention in above HPI.    /76   Pulse (!) 165   Temp (!) 103.4 °F (39.7 °C) (Rectal)   Resp 24   SpO2 100%   Temp (24hrs), Av.7 °F (39.8 °C), Min:103.4 °F (39.7 °C), Max:104 °F (40 °C)       Intake/Output Summary (Last 24 hours) at 2024 1459  Last data filed at 2024 1356  Gross per 24 hour   Intake 100 ml   Output 300 ml   Net -200 ml     Wt Readings from Last 3 Encounters:   24 240 lb   23 245 lb   23 249 lb 8 oz       Physical Exam:   General: confused  HEENT: Normocephalic   Neck: No JVD   Cardiac: tachy, s1, s2  Lungs: Clear anteriorly  Extremities: warm, + rash  Neurologic: Alert and oriented   Skin: Warm and dry.     Laboratory Data:  Lab Results   Component Value Date    WBC 20.2 2024    HGB 11.8 2024    HCT 35.8 2024    .0 2024    CREATSERUM 1.97 2024    BUN 43 2024     2024    K 5.3 2024     2024    CO2 22.0 2024     2024    CA 10.0 2024    ALB 3.6 2024    ALKPHO 88 2024    BILT 0.8 2024    TP 6.9 2024    AST 13 2024    ALT 27 2024    PGLU 203 2024       Imaging:  Nuclear PET 2023  1.Stress EKG is normal.     1.This is a normal perfusion study, no  perfusion defects noted.     2.The left ventricular cavity is noted to be normal on the stress studies. The stress left ventricular ejection fraction was calculated to be 63% and left ventricular global function is normal. The rest left ventricular cavity is noted to be normal. The rest left ventricular ejection fraction was calculated to be 68% and rest left ventricular global function is normal.     Lower Extremity Venous Ultrasound 12/07/2023  1.The study quality is good.     2.Evidence of non-occlusive chronic thrombus noted in the right mid femoral and popliteal veins with recanalization of blood flow and fibrin strands.     3.Deep venous reflux noted in the right mid and distal femoral veins, popliteal vein, and posterior tibial vein x1.     4.Evidence of a possible right Baker's Cyst at the popliteal fossa measuring 5.7 x3.1 x1.2 cm.     5.Evidence of non-occlusive chronic thrombus noted in one of the duplicated left proximal, mid and distal femoral veins, popliteal vein , and gastrocnemius vein x1 with recanalization of blood flow and fibrin strands.     6.Deep venous reflux noted in the left femoral, popliteal, and gastrocnemius vein x1.     7.Remaining deep veins exhibits normal compressibility, augmentation and phasic flow in the lower extremity venous sysptem bilaterally with no evidence of thrombus.     Abdominal Aorta 12/07/2023  1.The study quality is average.     2.Technically challenging study due to body habitus and overlying bowel gas.     3.Evidence of an infrarenal abdominal aneurysm measuring 3.2 x 3.2 cm .     4.Scattered atherosclerotic plaque but no elevated velocity or velocity gradient to suggest any significant stenosis.     Trans Thoracic Echocardiogram 12/04/2023  1.The study quality is below average.     2.Technically difficult study due to patients body habitus.     3.The left ventricle is normal in size. Global left ventricular systolic function is normal. The left ventricular  ejection fraction is 52%. The left ventricle diastolic function is impaired (Grade I) with normal left atrial pressure. The wall thickness is within normal limits. No regional wall motion abnormality is noted     4.The right ventricle is normal in size. Right ventricular systolic function is normal.     5.No significant valvular regurgitation or stenosis.    Impression/Recs:  Altered mental status in septic appearing gentleman.  Would start aggressive fluid hydration (echo in last 6 months with LVEF 50-55%) and empiric broad spectrum antibiotics.  Obtain echocardiogram once on the floor - will likely need MICU bed.  Doubt PE as he is on oral anticoagulation and LE venous US negative for DVT earlier today in ED.    Thank you for allowing me to participate in the care of your patient.    Massimo Junior MD  4/19/2024  2:59 PM

## 2024-04-20 ENCOUNTER — APPOINTMENT (OUTPATIENT)
Dept: CV DIAGNOSTICS | Facility: HOSPITAL | Age: 84
End: 2024-04-20
Payer: MEDICARE

## 2024-04-20 PROBLEM — N17.9 AKI (ACUTE KIDNEY INJURY) (HCC): Status: ACTIVE | Noted: 2024-04-20

## 2024-04-20 PROBLEM — E87.20 METABOLIC ACIDOSIS: Status: ACTIVE | Noted: 2024-04-20

## 2024-04-20 PROBLEM — R00.0 TACHYCARDIA: Status: ACTIVE | Noted: 2024-04-20

## 2024-04-20 PROBLEM — M79.605 PAIN OF LEFT LOWER EXTREMITY: Status: ACTIVE | Noted: 2024-04-20

## 2024-04-20 LAB
ALBUMIN SERPL-MCNC: 2.6 G/DL (ref 3.4–5)
ALBUMIN/GLOB SERPL: 0.9 {RATIO} (ref 1–2)
ALP LIVER SERPL-CCNC: 66 U/L
ALT SERPL-CCNC: 18 U/L
ANION GAP SERPL CALC-SCNC: 5 MMOL/L (ref 0–18)
AST SERPL-CCNC: 11 U/L (ref 15–37)
ATRIAL RATE: 137 BPM
BASOPHILS # BLD AUTO: 0.08 X10(3) UL (ref 0–0.2)
BASOPHILS NFR BLD AUTO: 0.3 %
BILIRUB SERPL-MCNC: 0.7 MG/DL (ref 0.1–2)
BUN BLD-MCNC: 39 MG/DL (ref 9–23)
CALCIUM BLD-MCNC: 8.9 MG/DL (ref 8.5–10.1)
CHLORIDE SERPL-SCNC: 113 MMOL/L (ref 98–112)
CO2 SERPL-SCNC: 21 MMOL/L (ref 21–32)
CREAT BLD-MCNC: 1.87 MG/DL
EGFRCR SERPLBLD CKD-EPI 2021: 36 ML/MIN/1.73M2 (ref 60–?)
EOSINOPHIL # BLD AUTO: 0.07 X10(3) UL (ref 0–0.7)
EOSINOPHIL NFR BLD AUTO: 0.2 %
ERYTHROCYTE [DISTWIDTH] IN BLOOD BY AUTOMATED COUNT: 23.6 %
GLOBULIN PLAS-MCNC: 3 G/DL (ref 2.8–4.4)
GLUCOSE BLD-MCNC: 190 MG/DL (ref 70–99)
GLUCOSE BLD-MCNC: 234 MG/DL (ref 70–99)
GLUCOSE BLD-MCNC: 244 MG/DL (ref 70–99)
GLUCOSE BLD-MCNC: 245 MG/DL (ref 70–99)
GLUCOSE BLD-MCNC: 266 MG/DL (ref 70–99)
HCT VFR BLD AUTO: 32.9 %
HGB BLD-MCNC: 10.8 G/DL
IMM GRANULOCYTES # BLD AUTO: 0.33 X10(3) UL (ref 0–1)
IMM GRANULOCYTES NFR BLD: 1.1 %
LYMPHOCYTES # BLD AUTO: 1.19 X10(3) UL (ref 1–4)
LYMPHOCYTES NFR BLD AUTO: 4 %
MCH RBC QN AUTO: 23.4 PG (ref 26–34)
MCHC RBC AUTO-ENTMCNC: 32.8 G/DL (ref 31–37)
MCV RBC AUTO: 71.2 FL
MONOCYTES # BLD AUTO: 1.55 X10(3) UL (ref 0.1–1)
MONOCYTES NFR BLD AUTO: 5.2 %
NEUTROPHILS # BLD AUTO: 26.66 X10 (3) UL (ref 1.5–7.7)
NEUTROPHILS # BLD AUTO: 26.66 X10(3) UL (ref 1.5–7.7)
NEUTROPHILS NFR BLD AUTO: 89.2 %
OSMOLALITY SERPL CALC.SUM OF ELEC: 307 MOSM/KG (ref 275–295)
P-R INTERVAL: 120 MS
PLATELET # BLD AUTO: 163 10(3)UL (ref 150–450)
PLATELETS.RETICULATED NFR BLD AUTO: 4.4 % (ref 0–7)
POTASSIUM SERPL-SCNC: 4.9 MMOL/L (ref 3.5–5.1)
PROCALCITONIN SERPL-MCNC: 4.94 NG/ML (ref ?–0.16)
PROT SERPL-MCNC: 5.6 G/DL (ref 6.4–8.2)
Q-T INTERVAL: 338 MS
QRS DURATION: 132 MS
QTC CALCULATION (BEZET): 510 MS
R AXIS: 133 DEGREES
RBC # BLD AUTO: 4.62 X10(6)UL
SODIUM SERPL-SCNC: 139 MMOL/L (ref 136–145)
T AXIS: -8 DEGREES
VENTRICULAR RATE: 137 BPM
WBC # BLD AUTO: 29.9 X10(3) UL (ref 4–11)

## 2024-04-20 PROCEDURE — 99233 SBSQ HOSP IP/OBS HIGH 50: CPT | Performed by: INTERNAL MEDICINE

## 2024-04-20 PROCEDURE — 93306 TTE W/DOPPLER COMPLETE: CPT

## 2024-04-20 RX ORDER — LIDOCAINE HYDROCHLORIDE 20 MG/ML
10 JELLY TOPICAL ONCE
Status: COMPLETED | OUTPATIENT
Start: 2024-04-20 | End: 2024-04-20

## 2024-04-20 RX ORDER — INSULIN DEGLUDEC 100 U/ML
10 INJECTION, SOLUTION SUBCUTANEOUS NIGHTLY
Status: DISCONTINUED | OUTPATIENT
Start: 2024-04-20 | End: 2024-04-24

## 2024-04-20 RX ORDER — ATORVASTATIN CALCIUM 10 MG/1
10 TABLET, FILM COATED ORAL NIGHTLY
Status: DISCONTINUED | OUTPATIENT
Start: 2024-04-20 | End: 2024-04-24

## 2024-04-20 RX ORDER — DOCUSATE SODIUM 100 MG/1
100 CAPSULE, LIQUID FILLED ORAL 2 TIMES DAILY
Status: DISCONTINUED | OUTPATIENT
Start: 2024-04-20 | End: 2024-04-24

## 2024-04-20 RX ORDER — DOCUSATE SODIUM 100 MG/1
100 CAPSULE, LIQUID FILLED ORAL 2 TIMES DAILY
Status: DISCONTINUED | OUTPATIENT
Start: 2024-04-20 | End: 2024-04-20

## 2024-04-20 NOTE — PROGRESS NOTES
ICU  Critical Care APRN Progress Note    NAME: Thiago Alcantara - ROOM: 459 - MRN: [id number] - Age: 83 year old - :1940    Subjective: No acute events overnight.  Patient remains on levophed.    Current Facility-Administered Medications   Medication Dose Route Frequency    docusate sodium (Colace) cap 100 mg  100 mg Oral BID    apixaban (Eliquis) tab 5 mg  5 mg Oral BID    folic acid (Folvite) tab 1 mg  1 mg Oral Daily    glucose (Dex4) 15 GM/59ML oral liquid 15 g  15 g Oral Q15 Min PRN    Or    glucose (Glutose) 40% oral gel 15 g  15 g Oral Q15 Min PRN    Or    glucose-vitamin C (Dex-4) chewable tab 4 tablet  4 tablet Oral Q15 Min PRN    Or    dextrose 50% injection 50 mL  50 mL Intravenous Q15 Min PRN    Or    glucose (Dex4) 15 GM/59ML oral liquid 30 g  30 g Oral Q15 Min PRN    Or    glucose (Glutose) 40% oral gel 30 g  30 g Oral Q15 Min PRN    Or    glucose-vitamin C (Dex-4) chewable tab 8 tablet  8 tablet Oral Q15 Min PRN    acetaminophen (Tylenol Extra Strength) tab 1,000 mg  1,000 mg Oral Q6H PRN    norepinephrine (Levophed) 4 mg/250mL infusion premix  0.5-30 mcg/min Intravenous Continuous PRN    vasopressin (Vasostrict) 20 Units in sodium chloride 0.9% 100 mL infusion for septic shock  0.01-0.03 Units/min Intravenous Continuous PRN    metoclopramide (Reglan) 5 mg/mL injection 5 mg  5 mg Intravenous Q8H PRN    polyethylene glycol (PEG 3350) (Miralax) 17 g oral packet 17 g  17 g Oral Daily PRN    bisacodyl (Dulcolax) 10 MG rectal suppository 10 mg  10 mg Rectal Daily PRN    sennosides (Senokot) tab 17.2 mg  17.2 mg Oral Nightly PRN    hydrocortisone Na succinate PF (Solu-CORTEF) injection 50 mg  50 mg Intravenous Q6H    piperacillin-tazobactam (Zosyn) 4.5 g in dextrose 5% 100 mL IVPB-ADDV  4.5 g Intravenous Q8H    insulin aspart (NovoLOG) 100 Units/mL FlexPen 1-10 Units  1-10 Units Subcutaneous TID AC and HS    insulin aspart (NovoLOG) 100 Units/mL FlexPen 1-68 Units  1-68 Units Subcutaneous TID CC     lactated ringers infusion   Intravenous Continuous    [START ON 4/21/2024] vancomycin (Vancocin) 1.25 g in sodium chloride 0.9% 250mL IVPB premix  1,250 mg Intravenous Q36H    HYDROmorphone (Dilaudid) 1 MG/ML injection 0.2 mg  0.2 mg Intravenous Q2H PRN    Or    HYDROmorphone (Dilaudid) 1 MG/ML injection 0.4 mg  0.4 mg Intravenous Q2H PRN    Or    HYDROmorphone (Dilaudid) 1 MG/ML injection 0.8 mg  0.8 mg Intravenous Q2H PRN       OBJECTIVE  Vitals:  /59   Pulse 90   Temp 97.2 °F (36.2 °C) (Temporal)   Resp 26   Wt 241 lb 6.5 oz (109.5 kg)   SpO2 100%   BMI 36.71 kg/m²       Physical Exam:    General Appearance: Lethargic, awakens easily but falls back asleep, cooperative, appears stated age  Neck: No JVD, neck supple, no adenopathy, trachea midline, no carotid bruits  Lungs: Clear to auscultation bilaterally, diminished left base, respirations unlabored  Heart: Regular rate and rhythm, S1 and S2 normal, no murmur, rub or gallop  Abdomen: Soft, tender with palpation over mid lower quadrant, bowel sounds active all four quadrants, no masses, no organomegaly  Extremities:  no cyanosis, reddened left calf, lesions to left outer ankle with small open wounds, 3+pitting LLE edema, capillary refill <3 sec.    Pulses: 2+ and symmetric all extremities  Skin: Skin color, texture, turgor normal for ethnicity, no rashes, warm and dry, LLE with reddened calf and foot, left outer ankle with small open wounds  Neurologic: CNII-XII intact, normal strength    Data this admission:  CTA PELVIS W CTA BILAT LEG RUNOFF W IV CONTRAST(CPT=75635)    Result Date: 4/19/2024  CONCLUSION:  1. Vasculature as described above.  There is poor evaluation of the vessels within the lower leg secondary to motion artifact and poor contrast opacification. 2. Soft tissue edema noted within the left lower leg which is nonspecific but could represent cellulitis. 3. Diffuse bladder wall thickening.  Correlation with urinalysis is recommended. 4.  Scrotal wall thickening which is nonspecific but could be secondary to infectious etiology. 5. Prominent left inguinal lymph node with surrounding inflammatory stranding measuring 2.8 x 1.9 cm which may be reactive in nature. 6. Ectasia of the infrarenal abdominal aorta measuring 3.3 x 3.2 cm. 7. Hepatic steatosis. 8. Dilated left ureter without evidence of obstructing urolithiasis. 9. Colonic diverticulosis without evidence of diverticulitis.    LOCATION:  [id number]   Dictated by (CST): Claire Antonio MD on 4/19/2024 at 3:14 PM     Finalized by (CST): Claire Antonio MD on 4/19/2024 at 3:23 PM       XR CHEST AP PORTABLE  (CPT=71045)    Result Date: 4/19/2024  CONCLUSION:  Mild interstitial opacities which may represent mild edema.   LOCATION:  [id number]      Dictated by (CST): Claire Antonio MD on 4/19/2024 at 2:32 PM     Finalized by (CST): Claire Antonio MD on 4/19/2024 at 2:32 PM       US VENOUS DOPPLER LEG LEFT - DIAG IMG (CPT=93971)    Result Date: 4/19/2024  CONCLUSION:  No acute deep vein thrombosis.   LOCATION:  [id number]    Dictated by (CST): Claire Antonio MD on 4/19/2024 at 2:16 PM     Finalized by (CST): Claire Antonio MD on 4/19/2024 at 2:18 PM         Labs:  Lab Results   Component Value Date    WBC 29.9 04/20/2024    HGB 10.8 04/20/2024    HCT 32.9 04/20/2024    .0 04/20/2024    CREATSERUM 1.87 04/20/2024    BUN 39 04/20/2024     04/20/2024    K 4.9 04/20/2024     04/20/2024    CO2 21.0 04/20/2024     04/20/2024    CA 8.9 04/20/2024    ALB 2.6 04/20/2024    ALKPHO 66 04/20/2024    BILT 0.7 04/20/2024    TP 5.6 04/20/2024    AST 11 04/20/2024    ALT 18 04/20/2024    PTT 28.2 04/19/2024    INR 1.31 04/19/2024           Assessment/Plan:  Septic shock 2/2 to cellulitis LLE  - Leukocytosis, tmax 104.0- PRN tylenol  - Lactic acid elevated, trend for clearance  - Procal elevated  - Urine culture pending- cystitis on CT  - Blood cultures pending  - Chest xray with mild edema,  no focal consolidation  - s/p sepsis bolus in ED  - Vasopressors to maitain MAP >65, currently on norepinephrine  - Stress dose steroids  - Zosyn (4/19- ) Vanc (4/19- )    NAGMA  TONJA on CKD  - IVF- change to LR due to hyperchloremia  - Strict I/O  - Avoid nephrotoxins  - Daily BMP  - Consider renal consult if no improvement  -urinary retention, urology consulted    Wide complex tachycardia - EKG with ST  - IV amiodarone- held  - Cardiology following  - TTE EF 55-60%, bowing of MV    LLE pain  - LE doppler negative for DVT  - LLE cellulitis on CT scan, Patent arteries  - Pain management  - IV antibiotics as above    HTN  - Home meds on hold in the setting of shock    DM2  - A1c 9  - Home PO meds on hold  - Accuchecks  - SSI    History of PE/DVT  - Eliquis    Seronegative rheumatoid arthritis        - Hold Prednisone while on IV steroids        - Methotrexate on hold    History of lung and bladder cancer    F/E/N  - IVF  - Replete electrolytes per protocol  - ADAT    Proph  - Eliquis  - SCDs  - PT/OT    Dispo  - Full code  - ICU monitoring    Plan of care discussed with intensivist on-call, Dr. Maria.    Jessica Mccarthy, [facility]  Critical Care NP  Phone [phone]    Intensivist Addendum:  I agree with APN note above. I have independently seen & evaluated the patient.  I agree with the management plan outlined above with the following changes/additions:    Patient with septic shock requiring vasopressors.  Severe cellulitis to LE with minor improvement overnight.  Negative CT for gas prodcing organisms.  Continue broad spectrum antibiotics at this time.  Await cardiology input for AAA and likely ischemic CM.  Continue ICU level of care at this time    Critical Care Time: 47 minutes spent (excluding separate billable procedures) was spent in the evaluation and management of the patient, including chart review and discussion    Julio Maria, DO  Pulmonary & Critical Care Medicine  [facility]

## 2024-04-20 NOTE — PROGRESS NOTES
[facility]   part of [facility]     Hospitalist Progress Note     Thigao Alcantara Patient Status:  Inpatient    1940 MRN [id number]   Location [facility] 4SW-A Attending Elia Church MD   Hosp Day # 1 PCP Tiffany Pierce MD     Chief Complaint: LLE pain    Subjective:     Patient remains on vasopressors this AM but being weaned  Complaining of back/joint pain      Objective:    Review of Systems:   A comprehensive review of systems was completed; pertinent positive and negatives stated in subjective.    Vital signs:  Temp:  [97.2 °F (36.2 °C)-104 °F (40 °C)] 97.8 °F (36.6 °C)  Pulse:  [] 85  Resp:  [15-32] 19  BP: ()/() 116/76  SpO2:  [93 %-100 %] 93 %    Physical Exam:    General: No acute distress  Respiratory: No wheezes, no rhonchi  Cardiovascular: S1, S2, regular rate and rhythm  Abdomen: Soft, Non-tender, non-distended, positive bowel sounds  Neuro: No new focal deficits.   Extremities: LLE erythema/edema, well demarcated     Diagnostic Data:    Labs:  Recent Labs   Lab 24  1325 24  1848 24  0455   WBC 20.2*  --  29.9*   HGB 11.8*  --  10.8*   MCV 72.9*  --  71.2*   .0  --  163.0   INR  --  1.31*  --        Recent Labs   Lab 24  1325 24  1622 24  0455   * 254* 266*   BUN 43* 44* 39*   CREATSERUM 1.97* 2.12* 1.87*   CA 10.0 8.1* 8.9   ALB 3.6  --  2.6*    140 139   K 5.3* 4.3 4.9    113* 113*   CO2 22.0 18.0* 21.0   ALKPHO 88  --  66   AST 13*  --  11*   ALT 27  --  18   BILT 0.8  --  0.7   TP 6.9  --  5.6*       Estimated Creatinine Clearance: 30 mL/min (A) (based on SCr of 1.87 mg/dL (H)).    Recent Labs   Lab 24  1325   TROPHS 15       Recent Labs   Lab 24  1848   PTP 16.4*   INR 1.31*                  Microbiology    Hospital Encounter on 24   1. Urine Culture, Routine     Status: None (Preliminary result)    Collection Time: 24  1:47 PM    Specimen: Urine, clean catch    Result Value Ref Range    Urine Culture No Growth at <18 hours N/A         Imaging: Reviewed in Epic.    Medications:    docusate sodium  100 mg Oral BID    apixaban  5 mg Oral BID    folic acid  1 mg Oral Daily    hydrocortisone Na succinate PF  50 mg Intravenous Q6H    piperacillin-tazobactam  4.5 g Intravenous Q8H    insulin aspart  1-10 Units Subcutaneous TID AC and HS    insulin aspart  1-68 Units Subcutaneous TID CC    [START ON 4/21/2024] vancomycin  1,250 mg Intravenous Q36H       Assessment & Plan:      #Septic shock d/t LLE cellulitis and UTI  -wean vasopressors per critical care  -Follow Cultures     #Cerebrovascular disease  #VTE history  -Elqiuis      #Wide complex tachycardia  -Amiodarone  -ECHO preserved EF, Grade 1 diastolic dysfunction   -Telemetry  -Cardiology consult     #Acute kidney injury on chronic kidney disease III   -IVF  -Monitor UOP, creatinine and electrolytes     #VTE on DOAC  -DOAC     #Diabetes mellitus  -exacerbated by steroids   -ISS  -Degludec      #Seronegative rheumatoid arthritis  -wean back to chronic Prednisone from stress dose per critical care   -Hold Methotrexate     #History of lung cancer  #Bladder cancer sp resection of tumor, Urinary retention - Wat[place], Urology on consult          Shin Bose DO    Supplementary Documentation:     Quality:  DVT Mechanical Prophylaxis:        DVT Pharmacologic Prophylaxis   Medication    apixaban (Eliquis) tab 5 mg                Code Status: Full Code  [place]: No urinary catheter in place    The 21st Century Cures Act makes medical notes like these available to patients in the interest of transparency. Please be advised this is a medical document. Medical documents are intended to carry relevant information, facts as evident, and the clinical opinion of the practitioner. The medical note is intended as peer to peer communication and may appear blunt or direct. It is written in medical language and may contain abbreviations or verbiage  that are unfamiliar.             **Certification      PHYSICIAN Certification of Need for Inpatient Hospitalization - Initial Certification    Patient will require inpatient services that will reasonably be expected to span two midnight's based on the clinical documentation in H+P.   Based on patients current state of illness, I anticipate that, after discharge, patient will require TBD.

## 2024-04-20 NOTE — PROGRESS NOTES
Progress Note  Thiago Alcantara Patient Status:  Inpatient    1940 MRN [id number]   [facility] 4SW-A Attending Elia Church MD   Hosp Day # 1 PCP Tiffany Pierce MD     Subjective:  No cardiac events over night.     Objective:  /76   Pulse 85   Temp 97.8 °F (36.6 °C) (Temporal)   Resp 19   Wt 241 lb 6.5 oz (109.5 kg)   SpO2 93%   BMI 36.71 kg/m²     Telemetry: SR 85 BPM.      Intake/Output:    Intake/Output Summary (Last 24 hours) at 2024 1543  Last data filed at 2024 0941  Gross per 24 hour   Intake 5927 ml   Output 1075 ml   Net 4852 ml       Last 3 Weights   24 1839 241 lb 6.5 oz (109.5 kg)   24 1547 240 lb 1.3 oz (108.9 kg)   24 2029 240 lb (108.9 kg)   23 1529 245 lb (111.1 kg)       Labs:  Recent Labs   Lab 24  1325 24  1622 24  0455   * 254* 266*   BUN 43* 44* 39*   CREATSERUM 1.97* 2.12* 1.87*   EGFRCR 34* 31* 36*   CA 10.0 8.1* 8.9    140 139   K 5.3* 4.3 4.9    113* 113*   CO2 22.0 18.0* 21.0     Recent Labs   Lab 24  1325 24  0455   RBC 4.91 4.62   HGB 11.8* 10.8*   HCT 35.8* 32.9*   MCV 72.9* 71.2*   MCH 24.0* 23.4*   MCHC 33.0 32.8   RDW 23.9 23.6   NEPRELIM 17.51* 26.66*   WBC 20.2* 29.9*   .0 163.0         Recent Labs   Lab 24  1325   TROPHS 15   EK24:  ST LBBB HR: 1367 BPM, QRS: 132 ms, QTc: 510 ms, DC: 120 ms.   Echo: 24:  1. Left ventricle: The cavity size was normal. Wall thickness was normal.      Systolic function was normal. The estimated ejection fraction was 55-60%,      by visual assessment. No diagnostic evidence for regional wall motion      abnormalities. Doppler parameters are consistent with abnormal left      ventricular relaxation - grade 1 diastolic dysfunction.   2. Right ventricle: Systolic function was normal.   3. Left atrium: The left atrial volume was normal.   4. Mitral valve: Systolic bowing without prolapse, involving the  anterior      leaflet and the posterior leaflet. There was trivial regurgitation.   5. Pulmonary arteries: Systolic pressure was mildly increased, in the range      of 35mm Hg to 40mm Hg. Estimated pulmonary artery diastolic pressure was      13mm Hg.   Impressions:  No previous study from [facility] was   available for comparison.   *     Physical Exam:  Gen: Sleepy.  Heent: pupils equal, reactive. Mucous membranes moist.   Neck: no jvd  Cardiac: regular rate and rhythm, normal S1,S2, no murmur, gallop or rub.   Lungs: Clear upper, Dim bases.   Abd: soft, NT/ND +bs  Ext: no edema  Skin: Warm, dry  Neuro: See hPI.     Medications:     docusate sodium  100 mg Oral BID    insulin degludec  10 Units Subcutaneous Nightly    atorvastatin  10 mg Oral Nightly    apixaban  5 mg Oral BID    folic acid  1 mg Oral Daily    hydrocortisone Na succinate PF  50 mg Intravenous Q6H    piperacillin-tazobactam  4.5 g Intravenous Q8H    insulin aspart  1-10 Units Subcutaneous TID AC and HS    insulin aspart  1-68 Units Subcutaneous TID CC    [START ON 4/21/2024] vancomycin  1,250 mg Intravenous Q36H      norepinephrine 0.5 mcg/min (04/20/24 1245)    vasopressin (Vasostrict) 20 Units in sodium chloride 0.9% 100 mL infusion for septic shock      lactated ringers 100 mL/hr at 04/20/24 0632     Assessment:  Septic shock/ LLE cellulitis/UTI:  Initially febrile at 104.   Levophed. IV Hydration.  Viral panel neg.   Elevated inflammatory markers.   CT possible cystitis. Vanco.   WBC: 29.9 (Solucortef). Zosyn.   WCT:  upon admission at 137 BPM.   Received IV Amio bolus 150 mg.   LVEF:  55-60 %, PAP02: 35-40 mmHg.   12/11/23:  Nuclear PET Scan: No evidence of ischemia. EF: 52 %.   SR 85 BPM.  Hx TIA:  Long term Eliquis.     TONJA:  CKD stage 2: Cr:  1.87 GFR: 36- improving. Baseline Cr: 1.4-1.5  Hx AAA:  Infra renal stable at 3.3 cm.   Hx DVT and Saddle PE:  Eliquis 5 mg Bid- long term.   4/19: Le Venous doppler:  No DVT.    Dyslipidemia: Atorvastatin 10 mg.   Type 2 DM: A1C: 9.0  Insulin.   Hx Bladder CA  Hx Lung Ca.     Plan:  Recent septic shock secondary to LE cellulitis/UTI.   No further WCT  Remains SR. Repeat EKG.  LVEF preserved.   TONJA improving. Follow renal function.   Levophed for pressure support.     Plan of care discussed with patient, RN.    Rubina Benson, APRN  4/20/2024  3:43 PM  [phone]  [facility] [phone]

## 2024-04-20 NOTE — PROGRESS NOTES
[facility] Pharmacy Dosing Service      Initial Pharmacokinetic Consult for Vancomycin Dosing     Thiago Alcantara is a 83 year old male who is being initiated on vancomycin therapy for cellulitis.  Pharmacy has been asked to dose vancomycin by Dr. Church.  The initial treatment and monitoring approach will be non-AUC strategy.        Weight and Temperature:    Wt Readings from Last 1 Encounters:   24 109.5 kg (241 lb 6.5 oz)        Temp Readings from Last 1 Encounters:   24 98.2 °F (36.8 °C) (Temporal)      Labs:   Recent Labs   Lab 24  1325 24  1622   CREATSERUM 1.97* 2.12*      Estimated Creatinine Clearance: 26.4 mL/min (A) (based on SCr of 2.12 mg/dL (H)).     Recent Labs   Lab 24  1325   WBC 20.2*          The Pharmacokinetic Target is:    Trough/random 10-15 mg/L    Renal Dosing Considerations:    TONJA/ARF     Assessment/Plan:   Initial/Loading dose: Has received 2250 mg IV (25 mg/kg, capped at 2250 mg) x 1 loading dose.      Maintenance dose: Pharmacy will dose vancomycin at 1250 mg IV every 36 hours    Monitorin) Plan for vancomycin trough to be obtained  iin less than 72 hours.    2) Pharmacy will order SCr as clinically indicated to assess renal function.    3) Pharmacy will monitor for toxicity and efficacy, adjust vancomycin dose and/or frequency, and order vancomycin levels as appropriate per the Pharmacy and Therapeutics Committee approved protocol until discontinuation of the medication.       We appreciate the opportunity to assist in the care of this patient.     Meryl Pantoja, PharmD  2024  7:56 PM  [facility]  Pharmacy Extension: [phone]

## 2024-04-20 NOTE — PLAN OF CARE
Pt from ED to 459, report received from marguerite chavez. Initial patient assessment done. Additional piv site inserted. ST on tele, afebrile, levo infusing titrated to ordered bp parameters, 2 L nasal cannula. Pt screaming out in pain, localizes to LLE and generalized. LLE discolored and small wound to lateral foot (photographed). AMS pt able to state name and general hospital setting, pt drowsy/drifts to sleep during assessment. Needs echo. Seen by ICU apn. Iv fluids started per order.

## 2024-04-20 NOTE — PHYSICAL THERAPY NOTE
PHYSICAL THERAPY EVALUATION - INPATIENT     Room Number: 459/459-A  Evaluation Date: 4/20/2024  Type of Evaluation: Initial  Physician Order: PT Eval and Treat    Presenting Problem: severe sepsis, shock due to L LE cellulitis  Co-Morbidities : PVD, AAA, HTN, cerebrovascular disease, DM, h/o lung and bladder cancer, RA, dyslipidemia  Reason for Therapy: Mobility Dysfunction and Discharge Planning    PHYSICAL THERAPY ASSESSMENT   Patient is currently functioning below baseline with bed mobility, transfers, gait, and stair negotiation.  Prior to admission, patient's baseline is mod I with all functional mobility.  Patient is requiring contact guard assist and minimal assist as a result of the following impairments: decreased functional strength, decreased endurance/aerobic capacity, pain, impaired static and dynamic balance, and decreased muscular endurance.  Physical Therapy will continue to follow for duration of hospitalization.    Patient will benefit from continued skilled PT Services at discharge to promote functional independence in home.  Anticipate patient will return home with home health PT.    PLAN  PT Treatment Plan: Bed mobility;Body mechanics;Endurance;Patient education;Gait training;Strengthening;Stair training;Transfer training;Balance training  Rehab Potential : Good  Frequency (Obs): 3-5x/week  Number of Visits to Meet Established Goals: 4      CURRENT GOALS    Goal #1 Patient is able to demonstrate supine - sit EOB @ level: modified independent     Goal #2 Patient is able to demonstrate transfers Sit to/from Stand at assistance level: modified independent     Goal #3 Patient is able to ambulate 200 feet with assist device:  RW or cane  at assistance level: supervision     Goal #4 Pt is able to ascend/descend full flight of stairs with railing and supervision   Goal #5    Goal #6    Goal Comments: Goals established on 4/20/2024      PHYSICAL THERAPY MEDICAL/SOCIAL HISTORY  History related to  current admission: Patient is a 83 year old male admitted on 4/19/2024 from home for severe L LE pain.  Pt diagnosed with septic shock due to L LE cellulitis and with acute encephalopathy. L LE US doppler negative for DVT.      HOME SITUATION  Type of Home: House   Home Layout: Two level  Stairs to Enter : 1     Stairs to Bedroom: 14  Railing: Yes    Lives With: Spouse;Son     Patient Owned Equipment: Cane       Prior Level of [place]: Pt typically independent/mod I with all functional mobility. Pt usually ambulates without assistive device but does use cane occasionally if needed. Pt does grocery shopping, cooking, etc at home. Bedroom and bathroom upstairs, Daughter lives ~15 min away.    SUBJECTIVE  Pt states he would like to walk \"at least around the block\" today.      OBJECTIVE  Precautions: Hard of hearing  Fall Risk: High fall risk    WEIGHT BEARING RESTRICTION  Weight Bearing Restriction: None                PAIN ASSESSMENT  Rating: Unable to rate  Location: penis due to catheter  Management Techniques: Repositioning;Relaxation    COGNITION  Overall Cognitive Status:  WFL - within functional limits    RANGE OF MOTION AND STRENGTH ASSESSMENT  Upper extremity ROM and strength are within functional limits     Lower extremity ROM is within functional limits     Lower extremity strength is within functional limits       BALANCE  Static Sitting: Good  Dynamic Sitting: Fair +  Static Standing: Fair -  Dynamic Standing: Poor +    ADDITIONAL TESTS                                    ACTIVITY TOLERANCE           BP: 102/59  BP Location: Left arm  BP Method: Automatic  Patient Position: Semi-[place]    O2 WALK  Oxygen Therapy  SPO2% on Room Air at Rest: 100    NEUROLOGICAL FINDINGS                        AM-PAC '6-Clicks' INPATIENT SHORT FORM - BASIC MOBILITY  How much difficulty does the patient currently have...  Patient Difficulty: Turning over in bed (including adjusting bedclothes, sheets and blankets)?: A  Little   Patient Difficulty: Sitting down on and standing up from a chair with arms (e.g., wheelchair, bedside commode, etc.): A Little   Patient Difficulty: Moving from lying on back to sitting on the side of the bed?: A Little   How much help from another person does the patient currently need...   Help from Another: Moving to and from a bed to a chair (including a wheelchair)?: A Little   Help from Another: Need to walk in hospital room?: A Little   Help from Another: Climbing 3-5 steps with a railing?: A Little       AM-PAC Score:  Raw Score: 18   Approx Degree of Impairment: 46.58%   Standardized Score (AM-PAC Scale): 43.63   CMS Modifier (G-Code): CK    FUNCTIONAL ABILITY STATUS  Gait Assessment   Functional Mobility/Gait Assessment  Gait Assistance: Contact guard assist  Distance (ft): 120  Assistive Device: Rolling walker    Skilled Therapy Provided     Bed Mobility:  Supine to sit: min A   Sit to supine: NT     Transfer Mobility:  Sit to stand: min A from bed and from commode   Stand to sit: CGA with cues for hand placement  Gait = pt ambulated 120' with RW and CGA; slightly shortened step length on R and toeing out on L    Therapist's Comments: Pt agreeable to PT; wife and dtr present. Redness noted L LE. VSS during session.    Exercise/Education Provided:  Functional activity tolerated  Gait training  Posture  Transfer training    Patient End of Session: Up in chair;Needs met;Call light within reach;RN aware of session/findings;All patient questions and concerns addressed;With  staff;Family present      Patient Evaluation Complexity Level:  History Moderate - 1 or 2 personal factors and/or co-morbidities   Examination of body systems Low - addressing 1-2 elements   Clinical Presentation Low - Stable   Clinical Decision Making Low - Stable       PT Session Time: 43 minutes  Gait Training: 10 minutes  Therapeutic Activity: 15 minutes

## 2024-04-20 NOTE — PLAN OF CARE
Assumed care of pt for the night shift. Pt alert complaining of severe pain, KAYLAH MAI aware orders received for PRN Dilaudid, see MAR. Levophed Infusing per orders. Pt retaining urine, straight cath per protocol. Unable to successfully straight cath this morning due to meeting resistance and patient discomfort. Attempted to have patient stand at side of bed to void, pt tolerated activity well and was able to void 125ml. Pt encouraged to try to void again this morning. KAYLAH MAI aware.

## 2024-04-20 NOTE — CONSULTS
[facility]  Report of Consultation    Thiago Alcantara Patient Status:  Inpatient    1940 MRN [id number]   Location [facility] 4SW-A Attending Elia Church MD   Hosp Day # 1 PCP Tiffany Pierce MD     Reason for Consultation:  Urinary retention, dilated distal ureter, history of bladder CA    History of Present Illness:  Thiago Alcantara is a a(n) 83 year old male presents with acute onset of severe left leg pain.  Patient was also toxic with a temperature of 104 and pulse in the 160s.the patient does have a history of high-grade bladder cancer status post resection back in . Pt comfortable at bedside. Pt had bladder scan noting 800+ urine in the bladder. Pt states slowing of stream over the past 6 months.  Patient is not on any BPH medications, specifically Flomax or Proscar.  Patient does note some tenderness in the scrotal area left equal to right.    History:  Past Medical History:    Anesthesia complication    broke out in rashes generalized - unknown source (after every anaesthesia)    Aneurysm of abdominal aorta (HCC)    Bladder tumor    recesected 2015    Cancer (HCC)    SKIN CANCERS ON ARM AND NOSE    Deep vein thrombosis (HCC)    Erectile dysfunction    Gall stones    High blood pressure    High cholesterol    History of blood clots    Neuropathy    HAnds from DM    Obesity    Osteoarthritis    Personal history of antineoplastic chemotherapy    2015    Pulmonary embolism (HCC)    ON COUMADIN NOW    TIA (transient ischemic attack)    Type II or unspecified type diabetes mellitus without mention of complication, not stated as uncontrolled    Varicose veins of both lower extremities with complications    Visual impairment    4-5 yrs ago had blurriness prob, evaled. no diagnosis     Past Surgical History:   Procedure Laterality Date    Colonoscopy      Colonoscopy      x3, polpypectomy x1, third one clear    Colonoscopy N/A 2017    Procedure: COLONOSCOPY, POSSIBLE  BIOPSY, POSSIBLE POLYPECTOMY 11320;  Surgeon: Marisela Morris MD;  [place] [facility]    Colonoscopy N/A 6/14/2020    Procedure: COLONOSCOPY;  Surgeon: Jewel Lane MD;  Location:  ENDOSCOPY    Colonoscopy N/A 6/13/2020    adenoma- repeat 5 yrs    Eye surgery  plastic sx eye lids    Fracture surgery      left leg-screws,pins    Hc closed tx distal tibia fracture w manipulation Left     Hc debridement skin up to 10% Left     leg    Other surgical history  7/29/15    cysto, stent removal - Dr. Campos    Other surgical history  10/14/15    BTS Cystoscopy -Dr Campos    Other surgical history  1/16/15    BTS Cystoscopy -Dr Campos    Other surgical history  5/13/16    BTS Cystoscopy-Dr Campos    Other surgical history  8/15/16    Cysto- Dr. Campos    Other surgical history  2/17/17    Cysto- Dr. Campos    Other surgical history  08/18/2017    Cystoscopy- Dr. Campos    Other surgical history  09/16/2019     BTS cysto - dr. campos     Other surgical history  09/21/2020    Cysto Dr. Campos    Other surgical history  09/27/2021    Cystoscopy- Dr. Campos     Skin graft procedure      multiple to Left leg    Tonsillectomy      Vein bypass graft,fem-fem      to left leg     Family History   Problem Relation Age of Onset    Heart Disorder Father     Hypertension Mother     Arthritis Mother     Heart Disorder Son     Other (kidney stone) Brother     Other (hernia) Brother       reports that he quit smoking about 28 years ago. He started smoking about 58 years ago. He has a 30 pack-year smoking history. He has never used smokeless tobacco. He reports that he does not currently use alcohol. He reports that he does not use drugs.    Allergies:  Allergies   Allergen Reactions    Bacitracin-Neomycin-Polymyxin [Neomycin-Bacitracin-Polymyxin] RASH    Other OTHER (SEE COMMENTS)       Medications:    Current Facility-Administered Medications:     docusate sodium (Colace) cap 100 mg,  100 mg, Oral, BID    lidocaine (Urojet) 2 % urethral jelly 10 mL, 10 mL, Urethral, Once    apixaban (Eliquis) tab 5 mg, 5 mg, Oral, BID    folic acid (Folvite) tab 1 mg, 1 mg, Oral, Daily    glucose (Dex4) 15 GM/59ML oral liquid 15 g, 15 g, Oral, Q15 Min PRN **OR** glucose (Glutose) 40% oral gel 15 g, 15 g, Oral, Q15 Min PRN **OR** glucose-vitamin C (Dex-4) chewable tab 4 tablet, 4 tablet, Oral, Q15 Min PRN **OR** dextrose 50% injection 50 mL, 50 mL, Intravenous, Q15 Min PRN **OR** glucose (Dex4) 15 GM/59ML oral liquid 30 g, 30 g, Oral, Q15 Min PRN **OR** glucose (Glutose) 40% oral gel 30 g, 30 g, Oral, Q15 Min PRN **OR** glucose-vitamin C (Dex-4) chewable tab 8 tablet, 8 tablet, Oral, Q15 Min PRN    acetaminophen (Tylenol Extra Strength) tab 1,000 mg, 1,000 mg, Oral, Q6H PRN    norepinephrine (Levophed) 4 mg/250mL infusion premix, 0.5-30 mcg/min, Intravenous, Continuous PRN    vasopressin (Vasostrict) 20 Units in sodium chloride 0.9% 100 mL infusion for septic shock, 0.01-0.03 Units/min, Intravenous, Continuous PRN    metoclopramide (Reglan) 5 mg/mL injection 5 mg, 5 mg, Intravenous, Q8H PRN    polyethylene glycol (PEG 3350) (Miralax) 17 g oral packet 17 g, 17 g, Oral, Daily PRN    bisacodyl (Dulcolax) 10 MG rectal suppository 10 mg, 10 mg, Rectal, Daily PRN    sennosides (Senokot) tab 17.2 mg, 17.2 mg, Oral, Nightly PRN    hydrocortisone Na succinate PF (Solu-CORTEF) injection 50 mg, 50 mg, Intravenous, Q6H    piperacillin-tazobactam (Zosyn) 4.5 g in dextrose 5% 100 mL IVPB-ADDV, 4.5 g, Intravenous, Q8H    insulin aspart (NovoLOG) 100 Units/mL FlexPen 1-10 Units, 1-10 Units, Subcutaneous, TID AC and HS    insulin aspart (NovoLOG) 100 Units/mL FlexPen 1-68 Units, 1-68 Units, Subcutaneous, TID CC    lactated ringers infusion, , Intravenous, Continuous    [START ON 4/21/2024] vancomycin (Vancocin) 1.25 g in sodium chloride 0.9% 250mL IVPB premix, 1,250 mg, Intravenous, Q36H    HYDROmorphone (Dilaudid) 1 MG/ML  injection 0.2 mg, 0.2 mg, Intravenous, Q2H PRN **OR** HYDROmorphone (Dilaudid) 1 MG/ML injection 0.4 mg, 0.4 mg, Intravenous, Q2H PRN **OR** HYDROmorphone (Dilaudid) 1 MG/ML injection 0.8 mg, 0.8 mg, Intravenous, Q2H PRN    Review of Systems:  A comprehensive review of systems was negative.    Physical Exam:  /56   Pulse 83   Temp 97.8 °F (36.6 °C) (Temporal)   Resp (!) 32   Wt 241 lb 6.5 oz (109.5 kg)   SpO2 100%   BMI 36.71 kg/m²   General appearance: alert, appears stated age, cooperative, and mild distress  Head: Normocephalic, without obvious abnormality, atraumatic  Neck: supple, symmetrical, trachea midline  Lungs:  clear  Heart: regular rate and rhythm  Abdomen: soft, non-tender; bowel sounds normal; no masses,  no organomegaly  Male genitalia: Normal distended testes, tender epididymis left and right equal and degree of mild to moderate discomfort to palpation.  Scrotal skin normal no evidence of cellulitis or infection.  Penis normal indwelling 14 Czech Waterman catheter.  Skin: Left lower extremity swelling and erythema consistent with cellulitis.  Neurologic: Grossly normal    Laboratory Data:  Lab Results   Component Value Date    WBC 29.9 04/20/2024    HGB 10.8 04/20/2024    HCT 32.9 04/20/2024    .0 04/20/2024    CREATSERUM 1.87 04/20/2024    BUN 39 04/20/2024     04/20/2024    K 4.9 04/20/2024     04/20/2024    CO2 21.0 04/20/2024     04/20/2024    CA 8.9 04/20/2024    ALB 2.6 04/20/2024    ALKPHO 66 04/20/2024    BILT 0.7 04/20/2024    TP 5.6 04/20/2024    AST 11 04/20/2024    ALT 18 04/20/2024    PTT 28.2 04/19/2024    INR 1.31 04/19/2024    PTP 16.4 04/19/2024    PGLU 244 04/20/2024     Lab Results   Component Value Date    COLORUR Yellow 04/19/2024    CLARITY Turbid 04/19/2024    SPECGRAVITY 1.015 04/19/2024    GLUUR Normal 04/19/2024    BILUR Negative 04/19/2024    KETUR Negative 04/19/2024    BLOODURINE 2+ 04/19/2024    PHURINE 5.5 04/19/2024    PROUR 50  04/19/2024    UROBILINOGEN Normal 04/19/2024    NITRITE Negative 04/19/2024    LEUUR 500 04/19/2024       Imaging:  CT Scan  Impression   CONCLUSION:    1. Vasculature as described above.  There is poor evaluation of the vessels within the lower leg secondary to motion artifact and poor contrast opacification.  2. Soft tissue edema noted within the left lower leg which is nonspecific but could represent cellulitis.  3. Diffuse bladder wall thickening.  Correlation with urinalysis is recommended.  4. Scrotal wall thickening which is nonspecific but could be secondary to infectious etiology.  5. Prominent left inguinal lymph node with surrounding inflammatory stranding measuring 2.8 x 1.9 cm which may be reactive in nature.  6. Ectasia of the infrarenal abdominal aorta measuring 3.3 x 3.2 cm.  7. Hepatic steatosis.  8. Dilated left ureter without evidence of obstructing urolithiasis.  9. Colonic diverticulosis without evidence of diverticulitis.            Impression:  Patient Active Problem List   Diagnosis    Carcinoid tumor of left lung (HCC)    History of bladder cancer    Hypogonadism in male    Erectile dysfunction    TIA (transient ischemic attack)    Varicose veins of both lower extremities with complications    Obesity    High cholesterol    Neuropathy    Aneurysm of abdominal aorta (HCC)    Bladder tumor    Controlled type 2 diabetes mellitus with diabetic neuropathy (HCC)    BPH with urinary obstruction    Type 2 diabetes mellitus (HCC)    Osteoarthritis    Elevated PSA    Hematuria    Hematuria, unspecified type    Pyelonephritis    Elevated INR    Gangrene (HCC)    Acute deep vein thrombosis (DVT) of proximal vein of left lower extremity (HCC)    Leg DVT (deep venous thromboembolism), acute, left (HCC)    Microcytic anemia    Benign carcinoid tumor of lung (HCC)    Urothelial cancer (HCC)    Iron deficiency anemia    Folic acid deficiency    Hemolytic anemia, acute (HCC)    Severe sepsis (HCC)     Cellulitis of left lower extremity    Chronic kidney disease, unspecified CKD stage    Hyperkalemia    Encephalopathy in sepsis    Acute cystitis without hematuria    Septic shock (HCC)    TONJA (acute kidney injury) (HCC)    Metabolic acidosis    Tachycardia    Pain of left lower extremity       Partial urinary retention  Bladder wall thickening  Left ureteral dilatation  History of bladder carcinoma  BPH with LUTS    Recommendations:  Indwelling Waterman catheter for now.  Left lower extremity elevation and antibiotics as per infectious disease and intensive care assessment  Once vitals fully stabilized and ambulatory we will consider trial of Flomax and Proscar  Patient will follow-up as an outpatient with Dr. Campos for addressing mild left ureteral dilatation.    Thank you for allowing me to participate in the care of your patient.    Santana Pate MD  4/20/2024  10:47 AM

## 2024-04-21 ENCOUNTER — APPOINTMENT (OUTPATIENT)
Dept: GENERAL RADIOLOGY | Facility: HOSPITAL | Age: 84
End: 2024-04-21
Attending: NURSE PRACTITIONER
Payer: MEDICARE

## 2024-04-21 LAB
ALBUMIN SERPL-MCNC: 2.5 G/DL (ref 3.4–5)
ALBUMIN/GLOB SERPL: 0.7 {RATIO} (ref 1–2)
ALP LIVER SERPL-CCNC: 68 U/L
ALT SERPL-CCNC: 17 U/L
ANION GAP SERPL CALC-SCNC: 6 MMOL/L (ref 0–18)
AST SERPL-CCNC: 14 U/L (ref 15–37)
ATRIAL RATE: 89 BPM
BASOPHILS # BLD AUTO: 0.03 X10(3) UL (ref 0–0.2)
BASOPHILS NFR BLD AUTO: 0.1 %
BILIRUB SERPL-MCNC: 0.6 MG/DL (ref 0.1–2)
BUN BLD-MCNC: 33 MG/DL (ref 9–23)
C DIFF GDH + TOXINS A+B STL QL IA.RAPID: NOT DETECTED
C DIFF TOX B STL QL: POSITIVE
CALCIUM BLD-MCNC: 9.5 MG/DL (ref 8.5–10.1)
CHLORIDE SERPL-SCNC: 115 MMOL/L (ref 98–112)
CO2 SERPL-SCNC: 23 MMOL/L (ref 21–32)
CREAT BLD-MCNC: 1.52 MG/DL
EGFRCR SERPLBLD CKD-EPI 2021: 46 ML/MIN/1.73M2 (ref 60–?)
EOSINOPHIL # BLD AUTO: 0.04 X10(3) UL (ref 0–0.7)
EOSINOPHIL NFR BLD AUTO: 0.2 %
ERYTHROCYTE [DISTWIDTH] IN BLOOD BY AUTOMATED COUNT: 22.2 %
GLOBULIN PLAS-MCNC: 3.5 G/DL (ref 2.8–4.4)
GLUCOSE BLD-MCNC: 147 MG/DL (ref 70–99)
GLUCOSE BLD-MCNC: 188 MG/DL (ref 70–99)
GLUCOSE BLD-MCNC: 202 MG/DL (ref 70–99)
GLUCOSE BLD-MCNC: 218 MG/DL (ref 70–99)
GLUCOSE BLD-MCNC: 220 MG/DL (ref 70–99)
HCT VFR BLD AUTO: 31.3 %
HGB BLD-MCNC: 9.5 G/DL
IMM GRANULOCYTES # BLD AUTO: 0.27 X10(3) UL (ref 0–1)
IMM GRANULOCYTES NFR BLD: 1.3 %
LYMPHOCYTES # BLD AUTO: 1.2 X10(3) UL (ref 1–4)
LYMPHOCYTES NFR BLD AUTO: 5.7 %
MCH RBC QN AUTO: 21.8 PG (ref 26–34)
MCHC RBC AUTO-ENTMCNC: 30.4 G/DL (ref 31–37)
MCV RBC AUTO: 71.8 FL
MONOCYTES # BLD AUTO: 1.4 X10(3) UL (ref 0.1–1)
MONOCYTES NFR BLD AUTO: 6.7 %
NEUTROPHILS # BLD AUTO: 17.94 X10 (3) UL (ref 1.5–7.7)
NEUTROPHILS # BLD AUTO: 17.94 X10(3) UL (ref 1.5–7.7)
NEUTROPHILS NFR BLD AUTO: 86 %
OSMOLALITY SERPL CALC.SUM OF ELEC: 312 MOSM/KG (ref 275–295)
P AXIS: -6 DEGREES
P-R INTERVAL: 182 MS
PLATELET # BLD AUTO: 134 10(3)UL (ref 150–450)
PLATELETS.RETICULATED NFR BLD AUTO: 6.3 % (ref 0–7)
POTASSIUM SERPL-SCNC: 4.1 MMOL/L (ref 3.5–5.1)
PROT SERPL-MCNC: 6 G/DL (ref 6.4–8.2)
Q-T INTERVAL: 404 MS
QRS DURATION: 154 MS
QTC CALCULATION (BEZET): 491 MS
R AXIS: 80 DEGREES
RBC # BLD AUTO: 4.36 X10(6)UL
SODIUM SERPL-SCNC: 144 MMOL/L (ref 136–145)
T AXIS: 11 DEGREES
VENTRICULAR RATE: 89 BPM
WBC # BLD AUTO: 20.9 X10(3) UL (ref 4–11)

## 2024-04-21 PROCEDURE — 73600 X-RAY EXAM OF ANKLE: CPT | Performed by: NURSE PRACTITIONER

## 2024-04-21 PROCEDURE — 99232 SBSQ HOSP IP/OBS MODERATE 35: CPT | Performed by: INTERNAL MEDICINE

## 2024-04-21 PROCEDURE — 73590 X-RAY EXAM OF LOWER LEG: CPT | Performed by: NURSE PRACTITIONER

## 2024-04-21 RX ORDER — HYDROCODONE BITARTRATE AND ACETAMINOPHEN 5; 325 MG/1; MG/1
1 TABLET ORAL EVERY 4 HOURS PRN
Status: DISCONTINUED | OUTPATIENT
Start: 2024-04-21 | End: 2024-04-24

## 2024-04-21 RX ORDER — TAMSULOSIN HYDROCHLORIDE 0.4 MG/1
0.4 CAPSULE ORAL DAILY
Status: DISCONTINUED | OUTPATIENT
Start: 2024-04-21 | End: 2024-04-24

## 2024-04-21 RX ORDER — OMEPRAZOLE 20 MG/1
20 CAPSULE, DELAYED RELEASE ORAL DAILY
COMMUNITY
Start: 2024-01-26

## 2024-04-21 RX ORDER — CLOBETASOL PROPIONATE 0.5 MG/G
1 OINTMENT TOPICAL 2 TIMES DAILY PRN
COMMUNITY

## 2024-04-21 RX ORDER — VANCOMYCIN HYDROCHLORIDE 125 MG/1
125 CAPSULE ORAL 4 TIMES DAILY
Status: DISCONTINUED | OUTPATIENT
Start: 2024-04-21 | End: 2024-04-21

## 2024-04-21 RX ORDER — HYDROCODONE BITARTRATE AND ACETAMINOPHEN 5; 325 MG/1; MG/1
2 TABLET ORAL EVERY 4 HOURS PRN
Status: DISCONTINUED | OUTPATIENT
Start: 2024-04-21 | End: 2024-04-24

## 2024-04-21 RX ORDER — METHOTREXATE 2.5 MG/1
2.5 TABLET ORAL DAILY
COMMUNITY
Start: 2024-01-10

## 2024-04-21 RX ORDER — PREDNISONE 10 MG/1
10 TABLET ORAL DAILY
COMMUNITY
Start: 2024-04-11

## 2024-04-21 NOTE — PROGRESS NOTES
[facility]  Progress Note    Thiago Alcantara Patient Status:  Inpatient    1940 MRN [id number]   Location [facility] 4SW-A Attending Elia Church MD   Hosp Day # 2 PCP Tiffany Pierce MD     Subjective:  Thiago Alcantara is a(n) 83 year old male.      Current complaints: Comfortable, no complaints.  Significant clinical improvement over the past day.    Objective:  /62   Pulse 74   Temp 99 °F (37.2 °C) (Temporal)   Resp 16   Wt 241 lb 6.5 oz (109.5 kg)   SpO2 99%   BMI 36.71 kg/m²   General appearance: alert, appears stated age, and cooperative  Male genitalia: normal, indwelling Waterman catheter with clear yellow urine and gravity drainage bag      Labs:  Lab Results   Component Value Date    WBC 20.9 2024    HGB 9.5 2024    HCT 31.3 2024    .0 2024    CREATSERUM 1.52 2024    BUN 33 2024     2024    K 4.1 2024     2024    CO2 23.0 2024     2024    CA 9.5 2024    ALB 2.5 2024    ALKPHO 68 2024    BILT 0.6 2024    TP 6.0 2024    AST 14 2024    ALT 17 2024    PGLU 188 2024       Assessment:  Patient Active Problem List   Diagnosis    Carcinoid tumor of left lung (HCC)    History of bladder cancer    Hypogonadism in male    Erectile dysfunction    TIA (transient ischemic attack)    Varicose veins of both lower extremities with complications    Obesity    High cholesterol    Neuropathy    Aneurysm of abdominal aorta (HCC)    Bladder tumor    Controlled type 2 diabetes mellitus with diabetic neuropathy (HCC)    BPH with urinary obstruction    Type 2 diabetes mellitus (HCC)    Osteoarthritis    Elevated PSA    Hematuria    Hematuria, unspecified type    Pyelonephritis    Elevated INR    Gangrene (HCC)    Acute deep vein thrombosis (DVT) of proximal vein of left lower extremity (HCC)    Leg DVT (deep venous thromboembolism), acute, left (HCC)     Microcytic anemia    Benign carcinoid tumor of lung (HCC)    Urothelial cancer (HCC)    Iron deficiency anemia    Folic acid deficiency    Hemolytic anemia, acute (HCC)    Severe sepsis (HCC)    Cellulitis of left lower extremity    Chronic kidney disease, unspecified CKD stage    Hyperkalemia    Encephalopathy in sepsis    Acute cystitis without hematuria    Septic shock (HCC)    TONJA (acute kidney injury) (HCC)    Metabolic acidosis    Tachycardia    Pain of left lower extremity       Partial urinary retention  Bladder wall thickening  Left ureteral dilatation  History of bladder carcinoma  BPH with LUTS       Plan:  Continue present management with indwelling Waterman catheter  Will start Flomax      Santana Pate MD  4/21/2024  1:42 PM

## 2024-04-21 NOTE — PROGRESS NOTES
ICU  Critical Care APRN Progress Note    NAME: Thiago Alcantara - ROOM: 459 - MRN: [id number] - Age: 83 year old - :1940    Subjective: No acute events overnight.  Levophed weaned off.  Blood cultures positive for salmonella.     Current Facility-Administered Medications   Medication Dose Route Frequency    docusate sodium (Colace) cap 100 mg  100 mg Oral BID    insulin degludec 100 units/mL flextouch 10 Units  10 Units Subcutaneous Nightly    atorvastatin (Lipitor) tab 10 mg  10 mg Oral Nightly    apixaban (Eliquis) tab 5 mg  5 mg Oral BID    folic acid (Folvite) tab 1 mg  1 mg Oral Daily    glucose (Dex4) 15 GM/59ML oral liquid 15 g  15 g Oral Q15 Min PRN    Or    glucose (Glutose) 40% oral gel 15 g  15 g Oral Q15 Min PRN    Or    glucose-vitamin C (Dex-4) chewable tab 4 tablet  4 tablet Oral Q15 Min PRN    Or    dextrose 50% injection 50 mL  50 mL Intravenous Q15 Min PRN    Or    glucose (Dex4) 15 GM/59ML oral liquid 30 g  30 g Oral Q15 Min PRN    Or    glucose (Glutose) 40% oral gel 30 g  30 g Oral Q15 Min PRN    Or    glucose-vitamin C (Dex-4) chewable tab 8 tablet  8 tablet Oral Q15 Min PRN    acetaminophen (Tylenol Extra Strength) tab 1,000 mg  1,000 mg Oral Q6H PRN    norepinephrine (Levophed) 4 mg/250mL infusion premix  0.5-30 mcg/min Intravenous Continuous PRN    vasopressin (Vasostrict) 20 Units in sodium chloride 0.9% 100 mL infusion for septic shock  0.01-0.03 Units/min Intravenous Continuous PRN    metoclopramide (Reglan) 5 mg/mL injection 5 mg  5 mg Intravenous Q8H PRN    polyethylene glycol (PEG 3350) (Miralax) 17 g oral packet 17 g  17 g Oral Daily PRN    bisacodyl (Dulcolax) 10 MG rectal suppository 10 mg  10 mg Rectal Daily PRN    sennosides (Senokot) tab 17.2 mg  17.2 mg Oral Nightly PRN    hydrocortisone Na succinate PF (Solu-CORTEF) injection 50 mg  50 mg Intravenous Q6H    piperacillin-tazobactam (Zosyn) 4.5 g in dextrose 5% 100 mL IVPB-ADDV  4.5 g Intravenous Q8H    insulin aspart  (NovoLOG) 100 Units/mL FlexPen 1-10 Units  1-10 Units Subcutaneous TID AC and HS    insulin aspart (NovoLOG) 100 Units/mL FlexPen 1-68 Units  1-68 Units Subcutaneous TID CC    lactated ringers infusion   Intravenous Continuous    vancomycin (Vancocin) 1.25 g in sodium chloride 0.9% 250mL IVPB premix  1,250 mg Intravenous Q36H    HYDROmorphone (Dilaudid) 1 MG/ML injection 0.2 mg  0.2 mg Intravenous Q2H PRN    Or    HYDROmorphone (Dilaudid) 1 MG/ML injection 0.4 mg  0.4 mg Intravenous Q2H PRN    Or    HYDROmorphone (Dilaudid) 1 MG/ML injection 0.8 mg  0.8 mg Intravenous Q2H PRN       OBJECTIVE  Vitals:  /56   Pulse 78   Temp 98.2 °F (36.8 °C) (Temporal)   Resp 17   Wt 241 lb 6.5 oz (109.5 kg)   SpO2 100%   BMI 36.71 kg/m²       Physical Exam:    General Appearance: Lethargic, awakens easily but falls back asleep, cooperative, appears stated age  Neck: No JVD, neck supple, no adenopathy, trachea midline, no carotid bruits  Lungs: Clear to auscultation bilaterally, diminished left base, respirations unlabored  Heart: Regular rate and rhythm, S1 and S2 normal, no murmur, rub or gallop  Abdomen: Soft, tender with palpation over mid lower quadrant, bowel sounds active all four quadrants, no masses, no organomegaly  Extremities:  no cyanosis, reddened left calf, lesions to left outer ankle with small open wounds, 3+pitting LLE edema, capillary refill <3 sec.    Pulses: 2+ and symmetric all extremities  Skin: Skin color, texture, turgor normal for ethnicity, no rashes, warm and dry, LLE with reddened calf and foot, left outer ankle with small open wounds  Neurologic: CNII-XII intact, normal strength    Data this admission:  CTA PELVIS W CTA BILAT LEG RUNOFF W IV CONTRAST(CPT=75635)    Result Date: 4/19/2024  CONCLUSION:  1. Vasculature as described above.  There is poor evaluation of the vessels within the lower leg secondary to motion artifact and poor contrast opacification. 2. Soft tissue edema noted within  the left lower leg which is nonspecific but could represent cellulitis. 3. Diffuse bladder wall thickening.  Correlation with urinalysis is recommended. 4. Scrotal wall thickening which is nonspecific but could be secondary to infectious etiology. 5. Prominent left inguinal lymph node with surrounding inflammatory stranding measuring 2.8 x 1.9 cm which may be reactive in nature. 6. Ectasia of the infrarenal abdominal aorta measuring 3.3 x 3.2 cm. 7. Hepatic steatosis. 8. Dilated left ureter without evidence of obstructing urolithiasis. 9. Colonic diverticulosis without evidence of diverticulitis.    LOCATION:  [id number]   Dictated by (CST): Claire Antonio MD on 4/19/2024 at 3:14 PM     Finalized by (CST): Claire Antonio MD on 4/19/2024 at 3:23 PM       XR CHEST AP PORTABLE  (CPT=71045)    Result Date: 4/19/2024  CONCLUSION:  Mild interstitial opacities which may represent mild edema.   LOCATION:  [id number]      Dictated by (CST): Claire Antonio MD on 4/19/2024 at 2:32 PM     Finalized by (CST): Claire Antonio MD on 4/19/2024 at 2:32 PM       US VENOUS DOPPLER LEG LEFT - DIAG IMG (CPT=93971)    Result Date: 4/19/2024  CONCLUSION:  No acute deep vein thrombosis.   LOCATION:  [id number]    Dictated by (CST): Claire Antonio MD on 4/19/2024 at 2:16 PM     Finalized by (CST): Claire Antonio MD on 4/19/2024 at 2:18 PM         Labs:  Lab Results   Component Value Date    WBC 20.9 04/21/2024    HGB 9.5 04/21/2024    HCT 31.3 04/21/2024    .0 04/21/2024    CREATSERUM 1.52 04/21/2024    BUN 33 04/21/2024     04/21/2024    K 4.1 04/21/2024     04/21/2024    CO2 23.0 04/21/2024     04/21/2024    CA 9.5 04/21/2024    ALB 2.5 04/21/2024    ALKPHO 68 04/21/2024    BILT 0.6 04/21/2024    TP 6.0 04/21/2024    AST 14 04/21/2024    ALT 17 04/21/2024           Assessment/Plan:  Septic shock 2/2 to cellulitis LLE  - Leukocytosis, tmax 104.0- PRN tylenol  - Lactic acid elevated  - Procal elevated  - Urine  culture pending- NGTD  - Blood cultures positive for salmonella, ID consulted  - Chest xray with mild edema, no focal consolidation  - s/p sepsis bolus in ED  - Vasopressors to maitain MAP >65, currently on off  - Stress dose steroids, begin to wean   - Zosyn (4/19- ) Vanc (4/19- )    NAGMA  TONJA on CKD  - Strict I/O  - Avoid nephrotoxins  - Daily BMP  - Consider renal consult if no improvement  -urinary retention, urology consulted    Wide complex tachycardia - EKG with ST  - IV amiodarone- held  - Cardiology following  - TTE EF 55-60%, bowing of MV    LLE pain  - LE doppler negative for DVT  - LLE cellulitis on CT scan, Patent arteries  - Pain management  - IV antibiotics as above    HTN  - Home meds on hold in the setting of shock    DM2  - A1c 9  - Home PO meds on hold  - Accuchecks  - SSI    History of PE/DVT  - Eliquis    Seronegative rheumatoid arthritis        - Hold Prednisone while on IV steroids        - Methotrexate on hold    History of lung and bladder cancer    F/E/N  - Replete electrolytes per protocol  - ADAT    Proph  - Eliquis  - SCDs  - PT/OT    Dispo  - Full code  - ICU monitoring, may transfer to floor if BP remains stable    Plan of care discussed with intensivist on-call, Dr. Maria.    ZENAIDA GarciaELIZABETH-BC  Critical Care NP  Phone [phone]    Intensivist Addendum:  I agree with APN note above. I have independently seen & evaluated the patient.  I agree with the management plan outlined above with the following changes/additions:    Patient appears stable, but with worsening spread of cellulitis.  Cultures now positive for salmonella bacteremia, will change to cefepime for coverage.  No prosthetic devices and will hold on echo for now.  Given open ankle ulceration will check xray to rule out bone changes - if found will need MRI to eval for osteomyelitis.  CT negative for gas production.  Also Cdiff positive and will treat with PO vanc.  Given stability will transfer to floor today; will  follow peripherally. ID services following for antibiotic management.    Lvl 3    Julio Maria, DO  Pulmonary & Critical Care Medicine  [facility]

## 2024-04-21 NOTE — PROGRESS NOTES
[facility] Cardiology Progress Note    Patient seen and examined.  Chart reviewed.  Discussed with family at bedside.    No chest pain or shortness of breath. Much improved from time I saw him on admission through ED    /56   Pulse 78   Temp 98.2 °F (36.8 °C) (Temporal)   Resp 17   Wt 241 lb 6.5 oz   SpO2 100%   BMI 36.71 kg/m²   Gen: alert and oriented  HEENT: moist mucous membranes  Neck: No JVD  CV: regular  Ext: no edema  Skin: warm and dry      Intake/Output Summary (Last 24 hours) at 4/21/2024 1006  Last data filed at 4/21/2024 0721  Gross per 24 hour   Intake 4175.6 ml   Output 5675 ml   Net -1499.4 ml       Lab Results   Component Value Date    WBC 20.9 04/21/2024    HGB 9.5 04/21/2024    HCT 31.3 04/21/2024    .0 04/21/2024    CREATSERUM 1.52 04/21/2024    BUN 33 04/21/2024     04/21/2024    K 4.1 04/21/2024     04/21/2024    CO2 23.0 04/21/2024     04/21/2024    CA 9.5 04/21/2024    ALB 2.5 04/21/2024    ALKPHO 68 04/21/2024    BILT 0.6 04/21/2024    TP 6.0 04/21/2024    AST 14 04/21/2024    ALT 17 04/21/2024      hydrocortisone Na succinate PF (Solu-CORTEF) injection 50 mg  50 mg Intravenous Q12H    meropenem (Merrem) 1 g in sodium chloride 0.9% 100 mL IVPB-MBP  1 g Intravenous Q12H    vancomycin (Vancocin) cap 125 mg  125 mg Oral QID    docusate sodium (Colace) cap 100 mg  100 mg Oral BID    [COMPLETED] Perflutren Lipid Microsphere (DEFINITY) 6.52 MG/ML injection 1.5 mL  1.5 mL Intravenous ONCE PRN    [COMPLETED] lidocaine (Urojet) 2 % urethral jelly 10 mL  10 mL Urethral Once    insulin degludec 100 units/mL flextouch 10 Units  10 Units Subcutaneous Nightly    atorvastatin (Lipitor) tab 10 mg  10 mg Oral Nightly    [COMPLETED] HYDROmorphone (Dilaudid) 1 MG/ML injection 0.5 mg  0.5 mg Intravenous Once    [COMPLETED] LORazepam (Ativan) 2 mg/mL injection 2 mg  2 mg Intravenous Once    [COMPLETED] amiodarone in dextrose 4.21% (Cordarone) 150 mg/100mL BOLUS infusion premix 150  mg  150 mg Intravenous Once    [COMPLETED] lidocaine (Urojet) 2 % urethral jelly 10 mL  10 mL Urethral Once    [COMPLETED] acetaminophen (Tylenol) rectal suppository 650 mg  650 mg Rectal Once    [COMPLETED] fentaNYL (Sublimaze) 50 mcg/mL injection 25 mcg  25 mcg Intravenous Once    [COMPLETED] ipratropium-albuterol (Duoneb) 0.5-2.5 (3) MG/3ML inhalation solution 3 mL  3 mL Nebulization Once    [COMPLETED] piperacillin-tazobactam (Zosyn) 4.5 g in dextrose 5% 100 mL IVPB-ADDV  4.5 g Intravenous Once    [COMPLETED] vancomycin (Vancocin) 2.25 g in sodium chloride 0.9% 500 mL IVPB premix  25 mg/kg Intravenous Once    [COMPLETED] sodium chloride 0.9 % IV bolus 1,000 mL  1,000 mL Intravenous Once    [COMPLETED] iopamidol 76% (ISOVUE-370) injection for power injector  100 mL Intravenous ONCE PRN    [COMPLETED] fentaNYL (Sublimaze) 50 mcg/mL injection 25 mcg  25 mcg Intravenous Once    [COMPLETED] sodium chloride 0.9 % IV bolus 1,000 mL  1,000 mL Intravenous Once    [COMPLETED] sodium chloride 0.9 % IV bolus 1,000 mL  1,000 mL Intravenous Once    apixaban (Eliquis) tab 5 mg  5 mg Oral BID    folic acid (Folvite) tab 1 mg  1 mg Oral Daily    glucose (Dex4) 15 GM/59ML oral liquid 15 g  15 g Oral Q15 Min PRN    Or    glucose (Glutose) 40% oral gel 15 g  15 g Oral Q15 Min PRN    Or    glucose-vitamin C (Dex-4) chewable tab 4 tablet  4 tablet Oral Q15 Min PRN    Or    dextrose 50% injection 50 mL  50 mL Intravenous Q15 Min PRN    Or    glucose (Dex4) 15 GM/59ML oral liquid 30 g  30 g Oral Q15 Min PRN    Or    glucose (Glutose) 40% oral gel 30 g  30 g Oral Q15 Min PRN    Or    glucose-vitamin C (Dex-4) chewable tab 8 tablet  8 tablet Oral Q15 Min PRN    acetaminophen (Tylenol Extra Strength) tab 1,000 mg  1,000 mg Oral Q6H PRN    norepinephrine (Levophed) 4 mg/250mL infusion premix  0.5-30 mcg/min Intravenous Continuous PRN    vasopressin (Vasostrict) 20 Units in sodium chloride 0.9% 100 mL infusion for septic shock  0.01-0.03  Units/min Intravenous Continuous PRN    metoclopramide (Reglan) 5 mg/mL injection 5 mg  5 mg Intravenous Q8H PRN    polyethylene glycol (PEG 3350) (Miralax) 17 g oral packet 17 g  17 g Oral Daily PRN    bisacodyl (Dulcolax) 10 MG rectal suppository 10 mg  10 mg Rectal Daily PRN    sennosides (Senokot) tab 17.2 mg  17.2 mg Oral Nightly PRN    insulin aspart (NovoLOG) 100 Units/mL FlexPen 1-10 Units  1-10 Units Subcutaneous TID AC and HS    insulin aspart (NovoLOG) 100 Units/mL FlexPen 1-68 Units  1-68 Units Subcutaneous TID CC    [COMPLETED] fentaNYL (Sublimaze) 50 mcg/mL injection 25 mcg  25 mcg Intravenous Once    vancomycin (Vancocin) 1.25 g in sodium chloride 0.9% 250mL IVPB premix  1,250 mg Intravenous Q36H    HYDROmorphone (Dilaudid) 1 MG/ML injection 0.2 mg  0.2 mg Intravenous Q2H PRN    Or    HYDROmorphone (Dilaudid) 1 MG/ML injection 0.4 mg  0.4 mg Intravenous Q2H PRN    Or    HYDROmorphone (Dilaudid) 1 MG/ML injection 0.8 mg  0.8 mg Intravenous Q2H PRN     Imp/Recs:  Sinus tach on admission in setting of sepsis  Underlying IVCD on ECG is old  Hx of TIA  CKD  Hx of AAA  Hs of DVT/PE - on apixaban  DM2  HLP - on statin    CV - sinus tachycardia resolved as his infectious process improving.  He appears compensated from cardiac standpoint and off pressor support.  Now with C. Diff.  From cardiac standpoint no objection to transfer out of ICU. Can resume low dose lisinopril as outpatient.      Will sign off,    Massimo Junior MD

## 2024-04-21 NOTE — CONSULTS
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      He was found to be hypotensive despite fluids on admission and started on pressors. On stress dose steroids. On admission febrile to 104F with WBC 29K and PCT 4.94. Lactic Acid 3.4. Blood cultures collected and started on zosyn and vancomycin. / Bcxs now positive for Salmonella. Urine cx no growth. Tele with wide complex tachycaria, placed on amiodarone. C diff positive today. Repeat blood cultures collected. LLE cellulitis noted, patient with pain, doppler negative for clot.    Overnight patient had two watery bowel movements, C diff sent and finalized positive.     Today patient seen sitting up in chair, his LLE pain is better but he remains concerned about his leg. He says he has had leg infections by the lateral ulcer for a couple years intermittently requiring antibiotics, but it never spread up his leg. The pain is improved from prior. He remained afebrile overnight. WBC down to 20. Off pressors for 24 hours. Now in SR. Urology consulted for urinary retention, harding placed.     History:  Past Medical History:    Anesthesia complication    broke out in rashes generalized - unknown source (after every anaesthesia)    Aneurysm of abdominal aorta (HCC)    Bladder tumor    recesected 2015    Cancer (HCC)    SKIN CANCERS ON ARM AND NOSE    Deep vein thrombosis (HCC)    Erectile dysfunction    Gall stones     High blood pressure    High cholesterol    History of blood clots    Neuropathy    HAnds from DM    Obesity    Osteoarthritis    Personal history of antineoplastic chemotherapy    JUly/2015    Pulmonary embolism (HCC)    ON COUMADIN NOW    TIA (transient ischemic attack)    Type II or unspecified type diabetes mellitus without mention of complication, not stated as uncontrolled    Varicose veins of both lower extremities with complications    Visual impairment    4-5 yrs ago had blurriness prob, evaled. no diagnosis     Past Surgical History:   Procedure Laterality Date    Colonoscopy      Colonoscopy      x3, polpypectomy x1, third one clear    Colonoscopy N/A 7/18/2017    Procedure: COLONOSCOPY, POSSIBLE BIOPSY, POSSIBLE POLYPECTOMY 04322;  Surgeon: Marisela Morris MD;  [facility]: [facility]    Colonoscopy N/A 6/14/2020    Procedure: COLONOSCOPY;  Surgeon: Jewel Lane MD;  Location:  ENDOSCOPY    Colonoscopy N/A 6/13/2020    adenoma- repeat 5 yrs    Eye surgery  plastic sx eye lids    Fracture surgery      left leg-screws,pins    Hc closed tx distal tibia fracture w manipulation Left     Hc debridement skin up to 10% Left     leg    Other surgical history  7/29/15    cysto, stent removal - Dr. Campos    Other surgical history  10/14/15    BTS Cystoscopy -Dr Campos    Other surgical history  1/16/15    BTS Cystoscopy -Dr Campos    Other surgical history  5/13/16    BTS Cystoscopy-Dr Campos    Other surgical history  8/15/16    Cysto- Dr. Campos    Other surgical history  2/17/17    Cysto- Dr. Campos    Other surgical history  08/18/2017    Cystoscopy- Dr. Campos    Other surgical history  09/16/2019     BTS cysto - dr. campos     Other surgical history  09/21/2020    Cysto Dr. Campos    Other surgical history  09/27/2021    Cystoscopy- Dr. Campos     Skin graft procedure      multiple to Left leg    Tonsillectomy      Vein bypass graft,fem-fem      to left leg      Family History   Problem Relation Age of Onset    Heart Disorder Father     Hypertension Mother     Arthritis Mother     Heart Disorder Son     Other (kidney stone) Brother     Other (hernia) Brother       reports that he quit smoking about 28 years ago. He started smoking about 58 years ago. He has a 30 pack-year smoking history. He has never used smokeless tobacco. He reports that he does not currently use alcohol. He reports that he does not use drugs.      Allergies:  Allergies   Allergen Reactions    Bacitracin-Neomycin-Polymyxin [Neomycin-Bacitracin-Polymyxin] RASH    Other OTHER (SEE COMMENTS)       Medications:    Current Facility-Administered Medications:     hydrocortisone Na succinate PF (Solu-CORTEF) injection 50 mg, 50 mg, Intravenous, Q12H    docusate sodium (Colace) cap 100 mg, 100 mg, Oral, BID    insulin degludec 100 units/mL flextouch 10 Units, 10 Units, Subcutaneous, Nightly    atorvastatin (Lipitor) tab 10 mg, 10 mg, Oral, Nightly    apixaban (Eliquis) tab 5 mg, 5 mg, Oral, BID    folic acid (Folvite) tab 1 mg, 1 mg, Oral, Daily    glucose (Dex4) 15 GM/59ML oral liquid 15 g, 15 g, Oral, Q15 Min PRN **OR** glucose (Glutose) 40% oral gel 15 g, 15 g, Oral, Q15 Min PRN **OR** glucose-vitamin C (Dex-4) chewable tab 4 tablet, 4 tablet, Oral, Q15 Min PRN **OR** dextrose 50% injection 50 mL, 50 mL, Intravenous, Q15 Min PRN **OR** glucose (Dex4) 15 GM/59ML oral liquid 30 g, 30 g, Oral, Q15 Min PRN **OR** glucose (Glutose) 40% oral gel 30 g, 30 g, Oral, Q15 Min PRN **OR** glucose-vitamin C (Dex-4) chewable tab 8 tablet, 8 tablet, Oral, Q15 Min PRN    acetaminophen (Tylenol Extra Strength) tab 1,000 mg, 1,000 mg, Oral, Q6H PRN    norepinephrine (Levophed) 4 mg/250mL infusion premix, 0.5-30 mcg/min, Intravenous, Continuous PRN    vasopressin (Vasostrict) 20 Units in sodium chloride 0.9% 100 mL infusion for septic shock, 0.01-0.03 Units/min, Intravenous, Continuous PRN    metoclopramide (Reglan) 5 mg/mL  injection 5 mg, 5 mg, Intravenous, Q8H PRN    polyethylene glycol (PEG 3350) (Miralax) 17 g oral packet 17 g, 17 g, Oral, Daily PRN    bisacodyl (Dulcolax) 10 MG rectal suppository 10 mg, 10 mg, Rectal, Daily PRN    sennosides (Senokot) tab 17.2 mg, 17.2 mg, Oral, Nightly PRN    piperacillin-tazobactam (Zosyn) 4.5 g in dextrose 5% 100 mL IVPB-ADDV, 4.5 g, Intravenous, Q8H    insulin aspart (NovoLOG) 100 Units/mL FlexPen 1-10 Units, 1-10 Units, Subcutaneous, TID AC and HS    insulin aspart (NovoLOG) 100 Units/mL FlexPen 1-68 Units, 1-68 Units, Subcutaneous, TID CC    lactated ringers infusion, , Intravenous, Continuous    vancomycin (Vancocin) 1.25 g in sodium chloride 0.9% 250mL IVPB premix, 1,250 mg, Intravenous, Q36H    HYDROmorphone (Dilaudid) 1 MG/ML injection 0.2 mg, 0.2 mg, Intravenous, Q2H PRN **OR** HYDROmorphone (Dilaudid) 1 MG/ML injection 0.4 mg, 0.4 mg, Intravenous, Q2H PRN **OR** HYDROmorphone (Dilaudid) 1 MG/ML injection 0.8 mg, 0.8 mg, Intravenous, Q2H PRN  No current facility-administered medications on file prior to encounter.     Current Outpatient Medications on File Prior to Encounter   Medication Sig Dispense Refill    apixaban (ELIQUIS) 5 MG Oral Tab Take 1 tablet (5 mg total) by mouth 2 (two) times daily. 60 tablet 8    folic acid 1 MG Oral Tab Take 1 tablet (1 mg total) by mouth daily.      Cyanocobalamin (VITAMIN B12) 1000 MCG Oral Tab CR Take by mouth.      MetFORMIN HCl 500 MG Oral Tab Take 1 tablet (500 mg total) by mouth 4 (four) times daily.      simvastatin 20 MG Oral Tab Take 1 tablet (20 mg total) by mouth nightly. (Patient taking differently: Take 1 tablet (20 mg total) by mouth every morning.) 90 tablet 1    lisinopril 2.5 MG Oral Tab Take 1 tablet (2.5 mg total) by mouth daily. 90 tablet 1    glimepiride 1 MG Oral Tab Take 1 tablet (1 mg total) by mouth daily with breakfast. 90 tablet 1       Review of Systems:    A comprehensive 10 point review of systems was completed.   Pertinent positives and negatives noted in the the HPI.      Physical Exam:    General: No acute distress. Alert and oriented x 3.  Vital signs: Temp:  [97.1 °F (36.2 °C)-98.7 °F (37.1 °C)] 98.2 °F (36.8 °C)  Pulse:  [75-98] 78  Resp:  [13-32] 17  BP: ()/(47-77) 109/56  SpO2:  [83 %-100 %] 100 %  HEENT: Moist mucous membranes. Extraocular muscles are intact.  Neck: No swelling, no masses  Respiratory: Non labored, symmetric exursion  Cardiovascular: No irregularities in rhythm  Abdomen: Soft, nontender, nondistended.    Musculoskeletal: Full range of motion of all extremities.  No swelling noted.  Joints: no effusions  Skin: No lesions. LLE as below, ulcer on lateral L ankle.                Laboratory Data:  Laboratory data reviewed      Recent Labs   Lab 04/21/24  0449   RBC 4.36   HGB 9.5*   HCT 31.3*   MCV 71.8*   MCH 21.8*   MCHC 30.4*   RDW 22.2   NEPRELIM 17.94*   WBC 20.9*   .0*       Recent Labs   Lab 04/19/24  1325 04/19/24  1622 04/20/24  0455 04/21/24  0450   * 254* 266* 218*   BUN 43* 44* 39* 33*   CREATSERUM 1.97* 2.12* 1.87* 1.52*   CA 10.0 8.1* 8.9 9.5   ALB 3.6  --  2.6* 2.5*    140 139 144   K 5.3* 4.3 4.9 4.1    113* 113* 115*   CO2 22.0 18.0* 21.0 23.0   ALKPHO 88  --  66 68   AST 13*  --  11* 14*   ALT 27  --  18 17   BILT 0.8  --  0.7 0.6   TP 6.9  --  5.6* 6.0*         Lab Results   Component Value Date    PGLU 202 04/21/2024         Microbiologic Data:   Hospital Encounter on 04/19/24   1. Urine Culture, Routine     Status: None    Collection Time: 04/19/24  1:47 PM    Specimen: Urine, clean catch   Result Value Ref Range    Urine Culture No Growth at 18-24 hrs. N/A   2. Blood Culture     Status: Abnormal (Preliminary result)    Collection Time: 04/19/24  1:36 PM    Specimen: Blood,peripheral   Result Value Ref Range    Blood Culture Result Salmonella (A) N/A    Blood Smear Positive Blood Culture (A) N/A    Blood Smear Gram Negative Rods (A) N/A          Imaging:    US Doppler LLE 4/19/24  Impression   CONCLUSION:  No acute deep vein thrombosis.     CXR 4/19/24  Impression   CONCLUSION:  Mild interstitial opacities which may represent mild edema.     CTA Pelvis with runoff 4/19/24  Impression   CONCLUSION:    1. Vasculature as described above.  There is poor evaluation of the vessels within the lower leg secondary to motion artifact and poor contrast opacification.  2. Soft tissue edema noted within the left lower leg which is nonspecific but could represent cellulitis.  3. Diffuse bladder wall thickening.  Correlation with urinalysis is recommended.  4. Scrotal wall thickening which is nonspecific but could be secondary to infectious etiology.  5. Prominent left inguinal lymph node with surrounding inflammatory stranding measuring 2.8 x 1.9 cm which may be reactive in nature.  6. Ectasia of the infrarenal abdominal aorta measuring 3.3 x 3.2 cm.  7. Hepatic steatosis.  8. Dilated left ureter without evidence of obstructing urolithiasis.  9. Colonic diverticulosis without evidence of diverticulitis.       TTE 4/20/24  Conclusions:   1. Left ventricle: The cavity size was normal. Wall thickness was normal.      Systolic function was normal. The estimated ejection fraction was 55-60%,      by visual assessment. No diagnostic evidence for regional wall motion      abnormalities. Doppler parameters are consistent with abnormal left      ventricular relaxation - grade 1 diastolic dysfunction.   2. Right ventricle: Systolic function was normal.   3. Left atrium: The left atrial volume was normal.   4. Mitral valve: Systolic bowing without prolapse, involving the anterior      leaflet and the posterior leaflet. There was trivial regurgitation.   5. Pulmonary arteries: Systolic pressure was mildly increased, in the range      of 35mm Hg to 40mm Hg. Estimated pulmonary artery diastolic pressure was      13mm Hg.   Impressions:  No previous study from [facility] was   available for comparison.     Established Problem list:  Patient Active Problem List   Diagnosis    Carcinoid tumor of left lung (HCC)    History of bladder cancer    Hypogonadism in male    Erectile dysfunction    TIA (transient ischemic attack)    Varicose veins of both lower extremities with complications    Obesity    High cholesterol    Neuropathy    Aneurysm of abdominal aorta (HCC)    Bladder tumor    Controlled type 2 diabetes mellitus with diabetic neuropathy (HCC)    BPH with urinary obstruction    Type 2 diabetes mellitus (HCC)    Osteoarthritis    Elevated PSA    Hematuria    Hematuria, unspecified type    Pyelonephritis    Elevated INR    Gangrene (HCC)    Acute deep vein thrombosis (DVT) of proximal vein of left lower extremity (HCC)    Leg DVT (deep venous thromboembolism), acute, left (HCC)    Microcytic anemia    Benign carcinoid tumor of lung (HCC)    Urothelial cancer (HCC)    Iron deficiency anemia    Folic acid deficiency    Hemolytic anemia, acute (HCC)    Severe sepsis (HCC)    Cellulitis of left lower extremity    Chronic kidney disease, unspecified CKD stage    Hyperkalemia    Encephalopathy in sepsis    Acute cystitis without hematuria    Septic shock (HCC)    TONJA (acute kidney injury) (HCC)    Metabolic acidosis    Tachycardia    Pain of left lower extremity       ASSESSMENT/PLAN:  1. Salmonella Septicemia   1/2 Bcx + 4/19  Repeat Bcxs 4/21 in process  Unclear source, does not shop at  [employer] (recent salmonella basil recall). Denies eating out or ordering in food recently. Generally eats same foods as his wife, and she is not acutely ill   Fever to 104F Leukocytosis to 29K and elevated lactic acid   Initially on pressors, stress dose steroids started 4/19  Temperatures improved, WBC improving   Patient has a h/o AAA   TTE 4/20 reviewed     -stop zosyn  -start tiffayn to cover salmonella, and provide broad coverage for sepsis and to cover broadly for LLE infection   -continue  IV vancomycin for now   -Follow up repeat blood cultures    2. C diff  Unclear if colonization vs true infection. This could also explain fevers and high WBC count on admission, though patient reports that diarrhea started in the hospital. RN reports from overnight that two watery BMs were mixed solid / liquid. Today one bowel movement just small amount of mucus. Patient has no significant abdominal pain / cramping.     -start po vanco given clinical state   -consider retesting with EIA    3. LLE cellulitis with lateral ankle ulcer   Erythema appaers to be receding from pre-existing lines  Was noted to have pain out of proportion on admission, today patient tolerates examination without significant pain. Most of his pain is at the ulcer therefore would monitor   -CT runoff with edema, no signs of gas    -covered with meropenem / vancomycin     4. TONJA on CKD    -monitor and adjust antibiotics as indicated     5. Seronegative RA  prednisone, methotraxate PTA currently on hold         M[facility]IN [facility]  [phone]

## 2024-04-21 NOTE — PLAN OF CARE
Pt denies pain. Occasionally c/o numbness to feet. L lower leg has more redness today, extending outside of the line I drew yesterday- Dr Maria informed. Xrays ordered and completed. Pt up in chair for 2 hours twice, then back in bed w/ L leg above the level of the heart. Good appetite, good urine output. May transfer to med floor, no beds currently available.

## 2024-04-21 NOTE — CM/SW NOTE
Chart reviewed for discharge planning needs and noted PT is anticipating [facility] services for pt at discharge.     Per PT eval:  HOME SITUATION  Type of Home: House   Home Layout: Two level  Stairs to Enter : 1  Stairs to Bedroom: 14  Railing: Yes     Lives With: Spouse;Son  Patient Owned Equipment: Cane     Prior Level of [place]: Pt typically independent/mod I with all functional mobility. Pt usually ambulates without assistive device but does use cane occasionally if needed. Pt does grocery shopping, cooking, etc at home. Bedroom and bathroom upstairs, Daughter lives ~15 min away.    Anticipated therapy need: Home with Home Healthcare    HH referrals sent in AIDIN, choice list will be provided once available. SW will continue to follow.     YOSELIN Michel  Discharge Planner

## 2024-04-21 NOTE — PLAN OF CARE
Assumed care of pt for the night shift. Pt hemodynamically stable overnight. ID consulted for positive blood cultures. Watery stools overnight, sample collected and sent to lab to rule out cdiff per protocol. No complaints of pain. See flowsheets for further assessment.

## 2024-04-21 NOTE — PROGRESS NOTES
[facility]   part of [facility]     Hospitalist Progress Note     Thiago Alcantara Patient Status:  Inpatient    1940 MRN [id number]   Location [facility] 4SW-A Attending Elia Church MD   Hosp Day # 2 PCP Tiffany Pierce MD     Chief Complaint: LLE pain    Subjective:     Off of vasopressors and feeling much better       Objective:    Review of Systems:   A comprehensive review of systems was completed; pertinent positive and negatives stated in subjective.    Vital signs:  Temp:  [97.1 °F (36.2 °C)-99 °F (37.2 °C)] 99 °F (37.2 °C)  Pulse:  [72-98] 74  Resp:  [13-27] 16  BP: ()/(47-77) 120/62  SpO2:  [83 %-100 %] 99 %    Physical Exam:    General: No acute distress  Respiratory: No wheezes, no rhonchi  Cardiovascular: S1, S2, regular rate and rhythm  Abdomen: Soft, Non-tender, non-distended, positive bowel sounds  Neuro: No new focal deficits.   Extremities: LLE erythema/edema, well demarcated     Diagnostic Data:    Labs:  Recent Labs   Lab 24  1325 24  1848 24  0455 24  0449   WBC 20.2*  --  29.9* 20.9*   HGB 11.8*  --  10.8* 9.5*   MCV 72.9*  --  71.2* 71.8*   .0  --  163.0 134.0*   INR  --  1.31*  --   --        Recent Labs   Lab 24  1325 24  1622 24  0455 24  0450   * 254* 266* 218*   BUN 43* 44* 39* 33*   CREATSERUM 1.97* 2.12* 1.87* 1.52*   CA 10.0 8.1* 8.9 9.5   ALB 3.6  --  2.6* 2.5*    140 139 144   K 5.3* 4.3 4.9 4.1    113* 113* 115*   CO2 22.0 18.0* 21.0 23.0   ALKPHO 88  --  66 68   AST 13*  --  11* 14*   ALT 27  --  18 17   BILT 0.8  --  0.7 0.6   TP 6.9  --  5.6* 6.0*       Estimated Creatinine Clearance: 36.9 mL/min (A) (based on SCr of 1.52 mg/dL (H)).    Recent Labs   Lab 24  1325   TROPHS 15       Recent Labs   Lab 24  1848   PTP 16.4*   INR 1.31*                  Microbiology    Hospital Encounter on 24   1. Urine Culture, Routine     Status: None    Collection Time:  04/19/24  1:47 PM    Specimen: Urine, clean catch   Result Value Ref Range    Urine Culture No Growth at 18-24 hrs. N/A   2. Blood Culture     Status: Abnormal (Preliminary result)    Collection Time: 04/19/24  1:36 PM    Specimen: Blood,peripheral   Result Value Ref Range    Blood Culture Result Salmonella (A) N/A    Blood Smear Positive Blood Culture (A) N/A    Blood Smear Gram Negative Rods (A) N/A         Imaging: Reviewed in Epic.    Medications:    hydrocortisone Na succinate PF  50 mg Intravenous Q12H    meropenem  1 g Intravenous Q12H    vancomycin  125 mg Oral QID    docusate sodium  100 mg Oral BID    insulin degludec  10 Units Subcutaneous Nightly    atorvastatin  10 mg Oral Nightly    apixaban  5 mg Oral BID    folic acid  1 mg Oral Daily    insulin aspart  1-10 Units Subcutaneous TID AC and HS    insulin aspart  1-68 Units Subcutaneous TID CC    vancomycin  1,250 mg Intravenous Q36H       Assessment & Plan:      #Septic shock d/t LLE cellulitis and UTI  #Bacteremia   -Shock resolved, stable off vasopressors   -Follow Cultures  -antibx per ID on consult      #Cdiff diarrhea  -PO Vancomycin     #Cerebrovascular disease  #VTE history  -Elqiuis      #Wide complex tachycardia  -Amiodarone  -ECHO preserved EF, Grade 1 diastolic dysfunction   -Telemetry  -Cardiology consult     #Acute kidney injury on chronic kidney disease III   -IVF  -Monitor UOP, creatinine and electrolytes     #VTE on DOAC  -DOAC     #Diabetes mellitus  -exacerbated by steroids   -ISS  -Degludec      #Seronegative rheumatoid arthritis  -wean back to chronic Prednisone from stress dose per critical care   -Hold Methotrexate     #History of lung cancer  #Bladder cancer sp resection of tumor, Urinary retention - Waterman, Urology on consult          Shin Bose DO    Supplementary Documentation:     Quality:  DVT Mechanical Prophylaxis:        DVT Pharmacologic Prophylaxis   Medication    apixaban (Eliquis) tab 5 mg                Code Status:  Full Code  Waterman: Waterman catheter in place    The 21st Century Cures Act makes medical notes like these available to patients in the interest of transparency. Please be advised this is a medical document. Medical documents are intended to carry relevant information, facts as evident, and the clinical opinion of the practitioner. The medical note is intended as peer to peer communication and may appear blunt or direct. It is written in medical language and may contain abbreviations or verbiage that are unfamiliar.             **Certification      PHYSICIAN Certification of Need for Inpatient Hospitalization - Initial Certification    Patient will require inpatient services that will reasonably be expected to span two midnight's based on the clinical documentation in H+P.   Based on patients current state of illness, I anticipate that, after discharge, patient will require TBD.

## 2024-04-22 LAB
ALBUMIN SERPL-MCNC: 2.3 G/DL (ref 3.4–5)
ALBUMIN/GLOB SERPL: 0.6 {RATIO} (ref 1–2)
ALP LIVER SERPL-CCNC: 70 U/L
ALT SERPL-CCNC: 15 U/L
ANION GAP SERPL CALC-SCNC: 5 MMOL/L (ref 0–18)
AST SERPL-CCNC: 13 U/L (ref 15–37)
BASOPHILS # BLD AUTO: 0.05 X10(3) UL (ref 0–0.2)
BASOPHILS NFR BLD AUTO: 0.3 %
BILIRUB SERPL-MCNC: 0.6 MG/DL (ref 0.1–2)
BLACTX-M ISLT/SPM QL: NOT DETECTED
BUN BLD-MCNC: 30 MG/DL (ref 9–23)
CALCIUM BLD-MCNC: 9.4 MG/DL (ref 8.5–10.1)
CHLORIDE SERPL-SCNC: 112 MMOL/L (ref 98–112)
CO2 SERPL-SCNC: 24 MMOL/L (ref 21–32)
CREAT BLD-MCNC: 1.22 MG/DL
EGFRCR SERPLBLD CKD-EPI 2021: 60 ML/MIN/1.73M2 (ref 60–?)
EOSINOPHIL # BLD AUTO: 0.12 X10(3) UL (ref 0–0.7)
EOSINOPHIL NFR BLD AUTO: 0.7 %
ERYTHROCYTE [DISTWIDTH] IN BLOOD BY AUTOMATED COUNT: 21.4 %
GLOBULIN PLAS-MCNC: 3.7 G/DL (ref 2.8–4.4)
GLUCOSE BLD-MCNC: 179 MG/DL (ref 70–99)
GLUCOSE BLD-MCNC: 188 MG/DL (ref 70–99)
GLUCOSE BLD-MCNC: 213 MG/DL (ref 70–99)
GLUCOSE BLD-MCNC: 228 MG/DL (ref 70–99)
GLUCOSE BLD-MCNC: 244 MG/DL (ref 70–99)
HCT VFR BLD AUTO: 28.9 %
HGB BLD-MCNC: 9.4 G/DL
IMM GRANULOCYTES # BLD AUTO: 0.28 X10(3) UL (ref 0–1)
IMM GRANULOCYTES NFR BLD: 1.5 %
LYMPHOCYTES # BLD AUTO: 1.04 X10(3) UL (ref 1–4)
LYMPHOCYTES NFR BLD AUTO: 5.6 %
MAGNESIUM SERPL-MCNC: 1.8 MG/DL (ref 1.6–2.6)
MCH RBC QN AUTO: 23.3 PG (ref 26–34)
MCHC RBC AUTO-ENTMCNC: 32.5 G/DL (ref 31–37)
MCV RBC AUTO: 71.5 FL
MONOCYTES # BLD AUTO: 0.87 X10(3) UL (ref 0.1–1)
MONOCYTES NFR BLD AUTO: 4.7 %
NEUTROPHILS # BLD AUTO: 16.09 X10 (3) UL (ref 1.5–7.7)
NEUTROPHILS # BLD AUTO: 16.09 X10(3) UL (ref 1.5–7.7)
NEUTROPHILS NFR BLD AUTO: 87.2 %
OSMOLALITY SERPL CALC.SUM OF ELEC: 303 MOSM/KG (ref 275–295)
PLATELET # BLD AUTO: 149 10(3)UL (ref 150–450)
PLATELETS.RETICULATED NFR BLD AUTO: 7.1 % (ref 0–7)
POTASSIUM SERPL-SCNC: 4.3 MMOL/L (ref 3.5–5.1)
PROT SERPL-MCNC: 6 G/DL (ref 6.4–8.2)
RBC # BLD AUTO: 4.04 X10(6)UL
SALMONELLA DNA BLD POS QL NAA+NON-PROBE: DETECTED
SODIUM SERPL-SCNC: 141 MMOL/L (ref 136–145)
VANCOMYCIN TROUGH SERPL-MCNC: 6.7 UG/ML (ref 10–20)
WBC # BLD AUTO: 18.5 X10(3) UL (ref 4–11)

## 2024-04-22 PROCEDURE — 99232 SBSQ HOSP IP/OBS MODERATE 35: CPT | Performed by: INTERNAL MEDICINE

## 2024-04-22 RX ORDER — VANCOMYCIN HYDROCHLORIDE
1250 EVERY 24 HOURS
Status: DISCONTINUED | OUTPATIENT
Start: 2024-04-22 | End: 2024-04-22

## 2024-04-22 RX ORDER — PREDNISONE 10 MG/1
10 TABLET ORAL
Status: DISCONTINUED | OUTPATIENT
Start: 2024-04-22 | End: 2024-04-24

## 2024-04-22 RX ORDER — VANCOMYCIN HYDROCHLORIDE 125 MG/1
125 CAPSULE ORAL 4 TIMES DAILY
Status: DISCONTINUED | OUTPATIENT
Start: 2024-04-22 | End: 2024-04-24

## 2024-04-22 RX ORDER — MAGNESIUM OXIDE 400 MG/1
400 TABLET ORAL ONCE
Status: COMPLETED | OUTPATIENT
Start: 2024-04-22 | End: 2024-04-22

## 2024-04-22 NOTE — PROGRESS NOTES
NURSING TRANSFER NOTE      Patient TRANSFERRED TO ROOM 514  FROM THE ICU via BED.   HE IS A&OX4 - FORGETFUL.  DENIES PAIN/DISCOMFORT.  SATURATING ABOVE 90% ON ROOM AIR.  LUNA IN PLACE DRAINING CLEAR YELLOW URINE.  LEFT LEG RED AND SWOLLEN - ELEVATED ON PILLOWS.    Oriented to room.  Safety precautions initiated.  Bed in low position.  Call light in reach.

## 2024-04-22 NOTE — DIETARY NOTE
[facility]   part of [facility]   CLINICAL NUTRITION    Thiago Alcantara admitted on 4/19 presents with severe sepsis.    PMH:  PVD, AAA, essential hypertension, dyslipidemia, cerebrovascular diease, VTE on DOAC, diabetes mellitus and bladder cancer     Admitting diagnosis:  Hyperkalemia [E87.5]  Encephalopathy in sepsis [G93.41]  Acute cystitis without hematuria [N30.00]  Cellulitis of left lower extremity [L03.116]  Severe sepsis (HCC) [A41.9, R65.20]  Chronic kidney disease, unspecified CKD stage [N18.9]    Ht:  5'8\"  Wt: 110.5 kg (243 lb 9.7 oz).   Body mass index is 37.04 kg/m².    Wt Readings from Last 6 Encounters:   04/22/24 110.5 kg (243 lb 9.7 oz)   04/02/24 108.9 kg (240 lb)   09/22/23 111.1 kg (245 lb)   08/31/23 113.2 kg (249 lb 8 oz)   06/03/23 107 kg (236 lb)   01/20/23 106.6 kg (235 lb)        Labs/Meds reviewed    Diet:       Procedures    Carbohydrate controlled diet 1800 kcal/60 grams; Is Patient on Accuchecks? Yes; Is Patient on Suicide Precautions? No       Percent Meals Eaten (last 3 days)       Date/Time Percent Meals Eaten (%)    04/20/24 1200 100 %    04/20/24 1600 100 %    04/21/24 1030 100 %    04/21/24 1225 100 %    04/21/24 1600 100 %            Pt chart reviewed d/t elevated A1c 9%. Visited pt at bedside. Pt reports he is always hungry and is more of a grazer, eating 10x/day sometimes. Reports he was intentionally trying to lose wt at home and lost ~15 lbs since Moncho but reports his wt has increased since admission from unknown reason. Denies nausea, vomiting and constipation but having diarrhea; noted to be +cdiff. No chewing or swallowing difficulties and NKFA. Offered ONS but pt declined at this time.  Pt reports steroids have been causing increased BG. Has been trying to mostly eat lean meats and vegetables. Offered to provide diabetic diet education but pt declined stating he just doesn't care anymore. Reports he is frustrated that no one seems to know what is wrong  with him. All questions answered at this time.    Patient is at low nutrition risk at this time.    Please consult if patient status changes or nutrition issues arise.    Swathi Isaac RD, LDN, [facility]  Clinical Dietitian  Spectra: [phone]

## 2024-04-22 NOTE — PHYSICAL THERAPY NOTE
PHYSICAL THERAPY TREATMENT NOTE - INPATIENT    Room Number: 514/514-A     Session: 1     Number of Visits to Meet Established Goals: 4    Presenting Problem: severe sepsis, shock due to L LE cellulitis  Co-Morbidities : PVD, AAA, HTN, cerebrovascular disease, DM, h/o lung and bladder cancer, RA, dyslipidemia    ASSESSMENT   Patient demonstrates good  progress this session, goals  remain in progress.    Patient continues to function near baseline with transfers and gait.  Contributing factors to remaining limitations include pain.  Next session anticipate patient to progress gait and stair negotiation.  Physical Therapy will continue to follow patient for duration of hospitalization.    Patient continues to benefit from continued skilled PT services: at discharge to promote functional independence in home.  Anticipate patient will return home with home health PT.    PLAN  PT Treatment Plan: Bed mobility;Body mechanics;Endurance;Patient education;Gait training;Strengthening;Stair training;Transfer training;Balance training  Rehab Potential : Good  Frequency (Obs): 3-5x/week    CURRENT GOALS     Goal #1 Patient is able to demonstrate supine - sit EOB @ level: modified independent      Goal #2 Patient is able to demonstrate transfers Sit to/from Stand at assistance level: modified independent      Goal #3 Patient is able to ambulate 200 feet with assist device:  RW or cane  at assistance level: supervision      Goal #4 Pt is able to ascend/descend full flight of stairs with railing and supervision   Goal #5     Goal #6     Goal Comments: Goals established on 4/20/2024        PHYSICAL THERAPY MEDICAL/SOCIAL HISTORY  History related to current admission: Patient is a 83 year old male admitted on 4/19/2024 from home for severe L LE pain.  Pt diagnosed with septic shock due to L LE cellulitis and with acute encephalopathy. L LE US doppler negative for DVT.    SUBJECTIVE  \"I don't remember if I used that or not.\" Re: SYLVIA  and last PT session.    OBJECTIVE  Precautions: Hard of hearing    WEIGHT BEARING RESTRICTION  Weight Bearing Restriction: None                PAIN ASSESSMENT   Rating: Unable to rate  Location: L lower leg, tape from catheter  Management Techniques: Repositioning;Relaxation    BALANCE                                                                                                                       Static Sitting: Good  Dynamic Sitting: Good           Static Standing: Good  Dynamic Standing: Fair -    ACTIVITY TOLERANCE                         O2 WALK         AM-PAC '6-Clicks' INPATIENT SHORT FORM - BASIC MOBILITY  How much difficulty does the patient currently have...  Patient Difficulty: Turning over in bed (including adjusting bedclothes, sheets and blankets)?: A Little   Patient Difficulty: Sitting down on and standing up from a chair with arms (e.g., wheelchair, bedside commode, etc.): A Little   Patient Difficulty: Moving from lying on back to sitting on the side of the bed?: A Little   How much help from another person does the patient currently need...   Help from Another: Moving to and from a bed to a chair (including a wheelchair)?: A Little   Help from Another: Need to walk in hospital room?: A Little   Help from Another: Climbing 3-5 steps with a railing?: A Little       AM-PAC Score:  Raw Score: 18   Approx Degree of Impairment: 46.58%   Standardized Score (AM-PAC Scale): 43.63   CMS Modifier (G-Code): CK    FUNCTIONAL ABILITY STATUS  Gait Assessment   Functional Mobility/Gait Assessment  Gait Assistance: Supervision  Distance (ft): 150  Assistive Device: Rolling walker  Pattern: Shuffle (slowed dimas)    Skilled Therapy Provided      Transfer Mobility:  Sit<>Stand: supervision   Stand<>Sit: supervision  Gait: Ambulation as above in hallway with increased time.    Therapist's Comments: Pt assisted with changing gown as current gown without straps to keep up in hallway.  D/w pt re: continued use of  RW at home.  Pt in agreement with.  Pt reports will likely stay on one level.  Educated on sequencing for negotiating stairs.           Patient End of Session: Up in chair;Needs met;Call light within reach;RN aware of session/findings;All patient questions and concerns addressed    PT Session Time: 23 minutes  Gait Training: 15 minutes  Therapeutic Activity: 8 minutes

## 2024-04-22 NOTE — PLAN OF CARE
Received patient on room air, saturating well. VSS. Medications administered per the Mar and patient needs addressed. Patient is A & O x3, forgetful, continent of stool, coude in place, patient is one assist with walker. Left lower extremity red and swollen, elevated on pillows. Patient is resting in locked bedside chair with call light within reach

## 2024-04-22 NOTE — PROGRESS NOTES
[facility]   part of [facility]     Hospitalist Progress Note     Thiago Alcantara Patient Status:  Inpatient    1940 MRN [id number]   Location [facility] 4SW-A Attending Elia Church MD   Hosp Day # 3 PCP Tiffany Pierce MD     Chief Complaint: LLE pain    Subjective:     Complains of some diarrhea  No Abdominal Pain       Objective:    Review of Systems:   A comprehensive review of systems was completed; pertinent positive and negatives stated in subjective.    Vital signs:  Temp:  [97.7 °F (36.5 °C)-99.9 °F (37.7 °C)] 97.8 °F (36.6 °C)  Pulse:  [74-87] 75  Resp:  [16-22] 16  BP: (113-140)/(55-69) 140/69  SpO2:  [95 %-100 %] 96 %    Physical Exam:    General: No acute distress  Respiratory: No wheezes, no rhonchi  Cardiovascular: S1, S2, regular rate and rhythm  Abdomen: Soft, Non-tender, non-distended, positive bowel sounds  Neuro: No new focal deficits.   Extremities: LLE erythema/edema, well demarcated     Diagnostic Data:    Labs:  Recent Labs   Lab 24  1325 24  1848 24  0455 24  0449 24  0711   WBC 20.2*  --  29.9* 20.9* 18.5*   HGB 11.8*  --  10.8* 9.5* 9.4*   MCV 72.9*  --  71.2* 71.8* 71.5*   .0  --  163.0 134.0* 149.0*   INR  --  1.31*  --   --   --        Recent Labs   Lab 24  0455 24  0450 24  0711   * 218* 179*   BUN 39* 33* 30*   CREATSERUM 1.87* 1.52* 1.22   CA 8.9 9.5 9.4   ALB 2.6* 2.5* 2.3*    144 141   K 4.9 4.1 4.3   * 115* 112   CO2 21.0 23.0 24.0   ALKPHO 66 68 70   AST 11* 14* 13*   ALT 18 17 15*   BILT 0.7 0.6 0.6   TP 5.6* 6.0* 6.0*       Estimated Creatinine Clearance: 45.9 mL/min (based on SCr of 1.22 mg/dL).    Recent Labs   Lab 24  1325   TROPHS 15       Recent Labs   Lab 24  1848   PTP 16.4*   INR 1.31*                  Microbiology    Hospital Encounter on 24   1. Urine Culture, Routine     Status: None    Collection Time: 24  1:47 PM    Specimen: Urine, clean  catch   Result Value Ref Range    Urine Culture No Growth at 18-24 hrs. N/A   2. Blood Culture     Status: Abnormal (Preliminary result)    Collection Time: 04/19/24  1:36 PM    Specimen: Blood,peripheral   Result Value Ref Range    Blood Culture Result Salmonella (A) N/A    Blood Smear Positive Blood Culture (A) N/A    Blood Smear Gram Negative Rods (A) N/A         Imaging: Reviewed in Epic.    Medications:    magnesium oxide  400 mg Oral Once    predniSONE  10 mg Oral Daily    meropenem  1 g Intravenous Q12H    tamsulosin  0.4 mg Oral Daily    docusate sodium  100 mg Oral BID    insulin degludec  10 Units Subcutaneous Nightly    atorvastatin  10 mg Oral Nightly    apixaban  5 mg Oral BID    folic acid  1 mg Oral Daily    insulin aspart  1-10 Units Subcutaneous TID AC and HS    insulin aspart  1-68 Units Subcutaneous TID CC    vancomycin  1,250 mg Intravenous Q36H       Assessment & Plan:      #Septic shock d/t LLE cellulitis and UTI  #Salmonella Bacteremia   -Shock resolved, stable off vasopressors   -Follow repeat Cultures  -antibx per ID on consult      #Cdiff diarrhea  -PO Vancomycin   -GI Stool PCR    #Cerebrovascular disease  #VTE history  -Elqiuis      #Wide complex tachycardia  -Amiodarone  -ECHO preserved EF, Grade 1 diastolic dysfunction   -Telemetry  -Cardiology consult    #HTN  -Hold Lisinopril      #Acute kidney injury on chronic kidney disease III   -resolved with IVF  -Monitor UOP, creatinine and electrolytes     #VTE on DOAC  -DOAC     #Diabetes mellitus  -exacerbated by steroids   -ISS  -Degludec      #Seronegative rheumatoid arthritis  -Chronic Prednisone   -Hold Methotrexate     #History of lung cancer  #Bladder cancer sp resection of tumor, Urinary retention - Waterman, Urology on consult          Shin Bose DO    Supplementary Documentation:     Quality:  DVT Mechanical Prophylaxis:        DVT Pharmacologic Prophylaxis   Medication    apixaban (Eliquis) tab 5 mg                Code Status: Full  Code  Waterman: Waterman catheter in place    The 21st Century Cures Act makes medical notes like these available to patients in the interest of transparency. Please be advised this is a medical document. Medical documents are intended to carry relevant information, facts as evident, and the clinical opinion of the practitioner. The medical note is intended as peer to peer communication and may appear blunt or direct. It is written in medical language and may contain abbreviations or verbiage that are unfamiliar.             **Certification      PHYSICIAN Certification of Need for Inpatient Hospitalization - Initial Certification    Patient will require inpatient services that will reasonably be expected to span two midnight's based on the clinical documentation in H+P.   Based on patients current state of illness, I anticipate that, after discharge, patient will require TBD.

## 2024-04-22 NOTE — PROGRESS NOTES
[facility]  Urology Progress Note    Thiago Alcantara Patient Status:  Inpatient    1940 MRN [id number]   Location [facility] 5NW-A Attending Elia Church MD   Hosp Day # 3 PCP Tiffany Pierce MD     Subjective:  Thiago Alcantara is a(n) 83 year old male.    Current complaints: Afebrile.  Tolerating harding catheter.      Objective:  General appearance: alert, appears stated age, and cooperative  Blood pressure 140/69, pulse 75, temperature 97.8 °F (36.6 °C), temperature source Oral, resp. rate 16, weight 243 lb 9.7 oz (110.5 kg), SpO2 96%.  Lungs: non-labored respirations.    Abdomen: soft, non-tender  : harding catheter in place draining yellow urine  (UOP - 3700 mL/24 hours)  Lab Results   Component Value Date    PGLU 188 2024       Hospital Encounter on 24   1. Urine Culture, Routine     Status: None    Collection Time: 24  1:47 PM    Specimen: Urine, clean catch   Result Value Ref Range    Urine Culture No Growth at 18-24 hrs. N/A   2. Blood Culture     Status: Abnormal (Preliminary result)    Collection Time: 24  1:36 PM    Specimen: Blood,peripheral   Result Value Ref Range    Blood Culture Result Salmonella (A) N/A    Blood Smear Positive Blood Culture (A) N/A    Blood Smear Gram Negative Rods (A) N/A        XR TIBIA + FIBULA (2 VIEWS), LEFT (CPT=73590)    Result Date: 2024  CONCLUSION:  Osseous degenerative changes.   LOCATION:  [facility]   Dictated by (CST): Massimo Qiu MD on 2024 at 10:04 AM     Finalized by (CST): Massimo Qiu MD on 2024 at 10:05 AM       XR ANKLE (2 VIEW), LEFT (CPT=73600)    Result Date: 2024  CONCLUSION:  Scattered degenerative changes.  Prior distal fibula ORIF.  Continued clinical correlation recommended.   LOCATION:  [facility]   Dictated by (CST): Massimo Qiu MD on 2024 at 10:03 AM     Finalized by (CST): Massimo Qiu MD on 2024 at 10:04 AM         Assessment:    Partial urinary retention  Bladder wall  thickening  Left ureteral dilatation  History of bladder carcinoma  BPH with LUTS     Serum creat 1.97 > 2.12 > 1.87 > 1.52 > 1.22  Urine culture 4/19/24: no growth  CTA pelvis with CTA bilat leg runnoff w IV contrast 4/19/24: no hydronephrosis.  left ureteral dilatation without obstructing urolithiasis.     Plan:    -CPM with tamsulosin, harding catheter.  Voiding trial prior to discharge vs as outpatient pending clinical course.    -Patient will follow-up as an outpatient with Dr. Campos for addressing mild left ureteral dilatation.     Annelise Barba PA-C  [facility]  Department of Urology  4/22/2024  7:48 AM

## 2024-04-22 NOTE — OCCUPATIONAL THERAPY NOTE
OCCUPATIONAL THERAPY EVALUATION - INPATIENT     Room Number: 514/514-A  Evaluation Date: 4/22/2024  Type of Evaluation: Initial  Presenting Problem: Severe Sepsis    Physician Order: IP Consult to Occupational Therapy  Reason for Therapy: ADL/IADL Dysfunction and Discharge Planning    OCCUPATIONAL THERAPY ASSESSMENT   Patient is currently functioning near baseline with ADLs and functional mobility. Prior to admission, patient's baseline is IND/MOD I with ADL and mobility.  Patient is requiring supervision as a result of the following impairments: decreased endurance and pain. Occupational Therapy will continue to follow for duration of hospitalization.    Patient will benefit from continued skilled OT Services at discharge to promote functional independence in home.  Anticipate patient will return home with home health OT      History Related to Current Admission: Patient is a 83 year old male admitted on 4/19/2024 with Presenting Problem: Severe Sepsis. Co-Morbidities : PVD, AAA, HTN, cerebrovascular disease, DM, h/o lung and bladder cancer, RA, dyslipidemia    WEIGHT BEARING RESTRICTION  Weight Bearing Restriction: None                Recommendations for nursing staff:   Transfers: one person with RW  Toileting location: toilet    EVALUATION SESSION:  Patient Start of Session: Seated in chair.  FUNCTIONAL TRANSFER ASSESSMENT  Sit to Stand: Chair  Chair: Supervision    BED MOBILITY  Supine to Sit : Not tested  Sit to Supine (OT): Not Tested  Scooting: IND    BALANCE ASSESSMENT  Static Sitting: Independent  Sitting Unilateral: Independent  Static Standing: Supervision  Standing Unilateral: Supervision    FUNCTIONAL ADL ASSESSMENT       ACTIVITY TOLERANCE: Functional mobility approx 6 minutes in hallway utilizing RW. Pt educated on self pacing and endurance to promote mobility more regularly throughout the day.                          O2 SATURATIONS       COGNITION  Overall Cognitive Status:  WFL - within functional  limits    Upper Extremity   ROM: within functional limits   Strength: within functional limits   Coordination  Gross motor: WFL  Fine motor: WFL  Sensation: Light touch:  intact    EDUCATION PROVIDED  Patient: Role of Occupational Therapy; Plan of Care; Functional Transfer Techniques; Posture/Positioning; Proper Body Mechanics; Energy Conservation  Patient's Response to Education: Verbalized Understanding; Returned Demonstration    Equipment used: RW  Demonstrates functional use, Would benefit from additional trial      Therapist comments:  Pt reports no concerns about returning home.    Patient End of Session: Up in chair;Needs met;Call light within reach;RN aware of session/findings;All patient questions and concerns addressed    OCCUPATIONAL PROFILE    HOME SITUATION  Type of Home: House  Home Layout: Two level  Lives With: Spouse;Son               Occupation/Status:         Patient Regularly Uses: Glasses    Prior Level of Function: Pt lives at home with spouse and son. Pt's spouse requires assist. Pt works as a  locally.     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 : A Little  -   Putting on and taking off regular upper body clothing?: None  -   Taking care of personal grooming such as brushing teeth?: None  -   Eating meals?: None    AM-PAC Score:  Score: 21  Approx Degree of Impairment: 32.79%  Standardized Score (AM-PAC Scale): 44.27    ADDITIONAL TESTS     NEUROLOGICAL FINDINGS      COGNITION ASSESSMENTS       PLAN  OT Treatment Plan: Energy conservation/work simplification techniques;ADL  training;IADL training;Balance activities;Functional transfer training;UE strengthening/ROM;Endurance training;Patient/Family education;Patient/Family training;Equipment eval/education;Compensatory technique education;Continued evaluation  Rehab Potential : Good  Frequency: 3x/week  Number of Visits to Meet Established Goals: 3    ADL Goals   Patient will perform grooming: with modified independent  Patient will perform upper body dressing:  with modified independent  Patient will perform lower body dressing:  with modified independent  Patient will perform toileting: with modified independent    Functional Transfer Goals  Patient will transfer from supine to sit:  with modified independent  Patient will transfer from sit to stand:  with modified independent  Patient will transfer to bedside commode:  with modified independent  Patient will transfer to toilet:  with modified independent      Patient Evaluation Complexity Level:   Occupational Profile/Medical History LOW - Brief history including review of medical or therapy records    Specific performance deficits impacting engagement in ADL/IADL LOW  1 - 3 performance deficits    Client Assessment/Performance Deficits MODERATE - Comorbidities and min to mod modifications of tasks    Clinical Decision Making LOW - Analysis of occupational profile, problem-focused assessments, limited treatment options    Overall Complexity LOW     OT Session Time: 16 minutes  Therapeutic Activity: 12 minutes

## 2024-04-22 NOTE — PROGRESS NOTES
Pharmacy Dosing Service  Follow-up Pharmacokinetic Consult for Vancomycin Dosing          Thiago Alcantara is a 83 year old male who is being treated for LLE cellulitis.       Vancomycin (4/19-    Est CrCl: >40 mL/min  TONJA on admission -- SCr now < usual baseline    Pharmacokinetic target: Trough/random 10-15 mg/L    Vancomycin random:  6.7ug/mL (~23hr post dose)        A/P:  Renal fx has improved since admission.  Ordered a ~24hr level to assess if frequency should be adjusted.  Pt currently on vancomycin 1250mg q36hr.    The vancomycin random level is subtherapeutic -- 6.7ug/mL.    Increase vancomycin to 1250mg q24hr based on random level & estimated renal function.    Will re-check a vancomycin trough prior to the 4th new dose if cont'd.      5.  Pharmacy will need SCr daily while on vancomycin to assess renal function.      Pharmacy will continue to follow him and adjust dosing as appropriate.        Lalo Johnson, PharmD, BCPS  4/22/2024  3:35 PM  [facility]: [phone]

## 2024-04-22 NOTE — SPIRITUAL CARE NOTE
Spiritual Care Visit Note    Patient Name: Thiago Alcantara Date of Spiritual Care Visit: 24   : 1940 Primary Dx: Severe sepsis (HCC)       Referred By: Referral From: Nurse    Spiritual Care Taxonomy:    Intended Effects: Aligning care plan with patient's values    Methods: Offer spiritual/Yarsanism support         Visit Type/Summary:     - PoA: Other: Patient did not wish to receive advance directive information at this time.    Spiritual Care support can be requested via an Epic consult. For urgent/immediate needs, please contact the On Call  at: [facility]: ext [phone]    Priscilla Treviño

## 2024-04-22 NOTE — PLAN OF CARE
Alert and oriented, congenial, slightly forgetful.  Lungs diminished (hx most of left lower lung removed), room air.  Denies dyspnea and nausea.  Hyperactive bowel sounds, continent of stool, states is hungry (on 1800 ADA diet).  Waterman (coude) with clear yellow urine, excellent output (for retention w/ hx bladder cancer).  Left leg elevated on pillows above level of heart, between ankle and knee is hot/red/pink/swollen/painful, both feet with 1+ edema. States primary source of pain is the left lateral ankle ulcer (open to air without drainage).  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  See assessments and flowsheets for further data.   Has transfer orders to floor.    At 0158 transferred via bed with transport to [facility] - states will text daughter with new room #.  Transferred with all belongings (including cane and dentures).

## 2024-04-22 NOTE — PROGRESS NOTES
INFECTIOUS DISEASE  PROGRESS NOTE            [facility]    Thiago Alcantara Patient Status:  Inpatient    1940 MRN [id number]   [facility] 5NW-A Attending Elia Church MD   Hosp Day # 3 PCP Tiffany Pierce MD     Antibiotics: #3  Ceftriaxone  vanc    Subjective:  : co pain left leg ulcer, redness, ulceration    Objective:  Temp:  [97.7 °F (36.5 °C)-99.9 °F (37.7 °C)] 97.7 °F (36.5 °C)  Pulse:  [68-87] 68  Resp:  [16-] 17  BP: (109-140)/(55-69) 117/64  SpO2:  [95 %-100 %] 97 %    General: awake alert  Vital signs:Temp:  [97.7 °F (36.5 °C)-99.9 °F (37.7 °C)] 97.7 °F (36.5 °C)  Pulse:  [68-87] 68  Resp:  [16-] 17  BP: (109-140)/(55-69) 117/64  SpO2:  [95 %-100 %] 97 %  HEENT: Moist mucous membranes. Extraocular muscles are intact.  Neck: supple no masses  Respiratory: Non labored, symmetric excursion, normal respirations  Cardiovascular: no irregularities in rhythm  Abdomen: Soft, nontender, nondistended.   Musculoskeletal: left leg erythema fading  Ulcer lateral dry  Labs:     COVID-19 Lab Results    COVID-19  Lab Results   Component Value Date    COVID19 Not Detected 2024    COVID19 Not Detected 2020    COVID19 Not Detected 2020       Pro-Calcitonin  Recent Labs   Lab 24  1622   PCT 4.94*       Cardiac  Recent Labs   Lab 24  1325   PBNP 143       Creatinine Kinase  No results for input(s): \"CK\" in the last 168 hours.    Inflammatory Markers  No results for input(s): \"CRP\", \"ELIZABETH\", \"LDH\", \"DDIMER\" in the last 168 hours.    Recent Labs   Lab 24  0711   RBC 4.04   HGB 9.4*   HCT 28.9*   MCV 71.5*   MCH 23.3*   MCHC 32.5   RDW 21.4   NEPRELIM 16.09*   WBC 18.5*   .0*         Recent Labs   Lab 24  0455 24  0450 24  0711   * 218* 179*   BUN 39* 33* 30*   CREATSERUM 1.87* 1.52* 1.22   CA 8.9 9.5 9.4   ALB 2.6* 2.5* 2.3*    144 141   K 4.9 4.1 4.3   * 115* 112   CO2 21.0 23.0 24.0   ALKPHO 66 68 70   AST  11* 14* 13*   ALT 18 17 15*   BILT 0.7 0.6 0.6   TP 5.6* 6.0* 6.0*       No results found for: \"VANCT\"  Microbiology    Hospital Encounter on 04/19/24   1. Blood Culture     Status: None (Preliminary result)    Collection Time: 04/21/24  4:49 AM    Specimen: Blood,peripheral   Result Value Ref Range    Blood Culture Result No Growth 1 Day N/A   2. Urine Culture, Routine     Status: None    Collection Time: 04/19/24  1:47 PM    Specimen: Urine, clean catch   Result Value Ref Range    Urine Culture No Growth at 18-24 hrs. N/A           Problem list reviewed:  Patient Active Problem List   Diagnosis    Carcinoid tumor of left lung (HCC)    History of bladder cancer    Hypogonadism in male    Erectile dysfunction    TIA (transient ischemic attack)    Varicose veins of both lower extremities with complications    Obesity    High cholesterol    Neuropathy    Aneurysm of abdominal aorta (HCC)    Bladder tumor    Controlled type 2 diabetes mellitus with diabetic neuropathy (HCC)    BPH with urinary obstruction    Type 2 diabetes mellitus (HCC)    Osteoarthritis    Elevated PSA    Hematuria    Hematuria, unspecified type    Pyelonephritis    Elevated INR    Gangrene (HCC)    Acute deep vein thrombosis (DVT) of proximal vein of left lower extremity (HCC)    Leg DVT (deep venous thromboembolism), acute, left (HCC)    Microcytic anemia    Benign carcinoid tumor of lung (HCC)    Urothelial cancer (HCC)    Iron deficiency anemia    Folic acid deficiency    Hemolytic anemia, acute (HCC)    Severe sepsis (HCC)    Cellulitis of left lower extremity    Chronic kidney disease, unspecified CKD stage    Hyperkalemia    Encephalopathy in sepsis    Acute cystitis without hematuria    Septic shock (HCC)    TONJA (acute kidney injury) (HCC)    Metabolic acidosis    Tachycardia    Pain of left lower extremity             ASSESSMENT/PLAN:  1. Salmonella bacteremia  Replace meropenem with ceftriaxone  -quinilone po when ready for DC    2. LLE  cellulitis improved    3. C diff, PO vancomycin      Al Nielsen MD, MD  [facility]  [phone]

## 2024-04-23 LAB
ALBUMIN SERPL-MCNC: 2.3 G/DL (ref 3.4–5)
ALBUMIN/GLOB SERPL: 0.6 {RATIO} (ref 1–2)
ALP LIVER SERPL-CCNC: 78 U/L
ALT SERPL-CCNC: 15 U/L
ANION GAP SERPL CALC-SCNC: 4 MMOL/L (ref 0–18)
AST SERPL-CCNC: 11 U/L (ref 15–37)
BASOPHILS # BLD AUTO: 0.09 X10(3) UL (ref 0–0.2)
BASOPHILS NFR BLD AUTO: 0.7 %
BILIRUB SERPL-MCNC: 0.4 MG/DL (ref 0.1–2)
BUN BLD-MCNC: 29 MG/DL (ref 9–23)
CALCIUM BLD-MCNC: 10.4 MG/DL (ref 8.5–10.1)
CHLORIDE SERPL-SCNC: 112 MMOL/L (ref 98–112)
CO2 SERPL-SCNC: 27 MMOL/L (ref 21–32)
CREAT BLD-MCNC: 1.19 MG/DL
EGFRCR SERPLBLD CKD-EPI 2021: 61 ML/MIN/1.73M2 (ref 60–?)
EOSINOPHIL # BLD AUTO: 0.29 X10(3) UL (ref 0–0.7)
EOSINOPHIL NFR BLD AUTO: 2.1 %
ERYTHROCYTE [DISTWIDTH] IN BLOOD BY AUTOMATED COUNT: 21.2 %
GLOBULIN PLAS-MCNC: 3.7 G/DL (ref 2.8–4.4)
GLUCOSE BLD-MCNC: 162 MG/DL (ref 70–99)
GLUCOSE BLD-MCNC: 164 MG/DL (ref 70–99)
GLUCOSE BLD-MCNC: 184 MG/DL (ref 70–99)
GLUCOSE BLD-MCNC: 224 MG/DL (ref 70–99)
GLUCOSE BLD-MCNC: 286 MG/DL (ref 70–99)
HCT VFR BLD AUTO: 32.1 %
HGB BLD-MCNC: 9.8 G/DL
IMM GRANULOCYTES # BLD AUTO: 0.34 X10(3) UL (ref 0–1)
IMM GRANULOCYTES NFR BLD: 2.5 %
LYMPHOCYTES # BLD AUTO: 1.72 X10(3) UL (ref 1–4)
LYMPHOCYTES NFR BLD AUTO: 12.7 %
MAGNESIUM SERPL-MCNC: 2.1 MG/DL (ref 1.6–2.6)
MCH RBC QN AUTO: 21.7 PG (ref 26–34)
MCHC RBC AUTO-ENTMCNC: 30.5 G/DL (ref 31–37)
MCV RBC AUTO: 71 FL
MONOCYTES # BLD AUTO: 0.89 X10(3) UL (ref 0.1–1)
MONOCYTES NFR BLD AUTO: 6.6 %
NEUTROPHILS # BLD AUTO: 10.21 X10 (3) UL (ref 1.5–7.7)
NEUTROPHILS # BLD AUTO: 10.21 X10(3) UL (ref 1.5–7.7)
NEUTROPHILS NFR BLD AUTO: 75.4 %
OSMOLALITY SERPL CALC.SUM OF ELEC: 305 MOSM/KG (ref 275–295)
PLATELET # BLD AUTO: 178 10(3)UL (ref 150–450)
PLATELETS.RETICULATED NFR BLD AUTO: 7.5 % (ref 0–7)
POTASSIUM SERPL-SCNC: 4.3 MMOL/L (ref 3.5–5.1)
PROT SERPL-MCNC: 6 G/DL (ref 6.4–8.2)
RBC # BLD AUTO: 4.52 X10(6)UL
SODIUM SERPL-SCNC: 143 MMOL/L (ref 136–145)
WBC # BLD AUTO: 13.5 X10(3) UL (ref 4–11)

## 2024-04-23 PROCEDURE — 99232 SBSQ HOSP IP/OBS MODERATE 35: CPT | Performed by: INTERNAL MEDICINE

## 2024-04-23 RX ORDER — LISINOPRIL 2.5 MG/1
2.5 TABLET ORAL DAILY
Status: DISCONTINUED | OUTPATIENT
Start: 2024-04-23 | End: 2024-04-24

## 2024-04-23 RX ORDER — FUROSEMIDE 10 MG/ML
20 INJECTION INTRAMUSCULAR; INTRAVENOUS ONCE
Status: COMPLETED | OUTPATIENT
Start: 2024-04-23 | End: 2024-04-23

## 2024-04-23 NOTE — PROGRESS NOTES
[facility]   part of [facility]     Hospitalist Progress Note     Thiago Alcantara Patient Status:  Inpatient    1940 MRN [id number]   Location [facility] 4SW-A Attending Elia Church MD   Hosp Day # 4 PCP Tiffany Pierce MD     Chief Complaint: LLE pain    Subjective:     Feels well except Left leg remains swollen and painful       Objective:    Review of Systems:   A comprehensive review of systems was completed; pertinent positive and negatives stated in subjective.    Vital signs:  Temp:  [97.6 °F (36.4 °C)-98.4 °F (36.9 °C)] 98.4 °F (36.9 °C)  Pulse:  [61-80] 69  Resp:  [16-18] 17  BP: (129-145)/(66-83) 129/66  SpO2:  [90 %-98 %] 98 %    Physical Exam:    General: No acute distress  Respiratory: No wheezes, no rhonchi  Cardiovascular: S1, S2, regular rate and rhythm  Abdomen: Soft, Non-tender, non-distended, positive bowel sounds  Neuro: No new focal deficits.   Extremities: LLE erythema/edema    Diagnostic Data:    Labs:  Recent Labs   Lab 24  1325 24  1848 24  0455 24  0449 24  0711 24  0647   WBC 20.2*  --  29.9* 20.9* 18.5* 13.5*   HGB 11.8*  --  10.8* 9.5* 9.4* 9.8*   MCV 72.9*  --  71.2* 71.8* 71.5* 71.0*   .0  --  163.0 134.0* 149.0* 178.0   INR  --  1.31*  --   --   --   --        Recent Labs   Lab 24  0450 24  0711 24  0647   * 179* 162*   BUN 33* 30* 29*   CREATSERUM 1.52* 1.22 1.19   CA 9.5 9.4 10.4*   ALB 2.5* 2.3* 2.3*    141 143   K 4.1 4.3 4.3   * 112 112   CO2 23.0 24.0 27.0   ALKPHO 68 70 78   AST 14* 13* 11*   ALT 17 15* 15*   BILT 0.6 0.6 0.4   TP 6.0* 6.0* 6.0*       Estimated Creatinine Clearance: 47.1 mL/min (based on SCr of 1.19 mg/dL).    Recent Labs   Lab 24  1325   TROPHS 15       Recent Labs   Lab 24  1848   PTP 16.4*   INR 1.31*                  Microbiology    Hospital Encounter on 24   1. Blood Culture     Status: None (Preliminary result)    Collection  Time: 04/21/24  4:49 AM    Specimen: Blood,peripheral   Result Value Ref Range    Blood Culture Result No Growth 2 Days N/A   2. Urine Culture, Routine     Status: None    Collection Time: 04/19/24  1:47 PM    Specimen: Urine, clean catch   Result Value Ref Range    Urine Culture No Growth at 18-24 hrs. N/A         Imaging: Reviewed in Epic.    Medications:    predniSONE  10 mg Oral Daily with breakfast    cefTRIAXone  2 g Intravenous Q24H    vancomycin  125 mg Oral QID    tamsulosin  0.4 mg Oral Daily    docusate sodium  100 mg Oral BID    insulin degludec  10 Units Subcutaneous Nightly    atorvastatin  10 mg Oral Nightly    apixaban  5 mg Oral BID    folic acid  1 mg Oral Daily    insulin aspart  1-10 Units Subcutaneous TID AC and HS    insulin aspart  1-68 Units Subcutaneous TID CC       Assessment & Plan:      #Septic shock d/t LLE cellulitis   #Salmonella Bacteremia   -Shock resolved, stable off vasopressors   -repeat Cultures no growth   -antibx per ID on consult      #Cdiff diarrhea  -PO Vancomycin   -GI Stool PCR    #Cerebrovascular disease  #VTE history  -Elqiuis      #Wide complex tachycardia  -Amiodarone  -ECHO preserved EF, Grade 1 diastolic dysfunction   -Telemetry  -Cardiology consult    #HTN  -Hold Lisinopril      #Acute kidney injury on chronic kidney disease III   -resolved with IVF     #VTE on DOAC  -DOAC     #Diabetes mellitus  -exacerbated by steroids   -ISS  -Degludec      #Seronegative rheumatoid arthritis  -Chronic Prednisone   -Hold Methotrexate     #History of lung cancer  #Bladder cancer sp resection of tumor, Urinary retention -Fomax, Waterman, Urology on consult          Shin Bose DO    Supplementary Documentation:     Quality:  DVT Mechanical Prophylaxis:        DVT Pharmacologic Prophylaxis   Medication    apixaban (Eliquis) tab 5 mg                Code Status: Full Code  Waterman: Waterman catheter in place    The 21st Century Cures Act makes medical notes like these available to patients  in the interest of transparency. Please be advised this is a medical document. Medical documents are intended to carry relevant information, facts as evident, and the clinical opinion of the practitioner. The medical note is intended as peer to peer communication and may appear blunt or direct. It is written in medical language and may contain abbreviations or verbiage that are unfamiliar.             **Certification      PHYSICIAN Certification of Need for Inpatient Hospitalization - Initial Certification    Patient will require inpatient services that will reasonably be expected to span two midnight's based on the clinical documentation in H+P.   Based on patients current state of illness, I anticipate that, after discharge, patient will require TBD.

## 2024-04-23 NOTE — PROGRESS NOTES
INFECTIOUS DISEASE  PROGRESS NOTE            [facility]    Thiago Alcantara Patient Status:  Inpatient    1940 MRN [id number]   [facility] 5NW-A Attending Elia Church MD   Hosp Day # 4 PCP Tiffany Pierce MD     Antibiotics: #4  Ceftriaxone    Subjective:  Left leg redness    Objective:  Temp:  [97.6 °F (36.4 °C)-98.4 °F (36.9 °C)] 98.4 °F (36.9 °C)  Pulse:  [61-80] 69  Resp:  [16-18] 17  BP: (129-145)/(66-83) 129/66  SpO2:  [90 %-98 %] 98 %    General: awake alert  Vital signs:Temp:  [97.6 °F (36.4 °C)-98.4 °F (36.9 °C)] 98.4 °F (36.9 °C)  Pulse:  [61-80] 69  Resp:  [16-18] 17  BP: (129-145)/(66-83) 129/66  SpO2:  [90 %-98 %] 98 %  HEENT: Moist mucous membranes. Extraocular muscles are intact.  Neck: supple no masses  Respiratory: Non labored, symmetric excursion, normal respirations  Cardiovascular: no irregularities in rhythm  Abdomen: Soft, nontender, nondistended.   Musculoskeletal: left leg erythema fading  Ulcer lateral dry  Labs:     COVID-19 Lab Results    COVID-19  Lab Results   Component Value Date    COVID19 Not Detected 2024    COVID19 Not Detected 2020    COVID19 Not Detected 2020       Pro-Calcitonin  Recent Labs   Lab 24  1622   PCT 4.94*       Cardiac  Recent Labs   Lab 24  1325   PBNP 143       Creatinine Kinase  No results for input(s): \"CK\" in the last 168 hours.    Inflammatory Markers  No results for input(s): \"CRP\", \"ELIZABETH\", \"LDH\", \"DDIMER\" in the last 168 hours.    Recent Labs   Lab 24  0647   RBC 4.52   HGB 9.8*   HCT 32.1*   MCV 71.0*   MCH 21.7*   MCHC 30.5*   RDW 21.2   NEPRELIM 10.21*   WBC 13.5*   .0         Recent Labs   Lab 24  0450 24  0711 24  0647   * 179* 162*   BUN 33* 30* 29*   CREATSERUM 1.52* 1.22 1.19   CA 9.5 9.4 10.4*   ALB 2.5* 2.3* 2.3*    141 143   K 4.1 4.3 4.3   * 112 112   CO2 23.0 24.0 27.0   ALKPHO 68 70 78   AST 14* 13* 11*   ALT 17 15* 15*   BILT  0.6 0.6 0.4   TP 6.0* 6.0* 6.0*       Vancomycin Trough (ug/mL)   Date Value   04/22/2024 6.7 (L)     Microbiology    Hospital Encounter on 04/19/24   1. Blood Culture     Status: None (Preliminary result)    Collection Time: 04/21/24  4:49 AM    Specimen: Blood,peripheral   Result Value Ref Range    Blood Culture Result No Growth 2 Days N/A   2. Urine Culture, Routine     Status: None    Collection Time: 04/19/24  1:47 PM    Specimen: Urine, clean catch   Result Value Ref Range    Urine Culture No Growth at 18-24 hrs. N/A           Problem list reviewed:  Patient Active Problem List   Diagnosis    Carcinoid tumor of left lung (HCC)    History of bladder cancer    Hypogonadism in male    Erectile dysfunction    TIA (transient ischemic attack)    Varicose veins of both lower extremities with complications    Obesity    High cholesterol    Neuropathy    Aneurysm of abdominal aorta (HCC)    Bladder tumor    Controlled type 2 diabetes mellitus with diabetic neuropathy (HCC)    BPH with urinary obstruction    Type 2 diabetes mellitus (HCC)    Osteoarthritis    Elevated PSA    Hematuria    Hematuria, unspecified type    Pyelonephritis    Elevated INR    Gangrene (HCC)    Acute deep vein thrombosis (DVT) of proximal vein of left lower extremity (HCC)    Leg DVT (deep venous thromboembolism), acute, left (HCC)    Microcytic anemia    Benign carcinoid tumor of lung (HCC)    Urothelial cancer (HCC)    Iron deficiency anemia    Folic acid deficiency    Hemolytic anemia, acute (HCC)    Severe sepsis (HCC)    Cellulitis of left lower extremity    Chronic kidney disease, unspecified CKD stage    Hyperkalemia    Encephalopathy in sepsis    Acute cystitis without hematuria    Septic shock (HCC)    TONJA (acute kidney injury) (HCC)    Metabolic acidosis    Tachycardia    Pain of left lower extremity             ASSESSMENT/PLAN:  1. Salmonella bacteremia  -admitted with recurrent LLE cellulitis  Cont ceftriaxone  -po abx when ready for  dc    3. C diff, PO vancomycin      Al Nielsen MD, MD [facility]  [phone]

## 2024-04-23 NOTE — PLAN OF CARE
Problem: CARDIOVASCULAR - ADULT  Goal: Maintains optimal cardiac output and hemodynamic stability  Description: INTERVENTIONS:  - Monitor vital signs, rhythm, and trends  - Monitor for bleeding, hypotension and signs of decreased cardiac output  - Evaluate effectiveness of vasoactive medications to optimize hemodynamic stability  - Monitor arterial and/or venous puncture sites for bleeding and/or hematoma  - Assess quality of pulses, skin color and temperature  - Assess for signs of decreased coronary artery perfusion - ex. Angina  - Evaluate fluid balance, assess for edema, trend weights  Outcome: Progressing     Problem: GENITOURINARY - ADULT  Goal: Absence of urinary retention  Description: INTERVENTIONS:  - Assess patient’s ability to void and empty bladder  - Monitor intake/output and perform bladder scan as needed  - Follow urinary retention protocol/standard of care  - Consider collaborating with pharmacy to review patient's medication profile  - Implement strategies to promote bladder emptying  Outcome: Progressing     Problem: METABOLIC/FLUID AND ELECTROLYTES - ADULT  Goal: Glucose maintained within prescribed range  Description: INTERVENTIONS:  - Monitor Blood Glucose as ordered  - Assess for signs and symptoms of hyperglycemia and hypoglycemia  - Administer ordered medications to maintain glucose within target range  - Assess barriers to adequate nutritional intake and initiate nutrition consult as needed  - Instruct patient on self management of diabetes  Outcome: Progressing     Problem: SKIN/TISSUE INTEGRITY - ADULT  Goal: Skin integrity remains intact  Description: INTERVENTIONS  - Assess and document risk factors for pressure ulcer development  - Assess and document skin integrity  - Monitor for areas of redness and/or skin breakdown  - Initiate interventions, skin care algorithm/standards of care as needed  Outcome: Progressing  Goal: Incision(s), wounds(s) or drain site(s) healing without S/S of  infection  Description: INTERVENTIONS:  - Assess and document risk factors for pressure ulcer development  - Assess and document skin integrity  - Assess and document dressing/incision, wound bed, drain sites and surrounding tissue  - Implement wound care per orders  - Initiate isolation precautions as appropriate  - Initiate Pressure Ulcer prevention bundle as indicated  Outcome: Progressing     Problem: HEMATOLOGIC - ADULT  Goal: Free from bleeding injury  Description: (Example usage: patient with low platelets)  INTERVENTIONS:  - Avoid intramuscular injections, enemas and rectal medication administration  - Ensure safe mobilization of patient  - Hold pressure on venipuncture sites to achieve adequate hemostasis  - Assess for signs and symptoms of internal bleeding  - Monitor lab trends  - Patient is to report abnormal signs of bleeding to staff  - Avoid use of toothpicks and dental floss  - Use electric shaver for shaving  - Use soft bristle tooth brush  - Limit straining and forceful nose blowing  Outcome: Progressing     Problem: Impaired Activities of Daily Living  Goal: Achieve highest/safest level of independence in self care  Description: Interventions:  - Assess ability and encourage patient to participate in ADLs to maximize function  - Promote sitting position while performing ADLs such as feeding, grooming, and bathing  - Educate and encourage patient/family in tolerated functional activity level and precautions during self-care  - Provide support under elbow of weak side to prevent shoulder subluxation  - Encourage patient to incorporate impaired side during daily activities to promote function  Outcome: Progressing     Problem: PAIN - ADULT  Goal: Verbalizes/displays adequate comfort level or patient's stated pain goal  Description: INTERVENTIONS:  - Encourage pt to monitor pain and request assistance  - Assess pain using appropriate pain scale  - Administer analgesics based on type and severity of  pain and evaluate response  - Implement non-pharmacological measures as appropriate and evaluate response  - Consider cultural and social influences on pain and pain management  - Manage/alleviate anxiety  - Utilize distraction and/or relaxation techniques  - Monitor for opioid side effects  - Notify MD/LIP if interventions unsuccessful or patient reports new pain  - Anticipate increased pain with activity and pre-medicate as appropriate  Outcome: Progressing     Problem: SAFETY ADULT - FALL  Goal: Free from fall injury  Description: INTERVENTIONS:  - Assess pt frequently for physical needs  - Identify cognitive and physical deficits and behaviors that affect risk of falls.  - [place] fall precautions as indicated by assessment.  - Educate pt/family on patient safety including physical limitations  - Instruct pt to call for assistance with activity based on assessment  - Modify environment to reduce risk of injury  - Provide assistive devices as appropriate  - Consider OT/PT consult to assist with strengthening/mobility  - Encourage toileting schedule  Outcome: Progressing     Problem: Patient/Family Goals  Goal: Patient/Family Long Term Goal  Description: Patient's Long Term Goal: Go home    Interventions:  - DM education  - learn to give insulin injections  - learn and be willing to carb count  - See additional Care Plan goals for specific interventions  Outcome: Progressing  Goal: Patient/Family Short Term Goal  Description: Patient's Short Term Goal: have pain be under control    Interventions:   - report pain to staff  - prn meds    - See additional Care Plan goals for specific interventions  Outcome: Progressing

## 2024-04-23 NOTE — CM/SW NOTE
04/23/24 1500   CM/SW Referral Data   Referral Source Social Work (self-referral)   Reason for Referral Discharge planning   Informant EMR;Clinical Staff Member;Patient   Medical Hx   Does patient have an established PCP? Yes   Patient Info   Patient's Current Mental Status at Time of Assessment Alert;Oriented   Patient's Home Environment House   Number of Levels in Home 2   Patient lives with Spouse/Significant other;Son   Discharge Needs   Anticipated D/C needs Home health care   Choice of Post-Acute Provider   Informed patient of right to choose their preferred provider Yes   List of appropriate post-acute services provided to patient/family with quality data Yes   Information given to Patient   Residential HHC/Hospice financial disclosure given Yes   Patient declines recommended services Yes     HOME SITUATION per  PT eval  Type of Home: House   Home Layout: Two level  Stairs to Enter : 1  Stairs to Bedroom: 14  Railing: Yes     Lives With: Spouse;Son  Patient Owned Equipment: Cane     Prior Level of [place] per PT eval: Pt typically independent/mod I with all functional mobility. Pt usually ambulates without assistive device but does use cane occasionally if needed. Pt does grocery shopping, cooking, etc at home. Bedroom and bathroom upstairs, Daughter lives ~15 min away.  (Per PT evaluation)     Patient is an 82 y/o male admitted due to severe sepsis. Per PT eval, pt would benefit from HH at [facility].      Met with pt, who was alert and oriented, at bedside to discuss discharge planning. Provided pt with HH options list. Pt not agreeable with HH at [facility]. Pt stated he does not want any HH services. SW inquired about OP PT services for pt at [facility]. Pt declined OP PT as well. Pt declined having any other discharge needs at this time.  to remain available for support and/or discharge planning.    YOSELIN Edwards  Discharge Planner  [phone]

## 2024-04-23 NOTE — PROGRESS NOTES
Pt A&O X 3. Can be forgetful. Room air. Vanco PO. IV Abx. NSR on Tele. Eliquis. Caude catheter. Need stool sample. Denies any pain. Carb controlled diet. Accuchecks. Up X 1 with walker. No further needs at this time.

## 2024-04-23 NOTE — CM/SW NOTE
Spoke with Claudine from [facility] who was following up with patient. She is unsure of abx at this time. Per Dr. Nielsen note yesterday, quinilone po when ready for DC for the Salmonella Bacteremia and PO vancomycin for the C Diff. Await update if IV abx are needed. Patient would only qualify for in office if using [facility] for IV abx.     SW/CM to remain available for support and/or discharge planning.    YOSELIN Dailey  Discharge Planner  [phone]

## 2024-04-24 VITALS
DIASTOLIC BLOOD PRESSURE: 65 MMHG | BODY MASS INDEX: 37 KG/M2 | WEIGHT: 243.63 LBS | OXYGEN SATURATION: 98 % | HEART RATE: 75 BPM | SYSTOLIC BLOOD PRESSURE: 111 MMHG | RESPIRATION RATE: 18 BRPM | TEMPERATURE: 98 F

## 2024-04-24 LAB
BASOPHILS # BLD: 0 X10(3) UL (ref 0–0.2)
BASOPHILS NFR BLD: 0 %
EOSINOPHIL # BLD: 0.28 X10(3) UL (ref 0–0.7)
EOSINOPHIL NFR BLD: 2 %
ERYTHROCYTE [DISTWIDTH] IN BLOOD BY AUTOMATED COUNT: 19.2 %
GLUCOSE BLD-MCNC: 153 MG/DL (ref 70–99)
GLUCOSE BLD-MCNC: 201 MG/DL (ref 70–99)
GLUCOSE BLD-MCNC: 279 MG/DL (ref 70–99)
HCT VFR BLD AUTO: 37.1 %
HGB BLD-MCNC: 11 G/DL
LYMPHOCYTES NFR BLD: 16 %
LYMPHOCYTES NFR BLD: 2.27 X10(3) UL (ref 1–4)
MCH RBC QN AUTO: 20 PG (ref 26–34)
MCHC RBC AUTO-ENTMCNC: 29.6 G/DL (ref 31–37)
MCV RBC AUTO: 67.3 FL
MONOCYTES # BLD: 0.99 X10(3) UL (ref 0.1–1)
MONOCYTES NFR BLD: 7 %
MORPHOLOGY: NORMAL
NEUTROPHILS # BLD AUTO: 9.49 X10 (3) UL (ref 1.5–7.7)
NEUTROPHILS NFR BLD: 75 %
NEUTS HYPERSEG # BLD: 10.65 X10(3) UL (ref 1.5–7.7)
PLATELET # BLD AUTO: 198 10(3)UL (ref 150–450)
PLATELET MORPHOLOGY: NORMAL
PLATELETS.RETICULATED NFR BLD AUTO: 8.2 % (ref 0–7)
RBC # BLD AUTO: 5.51 X10(6)UL
TOTAL CELLS COUNTED BLD: 100
WBC # BLD AUTO: 14.2 X10(3) UL (ref 4–11)

## 2024-04-24 PROCEDURE — 99239 HOSP IP/OBS DSCHRG MGMT >30: CPT | Performed by: INTERNAL MEDICINE

## 2024-04-24 RX ORDER — TAMSULOSIN HYDROCHLORIDE 0.4 MG/1
0.4 CAPSULE ORAL DAILY
Qty: 30 CAPSULE | Refills: 0 | Status: SHIPPED | OUTPATIENT
Start: 2024-04-25 | End: 2024-05-25

## 2024-04-24 RX ORDER — VANCOMYCIN HYDROCHLORIDE 125 MG/1
125 CAPSULE ORAL 4 TIMES DAILY
Qty: 40 CAPSULE | Refills: 0 | Status: SHIPPED | OUTPATIENT
Start: 2024-04-24 | End: 2024-05-04

## 2024-04-24 RX ORDER — LEVOFLOXACIN 500 MG/1
500 TABLET, FILM COATED ORAL DAILY
Qty: 10 TABLET | Refills: 0 | Status: SHIPPED | OUTPATIENT
Start: 2024-04-24 | End: 2024-05-04

## 2024-04-24 NOTE — PLAN OF CARE
Problem: CARDIOVASCULAR - ADULT  Goal: Maintains optimal cardiac output and hemodynamic stability  Description: INTERVENTIONS:  - Monitor vital signs, rhythm, and trends  - Monitor for bleeding, hypotension and signs of decreased cardiac output  - Evaluate effectiveness of vasoactive medications to optimize hemodynamic stability  - Monitor arterial and/or venous puncture sites for bleeding and/or hematoma  - Assess quality of pulses, skin color and temperature  - Assess for signs of decreased coronary artery perfusion - ex. Angina  - Evaluate fluid balance, assess for edema, trend weights  Outcome: Progressing     Problem: GENITOURINARY - ADULT  Goal: Absence of urinary retention  Description: INTERVENTIONS:  - Assess patient’s ability to void and empty bladder  - Monitor intake/output and perform bladder scan as needed  - Follow urinary retention protocol/standard of care  - Consider collaborating with pharmacy to review patient's medication profile  - Implement strategies to promote bladder emptying  Outcome: Progressing     Problem: METABOLIC/FLUID AND ELECTROLYTES - ADULT  Goal: Glucose maintained within prescribed range  Description: INTERVENTIONS:  - Monitor Blood Glucose as ordered  - Assess for signs and symptoms of hyperglycemia and hypoglycemia  - Administer ordered medications to maintain glucose within target range  - Assess barriers to adequate nutritional intake and initiate nutrition consult as needed  - Instruct patient on self management of diabetes  Outcome: Progressing     Problem: SKIN/TISSUE INTEGRITY - ADULT  Goal: Skin integrity remains intact  Description: INTERVENTIONS  - Assess and document risk factors for pressure ulcer development  - Assess and document skin integrity  - Monitor for areas of redness and/or skin breakdown  - Initiate interventions, skin care algorithm/standards of care as needed  Outcome: Progressing  Goal: Incision(s), wounds(s) or drain site(s) healing without S/S of  infection  Description: INTERVENTIONS:  - Assess and document risk factors for pressure ulcer development  - Assess and document skin integrity  - Assess and document dressing/incision, wound bed, drain sites and surrounding tissue  - Implement wound care per orders  - Initiate isolation precautions as appropriate  - Initiate Pressure Ulcer prevention bundle as indicated  Outcome: Progressing     Problem: HEMATOLOGIC - ADULT  Goal: Free from bleeding injury  Description: (Example usage: patient with low platelets)  INTERVENTIONS:  - Avoid intramuscular injections, enemas and rectal medication administration  - Ensure safe mobilization of patient  - Hold pressure on venipuncture sites to achieve adequate hemostasis  - Assess for signs and symptoms of internal bleeding  - Monitor lab trends  - Patient is to report abnormal signs of bleeding to staff  - Avoid use of toothpicks and dental floss  - Use electric shaver for shaving  - Use soft bristle tooth brush  - Limit straining and forceful nose blowing  Outcome: Progressing     Problem: MUSCULOSKELETAL - ADULT  Goal: Return mobility to safest level of function  Description: INTERVENTIONS:  - Assess patient stability and activity tolerance for standing, transferring and ambulating w/ or w/o assistive devices  - Assist with transfers and ambulation using safe patient handling equipment as needed  - Ensure adequate protection for wounds/incisions during mobilization  - Obtain PT/OT consults as needed  - Advance activity as appropriate  - Communicate ordered activity level and limitations with patient/family  Outcome: Progressing     Problem: NEUROLOGICAL - ADULT  Goal: Achieves stable or improved neurological status  Description: INTERVENTIONS  - Assess for and report changes in neurological status  - Initiate measures to prevent increased intracranial pressure  - Maintain blood pressure and fluid volume within ordered parameters to optimize cerebral perfusion and  minimize risk of hemorrhage  - Monitor temperature, glucose, and sodium. Initiate appropriate interventions as ordered  Outcome: Progressing     Problem: Impaired Functional Mobility  Goal: Achieve highest/safest level of mobility/gait  Description: Interventions:  - Assess patient's functional ability and stability  - Promote increasing activity/tolerance for mobility and gait  - Educate and engage patient/family in tolerated activity level and precautions  - Use towel roll for neutral alignment of cervical spine  Outcome: Progressing     Problem: Impaired Activities of Daily Living  Goal: Achieve highest/safest level of independence in self care  Description: Interventions:  - Assess ability and encourage patient to participate in ADLs to maximize function  - Promote sitting position while performing ADLs such as feeding, grooming, and bathing  - Educate and encourage patient/family in tolerated functional activity level and precautions during self-care  - Provide support under elbow of weak side to prevent shoulder subluxation  - Encourage patient to incorporate impaired side during daily activities to promote function  Outcome: Progressing     Problem: PAIN - ADULT  Goal: Verbalizes/displays adequate comfort level or patient's stated pain goal  Description: INTERVENTIONS:  - Encourage pt to monitor pain and request assistance  - Assess pain using appropriate pain scale  - Administer analgesics based on type and severity of pain and evaluate response  - Implement non-pharmacological measures as appropriate and evaluate response  - Consider cultural and social influences on pain and pain management  - Manage/alleviate anxiety  - Utilize distraction and/or relaxation techniques  - Monitor for opioid side effects  - Notify MD/LIP if interventions unsuccessful or patient reports new pain  - Anticipate increased pain with activity and pre-medicate as appropriate  Outcome: Progressing     Problem: SAFETY ADULT -  FALL  Goal: Free from fall injury  Description: INTERVENTIONS:  - Assess pt frequently for physical needs  - Identify cognitive and physical deficits and behaviors that affect risk of falls.  - [place] fall precautions as indicated by assessment.  - Educate pt/family on patient safety including physical limitations  - Instruct pt to call for assistance with activity based on assessment  - Modify environment to reduce risk of injury  - Provide assistive devices as appropriate  - Consider OT/PT consult to assist with strengthening/mobility  - Encourage toileting schedule  Outcome: Progressing     Problem: Patient/Family Goals  Goal: Patient/Family Long Term Goal  Description: Patient's Long Term Goal: Go home    Interventions:  - DM education  - learn to give insulin injections  - learn and be willing to carb count  - See additional Care Plan goals for specific interventions  Outcome: Progressing  Goal: Patient/Family Short Term Goal  Description: Patient's Short Term Goal: have pain be under control    Interventions:   - report pain to staff  - prn meds    - See additional Care Plan goals for specific interventions  Outcome: Progressing

## 2024-04-24 NOTE — PROGRESS NOTES
INFECTIOUS DISEASE  PROGRESS NOTE            [facility]    Thiago Alcantara Patient Status:  Inpatient    1940 MRN [id number]   Location [facility] 5NW-A Attending Elia Church MD   Hosp Day # 5 PCP Tiffany Pierce MD     Antibiotics: #5  Ceftriaxone    Subjective:  Left leg redness improved    Objective:  Temp:  [97.5 °F (36.4 °C)-98.4 °F (36.9 °C)] 97.6 °F (36.4 °C)  Pulse:  [65-74] 72  Resp:  [17-19] 18  BP: (103-131)/(56-80) 127/59  SpO2:  [94 %-96 %] 96 %    General: awake alert  Vital signs:Temp:  [97.5 °F (36.4 °C)-98.4 °F (36.9 °C)] 97.6 °F (36.4 °C)  Pulse:  [65-74] 72  Resp:  [17-19] 18  BP: (103-131)/(56-80) 127/59  SpO2:  [94 %-96 %] 96 %  HEENT: Moist mucous membranes. Extraocular muscles are intact.  Neck: supple no masses  Respiratory: Non labored, symmetric excursion, normal respirations  Cardiovascular: no irregularities in rhythm  Abdomen: Soft, nontender, nondistended.   Musculoskeletal: left leg erythema fading  Ulcer lateral dry  Labs:     COVID-19 Lab Results    COVID-19  Lab Results   Component Value Date    COVID19 Not Detected 2024    COVID19 Not Detected 2020    COVID19 Not Detected 2020       Pro-Calcitonin  Recent Labs   Lab 24  1622   PCT 4.94*       Cardiac  Recent Labs   Lab 24  1325   PBNP 143       Creatinine Kinase  No results for input(s): \"CK\" in the last 168 hours.    Inflammatory Markers  No results for input(s): \"CRP\", \"ELIZABETH\", \"LDH\", \"DDIMER\" in the last 168 hours.    Recent Labs   Lab 24  0909   RBC 5.51   HGB 11.0*   HCT 37.1*   MCV 67.3*   MCH 20.0*   MCHC 29.6*   RDW 19.2   NEPRELIM 9.49*   WBC 14.2*   .0   NEUT 75   LYMPH 16   MON 7   EOS 2         Recent Labs   Lab 24  0450 24  0711 24  0647   * 179* 162*   BUN 33* 30* 29*   CREATSERUM 1.52* 1.22 1.19   CA 9.5 9.4 10.4*   ALB 2.5* 2.3* 2.3*    141 143   K 4.1 4.3 4.3   * 112 112   CO2 23.0 24.0 27.0   ALKPHO 68 70  78   AST 14* 13* 11*   ALT 17 15* 15*   BILT 0.6 0.6 0.4   TP 6.0* 6.0* 6.0*       Vancomycin Trough (ug/mL)   Date Value   04/22/2024 6.7 (L)     Microbiology    Hospital Encounter on 04/19/24   1. Blood Culture     Status: None (Preliminary result)    Collection Time: 04/21/24  4:49 AM    Specimen: Blood,peripheral   Result Value Ref Range    Blood Culture Result No Growth 3 Days N/A   2. Urine Culture, Routine     Status: None    Collection Time: 04/19/24  1:47 PM    Specimen: Urine, clean catch   Result Value Ref Range    Urine Culture No Growth at 18-24 hrs. N/A           Problem list reviewed:  Patient Active Problem List   Diagnosis    Carcinoid tumor of left lung (HCC)    History of bladder cancer    Hypogonadism in male    Erectile dysfunction    TIA (transient ischemic attack)    Varicose veins of both lower extremities with complications    Obesity    High cholesterol    Neuropathy    Aneurysm of abdominal aorta (HCC)    Bladder tumor    Controlled type 2 diabetes mellitus with diabetic neuropathy (HCC)    BPH with urinary obstruction    Type 2 diabetes mellitus (HCC)    Osteoarthritis    Elevated PSA    Hematuria    Hematuria, unspecified type    Pyelonephritis    Elevated INR    Gangrene (HCC)    Acute deep vein thrombosis (DVT) of proximal vein of left lower extremity (HCC)    Leg DVT (deep venous thromboembolism), acute, left (HCC)    Microcytic anemia    Benign carcinoid tumor of lung (HCC)    Urothelial cancer (HCC)    Iron deficiency anemia    Folic acid deficiency    Hemolytic anemia, acute (HCC)    Severe sepsis (HCC)    Cellulitis of left lower extremity    Chronic kidney disease, unspecified CKD stage    Hyperkalemia    Encephalopathy in sepsis    Acute cystitis without hematuria    Septic shock (HCC)    TONJA (acute kidney injury) (HCC)    Metabolic acidosis    Tachycardia    Pain of left lower extremity             ASSESSMENT/PLAN:  1. Salmonella bacteremia  -admitted with recurrent LLE  cellulitis  Cont ceftriaxone  -po levaquin when ready for dc    3. C diff, PO vancomycin      Al Nielsen MD, MD  [facility]  [phone]

## 2024-04-24 NOTE — DISCHARGE SUMMARY
[facility]     Thiago Alcantara Patient Status:  Inpatient    1940 MRN [id number]   Location [facility] 5NW-A Attending Shin Bose, DO   Hosp Day # 5 PCP Tiffany Pierce MD     Date of Admission: 2024  Date of Discharge:   2024    Discharge Disposition: Home or Self Care    Discharge Diagnosis:  #Septic shock d/t LLE cellulitis   #Salmonella Bacteremia   -Shock resolved, stable off vasopressors   -repeat Cultures no growth   -antibx per ID on consult       #Cdiff diarrhea  -PO Vancomycin   -GI Stool PCR     #Cerebrovascular disease  #VTE history  -Elqiuis      #Wide complex tachycardia  -s/p Amiodarone  -ECHO preserved EF, Grade 1 diastolic dysfunction   -Telemetry  -Cardiology consult     #HTN  -Hold Lisinopril      #Acute kidney injury on chronic kidney disease III   -resolved with IVF     #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 Patient was in significant amount of pain on arrival he was given analgesics, anxiolytics and is snoring at the time of visit. Family at bedside. Patient is private and does not share information. Per wife patient with LLE pain prompting IC visit today and was then sent to the ER. Patient hypotensive after IVF and IV abx, vasopressors started.      Patient with wide complex tachycardia on arrival > Amiodarone     Brief Synopsis: Patient was admitted, he initially was admitted to the intensive care unit and  septic shock, this was attributed to left lower extremity cellulitis and his blood cultures came back positive for Salmonella.  Infectious disease following consultation and adjusted inpatient antibiotics, final cultures returned and patient to continue with oral sonya quinolone.  He also tested positive for C. difficile and is being treated with oral vancomycin course.  Patient hemodynamically stabilized.  For his wide-complex tachycardia he was briefly started on amiodarone and this resolved primarily with septic treatment.  Patient discharged home in stable condition.    Lace+ Score: 78  59-90 High Risk  29-58 Medium Risk  0-28   Low Risk       TCM Follow-Up Recommendation:  LACE > 58: High Risk of readmission after discharge from the hospital.      Procedures during hospitalization:   N/a    Incidental or significant findings and recommendations (brief descriptions):  N/a    Lab/Test results pending at Discharge:   N/a    Consultants:  ID, Critical Care, Cardiology     Discharge Medication List:     Discharge Medications        START taking these medications        Instructions Prescription details   levoFLOXacin 500 MG Tabs  Commonly known as: Levaquin      Take 1 tablet (500 mg total) by mouth daily for 10 days.   Stop taking on: May 4, 2024  Quantity: 10 tablet  Refills: 0     tamsulosin 0.4 MG Caps  Commonly known as: Flomax  Start taking on: April 25, 2024      Take 1 capsule (0.4 mg total) by mouth daily.   Stop taking on: May 25, 2024  Quantity: 30 capsule  Refills: 0     vancomycin 125 MG Caps  Commonly known as: Vancocin      Take 1 capsule (125 mg total) by mouth 4 (four) times daily for 10 days.   Stop taking on: May 4, 2024  Quantity: 40 capsule  Refills: 0            CHANGE how you take these medications        Instructions Prescription details   simvastatin 20 MG Tabs  Commonly known as: Zocor  What changed: when to take this      Take 1 tablet (20 mg total) by mouth nightly.   Quantity: 90  tablet  Refills: 1            CONTINUE taking these medications        Instructions Prescription details   clobetasol 0.05 % Oint  Commonly known as: Temovate      Apply 1 Application topically 2 (two) times daily as needed.   Refills: 0     Eliquis 5 MG Tabs  Generic drug: apixaban      Take 1 tablet (5 mg total) by mouth 2 (two) times daily.   Quantity: 60 tablet  Refills: 8     folic acid 1 MG Tabs  Commonly known as: Folvite      Take 1 tablet (1 mg total) by mouth daily.   Refills: 0     glimepiride 1 MG Tabs  Commonly known as: Amaryl      Take 1 tablet (1 mg total) by mouth daily with breakfast.   Quantity: 90 tablet  Refills: 1     lisinopril 2.5 MG Tabs  Commonly known as: Prinivil; Zestril      Take 1 tablet (2.5 mg total) by mouth daily.   Quantity: 90 tablet  Refills: 1     metFORMIN 500 MG Tabs  Commonly known as: Glucophage      Take 1 tablet (500 mg total) by mouth 2 (two) times daily with meals.   Refills: 0     methotrexate 2.5 MG Tabs  Commonly known as: Rheumatrex      Take 1 tablet (2.5 mg total) by mouth daily.   Refills: 0     omeprazole 20 MG Cpdr  Commonly known as: PriLOSEC      Take 1 capsule (20 mg total) by mouth daily.   Refills: 0     predniSONE 10 MG Tabs  Commonly known as: Deltasone      Take 1 tablet (10 mg total) by mouth daily.   Refills: 0               Where to Get Your Medications        [id number] - [place], [place] - [address] [phone], [phone]  [address] [place], [place] [address] [address]      Phone: [phone]   levoFLOXacin 500 MG Tabs  tamsulosin 0.4 MG Caps  vancomycin 125 MG Caps         [place] reviewed: n/[place]    Follow-up appointment:   Adal Campos DO  [address] [facility] [facility]  [address]  [place] [address] [phone]    Schedule an appointment as soon as possible for a visit in 2 week(s)  urology follow-up    Appointments for Next 30 Days 4/24/2024 - 5/24/2024      None            Vital signs:  Temp:  [97.5 °F (36.4  °C)-98.4 °F (36.9 °C)] 97.6 °F (36.4 °C)  Pulse:  [65-74] 72  Resp:  [17-19] 18  BP: (103-131)/(56-80) 127/59  SpO2:  [94 %-96 %] 96 %    Physical Exam:    General: No acute distress   Lungs: clear to auscultation  Cardiovascular: S1, S2  Abdomen: Soft  LLE erythema and edema is improved     -----------------------------------------------------------------------------------------------  PATIENT DISCHARGE INSTRUCTIONS: See electronic chart    Shin Bose DO    Total time spent on discharge plannin minutes     The  [employer] Cures Act makes medical notes like these available to patients in the interest of transparency. Please be advised this is a medical document. Medical documents are intended to carry relevant information, facts as evident, and the clinical opinion of the practitioner. The medical note is intended as peer to peer communication and may appear blunt or direct. It is written in medical language and may contain abbreviations or verbiage that are unfamiliar.

## 2024-04-24 NOTE — PROGRESS NOTES
[facility]  Urology Progress Note    Thiago Alcantara Patient Status:  Inpatient    1940 MRN [id number]   Location [facility] 5NW-A Attending Shin Bose,    Hosp Day # 5 PCP Tiffany Pierce MD     Subjective:  Thiago Alcantara is a(n) 83 year old male.    Current complaints: Patient wishes to proceed with voiding trial today.      Objective:  General appearance: alert, appears stated age, and cooperative  Blood pressure 131/62, pulse 68, temperature 97.6 °F (36.4 °C), temperature source Oral, resp. rate 18, weight 243 lb 9.7 oz (110.5 kg), SpO2 96%.  Lungs: non-labored respirations  Abdomen: soft, non-tender  : harding catheter in place draining yellow urine   Lab Results   Component Value Date    PGLU 153 2024       Hospital Encounter on 24   1. Blood Culture     Status: None (Preliminary result)    Collection Time: 24  4:49 AM    Specimen: Blood,peripheral   Result Value Ref Range    Blood Culture Result No Growth 3 Days N/A   2. Urine Culture, Routine     Status: None    Collection Time: 24  1:47 PM    Specimen: Urine, clean catch   Result Value Ref Range    Urine Culture No Growth at 18-24 hrs. N/A        Assessment:    Partial urinary retention  Bladder wall thickening  Left ureteral dilatation  History of bladder carcinoma  BPH with LUTS     Serum creat 1.97 > 2.12 > 1.87 > 1.52 > 1.22 > 1.19  Urine culture 24: no growth  CTA pelvis with CTA bilat leg runnoff w IV contrast 24: no hydronephrosis.  left ureteral dilatation without obstructing urolithiasis.     Plan:    CPM with tamsulosin  Voiding trial today  Check PVR bladder scan  Patient will follow-up as an outpatient with Dr. Campos for addressing mild left ureteral dilatation.     Above discussed with patient, nurse.    MARTIN Rivera [facility]  Department of Urology  2024  10:17 AM

## 2024-04-24 NOTE — PROGRESS NOTES
Patient A&O X 3. Can be forgetful. Room air. Vanco PO. IV Abx. NSR on Tele. Eliquis. Caude catheter. Denies any pain. Carb controlled diet. Accuchecks. Up X 1 with walker. No further needs at this time.

## 2024-04-24 NOTE — PLAN OF CARE
NURSING DISCHARGE NOTE    Discharged Home via Wheelchair.  Accompanied by Support staff  Belongings Taken by patient/family.  PIV and telemetry removed. Discharge instructions reviewed with patient who verbalized understanding. Awaiting transport home.

## 2024-04-24 NOTE — OCCUPATIONAL THERAPY NOTE
OCCUPATIONAL THERAPY TREATMENT NOTE - INPATIENT     Room Number: 514/514-A  Session: 1   Number of Visits to Meet Established Goals: 3    Presenting Problem: Severe Sepsis  Prior Level of Function: Pt lives at home with spouse and son. Pt's spouse requires assist. Pt works as a  locally.     ASSESSMENT   Patient demonstrates good  progress this session, goals remain in progress.    Patient continues to function below baseline with toileting, lower body dressing, transfers, static standing balance, dynamic standing balance, and functional standing tolerance.   Contributing factors to remaining limitations include decreased functional strength, decreased endurance, pain, and decreased muscular endurance.  Next session anticipate patient to progress toileting, lower body dressing, transfers, static standing balance, dynamic standing balance, and functional standing tolerance.  Occupational Therapy will continue to follow patient for duration of hospitalization.    Patient continues to benefit from continued skilled OT services: at discharge to promote functional independence in home.  Anticipate patient will return home with home health OT.          OT Device Recommendations: None    History: Patient is a 83 year old male admitted on 4/19/2024 with Presenting Problem: Severe Sepsis. Co-Morbidities : PVD, AAA, HTN, cerebrovascular disease, DM, h/o lung and bladder cancer, RA, dyslipidemia    WEIGHT BEARING RESTRICTION  Weight Bearing Restriction: None                Recommendations for nursing staff:   Transfers: 1 person  Toileting location: toilet    TREATMENT SESSION:  Patient Start of Session: seated in chair  FUNCTIONAL TRANSFER ASSESSMENT  Sit to Stand: Chair  Chair: Supervision    BED MOBILITY  Supine to Sit : Not tested  Sit to Supine (OT): Not Tested  Scooting: NT    BALANCE ASSESSMENT  Static Sitting: Independent  Sitting Unilateral: Independent  Static Standing: Supervision  Standing Unilateral:  Supervision    FUNCTIONAL ADL ASSESSMENT  LB Dressing Standing: Supervision (simulated advancing pants up past hips from reaching at shin/knee level)  Toileting Standing: Supervision (simulated reaching posteriorly for geronimo-care hygiene)      ACTIVITY TOLERANCE: WFL                         O2 SATURATIONS       EDUCATION PROVIDED  Patient: Role of Occupational Therapy; Plan of Care; Discharge Recommendations; Functional Transfer Techniques; Fall Prevention; Posture/Positioning; Energy Conservation; Proper Body Mechanics  Patient's Response to Education: Verbalized Understanding; Returned Demonstration      Equipment used: RW  Demonstrates functional use, Would benefit from additional trial     Therapist comments: Pt received seated in chair, pleasant and cooperative for OT session. Pt performs sit to stand transfer from chair to RW requiring supervision. In standing, pt engages in simulated LB dressing and toileting with pt requiring supervision. Pt then engages in functional mobility with RW requiring supervision and increased time required due to pain in L LE. Task performed in preparation for IADLs and household mobility.   Patient End of Session: Up in chair;Needs met;Call light within reach;RN aware of session/findings;All patient questions and concerns addressed;Alarm set    SUBJECTIVE  \"These girls here control my life.\" - referring to RN staff on floor    PAIN ASSESSMENT  Rating: 10  Location: L LE with activity        OBJECTIVE  Precautions: Hard of hearing    AM-PAC ‘6-Clicks’ Inpatient Daily Activity Short Form  -   Putting on and taking off regular lower body clothing?: A Little  -   Bathing (including washing, rinsing, drying)?: A Little  -   Toileting, which includes using toilet, bedpan or urinal? : A Little  -   Putting on and taking off regular upper body clothing?: None  -   Taking care of personal grooming such as brushing teeth?: None  -   Eating meals?: None    AM-PAC Score:  Score: 21  Approx  Degree of Impairment: 32.79%  Standardized Score (AM-PAC Scale): 44.27    PLAN  OT Treatment Plan: Energy conservation/work simplification techniques;ADL training;IADL training;Balance activities;Functional transfer training;UE strengthening/ROM;Endurance training;Patient/Family education;Patient/Family training;Equipment eval/education;Compensatory technique education;Continued evaluation  Rehab Potential : Good  Frequency: 3x/week    OT Goals:     All goals ongoing 04/24    ADL Goals   Patient will perform grooming: with modified independent  Patient will perform upper body dressing:  with modified independent  Patient will perform lower body dressing:  with modified independent  Patient will perform toileting: with modified independent     Functional Transfer Goals  Patient will transfer from supine to sit:  with modified independent  Patient will transfer from sit to stand:  with modified independent  Patient will transfer to bedside commode:  with modified independent  Patient will transfer to toilet:  with modified independent    OT Session Time: 25 minutes  Therapeutic Activity: 25 minutes

## 2024-04-24 NOTE — CDS QUERY
Dear Dr. Bose:    Please clarify the documented diagnosis of Encephalopathy:   [   ] Acute Encephalopathy due to Sepsis  [   ] Acute Toxic Metabolic Encephalopathy   [  x ] Altered mental status with encephalopathy ruled out.   [  ] Other (please specify):_______  ____________________________________________________________________________________________  Clinical Indicators/Risk Factors:   ___04/19: 83M to ER with Severe Left lower extremity pain.    ___ER Provider Note: 83-year-old male presents to ED with complaint of severe left leg pain, screaming in pain. Patient arrived temp 104, heart rate 165. Patient unable to answer any questions due to screaming in pain… Altered mental from baseline Encephalopathic likely secondary to sepsis…Patient very agitated and screaming on arrival, he was given Dilaudid 0.5 mg x 2, Ativan 2 mg IV and fentanyl; after these meds, he was sonorous…Patient was reevaluated in the ED several hours after arrival, he was sitting upright with eyes open answering questions. Wife and daughter at bedside.  Patient calm and cooperative at this time, much improved mental status from earlier. Clinical Impression: Severe Sepsis, Encephalopathy in sepsis.     ___H&P: Assessment and Plan: #Acute encephalopathy d/t medication-If mental status does not improve, would proceed with imaging    ___Nursing admission note: AMS pt able to state name and general hospital setting, pt drowsy/drifts to sleep during assessment    ___4/24 Nursing note: Patient is A & O x3, forgetful,    Treatment: Ativan, per nursing note 4/21, room visualized from nurses station.                   Use of terms such as suspected, possible, or probable (associated with a specific diagnosis that is being evaluated, monitored, or treated as if it exists) are acceptable and can be coded in the inpatient setting, when documented at the time of discharge.     Please add any additional documentation to your progress note and continue  to document this through discharge.    Thank you. For questions regarding this query, please contact Clinical : Irma Ramirez [phone]  This document is a permanent part of the medical record

## 2024-04-24 NOTE — PHYSICAL THERAPY NOTE
PHYSICAL THERAPY TREATMENT NOTE - INPATIENT    Room Number: 514/514-A     Session: 2     Number of Visits to Meet Established Goals: 4    Presenting Problem: severe sepsis, shock due to L LE cellulitis  Co-Morbidities : PVD, AAA, HTN, cerebrovascular disease, DM, h/o lung and bladder cancer, RA, dyslipidemia    ASSESSMENT   Patient demonstrates good  progress this session, goals  remain in progress.    Patient continues to function near baseline with transfers and gait.  Contributing factors to remaining limitations include pain.  Next session anticipate patient to progress gait and stair negotiation.  Physical Therapy will continue to follow patient for duration of hospitalization.    Patient continues to benefit from continued skilled PT services: at discharge to promote functional independence in home.  Anticipate patient will return home with home health PT.    PLAN  PT Treatment Plan: Bed mobility;Body mechanics;Endurance;Patient education;Gait training;Strengthening;Stair training;Transfer training;Balance training  Rehab Potential : Good  Frequency (Obs): 3-5x/week    CURRENT GOALS     Goal #1 Patient is able to demonstrate supine - sit EOB @ level: modified independent      Goal #2 Patient is able to demonstrate transfers Sit to/from Stand at assistance level: modified independent      Goal #3 Patient is able to ambulate 200 feet with assist device:  RW or cane  at assistance level: supervision      Goal #4 Pt is able to ascend/descend full flight of stairs with railing and supervision   Goal #5     Goal #6     Goal Comments: Goals established on 4/20/2024        PHYSICAL THERAPY MEDICAL/SOCIAL HISTORY  History related to current admission: Patient is a 83 year old male admitted on 4/19/2024 from home for severe L LE pain.  Pt diagnosed with septic shock due to L LE cellulitis and with acute encephalopathy. L LE US doppler negative for DVT.    SUBJECTIVE  \"I have been sitting for too many days, I am weak  when I walk.\"  OBJECTIVE  Precautions: Hard of hearing    WEIGHT BEARING RESTRICTION  Weight Bearing Restriction: None                PAIN ASSESSMENT   Rating: Unable to rate  Location: L lower leg, tape from catheter  Management Techniques: Repositioning;Relaxation    BALANCE                                                                                                                       Static Sitting: Good  Dynamic Sitting: Good           Static Standing: Fair  Dynamic Standing: Fair -    ACTIVITY TOLERANCE                         O2 WALK         AM-PAC '6-Clicks' INPATIENT SHORT FORM - BASIC MOBILITY  How much difficulty does the patient currently have...  Patient Difficulty: Turning over in bed (including adjusting bedclothes, sheets and blankets)?: None   Patient Difficulty: Sitting down on and standing up from a chair with arms (e.g., wheelchair, bedside commode, etc.): None   Patient Difficulty: Moving from lying on back to sitting on the side of the bed?: A Little   How much help from another person does the patient currently need...   Help from Another: Moving to and from a bed to a chair (including a wheelchair)?: A Little   Help from Another: Need to walk in hospital room?: A Little   Help from Another: Climbing 3-5 steps with a railing?: A Little       AM-PAC Score:  Raw Score: 20   Approx Degree of Impairment: 35.83%   Standardized Score (AM-PAC Scale): 47.67   CMS Modifier (G-Code): CJ    FUNCTIONAL ABILITY STATUS  Gait Assessment   Functional Mobility/Gait Assessment  Gait Assistance: Supervision  Distance (ft): 125  Assistive Device: Rolling walker  Pattern:  (ketty flat feet, slow speed, dec step length)    Skilled Therapy Provided      Transfer Mobility:  Sit<>Stand: supervision   Stand<>Sit: supervision  Gait: RW and sup. Slow dimas.   Able to stand without any device and reach down to use a urinal.   Therapist's Comments: Patient was educated on HEP for ketty le's, instructed and pt performed  exercises. Patient was taken to the stairwell but declined to attempt stairs today. Prefers to wait. Patient states he can stay on the main floor.   Patient was educated on proper step sequence to address LLE pain.           Patient End of Session: Up in chair;Needs met;Call light within reach;RN aware of session/findings;All patient questions and concerns addressed    PT Session Time: 25 minutes  Gait Training: 15 minutes  Therapeutic Activity: 8 minutes

## 2024-04-25 ENCOUNTER — PATIENT OUTREACH (OUTPATIENT)
Dept: CASE MANAGEMENT | Age: 84
End: 2024-04-25

## 2024-04-25 NOTE — PROGRESS NOTES
DM apt request (discharged 04/24)    Tiffany Pierce MD   [place] [address]   [phone]  [facility] to call [phone] to assist w/scheduling apt

## 2024-04-26 NOTE — PROGRESS NOTES
DM apt request (discharged 04/24)     Tiffany Pierce MD   Internal Medicine  [address] [address]   [phone]  Multiple attempts w/no calls back; no apt made  Closing encounter

## 2024-05-03 ENCOUNTER — OFFICE VISIT (OUTPATIENT)
Dept: WOUND CARE | Facility: HOSPITAL | Age: 84
End: 2024-05-03
Attending: INTERNAL MEDICINE
Payer: MEDICARE

## 2024-05-03 ENCOUNTER — TELEPHONE (OUTPATIENT)
Dept: WOUND CARE | Facility: HOSPITAL | Age: 84
End: 2024-05-03

## 2024-05-03 VITALS
DIASTOLIC BLOOD PRESSURE: 67 MMHG | WEIGHT: 235 LBS | RESPIRATION RATE: 16 BRPM | HEIGHT: 68 IN | HEART RATE: 79 BPM | SYSTOLIC BLOOD PRESSURE: 137 MMHG | BODY MASS INDEX: 35.61 KG/M2 | TEMPERATURE: 98 F

## 2024-05-03 DIAGNOSIS — L08.9 INFECTED WOUND: ICD-10-CM

## 2024-05-03 DIAGNOSIS — L97.322 NON-PRESSURE CHRONIC ULCER OF LEFT ANKLE WITH FAT LAYER EXPOSED (HCC): Primary | ICD-10-CM

## 2024-05-03 DIAGNOSIS — I73.9 PERIPHERAL ARTERIAL DISEASE (HCC): ICD-10-CM

## 2024-05-03 DIAGNOSIS — L03.116 CELLULITIS OF LEFT ANKLE: ICD-10-CM

## 2024-05-03 DIAGNOSIS — T14.8XXA INFECTED WOUND: ICD-10-CM

## 2024-05-03 DIAGNOSIS — I82.5Z2 CHRONIC DEEP VEIN THROMBOSIS (DVT) OF DISTAL VEIN OF LEFT LOWER EXTREMITY (HCC): ICD-10-CM

## 2024-05-03 DIAGNOSIS — Z79.01 LONG TERM (CURRENT) USE OF ANTICOAGULANTS: ICD-10-CM

## 2024-05-03 DIAGNOSIS — I87.332 IDIOPATHIC CHRONIC VENOUS HYPERTENSION OF LEFT LOWER EXTREMITY WITH ULCER AND INFLAMMATION (HCC): ICD-10-CM

## 2024-05-03 PROCEDURE — 99214 OFFICE O/P EST MOD 30 MIN: CPT

## 2024-05-03 RX ORDER — MAG HYDROX/ALUMINUM HYD/SIMETH 400-400-40
10 SUSPENSION, ORAL (FINAL DOSE FORM) ORAL DAILY
Qty: 210 ML | Refills: 3 | Status: SHIPPED | OUTPATIENT
Start: 2024-05-03

## 2024-05-03 RX ORDER — COLLAGENASE SANTYL 250 [ARB'U]/G
OINTMENT TOPICAL
Qty: 90 G | Refills: 0 | Status: SHIPPED | OUTPATIENT
Start: 2024-05-03

## 2024-05-03 NOTE — TELEPHONE ENCOUNTER
Daughter Naila LVM stating santyl too expensive, will continue honey gel. Would like order placed. RN returned call - informed daughter honey gel is over the counter and can be purchased at the pharmacy if needed. Reminded her that a tube of honey gel was provided at the visit earlier today. She verbalized understanding and will call if there are any questions or concerns.

## 2024-05-03 NOTE — PATIENT INSTRUCTIONS
USS ARTERIAL DOPPLER.       Wound Cleaning and Dressings:    Wash your hands with soap and water. Always wear gloves while changing dressings. Donot touch wound / geronimo-wound skin with un-gloved hands. Remove old dressing, discard and place into trash.      DRESSINGS: santyl / gauze  Change dressing daily.    Compression Therapy : spandagrip  Compression Therapy Instructions:  1.  Put on first thing in the morning and may remove at bedside.  Okay to wear overnight      if comfortable.  Do not let stockings roll up/down and kink.  Hand wash stockings and      hang dry as needed.    2.  Avoid prolonged standing in one place.  It is better to have your calf muscles moving       to pump fluid out of the legs.    3.  Elevate leg(s) above the level of the heart when sitting or as much as possible.    4.  Take your diuretics as directed by your provider.  Do not skip doses or change doses      unless instructed to do so by your provider.    5. Do not get leg(s) with compression wrap wet. If wraps are too tight as indicated        By pain, numbness/tingling or discoloration of toes remove wrap completely       and call the   wound center.     There are no questions and answers to display.        Off-Loading:    Miscellaneous Instructions:  Supplement with a daily multivitamin   Low salt diet  Intense blood sugar control - Goal Blood sugar below 180 at all times recommended.  Increase protein intake / consider protein supplements - see below  Elevate extremities at all times when sitting / laying down.    DIETARY MODIFICATIONS TO HELP WITH WOUND HEALING:    Protein: Meats, beans, eggs, milk and yogurt particularly Greek yogurt), tofu, soy nuts, soy protein products    Vitamin C: Citrus fruits and juices, strawberries, tomatoes, tomato juice, peppers, baked potatoes, spinach, broccoli, cauliflower, [place] sprouts, cabbage    Vitamin A: Dark green, leafy vegetables, orange or yellow vegetables, cantaloupe, fortified dairy  products, liver, fortified cereals    Zinc: Fortified cereals, red meats, seafood    Consider Kartik by [employer] (These are essential branch chain amino acids that help with tissue building and wound healing) and take 2 packets/day. you can order online at [employer] or [employer]    ADDITIONAL REMINDERS:    The treatment plan has been discussed at length with you and your provider. Follow all instructions carefully, it is very important. If you do not follow all instructions, you are at  risk of your wound not healing, infection, possible loss of limb and even end of life.  Please call the clinic during regular business hours ( 7:30 AM - 5:30 PM) if you notice increased bleeding, redness, warmth, pain or pus like drainage or start running a fever greater than 100.3.    For after hour emergencies, please call your primary physician or go to the nearest emergency room.

## 2024-05-03 NOTE — PROGRESS NOTES
Weekly Wound Education Note    Teaching Provided To: Patient;Family  Training Topics: Cleasing and general instructions;Discharge instructions;Dressing  Training Method: Explain/Verbal  Training Response: Patient responds and understands;Reinforcement needed        Notes: Initial visit: wounds to left foot/ankle went to ER about a month ago due to fever, fatigue, cellulitis to LLE - seen by ID inpatient. Honey gel and bordered abida myers ordered today - patient's daughter educated on dressing.

## 2024-05-03 NOTE — PROGRESS NOTES
[facility] CONSULTATION NOTE  JOSE ANTONIO BEE MD  5/3/2024    Subjective   Tayo Alcantara is a 83 year old male.    Chief Complaint   Patient presents with    Wound Care     Pt here for initial consult to left lateral foot. Daughter niurka present at visit today. Daughter states patient has had wounds for about 1 month, pt was admitted April 4th to ER with temp/cellulitis. Pt currently taking oral ABT, vancomycin and levofloxin. Daughter states patient saw vein doctor and he prescribed mupirocin ointment. Daughter states using ointment for about 1 week at this time.   Patient states he is diabt     HPI    83-year-old  male, with past medical history of peripheral artery disease, chronic venous type attention, AAA, hypertension, dyslipidemia, cerebrovascular disease, history of DVT on DOAC, diabetes mellitus, bladder cancer-here for evaluation and management of open wound on his left ankle which opened up about a month ago.  He was admitted to the hospital with a diagnosis of cellulitis of the left foot with infected wound.  He was diagnosis with septic shock secondary to left lower extremity cellulitis, Salmonella bacteremia-he was on vasopressors which was weaned off.  Infectious disease was consulted, patient was admitted to the ICU for management of the above.  He was treated with IV antibiotics and he was treated with oral Levaquin at the time of discharge, which will end tomorrow.  He tested positive for C. difficile and was treated with oral vancomycin, which will end tomorrow.    There is no periwound redness, discharge malodor or fever.  However there is swelling of the left lower extremity, which he says is from the history of DVT with history of intervention for the same.    He had an appointment with a vascular specialist last month and has another appointment coming up.    Questionable history of peripheral arterial disease-details unknown-due for workup.      Diabetes status:  Yes  Last A1c value was 9% done 4/19/2024.      Smoker status: Former-quit 1995    Past Medical history, Surgical history, Social history, Family history reviewed with patient.   Medications reviewed.   Epic chart notes including provider notes, labs, imaging etc. Reviewed.   [facility] care everywhere queried and results reviewed.     Past Medical Hx:  Past Medical History:    Anesthesia complication    broke out in rashes generalized - unknown source (after every anaesthesia)    Aneurysm of abdominal aorta (HCC)    Bladder tumor    recesected July/2015    Cancer (HCC)    SKIN CANCERS ON ARM AND NOSE    Deep vein thrombosis (HCC)    Erectile dysfunction    Gall stones    High blood pressure    High cholesterol    History of blood clots    Neuropathy    HAnds from DM    Obesity    Osteoarthritis    Personal history of antineoplastic chemotherapy    JUly/2015    Pulmonary embolism (HCC)    ON COUMADIN NOW    TIA (transient ischemic attack)    Type II or unspecified type diabetes mellitus without mention of complication, not stated as uncontrolled    Varicose veins of both lower extremities with complications    Visual impairment    4-5 yrs ago had blurriness prob, evaled. no diagnosis     Past Surgical Hx:  Past Surgical History:   Procedure Laterality Date    Cabg      Colonoscopy      Colonoscopy      x3, polpypectomy x1, third one clear    Colonoscopy N/A 07/18/2017    Procedure: COLONOSCOPY, POSSIBLE BIOPSY, POSSIBLE POLYPECTOMY 39273;  Surgeon: Marisela Morris MD;  [place] [facility] [place] [facility]    Colonoscopy N/A 06/14/2020    Procedure: COLONOSCOPY;  Surgeon: Jewel Lane MD;  Location:  ENDOSCOPY    Colonoscopy N/A 06/13/2020    adenoma- repeat 5 yrs    Eye surgery  plastic sx eye lids    Fracture surgery      left leg-screws,pins    Hc closed tx distal tibia fracture w manipulation Left     Hc debridement skin up to 10% Left     leg    Other surgical history  07/29/2015    cysto, stent removal -   Andres    Other surgical history  10/14/2015    BTS Cystoscopy -Dr Campos    Other surgical history  01/16/2015    BTS Cystoscopy -Dr Camops    Other surgical history  05/13/2016    BTS Cystoscopy-Dr Campos    Other surgical history  08/15/2016    Cysto- Dr. Campos    Other surgical history  02/17/2017    Cysto- Dr. Campos    Other surgical history  08/18/2017    Cystoscopy- Dr. Campos    Other surgical history  09/16/2019     BTS cysto - dr. campos     Other surgical history  09/21/2020    Cysto Dr. Campos    Other surgical history  09/27/2021    Cystoscopy- Dr. Campos     Skin graft procedure      multiple to Left leg    Tonsillectomy      Vein bypass graft,fem-fem      to left leg     Problem List:  Patient Active Problem List   Diagnosis    Carcinoid tumor of left lung (HCC)    History of bladder cancer    Hypogonadism in male    Erectile dysfunction    TIA (transient ischemic attack)    Varicose veins of both lower extremities with complications    Obesity    High cholesterol    Neuropathy    Aneurysm of abdominal aorta (HCC)    Bladder tumor    Controlled type 2 diabetes mellitus with diabetic neuropathy (HCC)    BPH with urinary obstruction    Type 2 diabetes mellitus (HCC)    Osteoarthritis    Elevated PSA    Hematuria    Hematuria, unspecified type    Pyelonephritis    Elevated INR    Gangrene (HCC)    Acute deep vein thrombosis (DVT) of proximal vein of left lower extremity (HCC)    Leg DVT (deep venous thromboembolism), acute, left (HCC)    Microcytic anemia    Benign carcinoid tumor of lung (HCC)    Urothelial cancer (HCC)    Iron deficiency anemia    Folic acid deficiency    Hemolytic anemia, acute (HCC)    Severe sepsis (HCC)    Cellulitis of left lower extremity    Chronic kidney disease, unspecified CKD stage    Hyperkalemia    Encephalopathy in sepsis    Acute cystitis without hematuria    Septic shock (HCC)    TONJA (acute kidney injury) (HCC)    Metabolic acidosis     Tachycardia    Pain of left lower extremity     Social History:  Social History     Socioeconomic History    Marital status:    Tobacco Use    Smoking status: Former     Current packs/day: 0.00     Average packs/day: 1 pack/day for 30.0 years (30.0 ttl pk-yrs)     Types: Cigarettes     Start date: 1965     Quit date: 1995     Years since quittin.8    Smokeless tobacco: Never   Vaping Use    Vaping status: Never Used   Substance and Sexual Activity    Alcohol use: Not Currently    Drug use: No     Social Determinants of Health     Food Insecurity: No Food Insecurity (2024)    Food Insecurity     Food Insecurity: Never true   Transportation Needs: No Transportation Needs (2024)    Transportation Needs     Lack of Transportation: No   Housing Stability: Low Risk  (2024)    Housing Stability     Housing Instability: No     Family History:  Family History   Problem Relation Age of Onset    Heart Disorder Father     Hypertension Mother     Arthritis Mother     Heart Disorder Son     Other (kidney stone) Brother     Other (hernia) Brother      Allergies:  Allergies   Allergen Reactions    Bacitracin-Neomycin-Polymyxin [Neomycin-Bacitracin-Polymyxin] RASH    Other OTHER (SEE COMMENTS)     Current Meds:  Current Outpatient Medications   Medication Sig Dispense Refill    collagenase (SANTYL) 250 UNIT/GM External Ointment Apply topical daily. 90 g 0    Sodium Chloride (SALINE WOUND WASH) 0.9 % External Solution Apply 10 mL topically daily. 210 mL 3    tamsulosin 0.4 MG Oral Cap Take 1 capsule (0.4 mg total) by mouth daily. 30 capsule 0    vancomycin 125 MG Oral Cap Take 1 capsule (125 mg total) by mouth 4 (four) times daily for 10 days. 40 capsule 0    levoFLOXacin 500 MG Oral Tab Take 1 tablet (500 mg total) by mouth daily for 10 days. 10 tablet 0    methotrexate 2.5 MG Oral Tab Take 1 tablet (2.5 mg total) by mouth daily.      clobetasol 0.05 % External Ointment Apply 1 Application  topically 2 (two) times daily as needed.      omeprazole 20 MG Oral Capsule Delayed Release Take 1 capsule (20 mg total) by mouth daily.      predniSONE 10 MG Oral Tab Take 1 tablet (10 mg total) by mouth daily.      apixaban (ELIQUIS) 5 MG Oral Tab Take 1 tablet (5 mg total) by mouth 2 (two) times daily. 60 tablet 8    folic acid 1 MG Oral Tab Take 1 tablet (1 mg total) by mouth daily.      MetFORMIN HCl 500 MG Oral Tab Take 1 tablet (500 mg total) by mouth 2 (two) times daily with meals.      simvastatin 20 MG Oral Tab Take 1 tablet (20 mg total) by mouth nightly. (Patient taking differently: Take 1 tablet (20 mg total) by mouth every morning.) 90 tablet 1    lisinopril 2.5 MG Oral Tab Take 1 tablet (2.5 mg total) by mouth daily. 90 tablet 1    glimepiride 1 MG Oral Tab Take 1 tablet (1 mg total) by mouth daily with breakfast. 90 tablet 1     Tobacco Counseling:  Counseling given: Not Answered       REVIEW OF SYSTEMS:   CONSTITUTIONAL:  Denies unusual weight gain/loss, fever, chills, or fatigue.  EENT:  Eyes:  Denies eye pain, visual loss, blurred vision, double vision or yellow sclerae.   CARDIOVASCULAR:  Denies chest pain, chest pressure, chest discomfort, palpitations, dyspnea on exertion or at rest.  RESPIRATORY:  Denies shortness of breath, wheezing, cough or sputum.  GASTROINTESTINAL:  Denies abdominal pain, nausea, vomiting, constipation, diarrhea, or blood in stool.  MUSCULOSKELETAL:  Denies weakness  NEUROLOGICAL:  Denies headache, seizures, dizziness, syncope      Objective   Objective  Physical Exam    Wound Assessment  Wound 05/03/24 # 1 Left Lateral Foot Foot Left;Lateral (Active)   Wound Image   05/03/24 0916   Drainage Amount ANN MARIE 05/03/24 0916   Wound Length (cm) 0.5 cm 05/03/24 0916   Wound Width (cm) 0.8 cm 05/03/24 0916   Wound Surface Area (cm^2) 0.4 cm^2 05/03/24 0916   Wound Depth (cm) 0.2 cm 05/03/24 0916   Wound Volume (cm^3) 0.08 cm^3 05/03/24 0916   Margins Well-defined edges 05/03/24 0916    Non-staged Wound Description Full thickness 05/03/24 0916   Peggy-wound Assessment Dry;Edema;Hemosiderin staining 05/03/24 0916   Wound Bed Slough (%) 100 % 05/03/24 0916   Wound Odor None 05/03/24 0916   Tunneling? No 05/03/24 0916   Undermining? No 05/03/24 0916   Sinus Tracts? No 05/03/24 0916   Josue Scale Grade 2 05/03/24 0916       Wound 05/03/24 #2 Left Lateral Ankle Ankle Left;Lateral (Active)   Wound Image   05/03/24 0917   Drainage Amount ANN MARIE 05/03/24 0917   Wound Length (cm) 1 cm 05/03/24 0917   Wound Width (cm) 0.5 cm 05/03/24 0917   Wound Surface Area (cm^2) 0.5 cm^2 05/03/24 0917   Wound Depth (cm) 0.1 cm 05/03/24 0917   Wound Volume (cm^3) 0.05 cm^3 05/03/24 0917   Margins Poorly defined 05/03/24 0917   Non-staged Wound Description Full thickness 05/03/24 0917   Peggy-wound Assessment Dry;Edema;Hemosiderin staining 05/03/24 0917   Wound Bed Epithelium (%) 50 % 05/03/24 0917   Wound Bed Slough (%) 50 % 05/03/24 0917   Wound Odor None 05/03/24 0917   Shape clustered 05/03/24 0917   Tunneling? No 05/03/24 0917   Undermining? No 05/03/24 0917   Sinus Tracts? No 05/03/24 0917   Josue Scale Grade 2 05/03/24 0917          PHYSICAL EXAM:   /67   Pulse 79   Temp 98.2 °F (36.8 °C)   Resp 16   Ht 68\"   Wt 235 lb (106.6 kg)   BMI 35.73 kg/m²  Estimated body mass index is 35.73 kg/m² as calculated from the following:    Height as of this encounter: 68\".    Weight as of this encounter: 235 lb (106.6 kg).   Vital signs reviewed.Appears stated age, well groomed.  Physical Exam:  GEN:  Patient is alert, awake and oriented, well developed, well nourished, no apparent distress.  HEENT:  Head:  Normocephalic, atraumatic   Eyes: EOMI, PERRLA, no scleral icterus, conjunctivae clear bilaterally, no eye discharge  NECK: Supple, no CLAD, no JVD, no thyromegaly.  HEART:  Regular rate and rhythm, no murmurs, rubs or gallops.  LUNGS: Clear to auscultation bilterally, no rales/rhonchi/wheezing.  ABDOMEN:  Soft,  nondistended, nontender,   EXTREMITIES:    NEURO:  No deficit, normal gait, strength grossly intact.     Assessment   Assessment    Encounter Diagnosis  1. 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 Peripheral arterial disease (HCC)    3. Idiopathic chronic venous hypertension of left lower extremity with ulcer and inflammation (HCC)    4. Long term (current) use of anticoagulants    5. Infected wound    6. Cellulitis of left ankle    7. Chronic deep vein thrombosis (DVT) of distal vein of left lower extremity (HCC)        Problem List  Patient Active Problem List   Diagnosis    Carcinoid tumor of left lung (HCC)    History of bladder cancer    Hypogonadism in male    Erectile dysfunction    TIA (transient ischemic attack)    Varicose veins of both lower extremities with complications    Obesity    High cholesterol    Neuropathy    Aneurysm of abdominal aorta (HCC)    Bladder tumor    Controlled type 2 diabetes mellitus with diabetic neuropathy (HCC)    BPH with urinary obstruction    Type 2 diabetes mellitus (HCC)    Osteoarthritis    Elevated PSA    Hematuria    Hematuria, unspecified type    Pyelonephritis    Elevated INR    Gangrene (HCC)    Acute deep vein thrombosis (DVT) of proximal vein of left lower extremity (HCC)    Leg DVT (deep venous thromboembolism), acute, left (HCC)    Microcytic anemia    Benign carcinoid tumor of lung (HCC)    Urothelial cancer (HCC)    Iron deficiency anemia    Folic acid deficiency    Hemolytic anemia, acute (HCC)    Severe sepsis (HCC)    Cellulitis of left lower extremity    Chronic kidney disease, unspecified CKD stage    Hyperkalemia    Encephalopathy in sepsis    Acute cystitis without hematuria    Septic shock (HCC)    TONJA (acute kidney injury) (HCC)    Metabolic acidosis    Tachycardia    Pain of left lower extremity       Plan    Start Santyl for enzymatic debridement-dressings as below.  Serial debridements needed for wound  healing.  Check arterial ultrasound duplex study  Reconsult with vascular team-vein specialist-appointment coming up  Complete course of Levaquin and vancomycin  Return to clinic in 2 week.      PROCEDURES:     Attempted debridement-patient did not tolerate due to pain.      Patient Instructions     USS ARTERIAL DOPPLER.       Wound Cleaning and Dressings:    Wash your hands with soap and water. Always wear gloves while changing dressings. Donot touch wound / geronimo-wound skin with un-gloved hands. Remove old dressing, discard and place into trash.      DRESSINGS: santyl / gauze  Change dressing daily.    Compression Therapy : spandagrip  Compression Therapy Instructions:  1.  Put on first thing in the morning and may remove at bedside.  Okay to wear overnight      if comfortable.  Do not let stockings roll up/down and kink.  Hand wash stockings and      hang dry as needed.    2.  Avoid prolonged standing in one place.  It is better to have your calf muscles moving       to pump fluid out of the legs.    3.  Elevate leg(s) above the level of the heart when sitting or as much as possible.    4.  Take your diuretics as directed by your provider.  Do not skip doses or change doses      unless instructed to do so by your provider.    5. Do not get leg(s) with compression wrap wet. If wraps are too tight as indicated        By pain, numbness/tingling or discoloration of toes remove wrap completely       and call the   wound center.     There are no questions and answers to display.        Off-Loading:    Miscellaneous Instructions:  Supplement with a daily multivitamin   Low salt diet  Intense blood sugar control - Goal Blood sugar below 180 at all times recommended.  Increase protein intake / consider protein supplements - see below  Elevate extremities at all times when sitting / laying down.    DIETARY MODIFICATIONS TO HELP WITH WOUND HEALING:    Protein: Meats, beans, eggs, milk and yogurt particularly Greek yogurt),  tofu, soy nuts, soy protein products    Vitamin C: Citrus fruits and juices, strawberries, tomatoes, tomato juice, peppers, baked potatoes, spinach, broccoli, cauliflower, [place] sprouts, cabbage    Vitamin A: Dark green, leafy vegetables, orange or yellow vegetables, cantaloupe, fortified dairy products, liver, fortified cereals    Zinc: Fortified cereals, red meats, seafood    Consider Kartik by [employer] (These are essential branch chain amino acids that help with tissue building and wound healing) and take 2 packets/day. you can order online at [employer] or [employer]    ADDITIONAL REMINDERS:    The treatment plan has been discussed at length with you and your provider. Follow all instructions carefully, it is very important. If you do not follow all instructions, you are at  risk of your wound not healing, infection, possible loss of limb and even end of life.  Please call the clinic during regular business hours ( 7:30 AM - 5:30 PM) if you notice increased bleeding, redness, warmth, pain or pus like drainage or start running a fever greater than 100.3.    For after hour emergencies, please call your primary physician or go to the nearest emergency room.        Meds & Refills for this Visit:  Requested Prescriptions     Signed Prescriptions Disp Refills    collagenase (SANTYL) 250 UNIT/GM External Ointment 90 g 0     Sig: Apply topical daily.    Sodium Chloride (SALINE WOUND WASH) 0.9 % External Solution 210 mL 3     Sig: Apply 10 mL topically daily.         Patient/Caregiver Education: There are no barriers to learning. Medical education for above diagnosis given.   Answered all questions.    Outcome: Patient verbalizes understanding. Patient is notified to call with any questions, complications, allergies, or worsening or changing symptoms.  Patient is to call with any side effects or complications as a result of the treatments today.      DOCUMENTATION OF TIME SPENT: Code selection for this visit was based on  time spent : 60 min on date of service in preparing to see the patient, obtaining and/or reviewing separately obtained history, performing a medically appropriate examination, counseling and educating the patient/family/caregiver, ordering medications or testing, referring and communicating with other healthcare providers, documenting clinical information in the E HR, independently interpreting results and communicating results to the patient/family/caregiver and care coordination with the patient's other providers.    Followup: Return in about 2 weeks (around 5/17/2024) for Wound followup.      Note to Patient:  The 21st Century Cures Act makes medical notes like these available to patients in the interest of transparency. However, be advised this is a medical document and is intended as [id number]-[id number] communication; it is written in medical language and may appear blunt, direct, or contain abbreviations or verbiage that are unfamiliar. Medical documents are intended to carry relevant information, facts as evident, and the clinical opinion of the practitioner.    Also, please note that this report has been produced using speech recognition software and may contain errors related to that system including, but not limited to, errors in grammar, punctuation, and spelling, as well as words and phrases that possibly may have been recognized inappropriately.  If there are any questions or concerns, contact the dictating provider for clarification.      Gayle Drake MD  5/3/2024  9:36 AM

## 2024-05-07 ENCOUNTER — HOSPITAL ENCOUNTER (OUTPATIENT)
Dept: ULTRASOUND IMAGING | Facility: HOSPITAL | Age: 84
Discharge: HOME | End: 2024-05-07
Attending: INTERNAL MEDICINE
Payer: MEDICARE

## 2024-05-07 DIAGNOSIS — I87.332 IDIOPATHIC CHRONIC VENOUS HYPERTENSION OF LEFT LOWER EXTREMITY WITH ULCER AND INFLAMMATION (HCC): ICD-10-CM

## 2024-05-07 DIAGNOSIS — L97.322 NON-PRESSURE CHRONIC ULCER OF LEFT ANKLE WITH FAT LAYER EXPOSED (HCC): ICD-10-CM

## 2024-05-07 DIAGNOSIS — I73.9 PERIPHERAL ARTERIAL DISEASE (HCC): ICD-10-CM

## 2024-05-07 PROCEDURE — 93923 UPR/LXTR ART STDY 3+ LVLS: CPT | Performed by: INTERNAL MEDICINE

## 2024-05-08 NOTE — PROGRESS NOTES
Called and spoke to pt and informed him of test results. Pt stated understanding and no questions at this time.

## 2024-05-17 ENCOUNTER — OFFICE VISIT (OUTPATIENT)
Dept: WOUND CARE | Facility: HOSPITAL | Age: 84
End: 2024-05-17
Attending: INTERNAL MEDICINE
Payer: MEDICARE

## 2024-05-17 VITALS
RESPIRATION RATE: 16 BRPM | HEART RATE: 84 BPM | DIASTOLIC BLOOD PRESSURE: 72 MMHG | SYSTOLIC BLOOD PRESSURE: 128 MMHG | TEMPERATURE: 98 F

## 2024-05-17 DIAGNOSIS — I73.9 PERIPHERAL ARTERIAL DISEASE (HCC): ICD-10-CM

## 2024-05-17 DIAGNOSIS — Z79.01 LONG TERM (CURRENT) USE OF ANTICOAGULANTS: ICD-10-CM

## 2024-05-17 DIAGNOSIS — M79.89 LEFT LEG SWELLING: ICD-10-CM

## 2024-05-17 DIAGNOSIS — I83.893 VARICOSE VEINS OF BOTH LOWER EXTREMITIES WITH COMPLICATIONS: ICD-10-CM

## 2024-05-17 DIAGNOSIS — L98.499 DIABETES MELLITUS WITH SKIN ULCER (HCC): ICD-10-CM

## 2024-05-17 DIAGNOSIS — I82.5Z2 CHRONIC DEEP VEIN THROMBOSIS (DVT) OF DISTAL VEIN OF LEFT LOWER EXTREMITY (HCC): ICD-10-CM

## 2024-05-17 DIAGNOSIS — L97.322 NON-PRESSURE CHRONIC ULCER OF LEFT ANKLE WITH FAT LAYER EXPOSED (HCC): Primary | ICD-10-CM

## 2024-05-17 DIAGNOSIS — I87.332 IDIOPATHIC CHRONIC VENOUS HYPERTENSION OF LEFT LOWER EXTREMITY WITH ULCER AND INFLAMMATION (HCC): ICD-10-CM

## 2024-05-17 DIAGNOSIS — E11.622 DIABETES MELLITUS WITH SKIN ULCER (HCC): ICD-10-CM

## 2024-05-17 DIAGNOSIS — E66.01 CLASS 2 SEVERE OBESITY DUE TO EXCESS CALORIES WITH SERIOUS COMORBIDITY AND BODY MASS INDEX (BMI) OF 35.0 TO 35.9 IN ADULT (HCC): ICD-10-CM

## 2024-05-17 LAB — GLUCOSE BLD-MCNC: 120 MG/DL (ref 70–99)

## 2024-05-17 PROCEDURE — 82962 GLUCOSE BLOOD TEST: CPT | Performed by: INTERNAL MEDICINE

## 2024-05-17 PROCEDURE — 29581 APPL MULTLAYER CMPRN SYS LEG: CPT

## 2024-05-17 NOTE — PROGRESS NOTES
Weekly Wound Education Note    Teaching Provided To: Patient;Family  Training Topics: Cleasing and general instructions;Compression;Discharge instructions;Dressing;Edema control;Home health  Training Method: Explain/Verbal  Training Response: Patient responds and understands;Reinforcement needed        Notes: Stable. HOney gel, honey aliginate, zinc to periwound, kerramax, conforming gauze, tape, coflex 2 30-40mmhg. Ordered HH - new referral sent [facility] - sent pt home with 1 compression kit for first dressing change. Pt and shaq educated on dressing/compression wrap -  a bit hesitant on getting HH. Ankle wound was weeping as patient sat in chair - showed thw daughter and again discussed his swelling and the importance of compression.

## 2024-05-17 NOTE — PROGRESS NOTES
[facility] PROGRESS NOTE  JOSE ANTONIO BEE MD  5/17/2024    Chief Complaint:   Chief Complaint   Patient presents with    Wound Care     Patient is here for a wound care follow up. His pain is currently 5/10. He presents today without compression or dressings on the wounds.        HPI:   Subjective   Tayo Alcantara is a 83 year old male coming in for a follow-up visit.    HPI    Wound stable   Did not start santyl as it was too expensive  Currently using honey gel and spandagrip.   Edema not well controlled: 3-4+  Accompanied by daughter.     Pt has a lot of periwound pain - ATTEMPTED DEBRIDEMENT  - not tolerating debridement well.     No s/o infection.     Review of Systems  Negative except HPI   Denies chest pain / SOB / palpitations  Denies fever.     Allergies  Allergies   Allergen Reactions    Bacitracin-Neomycin-Polymyxin [Neomycin-Bacitracin-Polymyxin] RASH    Other OTHER (SEE COMMENTS)       Current Meds:  Current Outpatient Medications   Medication Sig Dispense Refill    Sodium Chloride (SALINE WOUND WASH) 0.9 % External Solution Apply 10 mL topically daily. 210 mL 3    tamsulosin 0.4 MG Oral Cap Take 1 capsule (0.4 mg total) by mouth daily. 30 capsule 0    methotrexate 2.5 MG Oral Tab Take 1 tablet (2.5 mg total) by mouth daily.      clobetasol 0.05 % External Ointment Apply 1 Application topically 2 (two) times daily as needed.      omeprazole 20 MG Oral Capsule Delayed Release Take 1 capsule (20 mg total) by mouth daily.      predniSONE 10 MG Oral Tab Take 1 tablet (10 mg total) by mouth daily.      apixaban (ELIQUIS) 5 MG Oral Tab Take 1 tablet (5 mg total) by mouth 2 (two) times daily. 60 tablet 8    folic acid 1 MG Oral Tab Take 1 tablet (1 mg total) by mouth daily.      MetFORMIN HCl 500 MG Oral Tab Take 1 tablet (500 mg total) by mouth 2 (two) times daily with meals.      simvastatin 20 MG Oral Tab Take 1 tablet (20 mg total) by mouth nightly. (Patient taking differently: Take 1 tablet  (20 mg total) by mouth every morning.) 90 tablet 1    lisinopril 2.5 MG Oral Tab Take 1 tablet (2.5 mg total) by mouth daily. 90 tablet 1    glimepiride 1 MG Oral Tab Take 1 tablet (1 mg total) by mouth daily with breakfast. 90 tablet 1         EXAM:   Objective   Objective    Physical Exam    Vital Signs  Vitals:    05/17/24 0955   BP: 128/72   Pulse: 84   Resp: 16   Temp: 98.3 °F (36.8 °C)       Wound Assessment  Wound 05/03/24 # 1 Left Lateral Foot Foot Left;Lateral (Active)   Wound Image   05/17/24 0953   Drainage Amount ANN MARIE 05/17/24 0953   Treatments Compression 05/03/24 0916   Wound Length (cm) 1.2 cm 05/17/24 0953   Wound Width (cm) 0.6 cm 05/17/24 0953   Wound Surface Area (cm^2) 0.72 cm^2 05/17/24 0953   Wound Depth (cm) 0.2 cm 05/17/24 0953   Wound Volume (cm^3) 0.144 cm^3 05/17/24 0953   Wound Healing % -80 05/17/24 0953   Margins Well-defined edges 05/17/24 0953   Non-staged Wound Description Full thickness 05/17/24 0953   Peggy-wound Assessment Dry;Edema;Hemosiderin staining 05/17/24 0953   Wound Bed Slough (%) 100 % 05/17/24 0953   Wound Odor None 05/17/24 0953   Tunneling? No 05/17/24 0953   Undermining? No 05/17/24 0953   Sinus Tracts? No 05/17/24 0953   Josue Scale Grade 2 05/17/24 0953       Wound 05/03/24 #2 Left Lateral Ankle Ankle Left;Lateral (Active)   Wound Image   05/17/24 1014   Drainage Amount ANN MARIE 05/17/24 0954   Treatments Compression 05/03/24 0917   Wound Length (cm) 1.5 cm 05/17/24 0954   Wound Width (cm) 1.8 cm 05/17/24 0954   Wound Surface Area (cm^2) 2.7 cm^2 05/17/24 0954   Wound Depth (cm) 0.1 cm 05/17/24 0954   Wound Volume (cm^3) 0.27 cm^3 05/17/24 0954   Wound Healing % -440 05/17/24 0954   Margins Well-defined edges 05/17/24 0954   Non-staged Wound Description Full thickness 05/17/24 0954   Peggy-wound Assessment Dry;Edema;Hemosiderin staining 05/17/24 0954   Wound Bed Epithelium (%) 75 % 05/17/24 0954   Wound Bed Slough (%) 25 % 05/17/24 0954   Wound Odor None 05/17/24 0954    Shape clustered 05/17/24 0954   Tunneling? No 05/17/24 0954   Undermining? No 05/17/24 0954   Sinus Tracts? No 05/17/24 0954   Josue Scale Grade 2 05/17/24 0954           ASSESSMENT AND PLAN:     Assessment   Assessment    Encounter Diagnosis  1. 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 Peripheral arterial disease (HCC)    3. Left leg swelling    4. Idiopathic chronic venous hypertension of left lower extremity with ulcer and inflammation (HCC)    5. Diabetes mellitus with skin ulcer (HCC)    6. Long term (current) use of anticoagulants    7. Chronic deep vein thrombosis (DVT) of distal vein of left lower extremity (HCC)    8. Varicose veins of both lower extremities with complications    9. Class 2 severe obesity due to excess calories with serious comorbidity and body mass index (BMI) of 35.0 to 35.9 in adult (HCC)        Problem List  Patient Active Problem List   Diagnosis    Carcinoid tumor of left lung (HCC)    History of bladder cancer    Hypogonadism in male    Erectile dysfunction    TIA (transient ischemic attack)    Varicose veins of both lower extremities with complications    Obesity    High cholesterol    Neuropathy    Aneurysm of abdominal aorta (HCC)    Bladder tumor    Controlled type 2 diabetes mellitus with diabetic neuropathy (HCC)    BPH with urinary obstruction    Type 2 diabetes mellitus (HCC)    Osteoarthritis    Elevated PSA    Hematuria    Hematuria, unspecified type    Pyelonephritis    Elevated INR    Gangrene (HCC)    Acute deep vein thrombosis (DVT) of proximal vein of left lower extremity (HCC)    Leg DVT (deep venous thromboembolism), acute, left (HCC)    Microcytic anemia    Benign carcinoid tumor of lung (HCC)    Urothelial cancer (HCC)    Iron deficiency anemia    Folic acid deficiency    Hemolytic anemia, acute (HCC)    Severe sepsis (HCC)    Cellulitis of left lower extremity    Chronic kidney disease, unspecified CKD stage    Hyperkalemia     Encephalopathy in sepsis    Acute cystitis without hematuria    Septic shock (HCC)    TONJA (acute kidney injury) (HCC)    Metabolic acidosis    Tachycardia    Pain of left lower extremity         MANAGEMENT    Start Honey gel, honey aliginate, zinc to periwound, kerramax, conforming gauze, tape  Compression - coflex 2 30-40mmhg.   Consider taking pain med before next appt   Ordered HH - new referral sent [facility]   Sent pt home with 1 compression kit for first dressing change.   Extensive discussion on edema management.   Pt and duaghter educated on dressing/compression wrap - a bit hesitant on getting HH.   Ankle wound was weeping as patient sat in chair - showed the daughter and again discussed his swelling and the importance of compression.   Pt to discuss with PCP reg: diuretics.   Leg elevation  Low salt diet .  Return one week.  Nurse visit PRN.     PROCEDURES:    Non selective debridement with  curette - removed all nonviable tissue.   Any deeper debridement was restricted due to pain.     Patient Instructions     Consult HH - for dressing change and wrap change - 3 times a week.   Start honey gel with honey alginate.   Low salt diet.   Elevate lower extremities.   Consider diuretics - defer to PCP due to advanced age and possibility of electrolyte abnormalities.       Wound Cleaning and Dressings:    Shower with protection  Use SHOWER BOOT / CAST COVER       DRESSINGS: honey gel / honey alginate / kerramax.   Change dressing 3 times a week.     Compression Therapy :Unna 30-40 mm hg  Compression Therapy Instructions:  1. Okay to wear overnight      2.  Avoid prolonged standing in one place.  It is better to have your calf muscles moving       to pump fluid out of the legs.    3.  Elevate leg(s) above the level of the heart when sitting or as much as possible.    4.  Take your diuretics as directed by your provider.  Do not skip doses or change doses      unless instructed to do so by your provider.    5.  Do not get leg(s) with compression wrap wet. If wraps are too tight as indicated        By pain, numbness/tingling or discoloration of toes remove wrap completely       and call the   wound center.     There are no questions and answers to display.        Off-Loading:    Miscellaneous Instructions:  Supplement with a daily multivitamin   Low salt diet  Intense blood sugar control - Goal Blood sugar below 180 at all times recommended.  Increase protein intake / consider protein supplements - see below  Elevate extremities at all times when sitting / laying down.    DIETARY MODIFICATIONS TO HELP WITH WOUND HEALING:    Protein: Meats, beans, eggs, milk and yogurt particularly Greek yogurt), tofu, soy nuts, soy protein products    Vitamin C: Citrus fruits and juices, strawberries, tomatoes, tomato juice, peppers, baked potatoes, spinach, broccoli, cauliflower, [place] sprouts, cabbage    Vitamin A: Dark green, leafy vegetables, orange or yellow vegetables, cantaloupe, fortified dairy products, liver, fortified cereals    Zinc: Fortified cereals, red meats, seafood    Consider Kartik by [employer] (These are essential branch chain amino acids that help with tissue building and wound healing) and take 2 packets/day. you can order online at [employer] or [employer]    ADDITIONAL REMINDERS:    The treatment plan has been discussed at length with you and your provider. Follow all instructions carefully, it is very important. If you do not follow all instructions, you are at  risk of your wound not healing, infection, possible loss of limb and even end of life.  Please call the clinic during regular business hours ( 7:30 AM - 5:30 PM) if you notice increased bleeding, redness, warmth, pain or pus like drainage or start running a fever greater than 100.3.    For after hour emergencies, please call your primary physician or go to the nearest emergency room.    Patient/Caregiver Education: There are no barriers to learning. Medical  education for above diagnosis given.   Answered all questions.    Outcome: Patient verbalizes understanding. Patient is notified to call with any questions, complications, allergies, or worsening or changing symptoms.  Patient is to call with any side effects or complications as a result of the treatments today.      DOCUMENTATION OF TIME SPENT: Code selection for this visit was based on time spent : 40 min on date of service in preparing to see the patient, obtaining and/or reviewing separately obtained history, performing a medically appropriate examination, counseling and educating the patient/family/caregiver, ordering medications or testing, referring and communicating with other healthcare providers, documenting clinical information in the E HR, independently interpreting results and communicating results to the patient/family/caregiver and care coordination with the patient's other providers.    Followup: Return in about 1 week (around 5/24/2024) for Wound followup.      Note to Patient:  The 21st Century Cures Act makes medical notes like these available to patients in the interest of transparency. However, be advised this is a medical document and is intended as [id number] communication; it is written in medical language and may appear blunt, direct, or contain abbreviations or verbiage that are unfamiliar. Medical documents are intended to carry relevant information, facts as evident, and the clinical opinion of the practitioner.    Also, please note that this report has been produced using speech recognition software and may contain errors related to that system including, but not limited to, errors in grammar, punctuation, and spelling, as well as words and phrases that possibly may have been recognized inappropriately.  If there are any questions or concerns, contact the dictating provider for clarification.      Gayle Drake MD  5/17/2024  10:16 AM

## 2024-05-17 NOTE — PATIENT INSTRUCTIONS
Consult HH - for dressing change and wrap change - 3 times a week.   Start honey gel with honey alginate.   Low salt diet.   Elevate lower extremities.   Consider diuretics - defer to PCP due to advanced age and possibility of electrolyte abnormalities.       Wound Cleaning and Dressings:    Shower with protection  Use SHOWER BOOT / CAST COVER       DRESSINGS: honey gel / honey alginate / kerramax.   Change dressing 3 times a week.     Compression Therapy :Unna 30-40 mm hg  Compression Therapy Instructions:  1. Okay to wear overnight      2.  Avoid prolonged standing in one place.  It is better to have your calf muscles moving       to pump fluid out of the legs.    3.  Elevate leg(s) above the level of the heart when sitting or as much as possible.    4.  Take your diuretics as directed by your provider.  Do not skip doses or change doses      unless instructed to do so by your provider.    5. Do not get leg(s) with compression wrap wet. If wraps are too tight as indicated        By pain, numbness/tingling or discoloration of toes remove wrap completely       and call the   wound center.     There are no questions and answers to display.        Off-Loading:    Miscellaneous Instructions:  Supplement with a daily multivitamin   Low salt diet  Intense blood sugar control - Goal Blood sugar below 180 at all times recommended.  Increase protein intake / consider protein supplements - see below  Elevate extremities at all times when sitting / laying down.    DIETARY MODIFICATIONS TO HELP WITH WOUND HEALING:    Protein: Meats, beans, eggs, milk and yogurt particularly Greek yogurt), tofu, soy nuts, soy protein products    Vitamin C: Citrus fruits and juices, strawberries, tomatoes, tomato juice, peppers, baked potatoes, spinach, broccoli, cauliflower, [place] sprouts, cabbage    Vitamin A: Dark green, leafy vegetables, orange or yellow vegetables, cantaloupe, fortified dairy products, liver, fortified cereals    Zinc:  Fortified cereals, red meats, seafood    Consider Kartik by [employer] (These are essential branch chain amino acids that help with tissue building and wound healing) and take 2 packets/day. you can order online at [employer] or [employer]    ADDITIONAL REMINDERS:    The treatment plan has been discussed at length with you and your provider. Follow all instructions carefully, it is very important. If you do not follow all instructions, you are at  risk of your wound not healing, infection, possible loss of limb and even end of life.  Please call the clinic during regular business hours ( 7:30 AM - 5:30 PM) if you notice increased bleeding, redness, warmth, pain or pus like drainage or start running a fever greater than 100.3.    For after hour emergencies, please call your primary physician or go to the nearest emergency room.

## 2024-05-20 ENCOUNTER — APPOINTMENT (OUTPATIENT)
Dept: WOUND CARE | Facility: HOSPITAL | Age: 84
End: 2024-05-20
Attending: INTERNAL MEDICINE
Payer: MEDICARE

## 2024-05-20 ENCOUNTER — TELEPHONE (OUTPATIENT)
Dept: WOUND CARE | Facility: HOSPITAL | Age: 84
End: 2024-05-20

## 2024-05-20 NOTE — TELEPHONE ENCOUNTER
Tequila and nurse from [facility] - reaction to wrap. Attempted to call nurse, no answer - unable to LV. Spoke to Tequila - she states nurse applied a calamine unna boot at today's visit as the pt had a reaction to the coflex 2. Provider made aware.

## 2024-05-22 ENCOUNTER — TELEPHONE (OUTPATIENT)
Dept: WOUND CARE | Facility: HOSPITAL | Age: 84
End: 2024-05-22

## 2024-05-22 NOTE — TELEPHONE ENCOUNTER
Received call from Monica,  provider, stating that the rash is worse on pt's leg since Monday. They switched to the calamine unna boots with no improvement. Spoke to provider and received instructed. Called monica and instructed then to use OTC steroid cream to areas, and switch compression to double layer spandagrip. Monica stated understanding and will relay info to pt's nurse. Pt has provider visit on Friday 5/24/24 for follow up evaluation.

## 2024-05-24 ENCOUNTER — OFFICE VISIT (OUTPATIENT)
Dept: WOUND CARE | Facility: HOSPITAL | Age: 84
End: 2024-05-24
Attending: INTERNAL MEDICINE
Payer: MEDICARE

## 2024-05-24 VITALS
DIASTOLIC BLOOD PRESSURE: 82 MMHG | HEART RATE: 82 BPM | SYSTOLIC BLOOD PRESSURE: 116 MMHG | RESPIRATION RATE: 16 BRPM | TEMPERATURE: 98 F

## 2024-05-24 DIAGNOSIS — M79.89 LEFT LEG SWELLING: ICD-10-CM

## 2024-05-24 DIAGNOSIS — I87.332 IDIOPATHIC CHRONIC VENOUS HYPERTENSION OF LEFT LOWER EXTREMITY WITH ULCER AND INFLAMMATION (HCC): ICD-10-CM

## 2024-05-24 DIAGNOSIS — L30.9 DERMATITIS: ICD-10-CM

## 2024-05-24 DIAGNOSIS — I82.5Z2 CHRONIC DEEP VEIN THROMBOSIS (DVT) OF DISTAL VEIN OF LEFT LOWER EXTREMITY (HCC): ICD-10-CM

## 2024-05-24 DIAGNOSIS — I73.9 PERIPHERAL ARTERIAL DISEASE (HCC): ICD-10-CM

## 2024-05-24 DIAGNOSIS — L97.322 NON-PRESSURE CHRONIC ULCER OF LEFT ANKLE WITH FAT LAYER EXPOSED (HCC): Primary | ICD-10-CM

## 2024-05-24 DIAGNOSIS — L98.499 DIABETES MELLITUS WITH SKIN ULCER (HCC): ICD-10-CM

## 2024-05-24 DIAGNOSIS — E11.622 DIABETES MELLITUS WITH SKIN ULCER (HCC): ICD-10-CM

## 2024-05-24 DIAGNOSIS — Z79.01 LONG TERM (CURRENT) USE OF ANTICOAGULANTS: ICD-10-CM

## 2024-05-24 DIAGNOSIS — I83.893 VARICOSE VEINS OF BOTH LOWER EXTREMITIES WITH COMPLICATIONS: ICD-10-CM

## 2024-05-24 DIAGNOSIS — R21 RASH: ICD-10-CM

## 2024-05-24 LAB — GLUCOSE BLD-MCNC: 149 MG/DL (ref 70–99)

## 2024-05-24 PROCEDURE — 97597 DBRDMT OPN WND 1ST 20 CM/<: CPT | Performed by: INTERNAL MEDICINE

## 2024-05-24 PROCEDURE — 29581 APPL MULTLAYER CMPRN SYS LEG: CPT

## 2024-05-24 PROCEDURE — 82962 GLUCOSE BLOOD TEST: CPT | Performed by: INTERNAL MEDICINE

## 2024-05-24 RX ORDER — BETAMETHASONE DIPROPIONATE 0.5 MG/G
1 CREAM TOPICAL DAILY
Qty: 50 G | Refills: 1 | Status: SHIPPED | OUTPATIENT
Start: 2024-05-24

## 2024-05-24 RX ORDER — FUROSEMIDE 20 MG/1
20 TABLET ORAL DAILY
COMMUNITY
Start: 2024-05-20

## 2024-05-24 RX ORDER — POTASSIUM CHLORIDE 750 MG/1
10 TABLET, FILM COATED, EXTENDED RELEASE ORAL DAILY
COMMUNITY
Start: 2024-05-20

## 2024-05-24 NOTE — PROGRESS NOTES
Patient ID: Tayo Alcantara is a 83 year old male.    Debridement Diabetic Ulcer Left;Lateral Foot   Wound 05/03/24 # 1 Left Lateral Foot Foot Left;Lateral    Performed by: Gayle Ng MD  Authorized by: Gayle Ng MD      Consent   Consent obtained? verbal  Consent given by: patient  Risks discussed? procedural risks discussed    Debridement Details  Performed by: physician  Debridement type: conservative sharp  Pain control: lidocaine 4%  Pain control administration type: topical    Pre-debridement measurements  Length (cm): 1.1  Width (cm): 0.2  Depth (cm): 0.2  Surface Area (cm^2): 0.22    Post-debridement measurements  Length (cm): 1.1  Width (cm): 0.2  Depth (cm): 0.2  Percent debrided: 70%  Surface Area (cm^2): 0.22  Area Debrided (cm^2): 0.15  Volume (cm^3): 0.04    Devitalized tissue debrided: biofilm and slough  Instrument(s) utilized: curette  Bleeding: small  Hemostasis obtained with: pressure  Procedural pain (0-10): 7  Post-procedural pain: 1   Response to treatment: procedure was tolerated well

## 2024-05-24 NOTE — PROGRESS NOTES
Weekly Wound Education Note    Teaching Provided To: Patient  Training Topics: Cleasing and general instructions;Compression;Discharge instructions;Dressing;Edema control  Training Method: Explain/Verbal  Training Response: Patient responds and understands;Reinforcement needed        Notes: Stable. Rash noted to upper calf where compression wrap seems to end. Betamethasone to leg, honey gel, honey alginate, ABD pad, conforming gauze, tape, unna boot 30-40mmhg. Orders faxed to . Betamethasone ordered.

## 2024-05-24 NOTE — PROGRESS NOTES
[facility] PROGRESS NOTE  JOSE ANTONIO BEE MD  5/24/2024    Chief Complaint:   Chief Complaint   Patient presents with    Wound Recheck     Pt here for follow up. He arrives with spanda on left leg, states compression wrap was removed Wednesday d/t irritation to left leg. Home Care RN applied Triamcinolone and spanda. He started lasix and  potassium for right leg edema.        HPI:   Subjective   Tayo Alcantara is a 83 year old male coming in for a follow-up visit.    HPI    Wounds slightly improved  Dimensions minimally better.     Did not tolerated wrap well - developed rash on the proximal end of the leg- below knee -  compression wraps were discontinued and he was started on spandagrips.   Edema not much improved  He was started on diuretics by PCP.     Selective Debridement done today - not able to do surgical debridement due to pain    Accompanied by daughter   Plan of care discussed in detail.     HH in place.     Review of Systems  Negative except HPI   Denies chest pain / SOB / palpitations  Denies fever.     Allergies  Allergies   Allergen Reactions    Bacitracin-Neomycin-Polymyxin [Neomycin-Bacitracin-Polymyxin] RASH    Other OTHER (SEE COMMENTS)       Current Meds:  Current Outpatient Medications   Medication Sig Dispense Refill    furosemide 20 MG Oral Tab Take 1 tablet (20 mg total) by mouth daily.      Potassium Chloride ER 10 MEQ Oral Tab CR Take 1 tablet (10 mEq total) by mouth daily.      Betamethasone Dipropionate Aug (DIPROLENE AF) 0.05 % External Cream Apply 1 Application topically daily. 50 g 1    Sodium Chloride (SALINE WOUND WASH) 0.9 % External Solution Apply 10 mL topically daily. 210 mL 3    tamsulosin 0.4 MG Oral Cap Take 1 capsule (0.4 mg total) by mouth daily. 30 capsule 0    methotrexate 2.5 MG Oral Tab Take 1 tablet (2.5 mg total) by mouth daily.      clobetasol 0.05 % External Ointment Apply 1 Application topically 2 (two) times daily as needed.      omeprazole 20 MG Oral  Capsule Delayed Release Take 1 capsule (20 mg total) by mouth daily.      predniSONE 10 MG Oral Tab Take 1 tablet (10 mg total) by mouth daily.      apixaban (ELIQUIS) 5 MG Oral Tab Take 1 tablet (5 mg total) by mouth 2 (two) times daily. 60 tablet 8    folic acid 1 MG Oral Tab Take 1 tablet (1 mg total) by mouth daily.      MetFORMIN HCl 500 MG Oral Tab Take 1 tablet (500 mg total) by mouth 2 (two) times daily with meals.      simvastatin 20 MG Oral Tab Take 1 tablet (20 mg total) by mouth nightly. (Patient taking differently: Take 1 tablet (20 mg total) by mouth every morning.) 90 tablet 1    lisinopril 2.5 MG Oral Tab Take 1 tablet (2.5 mg total) by mouth daily. 90 tablet 1    glimepiride 1 MG Oral Tab Take 1 tablet (1 mg total) by mouth daily with breakfast. 90 tablet 1         EXAM:   Objective   Objective    Physical Exam    Vital Signs  Vitals:    05/24/24 1100   BP: 116/82   Pulse: 82   Resp: 16   Temp: 97.7 °F (36.5 °C)       Wound Assessment  Wound 05/03/24 # 1 Left Lateral Foot Foot Left;Lateral (Active)   Wound Image   05/24/24 1347   Drainage Amount Small 05/24/24 1324   Drainage Description Serosanguineous 05/24/24 1324   Treatments Compression 05/17/24 0953   Wound Length (cm) 1.1 cm 05/24/24 1324   Wound Width (cm) 0.2 cm 05/24/24 1324   Wound Surface Area (cm^2) 0.22 cm^2 05/24/24 1324   Wound Depth (cm) 0.2 cm 05/24/24 1324   Wound Volume (cm^3) 0.044 cm^3 05/24/24 1324   Wound Healing % 45 05/24/24 1324   Margins Well-defined edges 05/24/24 1324   Non-staged Wound Description Full thickness 05/24/24 1324   Peggy-wound Assessment Dry;Edema 05/24/24 1324   Wound Granulation Tissue Red;Firm 05/24/24 1324   Wound Bed Granulation (%) 5 % 05/24/24 1324   Wound Bed Slough (%) 95 % 05/24/24 1324   Wound Odor None 05/24/24 1324   Tunneling? No 05/24/24 1324   Undermining? No 05/24/24 1324   Sinus Tracts? No 05/24/24 1324   Josue Scale Grade 2 05/24/24 1324       Wound 05/03/24 #2 Left Lateral Ankle Ankle  Left;Lateral (Active)   Wound Image   05/24/24 1327   Drainage Amount Scant 05/24/24 1327   Drainage Description Serous;Yellow 05/24/24 1327   Treatments Compression 05/17/24 0954   Wound Length (cm) 1.3 cm 05/24/24 1327   Wound Width (cm) 2.1 cm 05/24/24 1327   Wound Surface Area (cm^2) 2.73 cm^2 05/24/24 1327   Wound Depth (cm) 0.2 cm 05/24/24 1327   Wound Volume (cm^3) 0.546 cm^3 05/24/24 1327   Wound Healing % -992 05/24/24 1327   Margins Well-defined edges 05/24/24 1327   Non-staged Wound Description Full thickness 05/24/24 1327   Peggy-wound Assessment Dry;Edema;Hemosiderin staining;Red 05/24/24 1327   Wound Granulation Tissue Red;Firm 05/24/24 1327   Wound Bed Granulation (%) 20 % 05/24/24 1327   Wound Bed Epithelium (%) 60 % 05/24/24 1327   Wound Bed Slough (%) 20 % 05/24/24 1327   Wound Odor None 05/24/24 1327   Shape clustered 05/24/24 1327   Tunneling? No 05/24/24 1327   Undermining? No 05/24/24 1327   Sinus Tracts? No 05/24/24 1327   Josue Scale Grade 2 05/24/24 1327       Compression Wrap 05/17/24 Leg Left (Active)   Response to Treatment Well tolerated 05/17/24 1153   Compression Layers Multilayer 05/17/24 1153   Compression Product Type Coflex 05/17/24 1153   Dressing Applied Yes 05/17/24 1153   Compression Wrap Location Toes to Knee 05/17/24 1153   Compression Wrap Status Clean;Dry;Intact 05/17/24 1153           ASSESSMENT AND PLAN:     Assessment   Assessment    Encounter Diagnosis  1. Non-pressure chronic ulcer of left ankle with fat layer exposed ([facility])    2. Dermatitis    3. Rash    4. Peripheral arterial disease (HCC)    5. Left leg swelling    6. Idiopathic chronic venous hypertension of left lower extremity with ulcer and inflammation ([facility])    7. Diabetes mellitus with skin ulcer ([facility])    8. Long term (current) use of anticoagulants    9. Chronic deep vein thrombosis (DVT) of distal vein of left lower extremity ([facility])    10. Varicose veins of both lower extremities with complications         Problem List  Patient Active Problem List   Diagnosis    Carcinoid tumor of left lung (HCC)    History of bladder cancer    Hypogonadism in male    Erectile dysfunction    TIA (transient ischemic attack)    Varicose veins of both lower extremities with complications    Obesity    High cholesterol    Neuropathy    Aneurysm of abdominal aorta (HCC)    Bladder tumor    Controlled type 2 diabetes mellitus with diabetic neuropathy (HCC)    BPH with urinary obstruction    Type 2 diabetes mellitus (HCC)    Osteoarthritis    Elevated PSA    Hematuria    Hematuria, unspecified type    Pyelonephritis    Elevated INR    Gangrene (HCC)    Acute deep vein thrombosis (DVT) of proximal vein of left lower extremity (HCC)    Leg DVT (deep venous thromboembolism), acute, left (HCC)    Microcytic anemia    Benign carcinoid tumor of lung (HCC)    Urothelial cancer (HCC)    Iron deficiency anemia    Folic acid deficiency    Hemolytic anemia, acute (HCC)    Severe sepsis (HCC)    Cellulitis of left lower extremity    Chronic kidney disease, unspecified CKD stage    Hyperkalemia    Encephalopathy in sepsis    Acute cystitis without hematuria    Septic shock (HCC)    TONJA (acute kidney injury) (HCC)    Metabolic acidosis    Tachycardia    Pain of left lower extremity         MANAGEMENT    Trial of betamethasone cream for rash on left leg - to be done before dressing change  Continue honey gel for enzymatic debridement.   Low salt diet.   Leg elevation.   Continue HH for dressing changes 3 times a week  Will attempt debridement again next week.   Plan of care d/w daughter in detail.   Return one week.     Rash noted to upper calf where compression wrap seems to end. Betamethasone to leg, honey gel, honey alginate, ABD pad, conforming gauze, tape, unna boot 30-40mmhg. Orders faxed to . Betamethasone ordered.     PROCEDURES:    Compression wraps applied after dressing change.     Debridement Diabetic Ulcer Left;Lateral Foot   Wound  05/03/24 # 1 Left Lateral Foot Foot Left;Lateral     Performed by: Gayle Ng MD  Authorized by: Gayle Ng MD       Consent   Consent obtained? verbal  Consent given by: patient  Risks discussed? procedural risks discussed     Debridement Details  Performed by: physician  Debridement type: conservative sharp  Pain control: lidocaine 4%  Pain control administration type: topical     Pre-debridement measurements  Length (cm): 1.1  Width (cm): 0.2  Depth (cm): 0.2  Surface Area (cm^2): 0.22     Post-debridement measurements  Length (cm): 1.1  Width (cm): 0.2  Depth (cm): 0.2  Percent debrided: 70%  Surface Area (cm^2): 0.22  Area Debrided (cm^2): 0.15  Volume (cm^3): 0.04     Devitalized tissue debrided: biofilm and slough  Instrument(s) utilized: curette  Bleeding: small  Hemostasis obtained with: pressure  Procedural pain (0-10): 7  Post-procedural pain: 1   Response to treatment: procedure was tolerated well         Patient Instructions     Continue HH - for dressing change and wrap change - 3 times a week.   Massage left leg with betamethasone - sunny. Area of rash.   Continue honey gel with honey alginate.   Low salt diet.   Elevate lower extremities.   Uptitrate diuretics as tolerated - defer to PCP due to advanced age and possibility of electrolyte abnormalities.       Wound Cleaning and Dressings:    Shower with protection  Use SHOWER BOOT / CAST COVER       DRESSINGS: honey gel / honey alginate / kerramax.   Change dressing 3 times a week.     Compression Therapy :Unna 30-40 mm hg  Compression Therapy Instructions:  1. Okay to wear overnight      2.  Avoid prolonged standing in one place.  It is better to have your calf muscles moving       to pump fluid out of the legs.    3.  Elevate leg(s) above the level of the heart when sitting or as much as possible.    4.  Take your diuretics as directed by your provider.  Do not skip doses or change doses      unless instructed to do so by your  provider.    5. Do not get leg(s) with compression wrap wet. If wraps are too tight as indicated        By pain, numbness/tingling or discoloration of toes remove wrap completely       and call the   wound center.     There are no questions and answers to display.        Off-Loading:    Miscellaneous Instructions:  Supplement with a daily multivitamin   Low salt diet  Intense blood sugar control - Goal Blood sugar below 180 at all times recommended.  Increase protein intake / consider protein supplements - see below  Elevate extremities at all times when sitting / laying down.    DIETARY MODIFICATIONS TO HELP WITH WOUND HEALING:    Protein: Meats, beans, eggs, milk and yogurt particularly Greek yogurt), tofu, soy nuts, soy protein products    Vitamin C: Citrus fruits and juices, strawberries, tomatoes, tomato juice, peppers, baked potatoes, spinach, broccoli, cauliflower, [place] sprouts, cabbage    Vitamin A: Dark green, leafy vegetables, orange or yellow vegetables, cantaloupe, fortified dairy products, liver, fortified cereals    Zinc: Fortified cereals, red meats, seafood    Consider Kartik by [employer] (These are essential branch chain amino acids that help with tissue building and wound healing) and take 2 packets/day. you can order online at [employer] or [employer]    ADDITIONAL REMINDERS:    The treatment plan has been discussed at length with you and your provider. Follow all instructions carefully, it is very important. If you do not follow all instructions, you are at  risk of your wound not healing, infection, possible loss of limb and even end of life.  Please call the clinic during regular business hours ( 7:30 AM - 5:30 PM) if you notice increased bleeding, redness, warmth, pain or pus like drainage or start running a fever greater than 100.3.    For after hour emergencies, please call your primary physician or go to the nearest emergency room.    Orders  Orders Placed This Encounter   Procedures     Debridement Diabetic Ulcer Left;Lateral Foot       Meds & Refills for this Visit:  Requested Prescriptions     Signed Prescriptions Disp Refills    Betamethasone Dipropionate Aug (DIPROLENE AF) 0.05 % External Cream 50 g 1     Sig: Apply 1 Application topically daily.       Patient/Caregiver Education: There are no barriers to learning. Medical education for above diagnosis given.   Answered all questions.    Outcome: Patient verbalizes understanding. Patient is notified to call with any questions, complications, allergies, or worsening or changing symptoms.  Patient is to call with any side effects or complications as a result of the treatments today.      DOCUMENTATION OF TIME SPENT: Code selection for this visit was based on time spent : 40 min on date of service in preparing to see the patient, obtaining and/or reviewing separately obtained history, performing a medically appropriate examination, counseling and educating the patient/family/caregiver, ordering medications or testing, referring and communicating with other healthcare providers, documenting clinical information in the E HR, independently interpreting results and communicating results to the patient/family/caregiver and care coordination with the patient's other providers.    Followup: Return in about 1 week (around 5/31/2024) for Followup.      Note to Patient:  The 21st Century Cures Act makes medical notes like these available to patients in the interest of transparency. However, be advised this is a medical document and is intended as [id number]-[id number] communication; it is written in medical language and may appear blunt, direct, or contain abbreviations or verbiage that are unfamiliar. Medical documents are intended to carry relevant information, facts as evident, and the clinical opinion of the practitioner.    Also, please note that this report has been produced using speech recognition software and may contain errors related to that system  including, but not limited to, errors in grammar, punctuation, and spelling, as well as words and phrases that possibly may have been recognized inappropriately.  If there are any questions or concerns, contact the dictating provider for clarification.      Gayle Drake MD  5/24/2024  1:50 PM

## 2024-05-24 NOTE — PATIENT INSTRUCTIONS
Continue HH - for dressing change and wrap change - 3 times a week.   Massage left leg with betamethasone - sunny. Area of rash.   Continue honey gel with honey alginate.   Low salt diet.   Elevate lower extremities.   Uptitrate diuretics as tolerated - defer to PCP due to advanced age and possibility of electrolyte abnormalities.       Wound Cleaning and Dressings:    Shower with protection  Use SHOWER BOOT / CAST COVER       DRESSINGS: honey gel / honey alginate / kerramax.   Change dressing 3 times a week.     Compression Therapy :Unna 30-40 mm hg  Compression Therapy Instructions:  1. Okay to wear overnight      2.  Avoid prolonged standing in one place.  It is better to have your calf muscles moving       to pump fluid out of the legs.    3.  Elevate leg(s) above the level of the heart when sitting or as much as possible.    4.  Take your diuretics as directed by your provider.  Do not skip doses or change doses      unless instructed to do so by your provider.    5. Do not get leg(s) with compression wrap wet. If wraps are too tight as indicated        By pain, numbness/tingling or discoloration of toes remove wrap completely       and call the   wound center.     There are no questions and answers to display.        Off-Loading:    Miscellaneous Instructions:  Supplement with a daily multivitamin   Low salt diet  Intense blood sugar control - Goal Blood sugar below 180 at all times recommended.  Increase protein intake / consider protein supplements - see below  Elevate extremities at all times when sitting / laying down.    DIETARY MODIFICATIONS TO HELP WITH WOUND HEALING:    Protein: Meats, beans, eggs, milk and yogurt particularly Greek yogurt), tofu, soy nuts, soy protein products    Vitamin C: Citrus fruits and juices, strawberries, tomatoes, tomato juice, peppers, baked potatoes, spinach, broccoli, cauliflower, [place] sprouts, cabbage    Vitamin A: Dark green, leafy vegetables, orange or yellow  vegetables, cantaloupe, fortified dairy products, liver, fortified cereals    Zinc: Fortified cereals, red meats, seafood    Consider Kartik by [employer] (These are essential branch chain amino acids that help with tissue building and wound healing) and take 2 packets/day. you can order online at [employer] or [employer]    ADDITIONAL REMINDERS:    The treatment plan has been discussed at length with you and your provider. Follow all instructions carefully, it is very important. If you do not follow all instructions, you are at  risk of your wound not healing, infection, possible loss of limb and even end of life.  Please call the clinic during regular business hours ( 7:30 AM - 5:30 PM) if you notice increased bleeding, redness, warmth, pain or pus like drainage or start running a fever greater than 100.3.    For after hour emergencies, please call your primary physician or go to the nearest emergency room.

## 2024-05-28 ENCOUNTER — LAB ENCOUNTER (OUTPATIENT)
Dept: LAB | Facility: HOSPITAL | Age: 84
End: 2024-05-28
Attending: INTERNAL MEDICINE
Payer: MEDICARE

## 2024-05-28 DIAGNOSIS — Z79.899 ENCOUNTER FOR LONG-TERM (CURRENT) DRUG USE: ICD-10-CM

## 2024-05-28 LAB
ANION GAP SERPL CALC-SCNC: 6 MMOL/L (ref 0–18)
BUN BLD-MCNC: 42 MG/DL (ref 9–23)
CALCIUM BLD-MCNC: 9.9 MG/DL (ref 8.5–10.1)
CHLORIDE SERPL-SCNC: 107 MMOL/L (ref 98–112)
CO2 SERPL-SCNC: 27 MMOL/L (ref 21–32)
CREAT BLD-MCNC: 1.62 MG/DL
EGFRCR SERPLBLD CKD-EPI 2021: 42 ML/MIN/1.73M2 (ref 60–?)
FASTING STATUS PATIENT QL REPORTED: YES
GLUCOSE BLD-MCNC: 107 MG/DL (ref 70–99)
OSMOLALITY SERPL CALC.SUM OF ELEC: 301 MOSM/KG (ref 275–295)
POTASSIUM SERPL-SCNC: 4.7 MMOL/L (ref 3.5–5.1)
SODIUM SERPL-SCNC: 140 MMOL/L (ref 136–145)

## 2024-05-28 PROCEDURE — 80048 BASIC METABOLIC PNL TOTAL CA: CPT

## 2024-05-28 PROCEDURE — 36415 COLL VENOUS BLD VENIPUNCTURE: CPT

## 2024-05-31 ENCOUNTER — OFFICE VISIT (OUTPATIENT)
Dept: WOUND CARE | Facility: HOSPITAL | Age: 84
End: 2024-05-31
Attending: INTERNAL MEDICINE
Payer: MEDICARE

## 2024-05-31 VITALS
RESPIRATION RATE: 16 BRPM | DIASTOLIC BLOOD PRESSURE: 69 MMHG | SYSTOLIC BLOOD PRESSURE: 130 MMHG | TEMPERATURE: 98 F | HEART RATE: 87 BPM

## 2024-05-31 DIAGNOSIS — Z79.01 LONG TERM (CURRENT) USE OF ANTICOAGULANTS: ICD-10-CM

## 2024-05-31 DIAGNOSIS — M79.89 LEFT LEG SWELLING: ICD-10-CM

## 2024-05-31 DIAGNOSIS — L97.322 NON-PRESSURE CHRONIC ULCER OF LEFT ANKLE WITH FAT LAYER EXPOSED (HCC): Primary | ICD-10-CM

## 2024-05-31 DIAGNOSIS — L98.499 DIABETES MELLITUS WITH SKIN ULCER (HCC): ICD-10-CM

## 2024-05-31 DIAGNOSIS — I73.9 PERIPHERAL ARTERIAL DISEASE (HCC): ICD-10-CM

## 2024-05-31 DIAGNOSIS — I87.332 IDIOPATHIC CHRONIC VENOUS HYPERTENSION OF LEFT LOWER EXTREMITY WITH ULCER AND INFLAMMATION (HCC): ICD-10-CM

## 2024-05-31 DIAGNOSIS — E11.622 DIABETES MELLITUS WITH SKIN ULCER (HCC): ICD-10-CM

## 2024-05-31 LAB — GLUCOSE BLD-MCNC: 79 MG/DL (ref 70–99)

## 2024-05-31 PROCEDURE — 82962 GLUCOSE BLOOD TEST: CPT | Performed by: INTERNAL MEDICINE

## 2024-05-31 PROCEDURE — 97597 DBRDMT OPN WND 1ST 20 CM/<: CPT | Performed by: INTERNAL MEDICINE

## 2024-05-31 NOTE — PROGRESS NOTES
Patient ID: Tayo Alcantara is a 83 year old male.    Debridement Diabetic Ulcer Left;Lateral Foot   Wound 05/03/24 # 1 Left Lateral Foot Foot Left;Lateral    Performed by: Gayle Ng MD  Authorized by: Gayle Ng MD      Consent   Consent obtained? verbal  Consent given by: patient  Risks discussed? procedural risks discussed    Debridement Details  Performed by: physician  Debridement type: conservative sharp  Pain control: lidocaine 4%  Pain control administration type: topical    Pre-debridement measurements  Length (cm): 1  Width (cm): 0.5  Depth (cm): 0.2  Surface Area (cm^2): 0.5    Post-debridement measurements  Length (cm): 1  Width (cm): 0.5  Depth (cm): 0.2  Percent debrided: 75%  Surface Area (cm^2): 0.5  Area Debrided (cm^2): 0.38  Volume (cm^3): 0.1    Devitalized tissue debrided: biofilm and slough  Instrument(s) utilized: curette  Bleeding: small  Hemostasis obtained with: pressure  Procedural pain (0-10): 4  Post-procedural pain: 0   Response to treatment: procedure was tolerated well

## 2024-05-31 NOTE — PROGRESS NOTES
[facility] PROGRESS NOTE  JOSE ANTONIO BEE MD  5/31/2024    Chief Complaint:   Chief Complaint   Patient presents with    Wound Care     Patient arrives for a wound care follow up appointment. Patient arrives with a regular sock, an unna compression wrap to the left, wearing regular shoe. There is iodosorb and kerramax to the wounds, help in place with gauze. Patient has moderate pain.        HPI:   Grey Alcantara is a 83 year old male coming in for a follow-up visit.    HPI    Wound improved. Dimensions smaller.   Tolerating wrap OK.   HH in place.   No s/o infection.     Significant pain with debridement.     Review of Systems  Negative except HPI   Denies chest pain / SOB / palpitations  Denies fever.     Allergies  Allergies   Allergen Reactions    Bacitracin-Neomycin-Polymyxin [Neomycin-Bacitracin-Polymyxin] RASH    Other OTHER (SEE COMMENTS)       Current Meds:  Current Outpatient Medications   Medication Sig Dispense Refill    furosemide 20 MG Oral Tab Take 1 tablet (20 mg total) by mouth daily.      Potassium Chloride ER 10 MEQ Oral Tab CR Take 1 tablet (10 mEq total) by mouth daily.      Betamethasone Dipropionate Aug (DIPROLENE AF) 0.05 % External Cream Apply 1 Application topically daily. 50 g 1    Sodium Chloride (SALINE WOUND WASH) 0.9 % External Solution Apply 10 mL topically daily. 210 mL 3    methotrexate 2.5 MG Oral Tab Take 1 tablet (2.5 mg total) by mouth daily.      clobetasol 0.05 % External Ointment Apply 1 Application topically 2 (two) times daily as needed.      omeprazole 20 MG Oral Capsule Delayed Release Take 1 capsule (20 mg total) by mouth daily.      predniSONE 10 MG Oral Tab Take 1 tablet (10 mg total) by mouth daily.      apixaban (ELIQUIS) 5 MG Oral Tab Take 1 tablet (5 mg total) by mouth 2 (two) times daily. 60 tablet 8    folic acid 1 MG Oral Tab Take 1 tablet (1 mg total) by mouth daily.      MetFORMIN HCl 500 MG Oral Tab Take 1 tablet (500 mg total) by  mouth 2 (two) times daily with meals.      simvastatin 20 MG Oral Tab Take 1 tablet (20 mg total) by mouth nightly. (Patient taking differently: Take 1 tablet (20 mg total) by mouth every morning.) 90 tablet 1    lisinopril 2.5 MG Oral Tab Take 1 tablet (2.5 mg total) by mouth daily. 90 tablet 1    glimepiride 1 MG Oral Tab Take 1 tablet (1 mg total) by mouth daily with breakfast. 90 tablet 1         EXAM:   Objective   Objective    Physical Exam    Vital Signs  Vitals:    05/31/24 1451   BP: 130/69   Pulse: 87   Resp: 16   Temp: 97.5 °F (36.4 °C)       Wound Assessment  Wound 05/03/24 # 1 Left Lateral Foot Foot Left;Lateral (Active)   Wound Image   05/31/24 1512   Drainage Amount Scant 05/31/24 1457   Drainage Description Yellow;Serous 05/31/24 1457   Treatments Compression 05/24/24 1324   Wound Length (cm) 1 cm 05/31/24 1457   Wound Width (cm) 0.5 cm 05/31/24 1457   Wound Surface Area (cm^2) 0.5 cm^2 05/31/24 1457   Wound Depth (cm) 0.2 cm 05/31/24 1457   Wound Volume (cm^3) 0.1 cm^3 05/31/24 1457   Wound Healing % -25 05/31/24 1457   Margins Well-defined edges 05/31/24 1457   Non-staged Wound Description Full thickness 05/31/24 1457   Peggy-wound Assessment Dry;Edema 05/31/24 1457   Wound Granulation Tissue Red;Firm 05/24/24 1324   Wound Bed Granulation (%) 5 % 05/24/24 1324   Wound Bed Slough (%) 100 % 05/31/24 1457   Wound Odor None 05/31/24 1457   Tunneling? No 05/31/24 1457   Undermining? No 05/31/24 1457   Sinus Tracts? No 05/31/24 1457   Josue Scale Grade 2 05/31/24 1457       Wound 05/03/24 #2 Left Lateral Ankle Ankle Left;Lateral (Active)   Wound Image   05/31/24 1456   Drainage Amount Scant 05/31/24 1456   Drainage Description Serous;Yellow 05/31/24 1456   Treatments Compression 05/24/24 1327   Wound Length (cm) 1.5 cm 05/31/24 1456   Wound Width (cm) 1.5 cm 05/31/24 1456   Wound Surface Area (cm^2) 2.25 cm^2 05/31/24 1456   Wound Depth (cm) 0.2 cm 05/31/24 1456   Wound Volume (cm^3) 0.45 cm^3 05/31/24  1456   Wound Healing % -800 05/31/24 1456   Margins Well-defined edges 05/31/24 1456   Non-staged Wound Description Full thickness 05/31/24 1456   Peggy-wound Assessment Dry;Edema;Hemosiderin staining;Red 05/31/24 1456   Wound Granulation Tissue Pink;Firm 05/31/24 1456   Wound Bed Granulation (%) 10 % 05/31/24 1456   Wound Bed Epithelium (%) 65 % 05/31/24 1456   Wound Bed Slough (%) 25 % 05/31/24 1456   Wound Odor None 05/31/24 1456   Shape clustered 05/31/24 1456   Tunneling? No 05/31/24 1456   Undermining? No 05/31/24 1456   Sinus Tracts? No 05/31/24 1456   Josue Scale Grade 2 05/31/24 1456       Compression Wrap 05/17/24 Leg Left (Active)   Response to Treatment Well tolerated 05/24/24 1352   Compression Layers Multilayer 05/24/24 1352   Compression Product Type Unna Boot 05/24/24 1352   Dressing Applied Yes 05/24/24 1352   Compression Wrap Location Toes to Knee 05/24/24 1352   Compression Wrap Status Clean;Dry;Intact 05/24/24 1352           ASSESSMENT AND PLAN:     Assessment   Assessment    Encounter Diagnosis  1. 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 Peripheral arterial disease (HCC)    3. Left leg swelling    4. Idiopathic chronic venous hypertension of left lower extremity with ulcer and inflammation (HCC)    5. Diabetes mellitus with skin ulcer (HCC)    6. Long term (current) use of anticoagulants        Problem List  Patient Active Problem List   Diagnosis    Carcinoid tumor of left lung (HCC)    History of bladder cancer    Hypogonadism in male    Erectile dysfunction    TIA (transient ischemic attack)    Varicose veins of both lower extremities with complications    Obesity    High cholesterol    Neuropathy    Aneurysm of abdominal aorta (HCC)    Bladder tumor    Controlled type 2 diabetes mellitus with diabetic neuropathy (HCC)    BPH with urinary obstruction    Type 2 diabetes mellitus (HCC)    Osteoarthritis    Elevated PSA    Hematuria    Hematuria, unspecified  type    Pyelonephritis    Elevated INR    Gangrene (HCC)    Acute deep vein thrombosis (DVT) of proximal vein of left lower extremity (HCC)    Leg DVT (deep venous thromboembolism), acute, left (HCC)    Microcytic anemia    Benign carcinoid tumor of lung (HCC)    Urothelial cancer (HCC)    Iron deficiency anemia    Folic acid deficiency    Hemolytic anemia, acute (HCC)    Severe sepsis (HCC)    Cellulitis of left lower extremity    Chronic kidney disease, unspecified CKD stage    Hyperkalemia    Encephalopathy in sepsis    Acute cystitis without hematuria    Septic shock (HCC)    TONJA (acute kidney injury) (HCC)    Metabolic acidosis    Tachycardia    Pain of left lower extremity         MANAGEMENT    Continue honey gel, honey alginate, ABD pad, betamethasone to leg (rash to upper leg subsided) unna boot 30-40mmhg. Orders faxed to .     PROCEDURES:    Debridement Diabetic Ulcer Left;Lateral Foot   Wound 05/03/24 # 1 Left Lateral Foot Foot Left;Lateral     Performed by: Gayle Ng MD  Authorized by: Gayle Ng MD       Consent   Consent obtained? verbal  Consent given by: patient  Risks discussed? procedural risks discussed     Debridement Details  Performed by: physician  Debridement type: conservative sharp  Pain control: lidocaine 4%  Pain control administration type: topical     Pre-debridement measurements  Length (cm): 1  Width (cm): 0.5  Depth (cm): 0.2  Surface Area (cm^2): 0.5     Post-debridement measurements  Length (cm): 1  Width (cm): 0.5  Depth (cm): 0.2  Percent debrided: 75%  Surface Area (cm^2): 0.5  Area Debrided (cm^2): 0.38  Volume (cm^3): 0.1     Devitalized tissue debrided: biofilm and slough  Instrument(s) utilized: curette  Bleeding: small  Hemostasis obtained with: pressure  Procedural pain (0-10): 4  Post-procedural pain: 0   Response to treatment: procedure was tolerated well            Patient Instructions     Continue  - for dressing change and wrap change - 3 times  a week.   Continue honey gel with honey alginate.   Low salt diet.   Elevate lower extremities.   Uptitrate diuretics as tolerated - defer to PCP due to advanced age and possibility of electrolyte abnormalities.       Wound Cleaning and Dressings:    Shower with protection  Use SHOWER BOOT / CAST COVER       DRESSINGS: honey gel / honey alginate / kerramax.   Change dressing 3 times a week.     Compression Therapy :Unna 30-40 mm hg  Compression Therapy Instructions:  1. Okay to wear overnight      2.  Avoid prolonged standing in one place.  It is better to have your calf muscles moving       to pump fluid out of the legs.    3.  Elevate leg(s) above the level of the heart when sitting or as much as possible.    4.  Take your diuretics as directed by your provider.  Do not skip doses or change doses      unless instructed to do so by your provider.    5. Do not get leg(s) with compression wrap wet. If wraps are too tight as indicated        By pain, numbness/tingling or discoloration of toes remove wrap completely       and call the   wound center.     Off-Loading:    Miscellaneous Instructions:  Supplement with a daily multivitamin   Low salt diet  Intense blood sugar control - Goal Blood sugar below 180 at all times recommended.  Increase protein intake / consider protein supplements - see below  Elevate extremities at all times when sitting / laying down.    DIETARY MODIFICATIONS TO HELP WITH WOUND HEALING:    Protein: Meats, beans, eggs, milk and yogurt particularly Greek yogurt), tofu, soy nuts, soy protein products    Vitamin C: Citrus fruits and juices, strawberries, tomatoes, tomato juice, peppers, baked potatoes, spinach, broccoli, cauliflower, [place] sprouts, cabbage    Vitamin A: Dark green, leafy vegetables, orange or yellow vegetables, cantaloupe, fortified dairy products, liver, fortified cereals    Zinc: Fortified cereals, red meats, seafood    Consider Kartik by [email or web address] (These are essential  branch chain amino acids that help with tissue building and wound healing) and take 2 packets/day. you can order online at [employer] or [employer]    ADDITIONAL REMINDERS:    The treatment plan has been discussed at length with you and your provider. Follow all instructions carefully, it is very important. If you do not follow all instructions, you are at  risk of your wound not healing, infection, possible loss of limb and even end of life.  Please call the clinic during regular business hours ( 7:30 AM - 5:30 PM) if you notice increased bleeding, redness, warmth, pain or pus like drainage or start running a fever greater than 100.3.    For after hour emergencies, please call your primary physician or go to the nearest emergency room.    Orders  Orders Placed This Encounter   Procedures    Debridement Diabetic Ulcer Left;Lateral Foot     Patient/Caregiver Education: There are no barriers to learning. Medical education for above diagnosis given.   Answered all questions.    Outcome: Patient verbalizes understanding. Patient is notified to call with any questions, complications, allergies, or worsening or changing symptoms.  Patient is to call with any side effects or complications as a result of the treatments today.      DOCUMENTATION OF TIME SPENT: Code selection for this visit was based on time spent : 35 min on date of service in preparing to see the patient, obtaining and/or reviewing separately obtained history, performing a medically appropriate examination, counseling and educating the patient/family/caregiver, ordering medications or testing, referring and communicating with other healthcare providers, documenting clinical information in the E HR, independently interpreting results and communicating results to the patient/family/caregiver and care coordination with the patient's other providers.    Followup: Return in about 2 weeks (around 6/14/2024) for Wound followup.      Note to Patient:  The 21st Century  Cures Act makes medical notes like these available to patients in the interest of transparency. However, be advised this is a medical document and is intended as [id number] communication; it is written in medical language and may appear blunt, direct, or contain abbreviations or verbiage that are unfamiliar. Medical documents are intended to carry relevant information, facts as evident, and the clinical opinion of the practitioner.    Also, please note that this report has been produced using speech recognition software and may contain errors related to that system including, but not limited to, errors in grammar, punctuation, and spelling, as well as words and phrases that possibly may have been recognized inappropriately.  If there are any questions or concerns, contact the dictating provider for clarification.      Gayle Drake MD  5/31/2024  3:17 PM

## 2024-05-31 NOTE — PROGRESS NOTES
Weekly Wound Education Note    Teaching Provided To: Patient;Family  Training Topics: Cleasing and general instructions;Compression;Edema control;Discharge instructions;Dressing  Training Method: Explain/Verbal  Training Response: Patient responds and understands;Reinforcement needed        Notes: Improving. Continue honey gel, honey alginate, ABD pad, betamethasone to leg (rash to upper leg subsided) unna boot 30-40mmhg. Orders faxed to .

## 2024-05-31 NOTE — PATIENT INSTRUCTIONS
Continue  - for dressing change and wrap change - 3 times a week.   Continue honey gel with honey alginate.   Low salt diet.   Elevate lower extremities.   Uptitrate diuretics as tolerated - defer to PCP due to advanced age and possibility of electrolyte abnormalities.       Wound Cleaning and Dressings:    Shower with protection  Use SHOWER BOOT / CAST COVER       DRESSINGS: honey gel / honey alginate / kerramax.   Change dressing 3 times a week.     Compression Therapy :Unna 30-40 mm hg  Compression Therapy Instructions:  1. Okay to wear overnight      2.  Avoid prolonged standing in one place.  It is better to have your calf muscles moving       to pump fluid out of the legs.    3.  Elevate leg(s) above the level of the heart when sitting or as much as possible.    4.  Take your diuretics as directed by your provider.  Do not skip doses or change doses      unless instructed to do so by your provider.    5. Do not get leg(s) with compression wrap wet. If wraps are too tight as indicated        By pain, numbness/tingling or discoloration of toes remove wrap completely       and call the   wound center.     Off-Loading:    Miscellaneous Instructions:  Supplement with a daily multivitamin   Low salt diet  Intense blood sugar control - Goal Blood sugar below 180 at all times recommended.  Increase protein intake / consider protein supplements - see below  Elevate extremities at all times when sitting / laying down.    DIETARY MODIFICATIONS TO HELP WITH WOUND HEALING:    Protein: Meats, beans, eggs, milk and yogurt particularly Greek yogurt), tofu, soy nuts, soy protein products    Vitamin C: Citrus fruits and juices, strawberries, tomatoes, tomato juice, peppers, baked potatoes, spinach, broccoli, cauliflower, [place] sprouts, cabbage    Vitamin A: Dark green, leafy vegetables, orange or yellow vegetables, cantaloupe, fortified dairy products, liver, fortified cereals    Zinc: Fortified cereals, red meats,  seafood    Consider Kartik by [employer] (These are essential branch chain amino acids that help with tissue building and wound healing) and take 2 packets/day. you can order online at [employer] or [employer]    ADDITIONAL REMINDERS:    The treatment plan has been discussed at length with you and your provider. Follow all instructions carefully, it is very important. If you do not follow all instructions, you are at  risk of your wound not healing, infection, possible loss of limb and even end of life.  Please call the clinic during regular business hours ( 7:30 AM - 5:30 PM) if you notice increased bleeding, redness, warmth, pain or pus like drainage or start running a fever greater than 100.3.    For after hour emergencies, please call your primary physician or go to the nearest emergency room.

## 2024-06-05 LAB — GLUCOSE BLD-MCNC: 126 MG/DL (ref 70–99)

## 2024-06-12 ENCOUNTER — OFFICE VISIT (OUTPATIENT)
Dept: WOUND CARE | Facility: HOSPITAL | Age: 84
End: 2024-06-12
Attending: INTERNAL MEDICINE
Payer: MEDICARE

## 2024-06-12 VITALS
RESPIRATION RATE: 16 BRPM | SYSTOLIC BLOOD PRESSURE: 118 MMHG | DIASTOLIC BLOOD PRESSURE: 67 MMHG | HEART RATE: 88 BPM | TEMPERATURE: 98 F

## 2024-06-12 DIAGNOSIS — I87.332 IDIOPATHIC CHRONIC VENOUS HYPERTENSION OF LEFT LOWER EXTREMITY WITH ULCER AND INFLAMMATION (HCC): ICD-10-CM

## 2024-06-12 DIAGNOSIS — Z79.01 LONG TERM (CURRENT) USE OF ANTICOAGULANTS: ICD-10-CM

## 2024-06-12 DIAGNOSIS — M79.89 LEFT LEG SWELLING: ICD-10-CM

## 2024-06-12 DIAGNOSIS — L97.322 NON-PRESSURE CHRONIC ULCER OF LEFT ANKLE WITH FAT LAYER EXPOSED (HCC): Primary | ICD-10-CM

## 2024-06-12 DIAGNOSIS — L98.499 DIABETES MELLITUS WITH SKIN ULCER (HCC): ICD-10-CM

## 2024-06-12 DIAGNOSIS — E11.622 DIABETES MELLITUS WITH SKIN ULCER (HCC): ICD-10-CM

## 2024-06-12 DIAGNOSIS — I73.9 PERIPHERAL ARTERIAL DISEASE (HCC): ICD-10-CM

## 2024-06-12 LAB — GLUCOSE BLD-MCNC: 194 MG/DL (ref 70–99)

## 2024-06-12 PROCEDURE — 97597 DBRDMT OPN WND 1ST 20 CM/<: CPT | Performed by: INTERNAL MEDICINE

## 2024-06-12 PROCEDURE — 82962 GLUCOSE BLOOD TEST: CPT | Performed by: INTERNAL MEDICINE

## 2024-06-12 NOTE — PROGRESS NOTES
Patient ID: Tayo Alcantara is a 83 year old male.    Debridement Diabetic Ulcer Left;Lateral Foot   Wound 05/03/24 # 1 Left Lateral Foot Foot Left;Lateral    Performed by: Gayle Ng MD  Authorized by: Gayle Ng MD      Consent   Consent obtained? verbal  Consent given by: patient  Risks discussed? procedural risks discussed    Debridement Details  Performed by: physician  Debridement type: conservative sharp  Pain control: lidocaine 4%  Pain control administration type: topical    Pre-debridement measurements  Length (cm): 0.9  Width (cm): 0.5  Depth (cm): 0.1  Surface Area (cm^2): 0.45    Post-debridement measurements  Length (cm): 0.9  Width (cm): 0.5  Depth (cm): 0.1  Percent debrided: 100%  Surface Area (cm^2): 0.45  Area Debrided (cm^2): 0.45  Volume (cm^3): 0.05    Devitalized tissue debrided: biofilm and slough  Instrument(s) utilized: curette  Bleeding: small  Hemostasis obtained with: pressure  Procedural pain (0-10): 5  Post-procedural pain: 1   Response to treatment: procedure was tolerated well

## 2024-06-12 NOTE — PROGRESS NOTES
Weekly Wound Education Note    Teaching Provided To: Patient;Family  Training Topics: Cleasing and general instructions;Compression;Discharge instructions;Dressing;Edema control  Training Method: Explain/Verbal  Training Response: Patient responds and understands;Reinforcement needed        Notes: Stable. Continue honey gel, honey alginate, ABD pad, betamethasone to leg, unna boot 30-40mmhg. Orders faxed to .

## 2024-06-12 NOTE — PATIENT INSTRUCTIONS
Continue  - for dressing change and wrap change - 3 times a week.   Continue honey gel with honey alginate.   Low salt diet.   Elevate lower extremities.   Uptitrate diuretics as tolerated - defer to PCP due to advanced age and possibility of electrolyte abnormalities.       Wound Cleaning and Dressings:    Shower with protection  Use SHOWER BOOT / CAST COVER       DRESSINGS: honey gel / honey alginate / kerramax.   Change dressing 3 times a week.     Compression Therapy :Unna 30-40 mm hg  Compression Therapy Instructions:  1. Okay to wear overnight      2.  Avoid prolonged standing in one place.  It is better to have your calf muscles moving       to pump fluid out of the legs.    3.  Elevate leg(s) above the level of the heart when sitting or as much as possible.    4.  Take your diuretics as directed by your provider.  Do not skip doses or change doses      unless instructed to do so by your provider.    5. Do not get leg(s) with compression wrap wet. If wraps are too tight as indicated        By pain, numbness/tingling or discoloration of toes remove wrap completely       and call the   wound center.     Off-Loading:    Miscellaneous Instructions:  Supplement with a daily multivitamin   Low salt diet  Intense blood sugar control - Goal Blood sugar below 180 at all times recommended.  Increase protein intake / consider protein supplements - see below  Elevate extremities at all times when sitting / laying down.    DIETARY MODIFICATIONS TO HELP WITH WOUND HEALING:    Protein: Meats, beans, eggs, milk and yogurt particularly Greek yogurt), tofu, soy nuts, soy protein products    Vitamin C: Citrus fruits and juices, strawberries, tomatoes, tomato juice, peppers, baked potatoes, spinach, broccoli, cauliflower, [place] sprouts, cabbage    Vitamin A: Dark green, leafy vegetables, orange or yellow vegetables, cantaloupe, fortified dairy products, liver, fortified cereals    Zinc: Fortified cereals, red meats,  seafood    Consider Kartik by [employer] (These are essential branch chain amino acids that help with tissue building and wound healing) and take 2 packets/day. you can order online at [employer] or [employer]    ADDITIONAL REMINDERS:    The treatment plan has been discussed at length with you and your provider. Follow all instructions carefully, it is very important. If you do not follow all instructions, you are at  risk of your wound not healing, infection, possible loss of limb and even end of life.  Please call the clinic during regular business hours ( 7:30 AM - 5:30 PM) if you notice increased bleeding, redness, warmth, pain or pus like drainage or start running a fever greater than 100.3.    For after hour emergencies, please call your primary physician or go to the nearest emergency room.

## 2024-06-12 NOTE — PROGRESS NOTES
[facility] PROGRESS NOTE  JOSE ANTONIO BEE MD  6/12/2024    Chief Complaint:   Chief Complaint   Patient presents with    Wound Care     Follow up for left foot ankle wound. No complaints at this time.         HPI:   Subjective   Tayo Alcantara is a 83 year old male coming in for a follow-up visit.    HPI    Wound dimensions same  100% slough   Selective debridement done - to reveal more granulation in wound base.   No s/o infection  Edema better    Review of Systems  Negative except HPI   Denies chest pain / SOB / palpitations  Denies fever.     Allergies  Allergies   Allergen Reactions    Bacitracin-Neomycin-Polymyxin [Neomycin-Bacitracin-Polymyxin] RASH    Other OTHER (SEE COMMENTS)       Current Meds:  Current Outpatient Medications   Medication Sig Dispense Refill    furosemide 20 MG Oral Tab Take 1 tablet (20 mg total) by mouth daily.      Potassium Chloride ER 10 MEQ Oral Tab CR Take 1 tablet (10 mEq total) by mouth daily.      Betamethasone Dipropionate Aug (DIPROLENE AF) 0.05 % External Cream Apply 1 Application topically daily. 50 g 1    Sodium Chloride (SALINE WOUND WASH) 0.9 % External Solution Apply 10 mL topically daily. 210 mL 3    methotrexate 2.5 MG Oral Tab Take 1 tablet (2.5 mg total) by mouth daily.      clobetasol 0.05 % External Ointment Apply 1 Application topically 2 (two) times daily as needed.      omeprazole 20 MG Oral Capsule Delayed Release Take 1 capsule (20 mg total) by mouth daily.      predniSONE 10 MG Oral Tab Take 1 tablet (10 mg total) by mouth daily.      apixaban (ELIQUIS) 5 MG Oral Tab Take 1 tablet (5 mg total) by mouth 2 (two) times daily. 60 tablet 8    folic acid 1 MG Oral Tab Take 1 tablet (1 mg total) by mouth daily.      MetFORMIN HCl 500 MG Oral Tab Take 1 tablet (500 mg total) by mouth 2 (two) times daily with meals.      simvastatin 20 MG Oral Tab Take 1 tablet (20 mg total) by mouth nightly. (Patient taking differently: Take 1 tablet (20 mg total) by mouth  every morning.) 90 tablet 1    lisinopril 2.5 MG Oral Tab Take 1 tablet (2.5 mg total) by mouth daily. 90 tablet 1    glimepiride 1 MG Oral Tab Take 1 tablet (1 mg total) by mouth daily with breakfast. 90 tablet 1         EXAM:   Objective   Objective    Physical Exam    Vital Signs  Vitals:    06/12/24 1500   BP: 118/67   Pulse: 88   Resp: 16   Temp: 97.8 °F (36.6 °C)       Wound Assessment  Wound 05/03/24 # 1 Left Lateral Foot Foot Left;Lateral (Active)   Wound Image   06/12/24 1608   Drainage Amount Moderate 06/12/24 1552   Drainage Description Yellow;Serous 06/12/24 1552   Treatments Compression 05/31/24 1457   Wound Length (cm) 0.9 cm 06/12/24 1552   Wound Width (cm) 0.5 cm 06/12/24 1552   Wound Surface Area (cm^2) 0.45 cm^2 06/12/24 1552   Wound Depth (cm) 0.1 cm 06/12/24 1552   Wound Volume (cm^3) 0.045 cm^3 06/12/24 1552   Wound Healing % 44 06/12/24 1552   Margins Well-defined edges 06/12/24 1552   Non-staged Wound Description Full thickness 06/12/24 1552   Peggy-wound Assessment Edema;Red;Dry 06/12/24 1552   Wound Granulation Tissue Red;Firm 05/24/24 1324   Wound Bed Granulation (%) 5 % 05/24/24 1324   Wound Bed Slough (%) 100 % 06/12/24 1552   Wound Odor None 06/12/24 1552   Tunneling? No 05/31/24 1457   Undermining? No 05/31/24 1457   Sinus Tracts? No 05/31/24 1457   Josue Scale Grade 2 06/12/24 1552       Wound 05/03/24 #2 Left Lateral Ankle Ankle Left;Lateral (Active)   Wound Image   06/12/24 1553   Drainage Amount Small 06/12/24 1553   Drainage Description Serosanguineous 06/12/24 1553   Treatments Compression 05/31/24 1456   Wound Length (cm) 1.4 cm 06/12/24 1553   Wound Width (cm) 1.8 cm 06/12/24 1553   Wound Surface Area (cm^2) 2.52 cm^2 06/12/24 1553   Wound Depth (cm) 0.2 cm 06/12/24 1553   Wound Volume (cm^3) 0.504 cm^3 06/12/24 1553   Wound Healing % -908 06/12/24 1553   Margins Well-defined edges 06/12/24 1553   Non-staged Wound Description Full thickness 06/12/24 1553   Peggy-wound Assessment  Dry;Edema;Hemosiderin staining 06/12/24 1553   Wound Granulation Tissue Red;Spongy 06/12/24 1553   Wound Bed Granulation (%) 5 % 06/12/24 1553   Wound Bed Epithelium (%) 50 % 06/12/24 1553   Wound Bed Slough (%) 45 % 06/12/24 1553   Wound Odor None 06/12/24 1553   Shape clustered 06/12/24 1553   Tunneling? No 05/31/24 1456   Undermining? No 05/31/24 1456   Sinus Tracts? No 05/31/24 1456   Josue Scale Grade 2 06/12/24 1553       Compression Wrap 05/17/24 Leg Left (Active)   Response to Treatment Well tolerated 05/31/24 1519   Compression Layers Multilayer 05/31/24 1519   Compression Product Type Unna Boot 05/31/24 1519   Dressing Applied Yes 05/31/24 1519   Compression Wrap Location Toes to Knee 05/31/24 1519   Compression Wrap Status Clean;Dry;Intact 05/31/24 1519           ASSESSMENT AND PLAN:     Assessment   Assessment    Encounter Diagnosis  1. 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 Peripheral arterial disease (HCC)    3. Left leg swelling    4. Idiopathic chronic venous hypertension of left lower extremity with ulcer and inflammation (HCC)    5. Diabetes mellitus with skin ulcer (HCC)    6. Long term (current) use of anticoagulants        Problem List  Patient Active Problem List   Diagnosis    Carcinoid tumor of left lung (HCC)    History of bladder cancer    Hypogonadism in male    Erectile dysfunction    TIA (transient ischemic attack)    Varicose veins of both lower extremities with complications    Obesity    High cholesterol    Neuropathy    Aneurysm of abdominal aorta (HCC)    Bladder tumor    Controlled type 2 diabetes mellitus with diabetic neuropathy (HCC)    BPH with urinary obstruction    Type 2 diabetes mellitus (HCC)    Osteoarthritis    Elevated PSA    Hematuria    Hematuria, unspecified type    Pyelonephritis    Elevated INR    Gangrene (HCC)    Acute deep vein thrombosis (DVT) of proximal vein of left lower extremity (HCC)    Leg DVT (deep venous  thromboembolism), acute, left (HCC)    Microcytic anemia    Benign carcinoid tumor of lung (HCC)    Urothelial cancer (HCC)    Iron deficiency anemia    Folic acid deficiency    Hemolytic anemia, acute (HCC)    Severe sepsis (HCC)    Cellulitis of left lower extremity    Chronic kidney disease, unspecified CKD stage    Hyperkalemia    Encephalopathy in sepsis    Acute cystitis without hematuria    Septic shock (HCC)    TONJA (acute kidney injury) (HCC)    Metabolic acidosis    Tachycardia    Pain of left lower extremity         MANAGEMENT    Serial debridements as needed  Continue honey gel for enzymatic debridement.   Continue compression wraps  Leg elevation.   Low salt diet.   Return one week    PROCEDURES:    Debridement Diabetic Ulcer Left;Lateral Foot   Wound 05/03/24 # 1 Left Lateral Foot Foot Left;Lateral     Performed by: Gayle Ng MD  Authorized by: Gayle Ng MD       Consent   Consent obtained? verbal  Consent given by: patient  Risks discussed? procedural risks discussed     Debridement Details  Performed by: physician  Debridement type: conservative sharp  Pain control: lidocaine 4%  Pain control administration type: topical     Pre-debridement measurements  Length (cm): 0.9  Width (cm): 0.5  Depth (cm): 0.1  Surface Area (cm^2): 0.45     Post-debridement measurements  Length (cm): 0.9  Width (cm): 0.5  Depth (cm): 0.1  Percent debrided: 100%  Surface Area (cm^2): 0.45  Area Debrided (cm^2): 0.45  Volume (cm^3): 0.05     Devitalized tissue debrided: biofilm and slough  Instrument(s) utilized: curette  Bleeding: small  Hemostasis obtained with: pressure  Procedural pain (0-10): 5  Post-procedural pain: 1   Response to treatment: procedure was tolerated well               Patient Instructions     Continue HH - for dressing change and wrap change - 3 times a week.   Continue honey gel with honey alginate.   Low salt diet.   Elevate lower extremities.   Uptitrate diuretics as tolerated -  defer to PCP due to advanced age and possibility of electrolyte abnormalities.       Wound Cleaning and Dressings:    Shower with protection  Use SHOWER BOOT / CAST COVER       DRESSINGS: honey gel / honey alginate / kerramax.   Change dressing 3 times a week.     Compression Therapy :Unna 30-40 mm hg  Compression Therapy Instructions:  1. Okay to wear overnight      2.  Avoid prolonged standing in one place.  It is better to have your calf muscles moving       to pump fluid out of the legs.    3.  Elevate leg(s) above the level of the heart when sitting or as much as possible.    4.  Take your diuretics as directed by your provider.  Do not skip doses or change doses      unless instructed to do so by your provider.    5. Do not get leg(s) with compression wrap wet. If wraps are too tight as indicated        By pain, numbness/tingling or discoloration of toes remove wrap completely       and call the   wound center.     Off-Loading:    Miscellaneous Instructions:  Supplement with a daily multivitamin   Low salt diet  Intense blood sugar control - Goal Blood sugar below 180 at all times recommended.  Increase protein intake / consider protein supplements - see below  Elevate extremities at all times when sitting / laying down.    DIETARY MODIFICATIONS TO HELP WITH WOUND HEALING:    Protein: Meats, beans, eggs, milk and yogurt particularly Greek yogurt), tofu, soy nuts, soy protein products    Vitamin C: Citrus fruits and juices, strawberries, tomatoes, tomato juice, peppers, baked potatoes, spinach, broccoli, cauliflower, [place] sprouts, cabbage    Vitamin A: Dark green, leafy vegetables, orange or yellow vegetables, cantaloupe, fortified dairy products, liver, fortified cereals    Zinc: Fortified cereals, red meats, seafood    Consider Kartik by [employer] (These are essential branch chain amino acids that help with tissue building and wound healing) and take 2 packets/day. you can order online at [employer] or  [employer]    ADDITIONAL REMINDERS:    The treatment plan has been discussed at length with you and your provider. Follow all instructions carefully, it is very important. If you do not follow all instructions, you are at  risk of your wound not healing, infection, possible loss of limb and even end of life.  Please call the clinic during regular business hours ( 7:30 AM - 5:30 PM) if you notice increased bleeding, redness, warmth, pain or pus like drainage or start running a fever greater than 100.3.    For after hour emergencies, please call your primary physician or go to the nearest emergency room.    Orders  Orders Placed This Encounter   Procedures    Debridement Diabetic Ulcer Left;Lateral Foot       Patient/Caregiver Education: There are no barriers to learning. Medical education for above diagnosis given.   Answered all questions.    Outcome: Patient verbalizes understanding. Patient is notified to call with any questions, complications, allergies, or worsening or changing symptoms.  Patient is to call with any side effects or complications as a result of the treatments today.      DOCUMENTATION OF TIME SPENT: Code selection for this visit was based on time spent : 35 min on date of service in preparing to see the patient, obtaining and/or reviewing separately obtained history, performing a medically appropriate examination, counseling and educating the patient/family/caregiver, ordering medications or testing, referring and communicating with other healthcare providers, documenting clinical information in the E HR, independently interpreting results and communicating results to the patient/family/caregiver and care coordination with the patient's other providers.    Followup: Return in about 2 weeks (around 6/26/2024) for Wound followup.      Note to Patient:  The 21st Century Cures Act makes medical notes like these available to patients in the interest of transparency. However, be advised this is a medical  document and is intended as [id number] communication; it is written in medical language and may appear blunt, direct, or contain abbreviations or verbiage that are unfamiliar. Medical documents are intended to carry relevant information, facts as evident, and the clinical opinion of the practitioner.    Also, please note that this report has been produced using speech recognition software and may contain errors related to that system including, but not limited to, errors in grammar, punctuation, and spelling, as well as words and phrases that possibly may have been recognized inappropriately.  If there are any questions or concerns, contact the dictating provider for clarification.      Gayle Drake MD  6/12/2024  4:12 PM

## 2024-06-26 ENCOUNTER — OFFICE VISIT (OUTPATIENT)
Dept: WOUND CARE | Facility: HOSPITAL | Age: 84
End: 2024-06-26
Attending: INTERNAL MEDICINE

## 2024-06-26 VITALS
TEMPERATURE: 98 F | DIASTOLIC BLOOD PRESSURE: 66 MMHG | RESPIRATION RATE: 16 BRPM | HEART RATE: 80 BPM | SYSTOLIC BLOOD PRESSURE: 112 MMHG

## 2024-06-26 DIAGNOSIS — M79.89 LEFT LEG SWELLING: ICD-10-CM

## 2024-06-26 DIAGNOSIS — E11.622 DIABETES MELLITUS WITH SKIN ULCER (HCC): ICD-10-CM

## 2024-06-26 DIAGNOSIS — L98.499 DIABETES MELLITUS WITH SKIN ULCER (HCC): ICD-10-CM

## 2024-06-26 DIAGNOSIS — Z79.01 LONG TERM (CURRENT) USE OF ANTICOAGULANTS: ICD-10-CM

## 2024-06-26 DIAGNOSIS — L97.322 NON-PRESSURE CHRONIC ULCER OF LEFT ANKLE WITH FAT LAYER EXPOSED (HCC): Primary | ICD-10-CM

## 2024-06-26 DIAGNOSIS — I87.332 IDIOPATHIC CHRONIC VENOUS HYPERTENSION OF LEFT LOWER EXTREMITY WITH ULCER AND INFLAMMATION (HCC): ICD-10-CM

## 2024-06-26 DIAGNOSIS — I73.9 PERIPHERAL ARTERIAL DISEASE (HCC): ICD-10-CM

## 2024-06-26 LAB — GLUCOSE BLD-MCNC: 205 MG/DL (ref 70–99)

## 2024-06-26 PROCEDURE — 97597 DBRDMT OPN WND 1ST 20 CM/<: CPT | Performed by: INTERNAL MEDICINE

## 2024-06-26 PROCEDURE — 82962 GLUCOSE BLOOD TEST: CPT | Performed by: INTERNAL MEDICINE

## 2024-06-26 NOTE — PATIENT INSTRUCTIONS
Continue HH - for dressing change and wrap change - 3 times a week.   Start endoform / HF transfer  Low salt diet.   Elevate lower extremities.   Diuretics as tolerated       Wound Cleaning and Dressings:    Shower with protection  Use SHOWER BOOT / CAST COVER       DRESSINGS: endoform / HF transfer / kerramax.   Change dressing 3 times a week.     Compression Therapy :Unna 30-40 mm hg  Compression Therapy Instructions:  1. Okay to wear overnight      2.  Avoid prolonged standing in one place.  It is better to have your calf muscles moving       to pump fluid out of the legs.    3.  Elevate leg(s) above the level of the heart when sitting or as much as possible.    4.  Take your diuretics as directed by your provider.  Do not skip doses or change doses      unless instructed to do so by your provider.    5. Do not get leg(s) with compression wrap wet. If wraps are too tight as indicated        By pain, numbness/tingling or discoloration of toes remove wrap completely       and call the   wound center.     Off-Loading:    Miscellaneous Instructions:  Supplement with a daily multivitamin   Low salt diet  Intense blood sugar control - Goal Blood sugar below 180 at all times recommended.  Increase protein intake / consider protein supplements - see below  Elevate extremities at all times when sitting / laying down.    DIETARY MODIFICATIONS TO HELP WITH WOUND HEALING:    Protein: Meats, beans, eggs, milk and yogurt particularly Greek yogurt), tofu, soy nuts, soy protein products    Vitamin C: Citrus fruits and juices, strawberries, tomatoes, tomato juice, peppers, baked potatoes, spinach, broccoli, cauliflower, [place] sprouts, cabbage    Vitamin A: Dark green, leafy vegetables, orange or yellow vegetables, cantaloupe, fortified dairy products, liver, fortified cereals    Zinc: Fortified cereals, red meats, seafood    Consider Kartik by [employer] (These are essential branch chain amino acids that help with tissue  building and wound healing) and take 2 packets/day. you can order online at [employer] or [employer]    ADDITIONAL REMINDERS:    The treatment plan has been discussed at length with you and your provider. Follow all instructions carefully, it is very important. If you do not follow all instructions, you are at  risk of your wound not healing, infection, possible loss of limb and even end of life.  Please call the clinic during regular business hours ( 7:30 AM - 5:30 PM) if you notice increased bleeding, redness, warmth, pain or pus like drainage or start running a fever greater than 100.3.    For after hour emergencies, please call your primary physician or go to the nearest emergency room.

## 2024-06-26 NOTE — PROGRESS NOTES
Weekly Wound Education Note    Teaching Provided To: Patient  Training Topics: Cleasing and general instructions;Compression;Discharge instructions;Dressing;Edema control  Training Method: Explain/Verbal  Training Response: Patient responds and understands;Reinforcement needed        Notes: Improving. Tiffany, hydrofera transfer, ABD pad, unna boot 30-40mmhg.

## 2024-06-26 NOTE — PROGRESS NOTES
[facility] PROGRESS NOTE  JOSE ANTONIO BEE MD  6/26/2024    Chief Complaint:   Chief Complaint   Patient presents with    Wound Care     Patient is here for a wound care follow up. His pain is currently 4/10.        HPI:   Subjective   Tayo Alcantara is a 83 year old male coming in for a follow-up visit.    HPI    Wound improved - dimensions smaller and tissue improved as well.   All nonviable tissue debrided off today.   Will start collagen/ HF transfer    Review of Systems  Negative except HPI   Denies chest pain / SOB / palpitations  Denies fever.     Allergies  Allergies   Allergen Reactions    Bacitracin-Neomycin-Polymyxin [Neomycin-Bacitracin-Polymyxin] RASH    Other OTHER (SEE COMMENTS)       Current Meds:  Current Outpatient Medications   Medication Sig Dispense Refill    furosemide 20 MG Oral Tab Take 1 tablet (20 mg total) by mouth daily.      Potassium Chloride ER 10 MEQ Oral Tab CR Take 1 tablet (10 mEq total) by mouth daily.      Betamethasone Dipropionate Aug (DIPROLENE AF) 0.05 % External Cream Apply 1 Application topically daily. 50 g 1    Sodium Chloride (SALINE WOUND WASH) 0.9 % External Solution Apply 10 mL topically daily. 210 mL 3    methotrexate 2.5 MG Oral Tab Take 1 tablet (2.5 mg total) by mouth daily.      clobetasol 0.05 % External Ointment Apply 1 Application topically 2 (two) times daily as needed.      omeprazole 20 MG Oral Capsule Delayed Release Take 1 capsule (20 mg total) by mouth daily.      predniSONE 10 MG Oral Tab Take 1 tablet (10 mg total) by mouth daily.      apixaban (ELIQUIS) 5 MG Oral Tab Take 1 tablet (5 mg total) by mouth 2 (two) times daily. 60 tablet 8    folic acid 1 MG Oral Tab Take 1 tablet (1 mg total) by mouth daily.      MetFORMIN HCl 500 MG Oral Tab Take 1 tablet (500 mg total) by mouth 2 (two) times daily with meals.      simvastatin 20 MG Oral Tab Take 1 tablet (20 mg total) by mouth nightly. (Patient taking differently: Take 1 tablet (20 mg total)  by mouth every morning.) 90 tablet 1    lisinopril 2.5 MG Oral Tab Take 1 tablet (2.5 mg total) by mouth daily. 90 tablet 1    glimepiride 1 MG Oral Tab Take 1 tablet (1 mg total) by mouth daily with breakfast. 90 tablet 1         EXAM:   Objective   Objective    Physical Exam    Vital Signs  Vitals:    06/26/24 1604   BP: 112/66   Pulse: 80   Resp: 16   Temp: 98 °F (36.7 °C)       Wound Assessment  Wound 05/03/24 # 1 Left Lateral Foot Foot Left;Lateral (Active)   Wound Image   06/26/24 1614   Drainage Amount Small 06/26/24 1614   Drainage Description Yellow;Serous 06/26/24 1614   Treatments Compression 06/12/24 1552   Wound Length (cm) 0.6 cm 06/26/24 1614   Wound Width (cm) 1.5 cm 06/26/24 1614   Wound Surface Area (cm^2) 0.9 cm^2 06/26/24 1614   Wound Depth (cm) 0.2 cm 06/26/24 1614   Wound Volume (cm^3) 0.18 cm^3 06/26/24 1614   Wound Healing % -125 06/26/24 1614   Margins Well-defined edges 06/26/24 1614   Non-staged Wound Description Full thickness 06/26/24 1614   Peggy-wound Assessment Edema;Dry 06/26/24 1614   Wound Granulation Tissue Red;Firm 05/24/24 1324   Wound Bed Granulation (%) 5 % 05/24/24 1324   Wound Bed Slough (%) 100 % 06/26/24 1614   Wound Odor None 06/26/24 1614   Tunneling? No 06/12/24 1552   Undermining? No 06/12/24 1552   Sinus Tracts? No 06/12/24 1552   Josue Scale Grade 2 06/26/24 1614       Wound 05/03/24 #2 Left Lateral Ankle Ankle Left;Lateral (Active)   Wound Image   06/26/24 1612   Drainage Amount Small 06/26/24 1612   Drainage Description Serous;Yellow 06/26/24 1612   Treatments Compression 06/12/24 1553   Wound Length (cm) 0.4 cm 06/26/24 1612   Wound Width (cm) 0.3 cm 06/26/24 1612   Wound Surface Area (cm^2) 0.12 cm^2 06/26/24 1612   Wound Depth (cm) 0.1 cm 06/26/24 1612   Wound Volume (cm^3) 0.012 cm^3 06/26/24 1612   Wound Healing % 76 06/26/24 1612   Margins Well-defined edges 06/26/24 1612   Non-staged Wound Description Full thickness 06/26/24 1612   Peggy-wound Assessment  Edema;Dry 06/26/24 1612   Wound Granulation Tissue Firm;Pink 06/26/24 1612   Wound Bed Granulation (%) 100 % 06/26/24 1612   Wound Bed Epithelium (%) 50 % 06/12/24 1553   Wound Bed Slough (%) 45 % 06/12/24 1553   Wound Odor None 06/26/24 1612   Shape clustered 06/12/24 1553   Tunneling? No 06/12/24 1553   Undermining? No 06/12/24 1553   Sinus Tracts? No 06/12/24 1553   Josue Scale Grade 2 06/26/24 1612       Compression Wrap 05/17/24 Leg Left (Active)   Response to Treatment Well tolerated 06/12/24 1632   Compression Layers Multilayer 06/12/24 1632   Compression Product Type Unna Boot 06/12/24 1632   Dressing Applied Yes 06/12/24 1632   Compression Wrap Location Toes to Knee 06/12/24 1632   Compression Wrap Status Clean;Dry;Intact 06/12/24 1632           ASSESSMENT AND PLAN:     Assessment   Assessment    Encounter Diagnosis  1. 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 Peripheral arterial disease (HCC)    3. Left leg swelling    4. Idiopathic chronic venous hypertension of left lower extremity with ulcer and inflammation (HCC)    5. Diabetes mellitus with skin ulcer (HCC)    6. Long term (current) use of anticoagulants        Problem List  Patient Active Problem List   Diagnosis    Carcinoid tumor of left lung (HCC)    History of bladder cancer    Hypogonadism in male    Erectile dysfunction    TIA (transient ischemic attack)    Varicose veins of both lower extremities with complications    Obesity    High cholesterol    Neuropathy    Aneurysm of abdominal aorta (HCC)    Bladder tumor    Controlled type 2 diabetes mellitus with diabetic neuropathy (HCC)    BPH with urinary obstruction    Type 2 diabetes mellitus (HCC)    Osteoarthritis    Elevated PSA    Hematuria    Hematuria, unspecified type    Pyelonephritis    Elevated INR    Gangrene (HCC)    Acute deep vein thrombosis (DVT) of proximal vein of left lower extremity (HCC)    Leg DVT (deep venous thromboembolism), acute, left  (HCC)    Microcytic anemia    Benign carcinoid tumor of lung (HCC)    Urothelial cancer (HCC)    Iron deficiency anemia    Folic acid deficiency    Hemolytic anemia, acute (HCC)    Severe sepsis (HCC)    Cellulitis of left lower extremity    Chronic kidney disease, unspecified CKD stage    Hyperkalemia    Encephalopathy in sepsis    Acute cystitis without hematuria    Septic shock (HCC)    TONJA (acute kidney injury) (HCC)    Metabolic acidosis    Tachycardia    Pain of left lower extremity         MANAGEMENT    Start collagen / foam  Serial debridements as needed  Compression wraps  Elevate LE  Low salt diet.   Return 2 week.       PROCEDURES:    Debridement Diabetic Ulcer Left;Lateral Foot   Wound 05/03/24 # 1 Left Lateral Foot Foot Left;Lateral     Performed by: Gayle Ng MD  Authorized by: Gayle Ng MD       Consent   Consent obtained? verbal  Consent given by: patient  Risks discussed? procedural risks discussed     Debridement Details  Performed by: physician  Debridement type: conservative sharp  Pain control: lidocaine 4%  Pain control administration type: topical     Pre-debridement measurements  Length (cm): 0.6  Width (cm): 1.5 (slight angle)  Depth (cm): 0.2  Surface Area (cm^2): 0.9     Post-debridement measurements  Length (cm): 0.6  Width (cm): 1.5  Depth (cm): 0.2  Percent debrided: 100%  Surface Area (cm^2): 0.9  Area Debrided (cm^2): 0.9  Volume (cm^3): 0.18     Devitalized tissue debrided: biofilm and slough  Bleeding: none  Hemostasis obtained with: pressure  Procedural pain (0-10): 2  Post-procedural pain: 0   Response to treatment: procedure was tolerated well             Patient Instructions     Continue HH - for dressing change and wrap change - 3 times a week.   Start endoform / HF transfer  Low salt diet.   Elevate lower extremities.   Diuretics as tolerated       Wound Cleaning and Dressings:    Shower with protection  Use SHOWER BOOT / CAST COVER       DRESSINGS:  endoform / HF transfer / kerramax.   Change dressing 3 times a week.     Compression Therapy :Unna 30-40 mm hg  Compression Therapy Instructions:  1. Okay to wear overnight      2.  Avoid prolonged standing in one place.  It is better to have your calf muscles moving       to pump fluid out of the legs.    3.  Elevate leg(s) above the level of the heart when sitting or as much as possible.    4.  Take your diuretics as directed by your provider.  Do not skip doses or change doses      unless instructed to do so by your provider.    5. Do not get leg(s) with compression wrap wet. If wraps are too tight as indicated        By pain, numbness/tingling or discoloration of toes remove wrap completely       and call the   wound center.     Off-Loading:    Miscellaneous Instructions:  Supplement with a daily multivitamin   Low salt diet  Intense blood sugar control - Goal Blood sugar below 180 at all times recommended.  Increase protein intake / consider protein supplements - see below  Elevate extremities at all times when sitting / laying down.    DIETARY MODIFICATIONS TO HELP WITH WOUND HEALING:    Protein: Meats, beans, eggs, milk and yogurt particularly Greek yogurt), tofu, soy nuts, soy protein products    Vitamin C: Citrus fruits and juices, strawberries, tomatoes, tomato juice, peppers, baked potatoes, spinach, broccoli, cauliflower, [place] sprouts, cabbage    Vitamin A: Dark green, leafy vegetables, orange or yellow vegetables, cantaloupe, fortified dairy products, liver, fortified cereals    Zinc: Fortified cereals, red meats, seafood    Consider Kartik by [employer] (These are essential branch chain amino acids that help with tissue building and wound healing) and take 2 packets/day. you can order online at [employer] or [employer]    ADDITIONAL REMINDERS:    The treatment plan has been discussed at length with you and your provider. Follow all instructions carefully, it is very important. If you do not follow all  instructions, you are at  risk of your wound not healing, infection, possible loss of limb and even end of life.  Please call the clinic during regular business hours ( 7:30 AM - 5:30 PM) if you notice increased bleeding, redness, warmth, pain or pus like drainage or start running a fever greater than 100.3.    For after hour emergencies, please call your primary physician or go to the nearest emergency room.    Orders  Orders Placed This Encounter   Procedures    Debridement Diabetic Ulcer Left;Lateral Foot     Patient/Caregiver Education: There are no barriers to learning. Medical education for above diagnosis given.   Answered all questions.    Outcome: Patient verbalizes understanding. Patient is notified to call with any questions, complications, allergies, or worsening or changing symptoms.  Patient is to call with any side effects or complications as a result of the treatments today.      DOCUMENTATION OF TIME SPENT: Code selection for this visit was based on time spent : 30 min on date of service in preparing to see the patient, obtaining and/or reviewing separately obtained history, performing a medically appropriate examination, counseling and educating the patient/family/caregiver, ordering medications or testing, referring and communicating with other healthcare providers, documenting clinical information in the E HR, independently interpreting results and communicating results to the patient/family/caregiver and care coordination with the patient's other providers.    Followup: Return in about 1 week (around 7/3/2024) for Wound followup.      Note to Patient:  The 21st Century Cures Act makes medical notes like these available to patients in the interest of transparency. However, be advised this is a medical document and is intended as oaxy-wg-hyfw communication; it is written in medical language and may appear blunt, direct, or contain abbreviations or verbiage that are unfamiliar. Medical documents  are intended to carry relevant information, facts as evident, and the clinical opinion of the practitioner.    Also, please note that this report has been produced using speech recognition software and may contain errors related to that system including, but not limited to, errors in grammar, punctuation, and spelling, as well as words and phrases that possibly may have been recognized inappropriately.  If there are any questions or concerns, contact the dictating provider for clarification.      Gayle Drake MD  6/26/2024  4:27 PM

## 2024-06-26 NOTE — PROGRESS NOTES
Patient ID: Tayo Alcantara is a 83 year old male.    Debridement Diabetic Ulcer Left;Lateral Foot   Wound 05/03/24 # 1 Left Lateral Foot Foot Left;Lateral    Performed by: Gayle Ng MD  Authorized by: Gayle Ng MD      Consent   Consent obtained? verbal  Consent given by: patient  Risks discussed? procedural risks discussed    Debridement Details  Performed by: physician  Debridement type: conservative sharp  Pain control: lidocaine 4%  Pain control administration type: topical    Pre-debridement measurements  Length (cm): 0.6  Width (cm): 1.5 (slight angle)  Depth (cm): 0.2  Surface Area (cm^2): 0.9    Post-debridement measurements  Length (cm): 0.6  Width (cm): 1.5  Depth (cm): 0.2  Percent debrided: 100%  Surface Area (cm^2): 0.9  Area Debrided (cm^2): 0.9  Volume (cm^3): 0.18    Devitalized tissue debrided: biofilm and slough  Bleeding: none  Hemostasis obtained with: pressure  Procedural pain (0-10): 2  Post-procedural pain: 0   Response to treatment: procedure was tolerated well

## 2024-07-03 ENCOUNTER — APPOINTMENT (OUTPATIENT)
Dept: WOUND CARE | Facility: HOSPITAL | Age: 84
End: 2024-07-03
Attending: INTERNAL MEDICINE
Payer: MEDICARE

## 2024-07-08 ENCOUNTER — LAB ENCOUNTER (OUTPATIENT)
Dept: LAB | Facility: HOSPITAL | Age: 84
End: 2024-07-08
Attending: INTERNAL MEDICINE
Payer: MEDICARE

## 2024-07-08 DIAGNOSIS — R60.0 PEDAL EDEMA: ICD-10-CM

## 2024-07-08 DIAGNOSIS — Z79.899 MEDICATION MANAGEMENT: Primary | ICD-10-CM

## 2024-07-08 LAB
ANION GAP SERPL CALC-SCNC: 6 MMOL/L (ref 0–18)
BUN BLD-MCNC: 64 MG/DL (ref 9–23)
CALCIUM BLD-MCNC: 9.9 MG/DL (ref 8.5–10.1)
CHLORIDE SERPL-SCNC: 106 MMOL/L (ref 98–112)
CO2 SERPL-SCNC: 26 MMOL/L (ref 21–32)
CREAT BLD-MCNC: 1.97 MG/DL
EGFRCR SERPLBLD CKD-EPI 2021: 34 ML/MIN/1.73M2 (ref 60–?)
FASTING STATUS PATIENT QL REPORTED: YES
GLUCOSE BLD-MCNC: 174 MG/DL (ref 70–99)
OSMOLALITY SERPL CALC.SUM OF ELEC: 309 MOSM/KG (ref 275–295)
POTASSIUM SERPL-SCNC: 5 MMOL/L (ref 3.5–5.1)
SODIUM SERPL-SCNC: 138 MMOL/L (ref 136–145)

## 2024-07-08 PROCEDURE — 36415 COLL VENOUS BLD VENIPUNCTURE: CPT

## 2024-07-08 PROCEDURE — 80048 BASIC METABOLIC PNL TOTAL CA: CPT

## 2024-07-10 ENCOUNTER — OFFICE VISIT (OUTPATIENT)
Dept: WOUND CARE | Facility: HOSPITAL | Age: 84
End: 2024-07-10
Attending: INTERNAL MEDICINE
Payer: MEDICARE

## 2024-07-10 VITALS
TEMPERATURE: 97 F | DIASTOLIC BLOOD PRESSURE: 66 MMHG | HEART RATE: 74 BPM | SYSTOLIC BLOOD PRESSURE: 109 MMHG | RESPIRATION RATE: 16 BRPM

## 2024-07-10 DIAGNOSIS — L97.322 NON-PRESSURE CHRONIC ULCER OF LEFT ANKLE WITH FAT LAYER EXPOSED (HCC): Primary | ICD-10-CM

## 2024-07-10 DIAGNOSIS — Z79.01 LONG TERM (CURRENT) USE OF ANTICOAGULANTS: ICD-10-CM

## 2024-07-10 DIAGNOSIS — I73.9 PERIPHERAL ARTERIAL DISEASE (HCC): ICD-10-CM

## 2024-07-10 DIAGNOSIS — L98.499 DIABETES MELLITUS WITH SKIN ULCER (HCC): ICD-10-CM

## 2024-07-10 DIAGNOSIS — I87.332 IDIOPATHIC CHRONIC VENOUS HYPERTENSION OF LEFT LOWER EXTREMITY WITH ULCER AND INFLAMMATION (HCC): ICD-10-CM

## 2024-07-10 DIAGNOSIS — M79.89 LEFT LEG SWELLING: ICD-10-CM

## 2024-07-10 DIAGNOSIS — E11.622 DIABETES MELLITUS WITH SKIN ULCER (HCC): ICD-10-CM

## 2024-07-10 LAB — GLUCOSE BLD-MCNC: 178 MG/DL (ref 70–99)

## 2024-07-10 PROCEDURE — 29581 APPL MULTLAYER CMPRN SYS LEG: CPT

## 2024-07-10 PROCEDURE — 82962 GLUCOSE BLOOD TEST: CPT | Performed by: INTERNAL MEDICINE

## 2024-07-10 NOTE — PROGRESS NOTES
Weekly Wound Education Note    Teaching Provided To: Patient;Family  Training Topics: Cleasing and general instructions;Compression;Discharge instructions;Dressing;Edema control  Training Method: Explain/Verbal  Training Response: Patient responds and understands;Reinforcement needed        Notes: Improving. Xeroform (folded), unna boot 30-40mmhg. orders faxed to .

## 2024-07-10 NOTE — PATIENT INSTRUCTIONS
Continue HH - for dressing change and wrap change - 2-3 times a week.   Low salt diet.   Elevate lower extremities.   Diuretics as tolerated       Wound Cleaning and Dressings:    Shower with protection  Use SHOWER BOOT / CAST COVER       DRESSINGS: XEROFORM/ ABD PAD.   Change dressing 2-3 times a week.     Compression Therapy :Unna 30-40 mm hg  Compression Therapy Instructions:  1. Okay to wear overnight      2.  Avoid prolonged standing in one place.  It is better to have your calf muscles moving       to pump fluid out of the legs.    3.  Elevate leg(s) above the level of the heart when sitting or as much as possible.    4.  Take your diuretics as directed by your provider.  Do not skip doses or change doses      unless instructed to do so by your provider.    5. Do not get leg(s) with compression wrap wet. If wraps are too tight as indicated        By pain, numbness/tingling or discoloration of toes remove wrap completely       and call the   wound center.     Off-Loading:    Miscellaneous Instructions:  Supplement with a daily multivitamin   Low salt diet  Intense blood sugar control - Goal Blood sugar below 180 at all times recommended.  Increase protein intake / consider protein supplements - see below  Elevate extremities at all times when sitting / laying down.    DIETARY MODIFICATIONS TO HELP WITH WOUND HEALING:    Protein: Meats, beans, eggs, milk and yogurt particularly Greek yogurt), tofu, soy nuts, soy protein products    Vitamin C: Citrus fruits and juices, strawberries, tomatoes, tomato juice, peppers, baked potatoes, spinach, broccoli, cauliflower, [place] sprouts, cabbage    Vitamin A: Dark green, leafy vegetables, orange or yellow vegetables, cantaloupe, fortified dairy products, liver, fortified cereals    Zinc: Fortified cereals, red meats, seafood    Consider Kartik by [employer] (These are essential branch chain amino acids that help with tissue building and wound healing) and take 2  packets/day. you can order online at [employer]    ADDITIONAL REMINDERS:    The treatment plan has been discussed at length with you and your provider. Follow all instructions carefully, it is very important. If you do not follow all instructions, you are at  risk of your wound not healing, infection, possible loss of limb and even end of life.  Please call the clinic during regular business hours ( 7:30 AM - 5:30 PM) if you notice increased bleeding, redness, warmth, pain or pus like drainage or start running a fever greater than 100.3.    For after hour emergencies, please call your primary physician or go to the nearest emergency room.

## 2024-07-10 NOTE — PROGRESS NOTES
[facility] PROGRESS NOTE  JOSE ANTONIO BEE MD  7/10/2024    Chief Complaint:   Chief Complaint   Patient presents with    Wound Care     Follow up for left ankle wounds. No complaints at this time.        HPI:   Subjective   Tayo Alcantara is a 83 year old male coming in for a follow-up visit.    HPI    Wound improved  Dimensions better  No s/o infection  Wound seemed dry    Review of Systems  Negative except HPI   Denies chest pain / SOB / palpitations  Denies fever.     Allergies  Allergies   Allergen Reactions    Bacitracin-Neomycin-Polymyxin [Neomycin-Bacitracin-Polymyxin] RASH    Other OTHER (SEE COMMENTS)       Current Meds:  Current Outpatient Medications   Medication Sig Dispense Refill    furosemide 20 MG Oral Tab Take 1 tablet (20 mg total) by mouth daily.      Potassium Chloride ER 10 MEQ Oral Tab CR Take 1 tablet (10 mEq total) by mouth daily.      Betamethasone Dipropionate Aug (DIPROLENE AF) 0.05 % External Cream Apply 1 Application topically daily. 50 g 1    Sodium Chloride (SALINE WOUND WASH) 0.9 % External Solution Apply 10 mL topically daily. 210 mL 3    methotrexate 2.5 MG Oral Tab Take 1 tablet (2.5 mg total) by mouth daily.      clobetasol 0.05 % External Ointment Apply 1 Application topically 2 (two) times daily as needed.      omeprazole 20 MG Oral Capsule Delayed Release Take 1 capsule (20 mg total) by mouth daily.      predniSONE 10 MG Oral Tab Take 1 tablet (10 mg total) by mouth daily.      apixaban (ELIQUIS) 5 MG Oral Tab Take 1 tablet (5 mg total) by mouth 2 (two) times daily. 60 tablet 8    folic acid 1 MG Oral Tab Take 1 tablet (1 mg total) by mouth daily.      MetFORMIN HCl 500 MG Oral Tab Take 1 tablet (500 mg total) by mouth 2 (two) times daily with meals.      simvastatin 20 MG Oral Tab Take 1 tablet (20 mg total) by mouth nightly. (Patient taking differently: Take 1 tablet (20 mg total) by mouth every morning.) 90 tablet 1    lisinopril 2.5 MG Oral Tab Take 1 tablet  (2.5 mg total) by mouth daily. 90 tablet 1    glimepiride 1 MG Oral Tab Take 1 tablet (1 mg total) by mouth daily with breakfast. 90 tablet 1         EXAM:   Objective   Objective    Physical Exam    Vital Signs  Vitals:    07/10/24 1555   BP: 109/66   Pulse: 74   Resp: 16   Temp: 97.3 °F (36.3 °C)       Wound Assessment  Wound 05/03/24 # 1 Left Lateral Foot Foot Left;Lateral (Active)   Wound Image   07/10/24 1554   Drainage Amount None 07/10/24 1554   Drainage Description Yellow;Serous 06/26/24 1614   Treatments Compression 06/26/24 1614   Wound Length (cm) 0.6 cm 07/10/24 1554   Wound Width (cm) 1 cm 07/10/24 1554   Wound Surface Area (cm^2) 0.6 cm^2 07/10/24 1554   Wound Depth (cm) 0 cm 07/10/24 1554   Wound Volume (cm^3) 0 cm^3 07/10/24 1554   Wound Healing % 100 07/10/24 1554   Margins Well-defined edges 07/10/24 1554   Non-staged Wound Description Full thickness 07/10/24 1554   Peggy-wound Assessment Edema;Dry 07/10/24 1554   Wound Granulation Tissue Red;Firm 05/24/24 1324   Wound Bed Granulation (%) 5 % 05/24/24 1324   Wound Bed Slough (%) 100 % 06/26/24 1614   Wound Odor None 07/10/24 1554   Shape Scabbed, uanble to view wound bed. 07/10/24 1554   Tunneling? No 07/10/24 1554   Undermining? No 07/10/24 1554   Sinus Tracts? No 07/10/24 1554   Josue Scale Grade 2 07/10/24 1554       Wound 05/03/24 #2 Left Lateral Ankle Ankle Left;Lateral (Active)   Wound Image   07/10/24 1554   Drainage Amount Scant 07/10/24 1554   Drainage Description Serous;Yellow 07/10/24 1554   Treatments Compression 06/26/24 1612   Wound Length (cm) 0.6 cm 07/10/24 1554   Wound Width (cm) 0.5 cm 07/10/24 1554   Wound Surface Area (cm^2) 0.3 cm^2 07/10/24 1554   Wound Depth (cm) 0 cm 07/10/24 1554   Wound Volume (cm^3) 0 cm^3 07/10/24 1554   Wound Healing % 100 07/10/24 1554   Margins Well-defined edges 07/10/24 1554   Non-staged Wound Description Full thickness 07/10/24 1554   Peggy-wound Assessment Edema;Dry 07/10/24 1554   Wound  Granulation Tissue Firm;[place] 06/26/24 1612   Wound Bed Granulation (%) 100 % 06/26/24 1612   Wound Bed Epithelium (%) 50 % 06/12/24 1553   Wound Bed Slough (%) 45 % 06/12/24 1553   Wound Odor None 07/10/24 1554   Shape Scabbed, unable to view wound bed. 07/10/24 1554   Tunneling? No 07/10/24 1554   Undermining? No 07/10/24 1554   Sinus Tracts? No 07/10/24 1554   Josue Scale Grade 2 07/10/24 1554       Compression Wrap 05/17/24 Leg Left (Active)   Response to Treatment Well tolerated 06/26/24 1729   Compression Layers Multilayer 06/26/24 1729   Compression Product Type Unna Boot 06/26/24 1729   Dressing Applied Yes 06/26/24 1729   Compression Wrap Location Toes to Knee 06/26/24 1729   Compression Wrap Status Clean;Dry;Intact 06/26/24 1729           ASSESSMENT AND PLAN:     Assessment   Assessment    Encounter Diagnosis  1. 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 Diabetes mellitus with skin ulcer (HCC)    4. Left leg swelling    5. Peripheral arterial disease (HCC)    6. Long term (current) use of anticoagulants        Problem List  Patient Active Problem List   Diagnosis    Carcinoid tumor of left lung (HCC)    History of bladder cancer    Hypogonadism in male    Erectile dysfunction    TIA (transient ischemic attack)    Varicose veins of both lower extremities with complications    Obesity    High cholesterol    Neuropathy    Aneurysm of abdominal aorta (HCC)    Bladder tumor    Controlled type 2 diabetes mellitus with diabetic neuropathy (HCC)    BPH with urinary obstruction    Type 2 diabetes mellitus (HCC)    Osteoarthritis    Elevated PSA    Hematuria    Hematuria, unspecified type    Pyelonephritis    Elevated INR    Gangrene (HCC)    Acute deep vein thrombosis (DVT) of proximal vein of left lower extremity (HCC)    Leg DVT (deep venous thromboembolism), acute, left (HCC)     Microcytic anemia    Benign carcinoid tumor of lung (HCC)    Urothelial cancer (HCC)    Iron deficiency anemia    Folic acid deficiency    Hemolytic anemia, acute (HCC)    Severe sepsis (HCC)    Cellulitis of left lower extremity    Chronic kidney disease, unspecified CKD stage    Hyperkalemia    Encephalopathy in sepsis    Acute cystitis without hematuria    Septic shock (HCC)    TONJA (acute kidney injury) (HCC)    Metabolic acidosis    Tachycardia    Pain of left lower extremity         MANAGEMENT    Continue HH - for dressing change and wrap change - 2-3 times a week.   Low salt diet.   Elevate lower extremities.   Diuretics as tolerated     PROCEDURES:    Compression wraps pplied after dressing change    Wound Cleaning and Dressings:    Shower with protection  Use SHOWER BOOT / CAST COVER       DRESSINGS: XEROFORM/ ABD PAD.   Change dressing 2-3 times a week.     Compression Therapy :Unna 30-40 mm hg  Compression Therapy Instructions:  1. Okay to wear overnight      2.  Avoid prolonged standing in one place.  It is better to have your calf muscles moving       to pump fluid out of the legs.    3.  Elevate leg(s) above the level of the heart when sitting or as much as possible.    4.  Take your diuretics as directed by your provider.  Do not skip doses or change doses      unless instructed to do so by your provider.    5. Do not get leg(s) with compression wrap wet. If wraps are too tight as indicated        By pain, numbness/tingling or discoloration of toes remove wrap completely       and call the   wound center.     Off-Loading:    Miscellaneous Instructions:  Supplement with a daily multivitamin   Low salt diet  Intense blood sugar control - Goal Blood sugar below 180 at all times recommended.  Increase protein intake / consider protein supplements - see below  Elevate extremities at all times when sitting / laying down.    DIETARY MODIFICATIONS TO HELP WITH WOUND HEALING:    Protein: Meats, beans, eggs,  milk and yogurt particularly Greek yogurt), tofu, soy nuts, soy protein products    Vitamin C: Citrus fruits and juices, strawberries, tomatoes, tomato juice, peppers, baked potatoes, spinach, broccoli, cauliflower, [place] sprouts, cabbage    Vitamin A: Dark green, leafy vegetables, orange or yellow vegetables, cantaloupe, fortified dairy products, liver, fortified cereals    Zinc: Fortified cereals, red meats, seafood    Consider Kartik by [employer] (These are essential branch chain amino acids that help with tissue building and wound healing) and take 2 packets/day. you can order online at [employer] or [employer]    ADDITIONAL REMINDERS:    The treatment plan has been discussed at length with you and your provider. Follow all instructions carefully, it is very important. If you do not follow all instructions, you are at  risk of your wound not healing, infection, possible loss of limb and even end of life.  Please call the clinic during regular business hours ( 7:30 AM - 5:30 PM) if you notice increased bleeding, redness, warmth, pain or pus like drainage or start running a fever greater than 100.3.    For after hour emergencies, please call your primary physician or go to the nearest emergency room.    Orders  No orders of the defined types were placed in this encounter.      Meds & Refills for this Visit:  Requested Prescriptions      No prescriptions requested or ordered in this encounter         Patient/Caregiver Education: There are no barriers to learning. Medical education for above diagnosis given.   Answered all questions.    Outcome: Patient verbalizes understanding. Patient is notified to call with any questions, complications, allergies, or worsening or changing symptoms.  Patient is to call with any side effects or complications as a result of the treatments today.      DOCUMENTATION OF TIME SPENT: Code selection for this visit was based on time spent : 35 min on date of service in preparing to see the  patient, obtaining and/or reviewing separately obtained history, performing a medically appropriate examination, counseling and educating the patient/family/caregiver, ordering medications or testing, referring and communicating with other healthcare providers, documenting clinical information in the E HR, independently interpreting results and communicating results to the patient/family/caregiver and care coordination with the patient's other providers.    Followup: Return in 2 weeks (on 7/24/2024) for Wound followup.      Note to Patient:  The 21st Century Cures Act makes medical notes like these available to patients in the interest of transparency. However, be advised this is a medical document and is intended as [id number]-[id number] communication; it is written in medical language and may appear blunt, direct, or contain abbreviations or verbiage that are unfamiliar. Medical documents are intended to carry relevant information, facts as evident, and the clinical opinion of the practitioner.    Also, please note that this report has been produced using speech recognition software and may contain errors related to that system including, but not limited to, errors in grammar, punctuation, and spelling, as well as words and phrases that possibly may have been recognized inappropriately.  If there are any questions or concerns, contact the dictating provider for clarification.      Gayle Drake MD  7/10/2024  4:06 PM

## 2024-07-24 ENCOUNTER — OFFICE VISIT (OUTPATIENT)
Dept: WOUND CARE | Facility: HOSPITAL | Age: 84
End: 2024-07-24
Attending: INTERNAL MEDICINE
Payer: MEDICARE

## 2024-07-24 ENCOUNTER — LAB ENCOUNTER (OUTPATIENT)
Dept: LAB | Facility: HOSPITAL | Age: 84
End: 2024-07-24
Attending: INTERNAL MEDICINE
Payer: MEDICARE

## 2024-07-24 VITALS
DIASTOLIC BLOOD PRESSURE: 79 MMHG | HEART RATE: 86 BPM | RESPIRATION RATE: 14 BRPM | TEMPERATURE: 98 F | SYSTOLIC BLOOD PRESSURE: 114 MMHG

## 2024-07-24 DIAGNOSIS — I73.9 PERIPHERAL ARTERIAL DISEASE (HCC): ICD-10-CM

## 2024-07-24 DIAGNOSIS — I87.332 IDIOPATHIC CHRONIC VENOUS HYPERTENSION OF LEFT LOWER EXTREMITY WITH ULCER AND INFLAMMATION (HCC): ICD-10-CM

## 2024-07-24 DIAGNOSIS — L08.9 INFECTED WOUND: ICD-10-CM

## 2024-07-24 DIAGNOSIS — T14.8XXA INFECTED WOUND: ICD-10-CM

## 2024-07-24 DIAGNOSIS — L97.322 NON-PRESSURE CHRONIC ULCER OF LEFT ANKLE WITH FAT LAYER EXPOSED (HCC): ICD-10-CM

## 2024-07-24 DIAGNOSIS — M79.89 LEFT LEG SWELLING: ICD-10-CM

## 2024-07-24 DIAGNOSIS — E11.622 DIABETES MELLITUS WITH SKIN ULCER (HCC): ICD-10-CM

## 2024-07-24 DIAGNOSIS — L98.499 DIABETES MELLITUS WITH SKIN ULCER (HCC): ICD-10-CM

## 2024-07-24 DIAGNOSIS — L03.116 CELLULITIS OF LEFT LEG: ICD-10-CM

## 2024-07-24 DIAGNOSIS — L97.322 NON-PRESSURE CHRONIC ULCER OF LEFT ANKLE WITH FAT LAYER EXPOSED (HCC): Primary | ICD-10-CM

## 2024-07-24 DIAGNOSIS — Z79.01 LONG TERM (CURRENT) USE OF ANTICOAGULANTS: ICD-10-CM

## 2024-07-24 LAB
ALBUMIN SERPL-MCNC: 4.3 G/DL (ref 3.2–4.8)
ALBUMIN/GLOB SERPL: 1.7 {RATIO} (ref 1–2)
ALP LIVER SERPL-CCNC: 64 U/L
ALT SERPL-CCNC: <7 U/L
ANION GAP SERPL CALC-SCNC: 4 MMOL/L (ref 0–18)
AST SERPL-CCNC: <8 U/L (ref ?–34)
BASOPHILS # BLD AUTO: 0.08 X10(3) UL (ref 0–0.2)
BASOPHILS NFR BLD AUTO: 0.8 %
BILIRUB SERPL-MCNC: 1.2 MG/DL (ref 0.2–1.1)
BUN BLD-MCNC: 37 MG/DL (ref 9–23)
CALCIUM BLD-MCNC: 10.2 MG/DL (ref 8.7–10.4)
CHLORIDE SERPL-SCNC: 111 MMOL/L (ref 98–112)
CO2 SERPL-SCNC: 28 MMOL/L (ref 21–32)
CREAT BLD-MCNC: 1.78 MG/DL
CRP SERPL-MCNC: 14.2 MG/DL (ref ?–0.5)
EGFRCR SERPLBLD CKD-EPI 2021: 38 ML/MIN/1.73M2 (ref 60–?)
EOSINOPHIL # BLD AUTO: 0.19 X10(3) UL (ref 0–0.7)
EOSINOPHIL NFR BLD AUTO: 2 %
ERYTHROCYTE [DISTWIDTH] IN BLOOD BY AUTOMATED COUNT: 18.6 %
ERYTHROCYTE [SEDIMENTATION RATE] IN BLOOD: 39 MM/HR
EST. AVERAGE GLUCOSE BLD GHB EST-MCNC: 154 MG/DL (ref 68–126)
FASTING STATUS PATIENT QL REPORTED: NO
GLOBULIN PLAS-MCNC: 2.6 G/DL (ref 2.8–4.4)
GLUCOSE BLD-MCNC: 78 MG/DL (ref 70–99)
GLUCOSE BLD-MCNC: 92 MG/DL (ref 70–99)
HBA1C MFR BLD: 7 % (ref ?–5.7)
HCT VFR BLD AUTO: 30.9 %
HGB BLD-MCNC: 10.8 G/DL
IMM GRANULOCYTES # BLD AUTO: 0.16 X10(3) UL (ref 0–1)
IMM GRANULOCYTES NFR BLD: 1.7 %
LYMPHOCYTES # BLD AUTO: 1.89 X10(3) UL (ref 1–4)
LYMPHOCYTES NFR BLD AUTO: 19.9 %
MCH RBC QN AUTO: 26 PG (ref 26–34)
MCHC RBC AUTO-ENTMCNC: 35 G/DL (ref 31–37)
MCV RBC AUTO: 74.3 FL
MONOCYTES # BLD AUTO: 0.93 X10(3) UL (ref 0.1–1)
MONOCYTES NFR BLD AUTO: 9.8 %
NEUTROPHILS # BLD AUTO: 6.23 X10 (3) UL (ref 1.5–7.7)
NEUTROPHILS # BLD AUTO: 6.23 X10(3) UL (ref 1.5–7.7)
NEUTROPHILS NFR BLD AUTO: 65.8 %
OSMOLALITY SERPL CALC.SUM OF ELEC: 304 MOSM/KG (ref 275–295)
PLATELET # BLD AUTO: 140 10(3)UL (ref 150–450)
PLATELETS.RETICULATED NFR BLD AUTO: 8.8 % (ref 0–7)
POTASSIUM SERPL-SCNC: 4.2 MMOL/L (ref 3.5–5.1)
PREALB SERPL-MCNC: 25.6 MG/DL (ref 10–40)
PROT SERPL-MCNC: 6.9 G/DL (ref 5.7–8.2)
RBC # BLD AUTO: 4.16 X10(6)UL
SODIUM SERPL-SCNC: 143 MMOL/L (ref 136–145)
WBC # BLD AUTO: 9.5 X10(3) UL (ref 4–11)

## 2024-07-24 PROCEDURE — 85025 COMPLETE CBC W/AUTO DIFF WBC: CPT

## 2024-07-24 PROCEDURE — 97597 DBRDMT OPN WND 1ST 20 CM/<: CPT | Performed by: INTERNAL MEDICINE

## 2024-07-24 PROCEDURE — 82962 GLUCOSE BLOOD TEST: CPT | Performed by: INTERNAL MEDICINE

## 2024-07-24 PROCEDURE — 36415 COLL VENOUS BLD VENIPUNCTURE: CPT

## 2024-07-24 PROCEDURE — 83036 HEMOGLOBIN GLYCOSYLATED A1C: CPT

## 2024-07-24 PROCEDURE — 84134 ASSAY OF PREALBUMIN: CPT

## 2024-07-24 PROCEDURE — 85652 RBC SED RATE AUTOMATED: CPT

## 2024-07-24 PROCEDURE — 80053 COMPREHEN METABOLIC PANEL: CPT

## 2024-07-24 PROCEDURE — 86140 C-REACTIVE PROTEIN: CPT

## 2024-07-24 RX ORDER — GENTAMICIN SULFATE 1 MG/G
1 OINTMENT TOPICAL 3 TIMES DAILY
Qty: 30 G | Refills: 3 | Status: SHIPPED | OUTPATIENT
Start: 2024-07-24

## 2024-07-24 RX ORDER — DOXYCYCLINE HYCLATE 100 MG/1
100 CAPSULE ORAL 2 TIMES DAILY
Qty: 14 CAPSULE | Refills: 0 | Status: SHIPPED | OUTPATIENT
Start: 2024-07-24

## 2024-07-24 NOTE — PROGRESS NOTES
[facility] PROGRESS NOTE  JOSE ANTONIO BEE MD  7/24/2024    Chief Complaint:   Chief Complaint   Patient presents with    Wound Care     Pt arrives for follow up for wounds on left ankle and left foot. Compression (unna boot) in place). No new complaints. Denies pain.       HPI:   Subjective   Tayo Alcantara is a 83 year old male coming in for a follow-up visit.    HPI    New onset redness left leg - some redness periwound as well - pt and daughter very much concerned about wound infection.   Cluster wounds - some scabbed off - scab removed with healthy skin underneath.   Some wounded areas continue to be open but smaller and seem to be improving.     Review of Systems  Negative except HPI   Denies chest pain / SOB / palpitations  Denies fever.     Allergies  Allergies   Allergen Reactions    Bacitracin-Neomycin-Polymyxin [Neomycin-Bacitracin-Polymyxin] RASH    Other OTHER (SEE COMMENTS)       Current Meds:  Current Outpatient Medications   Medication Sig Dispense Refill    gentamicin 0.1 % External Ointment Apply 1 Application topically 3 (three) times daily. 30 g 3    doxycycline 100 MG Oral Cap Take 1 capsule (100 mg total) by mouth 2 (two) times daily. 14 capsule 0    furosemide 20 MG Oral Tab Take 1 tablet (20 mg total) by mouth daily.      Potassium Chloride ER 10 MEQ Oral Tab CR Take 1 tablet (10 mEq total) by mouth daily.      Betamethasone Dipropionate Aug (DIPROLENE AF) 0.05 % External Cream Apply 1 Application topically daily. 50 g 1    Sodium Chloride (SALINE WOUND WASH) 0.9 % External Solution Apply 10 mL topically daily. 210 mL 3    methotrexate 2.5 MG Oral Tab Take 1 tablet (2.5 mg total) by mouth daily.      clobetasol 0.05 % External Ointment Apply 1 Application topically 2 (two) times daily as needed.      omeprazole 20 MG Oral Capsule Delayed Release Take 1 capsule (20 mg total) by mouth daily.      predniSONE 10 MG Oral Tab Take 1 tablet (10 mg total) by mouth daily.      apixaban  (ELIQUIS) 5 MG Oral Tab Take 1 tablet (5 mg total) by mouth 2 (two) times daily. 60 tablet 8    folic acid 1 MG Oral Tab Take 1 tablet (1 mg total) by mouth daily.      MetFORMIN HCl 500 MG Oral Tab Take 1 tablet (500 mg total) by mouth 2 (two) times daily with meals.      simvastatin 20 MG Oral Tab Take 1 tablet (20 mg total) by mouth nightly. (Patient taking differently: Take 1 tablet (20 mg total) by mouth every morning.) 90 tablet 1    lisinopril 2.5 MG Oral Tab Take 1 tablet (2.5 mg total) by mouth daily. 90 tablet 1    glimepiride 1 MG Oral Tab Take 1 tablet (1 mg total) by mouth daily with breakfast. 90 tablet 1         EXAM:   Objective   Objective    Physical Exam    Vital Signs  Vitals:    07/24/24 1535   BP: 114/79   Pulse: 86   Resp: 14   Temp: 97.9 °F (36.6 °C)       Wound Assessment  Wound 05/03/24 # 1 Left Lateral Foot Foot Left;Lateral (Active)   Wound Image   07/24/24 1545   Drainage Amount Scant 07/24/24 1545   Drainage Description Serosanguineous 07/24/24 1545   Treatments Compression 07/10/24 1554   Wound Length (cm) 0.8 cm 07/24/24 1545   Wound Width (cm) 0.7 cm 07/24/24 1545   Wound Surface Area (cm^2) 0.56 cm^2 07/24/24 1545   Wound Depth (cm) 0.1 cm 07/24/24 1545   Wound Volume (cm^3) 0.056 cm^3 07/24/24 1545   Wound Healing % 30 07/24/24 1545   Margins Well-defined edges 07/24/24 1545   Non-staged Wound Description Full thickness 07/24/24 1545   Peggy-wound Assessment Edema;Dry 07/24/24 1545   Wound Granulation Tissue Red;Firm 05/24/24 1324   Wound Bed Granulation (%) 5 % 05/24/24 1324   Wound Bed Slough (%) 100 % 06/26/24 1614   Wound Odor None 07/24/24 1545   Shape 100% scab 07/24/24 1545   Tunneling? No 07/24/24 1545   Undermining? No 07/24/24 1545   Sinus Tracts? No 07/24/24 1545   Josue Scale Grade 2 07/24/24 1545       Wound 05/03/24 #2 Left Lateral Ankle Ankle Left;Lateral (Active)   Wound Image   07/24/24 1546   Drainage Amount Small 07/24/24 1546   Drainage Description  Serosanguineous 07/24/24 1546   Treatments Compression 07/10/24 1554   Dressing Xeroform 07/10/24 1554   Wound Length (cm) 1.8 cm 07/24/24 1546   Wound Width (cm) 0.7 cm 07/24/24 1546   Wound Surface Area (cm^2) 1.26 cm^2 07/24/24 1546   Wound Depth (cm) 0.1 cm 07/24/24 1546   Wound Volume (cm^3) 0.126 cm^3 07/24/24 1546   Wound Healing % -152 07/24/24 1546   Margins Well-defined edges 07/24/24 1546   Non-staged Wound Description Full thickness 07/24/24 1546   Peggy-wound Assessment Edema;Moist;Pink 07/24/24 1546   Wound Granulation Tissue Firm;Pink 06/26/24 1612   Wound Bed Granulation (%) 100 % 06/26/24 1612   Wound Bed Epithelium (%) 70 % 07/24/24 1546   Wound Bed Slough (%) 30 % 07/24/24 1546   Wound Odor None 07/24/24 1546   Shape Scabbed, unable to view wound bed. 07/10/24 1554   Tunneling? No 07/24/24 1546   Undermining? No 07/24/24 1546   Sinus Tracts? No 07/24/24 1546   Josue Scale Grade 2 07/24/24 1546       Compression Wrap 05/17/24 Leg Left (Active)   Response to Treatment Well tolerated 07/10/24 1641   Compression Layers Multilayer 07/10/24 1641   Compression Product Type Unna Boot 07/10/24 1641   Dressing Applied Yes 07/10/24 1641   Compression Wrap Location Toes to Knee 07/10/24 1641   Compression Wrap Status Clean;Dry;Intact 07/10/24 1641           ASSESSMENT AND PLAN:     Assessment     Encounter Diagnosis  1. 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 Cellulitis of left leg    3. Infected wound    4. Idiopathic chronic venous hypertension of left lower extremity with ulcer and inflammation (HCC)    5. Diabetes mellitus with skin ulcer (HCC)    6. Left leg swelling    7. Peripheral arterial disease (HCC)    8. Long term (current) use of anticoagulants        PROCEDURES:    Debridement Diabetic Ulcer Left;Lateral Ankle   Wound 05/03/24 #2 Left Lateral Ankle Ankle Left;Lateral     Performed by: Gayle Ng MD  Authorized by: Gayle Ng MD        Consent   Consent obtained? verbal  Consent given by: patient  Risks discussed? procedural risks discussed     Debridement Details  Performed by: physician  Debridement type: conservative sharp  Pain control: lidocaine 4%  Pain control administration type: topical     Pre-debridement measurements  Length (cm): 1.8 (slight angle)  Width (cm): 0.7  Depth (cm): 0.1  Surface Area (cm^2): 1.26     Post-debridement measurements  Length (cm): 1.8  Width (cm): 0.7  Depth (cm): 0.1  Percent debrided: 35%  Surface Area (cm^2): 1.26  Area Debrided (cm^2): 0.44  Volume (cm^3): 0.13     Devitalized tissue debrided: biofilm and slough  Instrument(s) utilized: curette  Bleeding: small  Hemostasis obtained with: pressure  Procedural pain (0-10): 5  Post-procedural pain: 1   Response to treatment: procedure was tolerated well              PLAN OF CARE:    Trial of doxycycline orally and topical gentamicin.   Check labs - see below.   Watch out for signs of early infection - counseled.   Plan of care discussed with patient in detail - All questions answered   Return in one week.     Orders Placed This Encounter    CBC With Differential With Platelet     Standing Status:   Future     Standing Expiration Date:   7/24/2025    Prealbumin     Standing Status:   Future     Standing Expiration Date:   7/24/2025    Hemoglobin A1C     Standing Status:   Future     Standing Expiration Date:   7/24/2025    C-Reactive Protein     Standing Status:   Future     Standing Expiration Date:   7/24/2025    Sed Yann Cheung (Automated)     Standing Status:   Future     Standing Expiration Date:   7/24/2025    Comp Metabolic Panel (14)     Standing Status:   Future     Standing Expiration Date:   7/24/2025    Debridement Diabetic Ulcer Left;Lateral Ankle     This order was created via procedure documentation    gentamicin 0.1 % External Ointment     Sig: Apply 1 Application topically 3 (three) times daily.     Dispense:  30 g     Refill:  3     doxycycline 100 MG Oral Cap     Sig: Take 1 capsule (100 mg total) by mouth 2 (two) times daily.     Dispense:  14 capsule     Refill:  0       Patient Instructions     Continue HH - for dressing change and wrap change - 2-3 times a week.   Low salt diet.   Elevate lower extremities.   Diuretics as tolerated       Wound Cleaning and Dressings:    Shower with protection  Use SHOWER BOOT / CAST COVER       DRESSINGS: XEROFORM/ ABD PAD.   Change dressing 2-3 times a week.     Compression Therapy :Unna 30-40 mm hg  Compression Therapy Instructions:  1. Okay to wear overnight      2.  Avoid prolonged standing in one place.  It is better to have your calf muscles moving       to pump fluid out of the legs.    3.  Elevate leg(s) above the level of the heart when sitting or as much as possible.    4.  Take your diuretics as directed by your provider.  Do not skip doses or change doses      unless instructed to do so by your provider.    5. Do not get leg(s) with compression wrap wet. If wraps are too tight as indicated        By pain, numbness/tingling or discoloration of toes remove wrap completely       and call the   wound center.     Off-Loading:    Miscellaneous Instructions:  Supplement with a daily multivitamin   Low salt diet  Intense blood sugar control - Goal Blood sugar below 180 at all times recommended.  Increase protein intake / consider protein supplements - see below  Elevate extremities at all times when sitting / laying down.    DIETARY MODIFICATIONS TO HELP WITH WOUND HEALING:    Protein: Meats, beans, eggs, milk and yogurt particularly Greek yogurt), tofu, soy nuts, soy protein products    Vitamin C: Citrus fruits and juices, strawberries, tomatoes, tomato juice, peppers, baked potatoes, spinach, broccoli, cauliflower, [place] sprouts, cabbage    Vitamin A: Dark green, leafy vegetables, orange or yellow vegetables, cantaloupe, fortified dairy products, liver, fortified cereals    Zinc: Fortified  cereals, red meats, seafood    Consider Kartik by [employer] (These are essential branch chain amino acids that help with tissue building and wound healing) and take 2 packets/day. you can order online at [employer] or [employer]    ADDITIONAL REMINDERS:    The treatment plan has been discussed at length with you and your provider. Follow all instructions carefully, it is very important. If you do not follow all instructions, you are at  risk of your wound not healing, infection, possible loss of limb and even end of life.  Please call the clinic during regular business hours ( 7:30 AM - 5:30 PM) if you notice increased bleeding, redness, warmth, pain or pus like drainage or start running a fever greater than 100.3.    For after hour emergencies, please call your primary physician or go to the nearest emergency room.      Patient/Caregiver Education: There are no barriers to learning. Medical education for above diagnosis given.   Answered all questions.    Outcome: Patient verbalizes understanding. Patient is notified to call with any questions, complications, allergies, or worsening or changing symptoms.  Patient is to call with any side effects or complications as a result of the treatments today.      DOCUMENTATION OF TIME SPENT: Code selection for this visit was based on time spent : 35 min on date of service in preparing to see the patient, obtaining and/or reviewing separately obtained history, performing a medically appropriate examination, counseling and educating the patient/family/caregiver, ordering medications or testing, referring and communicating with other healthcare providers, documenting clinical information in the E HR, independently interpreting results and communicating results to the patient/family/caregiver and care coordination with the patient's other providers.    Followup: Return in about 1 week (around 7/31/2024) for Wound followup.      Note to Patient:  The 21st Century Cures Act makes medical  notes like these available to patients in the interest of transparency. However, be advised this is a medical document and is intended as [id number] communication; it is written in medical language and may appear blunt, direct, or contain abbreviations or verbiage that are unfamiliar. Medical documents are intended to carry relevant information, facts as evident, and the clinical opinion of the practitioner.    Also, please note that this report has been produced using speech recognition software and may contain errors related to that system including, but not limited to, errors in grammar, punctuation, and spelling, as well as words and phrases that possibly may have been recognized inappropriately.  If there are any questions or concerns, contact the dictating provider for clarification.      Gayle Drake MD  7/24/2024  3:52 PM

## 2024-07-24 NOTE — PROGRESS NOTES
Weekly Wound Education Note    Teaching Provided To: Patient  Training Topics: Cleasing and general instructions;Compression;Discharge instructions;Dressing;Edema control  Training Method: Explain/Verbal  Training Response: Patient responds and understands;Reinforcement needed        Notes: Redness notice to leg - oral and topical abx ordered. Topical abx will be applied to wounds when dressings are changed 3 times a week. Left ankle: Bacitracin, xeroform, ABD pad, unna boot 30-40mmhg. Per proivder left foot wound is healed. Labs also ordered - monitor kidneys. Discussed seeing a kidney doctor.

## 2024-07-24 NOTE — PATIENT INSTRUCTIONS
Continue HH - for dressing change and wrap change - 2-3 times a week.   Low salt diet.   Elevate lower extremities.   Diuretics as tolerated       Wound Cleaning and Dressings:    Shower with protection  Use SHOWER BOOT / CAST COVER       DRESSINGS: gentamicin/ XEROFORM/ ABD PAD.   Change dressing 2-3 times a week.     Compression Therapy :Unna 30-40 mm hg  Compression Therapy Instructions:  1. Okay to wear overnight      2.  Avoid prolonged standing in one place.  It is better to have your calf muscles moving       to pump fluid out of the legs.    3.  Elevate leg(s) above the level of the heart when sitting or as much as possible.    4.  Take your diuretics as directed by your provider.  Do not skip doses or change doses      unless instructed to do so by your provider.    5. Do not get leg(s) with compression wrap wet. If wraps are too tight as indicated        By pain, numbness/tingling or discoloration of toes remove wrap completely       and call the   wound center.     Off-Loading:    Miscellaneous Instructions:  Supplement with a daily multivitamin   Low salt diet  Intense blood sugar control - Goal Blood sugar below 180 at all times recommended.  Increase protein intake / consider protein supplements - see below  Elevate extremities at all times when sitting / laying down.    DIETARY MODIFICATIONS TO HELP WITH WOUND HEALING:    Protein: Meats, beans, eggs, milk and yogurt particularly Greek yogurt), tofu, soy nuts, soy protein products    Vitamin C: Citrus fruits and juices, strawberries, tomatoes, tomato juice, peppers, baked potatoes, spinach, broccoli, cauliflower, [place] sprouts, cabbage    Vitamin A: Dark green, leafy vegetables, orange or yellow vegetables, cantaloupe, fortified dairy products, liver, fortified cereals    Zinc: Fortified cereals, red meats, seafood    Consider Kartik by [employer] (These are essential branch chain amino acids that help with tissue building and wound healing) and  take 2 packets/day. you can order online at [employer] or [employer]    ADDITIONAL REMINDERS:    The treatment plan has been discussed at length with you and your provider. Follow all instructions carefully, it is very important. If you do not follow all instructions, you are at  risk of your wound not healing, infection, possible loss of limb and even end of life.  Please call the clinic during regular business hours ( 7:30 AM - 5:30 PM) if you notice increased bleeding, redness, warmth, pain or pus like drainage or start running a fever greater than 100.3.    For after hour emergencies, please call your primary physician or go to the nearest emergency room.

## 2024-07-24 NOTE — PROGRESS NOTES
Patient ID: Tayo Alcantara is a 83 year old male.    Debridement Diabetic Ulcer Left;Lateral Ankle   Wound 05/03/24 #2 Left Lateral Ankle Ankle Left;Lateral    Performed by: Gayle Ng MD  Authorized by: Gayle Ng MD      Consent   Consent obtained? verbal  Consent given by: patient  Risks discussed? procedural risks discussed    Debridement Details  Performed by: physician  Debridement type: conservative sharp  Pain control: lidocaine 4%  Pain control administration type: topical    Pre-debridement measurements  Length (cm): 1.8 (slight angle)  Width (cm): 0.7  Depth (cm): 0.1  Surface Area (cm^2): 1.26    Post-debridement measurements  Length (cm): 1.8  Width (cm): 0.7  Depth (cm): 0.1  Percent debrided: 35%  Surface Area (cm^2): 1.26  Area Debrided (cm^2): 0.44  Volume (cm^3): 0.13    Devitalized tissue debrided: biofilm and slough  Instrument(s) utilized: curette  Bleeding: small  Hemostasis obtained with: pressure  Procedural pain (0-10): 5  Post-procedural pain: 1   Response to treatment: procedure was tolerated well

## 2024-07-31 ENCOUNTER — OFFICE VISIT (OUTPATIENT)
Dept: WOUND CARE | Facility: HOSPITAL | Age: 84
End: 2024-07-31
Attending: INTERNAL MEDICINE
Payer: MEDICARE

## 2024-07-31 VITALS
RESPIRATION RATE: 15 BRPM | TEMPERATURE: 98 F | HEART RATE: 78 BPM | DIASTOLIC BLOOD PRESSURE: 58 MMHG | SYSTOLIC BLOOD PRESSURE: 100 MMHG

## 2024-07-31 DIAGNOSIS — E11.622 DIABETES MELLITUS WITH SKIN ULCER (HCC): ICD-10-CM

## 2024-07-31 DIAGNOSIS — Z79.01 LONG TERM (CURRENT) USE OF ANTICOAGULANTS: ICD-10-CM

## 2024-07-31 DIAGNOSIS — L97.322 NON-PRESSURE CHRONIC ULCER OF LEFT ANKLE WITH FAT LAYER EXPOSED (HCC): Primary | ICD-10-CM

## 2024-07-31 DIAGNOSIS — L08.9 INFECTED WOUND: ICD-10-CM

## 2024-07-31 DIAGNOSIS — I87.332 IDIOPATHIC CHRONIC VENOUS HYPERTENSION OF LEFT LOWER EXTREMITY WITH ULCER AND INFLAMMATION (HCC): ICD-10-CM

## 2024-07-31 DIAGNOSIS — L98.499 DIABETES MELLITUS WITH SKIN ULCER (HCC): ICD-10-CM

## 2024-07-31 DIAGNOSIS — T14.8XXA INFECTED WOUND: ICD-10-CM

## 2024-07-31 DIAGNOSIS — M79.89 LEFT LEG SWELLING: ICD-10-CM

## 2024-07-31 DIAGNOSIS — L03.116 CELLULITIS OF LEFT LEG: ICD-10-CM

## 2024-07-31 DIAGNOSIS — I73.9 PERIPHERAL ARTERIAL DISEASE (HCC): ICD-10-CM

## 2024-07-31 LAB — GLUCOSE BLD-MCNC: 109 MG/DL (ref 70–99)

## 2024-07-31 PROCEDURE — 82962 GLUCOSE BLOOD TEST: CPT | Performed by: INTERNAL MEDICINE

## 2024-07-31 PROCEDURE — 29581 APPL MULTLAYER CMPRN SYS LEG: CPT

## 2024-07-31 NOTE — PATIENT INSTRUCTIONS
Return 2 weeks  Oral antibiotics completed - no more  Continue topical gentamicin.  Continue HH - for dressing change and wrap change - 2-3 times a week.   Low salt diet.   Elevate lower extremities.   Diuretics as tolerated       Wound Cleaning and Dressings:    Shower with protection  Use SHOWER BOOT / CAST COVER       DRESSINGS: gentamicin/ collagen / HF transfer  Change dressing 2-3 times a week.     Compression Therapy :Unna 30-40 mm hg  Compression Therapy Instructions:  1. Okay to wear overnight      2.  Avoid prolonged standing in one place.  It is better to have your calf muscles moving       to pump fluid out of the legs.    3.  Elevate leg(s) above the level of the heart when sitting or as much as possible.    4.  Take your diuretics as directed by your provider.  Do not skip doses or change doses      unless instructed to do so by your provider.    5. Do not get leg(s) with compression wrap wet. If wraps are too tight as indicated        By pain, numbness/tingling or discoloration of toes remove wrap completely       and call the   wound center.     Off-Loading:    Miscellaneous Instructions:  Supplement with a daily multivitamin   Low salt diet  Intense blood sugar control - Goal Blood sugar below 180 at all times recommended.  Increase protein intake / consider protein supplements - see below  Elevate extremities at all times when sitting / laying down.    DIETARY MODIFICATIONS TO HELP WITH WOUND HEALING:    Protein: Meats, beans, eggs, milk and yogurt particularly Greek yogurt), tofu, soy nuts, soy protein products    Vitamin C: Citrus fruits and juices, strawberries, tomatoes, tomato juice, peppers, baked potatoes, spinach, broccoli, cauliflower, [place] sprouts, cabbage    Vitamin A: Dark green, leafy vegetables, orange or yellow vegetables, cantaloupe, fortified dairy products, liver, fortified cereals    Zinc: Fortified cereals, red meats, seafood    Consider Kartik by [employer] (These are  essential branch chain amino acids that help with tissue building and wound healing) and take 2 packets/day. you can order online at [employer] or [employer]    ADDITIONAL REMINDERS:    The treatment plan has been discussed at length with you and your provider. Follow all instructions carefully, it is very important. If you do not follow all instructions, you are at  risk of your wound not healing, infection, possible loss of limb and even end of life.  Please call the clinic during regular business hours ( 7:30 AM - 5:30 PM) if you notice increased bleeding, redness, warmth, pain or pus like drainage or start running a fever greater than 100.3.    For after hour emergencies, please call your primary physician or go to the nearest emergency room.

## 2024-07-31 NOTE — PROGRESS NOTES
[facility] PROGRESS NOTE  JOSE ANTONIO BEE MD  7/31/2024    Chief Complaint:   Chief Complaint   Patient presents with    Wound Care     Patient arrives for a wound care follow up appointment. Patient states he has no pain. Patient arrives with an unna wrap to the left leg. Patient states the wrap stayed up fine.        HPI:   Subjective   Tayo Alcantara is a 83 year old male coming in for a follow-up visit.    HPI    Wound seems improved even though he continues to have pain with wound manipulation.   Recent leg cellulitis - seems to be resolved.   No s/o infection int he wound.   Looking better overall.     Review of Systems  Negative except HPI   Denies chest pain / SOB / palpitations  Denies fever.     Allergies  Allergies   Allergen Reactions    Bacitracin-Neomycin-Polymyxin [Neomycin-Bacitracin-Polymyxin] RASH    Other OTHER (SEE COMMENTS)       Current Meds:  Current Outpatient Medications   Medication Sig Dispense Refill    gentamicin 0.1 % External Ointment Apply 1 Application topically 3 (three) times daily. 30 g 3    doxycycline 100 MG Oral Cap Take 1 capsule (100 mg total) by mouth 2 (two) times daily. 14 capsule 0    furosemide 20 MG Oral Tab Take 1 tablet (20 mg total) by mouth daily.      Potassium Chloride ER 10 MEQ Oral Tab CR Take 1 tablet (10 mEq total) by mouth daily.      Betamethasone Dipropionate Aug (DIPROLENE AF) 0.05 % External Cream Apply 1 Application topically daily. 50 g 1    Sodium Chloride (SALINE WOUND WASH) 0.9 % External Solution Apply 10 mL topically daily. 210 mL 3    methotrexate 2.5 MG Oral Tab Take 1 tablet (2.5 mg total) by mouth daily.      clobetasol 0.05 % External Ointment Apply 1 Application topically 2 (two) times daily as needed.      omeprazole 20 MG Oral Capsule Delayed Release Take 1 capsule (20 mg total) by mouth daily.      predniSONE 10 MG Oral Tab Take 1 tablet (10 mg total) by mouth daily.      apixaban (ELIQUIS) 5 MG Oral Tab Take 1 tablet (5 mg  total) by mouth 2 (two) times daily. 60 tablet 8    folic acid 1 MG Oral Tab Take 1 tablet (1 mg total) by mouth daily.      MetFORMIN HCl 500 MG Oral Tab Take 1 tablet (500 mg total) by mouth 2 (two) times daily with meals.      simvastatin 20 MG Oral Tab Take 1 tablet (20 mg total) by mouth nightly. (Patient taking differently: Take 1 tablet (20 mg total) by mouth every morning.) 90 tablet 1    lisinopril 2.5 MG Oral Tab Take 1 tablet (2.5 mg total) by mouth daily. 90 tablet 1    glimepiride 1 MG Oral Tab Take 1 tablet (1 mg total) by mouth daily with breakfast. 90 tablet 1         EXAM:   Objective   Objective    Physical Exam    Vital Signs  Vitals:    07/31/24 1500   BP: 100/58   Pulse: 78   Resp: 15   Temp: 98.2 °F (36.8 °C)       Wound Assessment  Wound 05/03/24 #2 Left Lateral Ankle Ankle Left;Lateral (Active)   Wound Image   07/31/24 1600   Drainage Amount Small 07/31/24 1600   Drainage Description Serous;Yellow 07/31/24 1600   Treatments Compression 07/24/24 1546   Dressing Xeroform 07/10/24 1554   Wound Length (cm) 1.9 cm 07/31/24 1600   Wound Width (cm) 0.6 cm 07/31/24 1600   Wound Surface Area (cm^2) 1.14 cm^2 07/31/24 1600   Wound Depth (cm) 0.1 cm 07/31/24 1600   Wound Volume (cm^3) 0.114 cm^3 07/31/24 1600   Wound Healing % -128 07/31/24 1600   Margins Well-defined edges 07/31/24 1600   Non-staged Wound Description Full thickness 07/31/24 1600   Peggy-wound Assessment Edema;Dry 07/31/24 1600   Wound Granulation Tissue Firm;Pink 06/26/24 1612   Wound Bed Granulation (%) 50 % 07/31/24 1600   Wound Bed Epithelium (%) 20 % 07/31/24 1600   Wound Bed Slough (%) 30 % 07/31/24 1600   Wound Odor None 07/31/24 1600   Shape clustered 07/31/24 1600   Tunneling? No 07/24/24 1546   Undermining? No 07/24/24 1546   Sinus Tracts? No 07/24/24 1546   Josue Scale Grade 2 07/31/24 1600       Compression Wrap 05/17/24 Leg Left (Active)   Response to Treatment Well tolerated 07/24/24 1702   Compression Layers Multilayer  07/24/24 1702   Compression Product Type Unna Boot 07/24/24 1702   Dressing Applied Yes 07/24/24 1702   Compression Wrap Location Toes to Knee 07/24/24 1702   Compression Wrap Status Clean;Dry;Intact 07/24/24 1702           ASSESSMENT AND PLAN:     Assessment     Encounter Diagnosis  1. 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 Diabetes mellitus with skin ulcer (HCC)    4. Left leg swelling    5. Infected wound    6. Cellulitis of left leg    7. Peripheral arterial disease (HCC)    8. Long term (current) use of anticoagulants      PROCEDURES:    Compression wrap application.       PLAN OF CARE:    Continue topical gentamicin.   Continue compression wraps.   Continue HH   Watch out for signs of early infection - counseled.   Plan of care discussed with patient in detail - All questions answered   Return in one week.     Patient Instructions     Return 2 weeks  Oral antibiotics completed - no more  Continue topical gentamicin.  Continue HH - for dressing change and wrap change - 2-3 times a week.   Low salt diet.   Elevate lower extremities.   Diuretics as tolerated       Wound Cleaning and Dressings:    Shower with protection  Use SHOWER BOOT / CAST COVER       DRESSINGS: gentamicin/ collagen / HF transfer  Change dressing 2-3 times a week.     Compression Therapy :Unna 30-40 mm hg  Compression Therapy Instructions:  1. Okay to wear overnight      2.  Avoid prolonged standing in one place.  It is better to have your calf muscles moving       to pump fluid out of the legs.    3.  Elevate leg(s) above the level of the heart when sitting or as much as possible.    4.  Take your diuretics as directed by your provider.  Do not skip doses or change doses      unless instructed to do so by your provider.    5. Do not get leg(s) with compression wrap wet. If wraps are too tight as indicated         By pain, numbness/tingling or discoloration of toes remove wrap completely       and call the   wound center.     Off-Loading:    Miscellaneous Instructions:  Supplement with a daily multivitamin   Low salt diet  Intense blood sugar control - Goal Blood sugar below 180 at all times recommended.  Increase protein intake / consider protein supplements - see below  Elevate extremities at all times when sitting / laying down.    DIETARY MODIFICATIONS TO HELP WITH WOUND HEALING:    Protein: Meats, beans, eggs, milk and yogurt particularly Greek yogurt), tofu, soy nuts, soy protein products    Vitamin C: Citrus fruits and juices, strawberries, tomatoes, tomato juice, peppers, baked potatoes, spinach, broccoli, cauliflower, [place] sprouts, cabbage    Vitamin A: Dark green, leafy vegetables, orange or yellow vegetables, cantaloupe, fortified dairy products, liver, fortified cereals    Zinc: Fortified cereals, red meats, seafood    Consider Kartik by [employer] (These are essential branch chain amino acids that help with tissue building and wound healing) and take 2 packets/day. you can order online at [employer] or [employer]    ADDITIONAL REMINDERS:    The treatment plan has been discussed at length with you and your provider. Follow all instructions carefully, it is very important. If you do not follow all instructions, you are at  risk of your wound not healing, infection, possible loss of limb and even end of life.  Please call the clinic during regular business hours ( 7:30 AM - 5:30 PM) if you notice increased bleeding, redness, warmth, pain or pus like drainage or start running a fever greater than 100.3.    For after hour emergencies, please call your primary physician or go to the nearest emergency room.      Patient/Caregiver Education: There are no barriers to learning. Medical education for above diagnosis given.   Answered all questions.    Outcome: Patient verbalizes understanding. Patient is notified to call  with any questions, complications, allergies, or worsening or changing symptoms.  Patient is to call with any side effects or complications as a result of the treatments today.      DOCUMENTATION OF TIME SPENT: Code selection for this visit was based on time spent : 35 min on date of service in preparing to see the patient, obtaining and/or reviewing separately obtained history, performing a medically appropriate examination, counseling and educating the patient/family/caregiver, ordering medications or testing, referring and communicating with other healthcare providers, documenting clinical information in the E HR, independently interpreting results and communicating results to the patient/family/caregiver and care coordination with the patient's other providers.    Followup: Return in 2 weeks (on 8/14/2024) for Wound followup.      Note to Patient:  The 21st Century Cures Act makes medical notes like these available to patients in the interest of transparency. However, be advised this is a medical document and is intended as [id number] communication; it is written in medical language and may appear blunt, direct, or contain abbreviations or verbiage that are unfamiliar. Medical documents are intended to carry relevant information, facts as evident, and the clinical opinion of the practitioner.    Also, please note that this report has been produced using speech recognition software and may contain errors related to that system including, but not limited to, errors in grammar, punctuation, and spelling, as well as words and phrases that possibly may have been recognized inappropriately.  If there are any questions or concerns, contact the dictating provider for clarification.      Gayle Drake MD  7/31/2024  4:22 PM

## 2024-07-31 NOTE — PROGRESS NOTES
Weekly Wound Education Note    Teaching Provided To: Patient;Family  Training Topics: Cleasing and general instructions;Compression;Discharge instructions;Edema control;Exercise  Training Method: Explain/Verbal  Training Response: Patient responds and understands;Reinforcement needed        Notes: Stable. Continue gentamycin, xeroform, unna boot 30-40mmhg. orders faxed to . Oral abx completed.

## 2024-08-07 ENCOUNTER — APPOINTMENT (OUTPATIENT)
Dept: WOUND CARE | Facility: HOSPITAL | Age: 84
End: 2024-08-07
Attending: INTERNAL MEDICINE
Payer: MEDICARE

## 2024-08-16 ENCOUNTER — OFFICE VISIT (OUTPATIENT)
Dept: WOUND CARE | Facility: HOSPITAL | Age: 84
End: 2024-08-16
Attending: INTERNAL MEDICINE
Payer: MEDICARE

## 2024-08-16 VITALS
TEMPERATURE: 97 F | SYSTOLIC BLOOD PRESSURE: 129 MMHG | RESPIRATION RATE: 15 BRPM | HEART RATE: 77 BPM | DIASTOLIC BLOOD PRESSURE: 70 MMHG

## 2024-08-16 DIAGNOSIS — L98.499 DIABETES MELLITUS WITH SKIN ULCER (HCC): ICD-10-CM

## 2024-08-16 DIAGNOSIS — L97.322 NON-PRESSURE CHRONIC ULCER OF LEFT ANKLE WITH FAT LAYER EXPOSED (HCC): Primary | ICD-10-CM

## 2024-08-16 DIAGNOSIS — L08.9 INFECTED WOUND: ICD-10-CM

## 2024-08-16 DIAGNOSIS — M79.89 LEFT LEG SWELLING: ICD-10-CM

## 2024-08-16 DIAGNOSIS — E11.622 DIABETES MELLITUS WITH SKIN ULCER (HCC): ICD-10-CM

## 2024-08-16 DIAGNOSIS — I87.332 IDIOPATHIC CHRONIC VENOUS HYPERTENSION OF LEFT LOWER EXTREMITY WITH ULCER AND INFLAMMATION (HCC): ICD-10-CM

## 2024-08-16 DIAGNOSIS — T14.8XXA INFECTED WOUND: ICD-10-CM

## 2024-08-16 LAB — GLUCOSE BLD-MCNC: 82 MG/DL (ref 70–99)

## 2024-08-16 PROCEDURE — 29581 APPL MULTLAYER CMPRN SYS LEG: CPT

## 2024-08-16 PROCEDURE — 82962 GLUCOSE BLOOD TEST: CPT | Performed by: INTERNAL MEDICINE

## 2024-08-16 NOTE — PATIENT INSTRUCTIONS
Return 2 weeks  Discontinue topical gentamicin.  Continue HH - for dressing change and wrap change - 3 times a week.   Low salt diet.   Elevate lower extremities.   Diuretics as tolerated       Wound Cleaning and Dressings:    Shower with protection  Use SHOWER BOOT / CAST COVER       DRESSINGS: coloplast  Change dressing 3 times a week.     Compression Therapy :Unna 30-40 mm hg  Compression Therapy Instructions:  1. Okay to wear overnight      2.  Avoid prolonged standing in one place.  It is better to have your calf muscles moving       to pump fluid out of the legs.    3.  Elevate leg(s) above the level of the heart when sitting or as much as possible.    4.  Take your diuretics as directed by your provider.  Do not skip doses or change doses      unless instructed to do so by your provider.    5. Do not get leg(s) with compression wrap wet. If wraps are too tight as indicated        By pain, numbness/tingling or discoloration of toes remove wrap completely       and call the   wound center.     Off-Loading:    Miscellaneous Instructions:  Supplement with a daily multivitamin   Low salt diet  Intense blood sugar control - Goal Blood sugar below 180 at all times recommended.  Increase protein intake / consider protein supplements - see below  Elevate extremities at all times when sitting / laying down.    DIETARY MODIFICATIONS TO HELP WITH WOUND HEALING:    Protein: Meats, beans, eggs, milk and yogurt particularly Greek yogurt), tofu, soy nuts, soy protein products    Vitamin C: Citrus fruits and juices, strawberries, tomatoes, tomato juice, peppers, baked potatoes, spinach, broccoli, cauliflower, [place] sprouts, cabbage    Vitamin A: Dark green, leafy vegetables, orange or yellow vegetables, cantaloupe, fortified dairy products, liver, fortified cereals    Zinc: Fortified cereals, red meats, seafood    Consider Kartik by [employer] (These are essential branch chain amino acids that help with tissue building  and wound healing) and take 2 packets/day. you can order online at [employer] or [employer]    ADDITIONAL REMINDERS:    The treatment plan has been discussed at length with you and your provider. Follow all instructions carefully, it is very important. If you do not follow all instructions, you are at  risk of your wound not healing, infection, possible loss of limb and even end of life.  Please call the clinic during regular business hours ( 7:30 AM - 5:30 PM) if you notice increased bleeding, redness, warmth, pain or pus like drainage or start running a fever greater than 100.3.    For after hour emergencies, please call your primary physician or go to the nearest emergency room.

## 2024-08-16 NOTE — PROGRESS NOTES
Weekly Wound Education Note    Teaching Provided To: Patient;Family  Training Topics: Cleasing and general instructions;Compression;Discharge instructions;Dressing;Edema control  Training Method: Explain/Verbal  Training Response: Patient responds and understands;Reinforcement needed        Notes: Improving. triad paste and unna boot 30-40mmhg. Orders faxed to .

## 2024-08-16 NOTE — PROGRESS NOTES
[facility] PROGRESS NOTE  JOSE ANTONIO BEE MD  8/16/2024    Chief Complaint:   Chief Complaint   Patient presents with    Wound Care     Pt here for follow up wound care visit to left lateral ankle. Pt denies any concerns or issues, pt denies any pain.       HPI:   Subjective   Tayo Alcantara is a 83 year old male coming in for a follow-up visit.    HPI    Wound stable / improved  No s/o infection- completed 3 weeks of topical gentamicin.   There is immature epithelium starting to move into wound bed.   Tolerating wraps OK.         Review of Systems  Negative except HPI   Denies chest pain / SOB / palpitations  Denies fever.     Allergies  Allergies   Allergen Reactions    Bacitracin-Neomycin-Polymyxin [Neomycin-Bacitracin-Polymyxin] RASH    Other OTHER (SEE COMMENTS)       Current Meds:  Current Outpatient Medications   Medication Sig Dispense Refill    gentamicin 0.1 % External Ointment Apply 1 Application topically 3 (three) times daily. 30 g 3    doxycycline 100 MG Oral Cap Take 1 capsule (100 mg total) by mouth 2 (two) times daily. 14 capsule 0    furosemide 20 MG Oral Tab Take 1 tablet (20 mg total) by mouth daily.      Potassium Chloride ER 10 MEQ Oral Tab CR Take 1 tablet (10 mEq total) by mouth daily.      Betamethasone Dipropionate Aug (DIPROLENE AF) 0.05 % External Cream Apply 1 Application topically daily. 50 g 1    Sodium Chloride (SALINE WOUND WASH) 0.9 % External Solution Apply 10 mL topically daily. 210 mL 3    methotrexate 2.5 MG Oral Tab Take 1 tablet (2.5 mg total) by mouth daily.      clobetasol 0.05 % External Ointment Apply 1 Application topically 2 (two) times daily as needed.      omeprazole 20 MG Oral Capsule Delayed Release Take 1 capsule (20 mg total) by mouth daily.      predniSONE 10 MG Oral Tab Take 1 tablet (10 mg total) by mouth daily.      apixaban (ELIQUIS) 5 MG Oral Tab Take 1 tablet (5 mg total) by mouth 2 (two) times daily. 60 tablet 8    folic acid 1 MG Oral Tab Take  1 tablet (1 mg total) by mouth daily.      MetFORMIN HCl 500 MG Oral Tab Take 1 tablet (500 mg total) by mouth 2 (two) times daily with meals.      simvastatin 20 MG Oral Tab Take 1 tablet (20 mg total) by mouth nightly. (Patient taking differently: Take 1 tablet (20 mg total) by mouth every morning.) 90 tablet 1    lisinopril 2.5 MG Oral Tab Take 1 tablet (2.5 mg total) by mouth daily. 90 tablet 1    glimepiride 1 MG Oral Tab Take 1 tablet (1 mg total) by mouth daily with breakfast. 90 tablet 1         EXAM:   Objective   Objective    Physical Exam    Vital Signs  Vitals:    08/16/24 1551   BP: 129/70   Pulse: 77   Resp: 15   Temp: 97.4 °F (36.3 °C)       Wound Assessment  Wound 05/03/24 #2 Left Lateral Ankle Ankle Left;Lateral (Active)   Wound Image   08/16/24 1557   Drainage Amount Small 08/16/24 1557   Drainage Description Serosanguineous 08/16/24 1557   Treatments Compression 07/31/24 1600   Dressing Xeroform 07/10/24 1554   Wound Length (cm) 0.6 cm 08/16/24 1557   Wound Width (cm) 0.7 cm 08/16/24 1557   Wound Surface Area (cm^2) 0.42 cm^2 08/16/24 1557   Wound Depth (cm) 0.1 cm 08/16/24 1557   Wound Volume (cm^3) 0.042 cm^3 08/16/24 1557   Wound Healing % 16 08/16/24 1557   Margins Well-defined edges 08/16/24 1557   Non-staged Wound Description Full thickness 08/16/24 1557   Peggy-wound Assessment Edema;Dry 08/16/24 1557   Wound Granulation Tissue Firm;Pink;Pale Clark 08/16/24 1557   Wound Bed Granulation (%) 100 % 08/16/24 1557   Wound Bed Epithelium (%) 20 % 07/31/24 1600   Wound Bed Slough (%) 30 % 07/31/24 1600   Wound Odor None 08/16/24 1557   Shape clustered 07/31/24 1600   Tunneling? No 08/16/24 1557   Undermining? No 08/16/24 1557   Sinus Tracts? No 08/16/24 1557   Josue Scale Grade 2 08/16/24 1557       Compression Wrap 05/17/24 Leg Left (Active)   Response to Treatment Well tolerated 07/31/24 1720   Compression Layers Multilayer 07/31/24 1720   Compression Product Type Unna Boot 07/31/24 1720    Dressing Applied Yes 07/31/24 1720   Compression Wrap Location Toes to Knee 07/31/24 1720   Compression Wrap Status Clean;Dry;Intact 07/31/24 1720           ASSESSMENT AND PLAN:     Assessment     Encounter Diagnosis  1. 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 Diabetes mellitus with skin ulcer (HCC)    4. Left leg swelling    5. Infected wound      PROCEDURES:    Compression wrap application.     PLAN OF CARE:    Start coloplast triad paste on wound bed.   Continue compression wraps.   Watch out for signs of early infection - counseled.   Plan of care discussed with patient in detail - All questions answered   Return in 2 weeks.     Patient Instructions     Return 2 weeks  Discontinue topical gentamicin.  Continue HH - for dressing change and wrap change - 3 times a week.   Low salt diet.   Elevate lower extremities.   Diuretics as tolerated       Wound Cleaning and Dressings:    Shower with protection  Use SHOWER BOOT / CAST COVER       DRESSINGS: coloplast  Change dressing 3 times a week.     Compression Therapy :Unna 30-40 mm hg  Compression Therapy Instructions:  1. Okay to wear overnight      2.  Avoid prolonged standing in one place.  It is better to have your calf muscles moving       to pump fluid out of the legs.    3.  Elevate leg(s) above the level of the heart when sitting or as much as possible.    4.  Take your diuretics as directed by your provider.  Do not skip doses or change doses      unless instructed to do so by your provider.    5. Do not get leg(s) with compression wrap wet. If wraps are too tight as indicated        By pain, numbness/tingling or discoloration of toes remove wrap completely       and call the   wound center.     Off-Loading:    Miscellaneous Instructions:  Supplement with a daily multivitamin   Low salt diet  Intense blood sugar control -  Goal Blood sugar below 180 at all times recommended.  Increase protein intake / consider protein supplements - see below  Elevate extremities at all times when sitting / laying down.    DIETARY MODIFICATIONS TO HELP WITH WOUND HEALING:    Protein: Meats, beans, eggs, milk and yogurt particularly Greek yogurt), tofu, soy nuts, soy protein products    Vitamin C: Citrus fruits and juices, strawberries, tomatoes, tomato juice, peppers, baked potatoes, spinach, broccoli, cauliflower, [place] sprouts, cabbage    Vitamin A: Dark green, leafy vegetables, orange or yellow vegetables, cantaloupe, fortified dairy products, liver, fortified cereals    Zinc: Fortified cereals, red meats, seafood    Consider Kartik by [employer] (These are essential branch chain amino acids that help with tissue building and wound healing) and take 2 packets/day. you can order online at [employer] or [employer]    ADDITIONAL REMINDERS:    The treatment plan has been discussed at length with you and your provider. Follow all instructions carefully, it is very important. If you do not follow all instructions, you are at  risk of your wound not healing, infection, possible loss of limb and even end of life.  Please call the clinic during regular business hours ( 7:30 AM - 5:30 PM) if you notice increased bleeding, redness, warmth, pain or pus like drainage or start running a fever greater than 100.3.    For after hour emergencies, please call your primary physician or go to the nearest emergency room.      Patient/Caregiver Education: There are no barriers to learning. Medical education for above diagnosis given.   Answered all questions.    Outcome: Patient verbalizes understanding. Patient is notified to call with any questions, complications, allergies, or worsening or changing symptoms.  Patient is to call with any side effects or complications as a result of the treatments today.      DOCUMENTATION OF TIME SPENT: Code selection for this visit was  based on time spent : 35 min on date of service in preparing to see the patient, obtaining and/or reviewing separately obtained history, performing a medically appropriate examination, counseling and educating the patient/family/caregiver, ordering medications or testing, referring and communicating with other healthcare providers, documenting clinical information in the E HR, independently interpreting results and communicating results to the patient/family/caregiver and care coordination with the patient's other providers.    Followup: Return in about 2 weeks (around 8/30/2024) for Wound followup.      Note to Patient:  The 21st Century Cures Act makes medical notes like these available to patients in the interest of transparency. However, be advised this is a medical document and is intended as [id number] communication; it is written in medical language and may appear blunt, direct, or contain abbreviations or verbiage that are unfamiliar. Medical documents are intended to carry relevant information, facts as evident, and the clinical opinion of the practitioner.    Also, please note that this report has been produced using speech recognition software and may contain errors related to that system including, but not limited to, errors in grammar, punctuation, and spelling, as well as words and phrases that possibly may have been recognized inappropriately.  If there are any questions or concerns, contact the dictating provider for clarification.      Gayle Drake MD  8/16/2024  4:19 PM

## 2024-08-21 ENCOUNTER — APPOINTMENT (OUTPATIENT)
Dept: WOUND CARE | Facility: HOSPITAL | Age: 84
End: 2024-08-21
Attending: INTERNAL MEDICINE
Payer: MEDICARE

## 2024-08-28 ENCOUNTER — OFFICE VISIT (OUTPATIENT)
Dept: WOUND CARE | Facility: HOSPITAL | Age: 84
End: 2024-08-28
Attending: INTERNAL MEDICINE
Payer: MEDICARE

## 2024-08-28 VITALS
HEART RATE: 83 BPM | DIASTOLIC BLOOD PRESSURE: 67 MMHG | RESPIRATION RATE: 16 BRPM | TEMPERATURE: 98 F | SYSTOLIC BLOOD PRESSURE: 126 MMHG

## 2024-08-28 DIAGNOSIS — L98.499 DIABETES MELLITUS WITH SKIN ULCER (HCC): ICD-10-CM

## 2024-08-28 DIAGNOSIS — L97.322 NON-PRESSURE CHRONIC ULCER OF LEFT ANKLE WITH FAT LAYER EXPOSED (HCC): Primary | ICD-10-CM

## 2024-08-28 DIAGNOSIS — E11.622 DIABETES MELLITUS WITH SKIN ULCER (HCC): ICD-10-CM

## 2024-08-28 DIAGNOSIS — M79.89 LEFT LEG SWELLING: ICD-10-CM

## 2024-08-28 DIAGNOSIS — I87.332 IDIOPATHIC CHRONIC VENOUS HYPERTENSION OF LEFT LOWER EXTREMITY WITH ULCER AND INFLAMMATION (HCC): ICD-10-CM

## 2024-08-28 LAB — GLUCOSE BLD-MCNC: 125 MG/DL (ref 70–99)

## 2024-08-28 PROCEDURE — 82962 GLUCOSE BLOOD TEST: CPT | Performed by: INTERNAL MEDICINE

## 2024-08-28 PROCEDURE — 29581 APPL MULTLAYER CMPRN SYS LEG: CPT

## 2024-08-28 NOTE — PROGRESS NOTES
[facility] PROGRESS NOTE  JOSE ANTONIO BEE MD  8/28/2024    Chief Complaint:   Chief Complaint   Patient presents with    Wound Care     Patient here for follow up. He states he thinks his pain is being caused by his bandage.       HPI:   Subjective   Tayo Alcantara is a 83 year old male coming in for a follow-up visit.    HPI    Wound   stable - minimally improved  Tolerating wrap ok  However he reports increased pain  Feels wrap was too tight.   No redness periwound / purulent drainage / malodor / fever.     Review of Systems  Negative except HPI   Denies chest pain / SOB / palpitations  Denies fever.     Allergies  Allergies   Allergen Reactions    Bacitracin-Neomycin-Polymyxin [Neomycin-Bacitracin-Polymyxin] RASH    Other OTHER (SEE COMMENTS)       Current Meds:  Current Outpatient Medications   Medication Sig Dispense Refill    gentamicin 0.1 % External Ointment Apply 1 Application topically 3 (three) times daily. 30 g 3    doxycycline 100 MG Oral Cap Take 1 capsule (100 mg total) by mouth 2 (two) times daily. 14 capsule 0    furosemide 20 MG Oral Tab Take 1 tablet (20 mg total) by mouth daily.      Potassium Chloride ER 10 MEQ Oral Tab CR Take 1 tablet (10 mEq total) by mouth daily.      Betamethasone Dipropionate Aug (DIPROLENE AF) 0.05 % External Cream Apply 1 Application topically daily. 50 g 1    Sodium Chloride (SALINE WOUND WASH) 0.9 % External Solution Apply 10 mL topically daily. 210 mL 3    methotrexate 2.5 MG Oral Tab Take 1 tablet (2.5 mg total) by mouth daily.      clobetasol 0.05 % External Ointment Apply 1 Application topically 2 (two) times daily as needed.      omeprazole 20 MG Oral Capsule Delayed Release Take 1 capsule (20 mg total) by mouth daily.      predniSONE 10 MG Oral Tab Take 1 tablet (10 mg total) by mouth daily.      apixaban (ELIQUIS) 5 MG Oral Tab Take 1 tablet (5 mg total) by mouth 2 (two) times daily. 60 tablet 8    folic acid 1 MG Oral Tab Take 1 tablet (1 mg total)  by mouth daily.      MetFORMIN HCl 500 MG Oral Tab Take 1 tablet (500 mg total) by mouth 2 (two) times daily with meals.      simvastatin 20 MG Oral Tab Take 1 tablet (20 mg total) by mouth nightly. (Patient taking differently: Take 1 tablet (20 mg total) by mouth every morning.) 90 tablet 1    lisinopril 2.5 MG Oral Tab Take 1 tablet (2.5 mg total) by mouth daily. 90 tablet 1    glimepiride 1 MG Oral Tab Take 1 tablet (1 mg total) by mouth daily with breakfast. 90 tablet 1         EXAM:   Objective   Objective    Physical Exam    Vital Signs  Vitals:    08/28/24 1500   BP: 126/67   Pulse: 83   Resp: 16   Temp: 98 °F (36.7 °C)       Wound Assessment  Wound 05/03/24 #2 Left Lateral Ankle Ankle Left;Lateral (Active)   Wound Image   08/28/24 1554   Drainage Amount Scant 08/28/24 1554   Drainage Description Serous;Yellow 08/28/24 1554   Treatments Compression 08/16/24 1557   Dressing Xeroform 07/10/24 1554   Wound Length (cm) 0.5 cm 08/28/24 1554   Wound Width (cm) 0.5 cm 08/28/24 1554   Wound Surface Area (cm^2) 0.25 cm^2 08/28/24 1554   Wound Depth (cm) 0.1 cm 08/28/24 1554   Wound Volume (cm^3) 0.025 cm^3 08/28/24 1554   Wound Healing % 50 08/28/24 1554   Margins Well-defined edges 08/28/24 1554   Non-staged Wound Description Full thickness 08/28/24 1554   Peggy-wound Assessment Edema;Dry 08/28/24 1554   Wound Granulation Tissue Firm;Pink;Pale Clark 08/28/24 1554   Wound Bed Granulation (%) 100 % 08/28/24 1554   Wound Bed Epithelium (%) 20 % 07/31/24 1600   Wound Bed Slough (%) 30 % 07/31/24 1600   Wound Odor None 08/28/24 1554   Shape clustered 07/31/24 1600   Tunneling? No 08/16/24 1557   Undermining? No 08/16/24 1557   Sinus Tracts? No 08/16/24 1557   Josue Scale Grade 2 08/16/24 1557       Compression Wrap 05/17/24 Leg Left (Active)   Response to Treatment Well tolerated 08/16/24 1718   Compression Layers Multilayer 08/16/24 1718   Compression Product Type Unna Boot 08/16/24 1718   Dressing Applied Yes 08/16/24  1718   Compression Wrap Location Toes to Knee 08/16/24 1718   Compression Wrap Status Clean;Dry;Intact 08/16/24 1718           ASSESSMENT AND PLAN:     Assessment     Encounter Diagnosis  1. 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 Diabetes mellitus with skin ulcer (HCC)    4. Left leg swelling      PROCEDURES:          PLAN OF CARE:    Start honey gel on wound base for enzymatic debridement.   Continue coloplast periwound.   Decrease compression to 20-30 mm hg  Watch out for signs of early infection - counseled.   Plan of care discussed with patient in detail - All questions answered   Continue HH  Return in 2 weeks for nurse visit and see me in 4 weeks.     Patient Instructions     Nurse visit in 2 weeks  Return 4 weeks  Continue HH - for dressing change and wrap change - 3 times a week.   Low salt diet.   Elevate lower extremities.   Diuretics as tolerated       Wound Cleaning and Dressings:    Shower with protection  Use SHOWER BOOT / CAST COVER       DRESSINGS: Honey gel and honey alginate on wound base - coloplast periwound  Change dressing 3 times a week.     Compression Therapy :  Decrease compressino - Unna 20-30 mm hg  Compression Therapy Instructions:  1. Okay to wear overnight      2.  Avoid prolonged standing in one place.  It is better to have your calf muscles moving       to pump fluid out of the legs.    3.  Elevate leg(s) above the level of the heart when sitting or as much as possible.    4.  Take your diuretics as directed by your provider.  Do not skip doses or change doses      unless instructed to do so by your provider.    5. Do not get leg(s) with compression wrap wet. If wraps are too tight as indicated        By pain, numbness/tingling or discoloration of toes remove wrap completely       and call the   wound center.     Off-Loading:    Miscellaneous  Instructions:  Supplement with a daily multivitamin   Low salt diet  Intense blood sugar control - Goal Blood sugar below 180 at all times recommended.  Increase protein intake / consider protein supplements - see below  Elevate extremities at all times when sitting / laying down.    DIETARY MODIFICATIONS TO HELP WITH WOUND HEALING:    Protein: Meats, beans, eggs, milk and yogurt particularly Greek yogurt), tofu, soy nuts, soy protein products    Vitamin C: Citrus fruits and juices, strawberries, tomatoes, tomato juice, peppers, baked potatoes, spinach, broccoli, cauliflower, [place] sprouts, cabbage    Vitamin A: Dark green, leafy vegetables, orange or yellow vegetables, cantaloupe, fortified dairy products, liver, fortified cereals    Zinc: Fortified cereals, red meats, seafood    Consider Kartik by [employer] (These are essential branch chain amino acids that help with tissue building and wound healing) and take 2 packets/day. you can order online at [employer] or "[employer]    ADDITIONAL REMINDERS:    The treatment plan has been discussed at length with you and your provider. Follow all instructions carefully, it is very important. If you do not follow all instructions, you are at  risk of your wound not healing, infection, possible loss of limb and even end of life.  Please call the clinic during regular business hours ( 7:30 AM - 5:30 PM) if you notice increased bleeding, redness, warmth, pain or pus like drainage or start running a fever greater than 100.3.    For after hour emergencies, please call your primary physician or go to the nearest emergency room.      Patient/Caregiver Education: There are no barriers to learning. Medical education for above diagnosis given.   Answered all questions.    Outcome: Patient verbalizes understanding. Patient is notified to call with any questions, complications, allergies, or worsening or changing symptoms.  Patient is to call with any side effects or complications as a  result of the treatments today.      DOCUMENTATION OF TIME SPENT: Code selection for this visit was based on time spent : 30 min on date of service in preparing to see the patient, obtaining and/or reviewing separately obtained history, performing a medically appropriate examination, counseling and educating the patient/family/caregiver, ordering medications or testing, referring and communicating with other healthcare providers, documenting clinical information in the E HR, independently interpreting results and communicating results to the patient/family/caregiver and care coordination with the patient's other providers.    Followup: Return in about 4 weeks (around 9/25/2024) for Wound followup.      Note to Patient:  The 21st Century Cures Act makes medical notes like these available to patients in the interest of transparency. However, be advised this is a medical document and is intended as [id number] communication; it is written in medical language and may appear blunt, direct, or contain abbreviations or verbiage that are unfamiliar. Medical documents are intended to carry relevant information, facts as evident, and the clinical opinion of the practitioner.    Also, please note that this report has been produced using speech recognition software and may contain errors related to that system including, but not limited to, errors in grammar, punctuation, and spelling, as well as words and phrases that possibly may have been recognized inappropriately.  If there are any questions or concerns, contact the dictating provider for clarification.      Gayle Drake MD  8/28/2024  4:16 PM

## 2024-08-28 NOTE — PATIENT INSTRUCTIONS
Nurse visit in 2 weeks  Return 4 weeks  Continue HH - for dressing change and wrap change - 3 times a week.   Low salt diet.   Elevate lower extremities.   Diuretics as tolerated       Wound Cleaning and Dressings:    Shower with protection  Use SHOWER BOOT / CAST COVER       DRESSINGS: Honey gel and honey alginate on wound base - coloplast periwound  Change dressing 3 times a week.     Compression Therapy :  Decrease compressino - Unna 20-30 mm hg  Compression Therapy Instructions:  1. Okay to wear overnight      2.  Avoid prolonged standing in one place.  It is better to have your calf muscles moving       to pump fluid out of the legs.    3.  Elevate leg(s) above the level of the heart when sitting or as much as possible.    4.  Take your diuretics as directed by your provider.  Do not skip doses or change doses      unless instructed to do so by your provider.    5. Do not get leg(s) with compression wrap wet. If wraps are too tight as indicated        By pain, numbness/tingling or discoloration of toes remove wrap completely       and call the   wound center.     Off-Loading:    Miscellaneous Instructions:  Supplement with a daily multivitamin   Low salt diet  Intense blood sugar control - Goal Blood sugar below 180 at all times recommended.  Increase protein intake / consider protein supplements - see below  Elevate extremities at all times when sitting / laying down.    DIETARY MODIFICATIONS TO HELP WITH WOUND HEALING:    Protein: Meats, beans, eggs, milk and yogurt particularly Greek yogurt), tofu, soy nuts, soy protein products    Vitamin C: Citrus fruits and juices, strawberries, tomatoes, tomato juice, peppers, baked potatoes, spinach, broccoli, cauliflower, [place] sprouts, cabbage    Vitamin A: Dark green, leafy vegetables, orange or yellow vegetables, cantaloupe, fortified dairy products, liver, fortified cereals    Zinc: Fortified cereals, red meats, seafood    Consider Kartik by [employer] (These  are essential branch chain amino acids that help with tissue building and wound healing) and take 2 packets/day. you can order online at [employer] or [employer]    ADDITIONAL REMINDERS:    The treatment plan has been discussed at length with you and your provider. Follow all instructions carefully, it is very important. If you do not follow all instructions, you are at  risk of your wound not healing, infection, possible loss of limb and even end of life.  Please call the clinic during regular business hours ( 7:30 AM - 5:30 PM) if you notice increased bleeding, redness, warmth, pain or pus like drainage or start running a fever greater than 100.3.    For after hour emergencies, please call your primary physician or go to the nearest emergency room.

## 2024-08-28 NOTE — PROGRESS NOTES
Weekly Wound Education Note    Teaching Provided To: Patient  Training Topics: Cleasing and general instructions;Compression;Discharge instructions;Dressing;Edema control  Training Method: Explain/Verbal  Training Response: Patient responds and understands;Reinforcement needed        Notes: Stable. honey gel, honey alginate, gauze, decreased to unna boot 20-30mmhg, strip of rosidal to anterior leg. Orders faxed to [facility]. RN visit in 2 weeks and Dr. Drake visit 4 weeks.

## 2024-09-13 ENCOUNTER — OFFICE VISIT (OUTPATIENT)
Dept: WOUND CARE | Facility: HOSPITAL | Age: 84
End: 2024-09-13
Attending: INTERNAL MEDICINE
Payer: MEDICARE

## 2024-09-13 VITALS
TEMPERATURE: 98 F | HEART RATE: 75 BPM | RESPIRATION RATE: 16 BRPM | SYSTOLIC BLOOD PRESSURE: 124 MMHG | DIASTOLIC BLOOD PRESSURE: 67 MMHG

## 2024-09-13 DIAGNOSIS — L08.9 INFECTED WOUND: ICD-10-CM

## 2024-09-13 DIAGNOSIS — E11.622 DIABETES MELLITUS WITH SKIN ULCER (HCC): ICD-10-CM

## 2024-09-13 DIAGNOSIS — L03.116 CELLULITIS OF LEFT LEG: ICD-10-CM

## 2024-09-13 DIAGNOSIS — L98.499 DIABETES MELLITUS WITH SKIN ULCER (HCC): ICD-10-CM

## 2024-09-13 DIAGNOSIS — L97.322 NON-PRESSURE CHRONIC ULCER OF LEFT ANKLE WITH FAT LAYER EXPOSED (HCC): Primary | ICD-10-CM

## 2024-09-13 DIAGNOSIS — M79.89 LEFT LEG SWELLING: ICD-10-CM

## 2024-09-13 DIAGNOSIS — I87.332 IDIOPATHIC CHRONIC VENOUS HYPERTENSION OF LEFT LOWER EXTREMITY WITH ULCER AND INFLAMMATION (HCC): ICD-10-CM

## 2024-09-13 DIAGNOSIS — T14.8XXA INFECTED WOUND: ICD-10-CM

## 2024-09-13 LAB — GLUCOSE BLD-MCNC: 94 MG/DL (ref 70–99)

## 2024-09-13 PROCEDURE — 29581 APPL MULTLAYER CMPRN SYS LEG: CPT

## 2024-09-13 PROCEDURE — 82962 GLUCOSE BLOOD TEST: CPT

## 2024-09-13 NOTE — PROGRESS NOTES
Chief Complaint   Patient presents with    Wound Care     Patients is here for a nurse visit. Patients denies any concern            Current Outpatient Medications:     gentamicin 0.1 % External Ointment, Apply 1 Application topically 3 (three) times daily., Disp: 30 g, Rfl: 3    doxycycline 100 MG Oral Cap, Take 1 capsule (100 mg total) by mouth 2 (two) times daily., Disp: 14 capsule, Rfl: 0    furosemide 20 MG Oral Tab, Take 1 tablet (20 mg total) by mouth daily., Disp: , Rfl:     Potassium Chloride ER 10 MEQ Oral Tab CR, Take 1 tablet (10 mEq total) by mouth daily., Disp: , Rfl:     Betamethasone Dipropionate Aug (DIPROLENE AF) 0.05 % External Cream, Apply 1 Application topically daily., Disp: 50 g, Rfl: 1    Sodium Chloride (SALINE WOUND WASH) 0.9 % External Solution, Apply 10 mL topically daily., Disp: 210 mL, Rfl: 3    methotrexate 2.5 MG Oral Tab, Take 1 tablet (2.5 mg total) by mouth daily., Disp: , Rfl:     clobetasol 0.05 % External Ointment, Apply 1 Application topically 2 (two) times daily as needed., Disp: , Rfl:     omeprazole 20 MG Oral Capsule Delayed Release, Take 1 capsule (20 mg total) by mouth daily., Disp: , Rfl:     predniSONE 10 MG Oral Tab, Take 1 tablet (10 mg total) by mouth daily., Disp: , Rfl:     apixaban (ELIQUIS) 5 MG Oral Tab, Take 1 tablet (5 mg total) by mouth 2 (two) times daily., Disp: 60 tablet, Rfl: 8    folic acid 1 MG Oral Tab, Take 1 tablet (1 mg total) by mouth daily., Disp: , Rfl:     MetFORMIN HCl 500 MG Oral Tab, Take 1 tablet (500 mg total) by mouth 2 (two) times daily with meals., Disp: , Rfl:     simvastatin 20 MG Oral Tab, Take 1 tablet (20 mg total) by mouth nightly. (Patient taking differently: Take 1 tablet (20 mg total) by mouth every morning.), Disp: 90 tablet, Rfl: 1    lisinopril 2.5 MG Oral Tab, Take 1 tablet (2.5 mg total) by mouth daily., Disp: 90 tablet, Rfl: 1    glimepiride 1 MG Oral Tab, Take 1 tablet (1 mg total) by mouth daily with breakfast., Disp: 90  tablet, Rfl: 1    Allergies   Allergen Reactions    Bacitracin-Neomycin-Polymyxin [Neomycin-Bacitracin-Polymyxin] RASH    Other OTHER (SEE COMMENTS)          HISTORY:     Past medical, surgical, family and social history updated where appropriate.    PHYSICAL EXAM:   /67   Pulse 75   Temp 97.8 °F (36.6 °C)   Resp 16        Vital signs reviewed.      Calf  Point of Measurement - Left Calf: 36     Left Calf from:: Heel  Calf Left cm:: 37.1          Ankle  Point of Measurement - Left Ankle: 11     Left Ankle from:: Heel  Left Ankle cm:: 24                Wound 05/03/24 #2 Left Lateral Ankle Ankle Left;Lateral (Active)   Date First Assessed/Time First Assessed: 05/03/24 0914    Wound Number (Wound Clinic Only): #2 Left Lateral Ankle  Primary Wound Type: Diabetic Ulcer  Location: Ankle  Wound Location Orientation: Left;Lateral      Assessments 5/3/2024  9:17 AM 9/13/2024  4:01 PM   Wound Image       Drainage Amount Unable to assess Small   Drainage Description -- Serosanguineous   Treatments Compression Compression   Wound Length (cm) 1 cm 0.5 cm   Wound Width (cm) 0.5 cm 0.4 cm   Wound Surface Area (cm^2) 0.5 cm^2 0.2 cm^2   Wound Depth (cm) 0.1 cm 0.1 cm   Wound Volume (cm^3) 0.05 cm^3 0.02 cm^3   Wound Healing % -- 60   Margins Poorly defined Well-defined edges   Non-staged Wound Description Full thickness Full thickness   Peggy-wound Assessment Dry;Edema;Hemosiderin staining Edema;Dry   Wound Granulation Tissue -- Pink;Spongy;Pale Grey   Wound Bed Granulation (%) -- 100 %   Wound Bed Epithelium (%) 50 % --   Wound Bed Slough (%) 50 % --   Wound Odor None None   Shape clustered --   Tunneling? No No   Undermining? No No   Sinus Tracts? No No   Josue Scale Grade 2 Grade 2       Inactive Orders   Date Order Priority Status Authorizing Provider   07/24/24 1625 Debridement Diabetic Ulcer Left;Lateral Ankle Routine Completed Gayle Ng MD       Compression Wrap 05/17/24 Leg Left (Active)   Placement  Date: 05/17/24   Location: Leg  Wound Location Orientation: Left      Assessments 5/17/2024 11:53 AM 9/13/2024  5:09 PM   Response to Treatment Well tolerated Well tolerated   Compression Layers Multilayer Multilayer   Compression Product Type Coflex Unna Boot   Dressing Applied Yes Yes   Compression Wrap Location Toes to Knee Toes to Knee   Compression Wrap Status Clean;Dry;Intact Clean;Dry;Intact       No associated orders.          ASSESSMENT AND PLAN:        Risks, benefits, and alternatives of current treatment plan discussed in detail.  Questions and concerns addressed. Red flags to RTC or ED reviewed.  Patient (or parent) agrees to plan.      No follow-ups on file.  Weekly Wound Education Note    Teaching Provided To: Patient;Family  Training Topics: Cleasing and general instructions;Compression;Discharge instructions;Dressing;Edema control  Training Method: Explain/Verbal  Training Response: Patient responds and understands;Reinforcement needed        Notes: Here for RN visit.    Here for RN visit, wound stable. Patient states the compression wrap in more tolerable since it was decreased to a unna boot 20-30mmhg. Edema measurements stable. Continue honey gel, honey alginate, gauze, unna boot 20-30mmhg, rosidal strip on the anterior leg. Scheduled for a provider visit with Dr. Drake on 9/27/24. [facility]   9/13/2024  5:11 PM

## 2024-09-27 ENCOUNTER — OFFICE VISIT (OUTPATIENT)
Dept: WOUND CARE | Facility: HOSPITAL | Age: 84
End: 2024-09-27
Attending: INTERNAL MEDICINE
Payer: MEDICARE

## 2024-09-27 VITALS
DIASTOLIC BLOOD PRESSURE: 55 MMHG | TEMPERATURE: 98 F | SYSTOLIC BLOOD PRESSURE: 108 MMHG | RESPIRATION RATE: 17 BRPM | HEART RATE: 81 BPM

## 2024-09-27 DIAGNOSIS — E11.622 DIABETES MELLITUS WITH SKIN ULCER (HCC): ICD-10-CM

## 2024-09-27 DIAGNOSIS — L97.322 NON-PRESSURE CHRONIC ULCER OF LEFT ANKLE WITH FAT LAYER EXPOSED (HCC): Primary | ICD-10-CM

## 2024-09-27 DIAGNOSIS — M79.89 LEFT LEG SWELLING: ICD-10-CM

## 2024-09-27 DIAGNOSIS — I87.332 IDIOPATHIC CHRONIC VENOUS HYPERTENSION OF LEFT LOWER EXTREMITY WITH ULCER AND INFLAMMATION (HCC): ICD-10-CM

## 2024-09-27 DIAGNOSIS — L98.499 DIABETES MELLITUS WITH SKIN ULCER (HCC): ICD-10-CM

## 2024-09-27 DIAGNOSIS — I73.9 PERIPHERAL ARTERIAL DISEASE (HCC): ICD-10-CM

## 2024-09-27 LAB — GLUCOSE BLD-MCNC: 128 MG/DL (ref 70–99)

## 2024-09-27 PROCEDURE — 29581 APPL MULTLAYER CMPRN SYS LEG: CPT

## 2024-09-27 PROCEDURE — 82962 GLUCOSE BLOOD TEST: CPT | Performed by: INTERNAL MEDICINE

## 2024-09-27 NOTE — PROGRESS NOTES
[facility] PROGRESS NOTE  JOSE ANTONIO BEE MD  9/27/2024    Chief Complaint:   Chief Complaint   Patient presents with    Wound Care     Patient arrives for a wound care follow up appointment. Patient arrives with honey, honey alginate, and an ABD pad to the wound. Patient arrives in an unna wrap. Patient states he is having severe pain and is emotional.        HPI:   Subjective   Tayo Alcantara is a 83 year old male coming in for a follow-up visit.    HPI    Wound stable  Tissue   Dimensions same.   No s/o infection  Tolerating wrap OK.   HH in place.     Review of Systems  Negative except HPI   Denies chest pain / SOB / palpitations  Denies fever.     Allergies  Allergies   Allergen Reactions    Bacitracin-Neomycin-Polymyxin [Neomycin-Bacitracin-Polymyxin] RASH    Other OTHER (SEE COMMENTS)       Current Meds:  Current Outpatient Medications   Medication Sig Dispense Refill    gentamicin 0.1 % External Ointment Apply 1 Application topically 3 (three) times daily. 30 g 3    doxycycline 100 MG Oral Cap Take 1 capsule (100 mg total) by mouth 2 (two) times daily. 14 capsule 0    furosemide 20 MG Oral Tab Take 1 tablet (20 mg total) by mouth daily.      Potassium Chloride ER 10 MEQ Oral Tab CR Take 1 tablet (10 mEq total) by mouth daily.      Betamethasone Dipropionate Aug (DIPROLENE AF) 0.05 % External Cream Apply 1 Application topically daily. 50 g 1    Sodium Chloride (SALINE WOUND WASH) 0.9 % External Solution Apply 10 mL topically daily. 210 mL 3    methotrexate 2.5 MG Oral Tab Take 1 tablet (2.5 mg total) by mouth daily.      clobetasol 0.05 % External Ointment Apply 1 Application topically 2 (two) times daily as needed.      omeprazole 20 MG Oral Capsule Delayed Release Take 1 capsule (20 mg total) by mouth daily.      predniSONE 10 MG Oral Tab Take 1 tablet (10 mg total) by mouth daily.      apixaban (ELIQUIS) 5 MG Oral Tab Take 1 tablet (5 mg total) by mouth 2 (two) times daily. 60 tablet 8     folic acid 1 MG Oral Tab Take 1 tablet (1 mg total) by mouth daily.      MetFORMIN HCl 500 MG Oral Tab Take 1 tablet (500 mg total) by mouth 2 (two) times daily with meals.      simvastatin 20 MG Oral Tab Take 1 tablet (20 mg total) by mouth nightly. (Patient taking differently: Take 1 tablet (20 mg total) by mouth every morning.) 90 tablet 1    lisinopril 2.5 MG Oral Tab Take 1 tablet (2.5 mg total) by mouth daily. 90 tablet 1    glimepiride 1 MG Oral Tab Take 1 tablet (1 mg total) by mouth daily with breakfast. 90 tablet 1         EXAM:   Objective   Objective    Physical Exam    Vital Signs  Vitals:    09/27/24 1500   BP: 108/55   Pulse: 81   Resp: 17   Temp: 98.1 °F (36.7 °C)       Wound Assessment  Wound 05/03/24 #2 Left Lateral Ankle Ankle Left;Lateral (Active)   Date First Assessed/Time First Assessed: 05/03/24 0914    Wound Number (Wound Clinic Only): #2 Left Lateral Ankle  Primary Wound Type: Diabetic Ulcer  Location: Ankle  Wound Location Orientation: Left;Lateral      Assessments 9/27/2024  4:06 PM   Wound Image     Drainage Amount Small   Drainage Description Serosanguineous   Wound Length (cm) 1 cm   Wound Width (cm) 1.2 cm   Wound Surface Area (cm^2) 1.2 cm^2   Wound Depth (cm) 0.1 cm   Wound Volume (cm^3) 0.12 cm^3   Wound Healing % -140   Margins Well-defined edges   Non-staged Wound Description Full thickness   Peggy-wound Assessment Edema;Dry   Wound Granulation Tissue Red;Firm   Wound Bed Granulation (%) 100 %   Wound Odor None   Tunneling? No   Undermining? No   Sinus Tracts? No   Josue Scale Grade 2       Inactive Orders   Date Order Priority Status Authorizing Provider   07/24/24 1625 Debridement Diabetic Ulcer Left;Lateral Ankle Routine Completed Gayle Ng MD          ASSESSMENT AND PLAN:     Assessment     Encounter Diagnosis  1. 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 Diabetes mellitus with skin ulcer (HCC)    4. Left leg swelling    5. Peripheral arterial disease (HCC)      PROCEDURES:    Compression wrap application.     PLAN OF CARE:    Dc honey gel  Start endoform --. Stack / HF transfer.  Continue compression wraps.   See ortho for ankle pain eval   Watch out for signs of early infection - counseled.   Plan of care discussed with patient in detail - All questions answered   Return in 2-3 week.     Patient Instructions     Return 2-3 weeks  Continue HH - for dressing change and wrap change - 3 times a week.   Low salt diet.   Elevate lower extremities.   Diuretics as tolerated       Wound Cleaning and Dressings:    Shower with protection  Use SHOWER BOOT / CAST COVER       DRESSINGS: HF transfer on wound base - coloplast periwound  Change dressing 3 times a week.     Compression Therapy :  Decrease compressino - Unna 20-30 mm hg  Compression Therapy Instructions:  1. Okay to wear overnight      2.  Avoid prolonged standing in one place.  It is better to have your calf muscles moving       to pump fluid out of the legs.    3.  Elevate leg(s) above the level of the heart when sitting or as much as possible.    4.  Take your diuretics as directed by your provider.  Do not skip doses or change doses      unless instructed to do so by your provider.    5. Do not get leg(s) with compression wrap wet. If wraps are too tight as indicated        By pain, numbness/tingling or discoloration of toes remove wrap completely       and call the   wound center.     Off-Loading:    Miscellaneous Instructions:  Supplement with a daily multivitamin   Low salt diet  Intense blood sugar control - Goal Blood sugar below 180 at all times recommended.  Increase protein intake / consider protein supplements - see below  Elevate extremities at all times when sitting / laying down.    DIETARY MODIFICATIONS TO HELP WITH WOUND HEALING:    Protein: Meats, beans, eggs, milk and  yogurt particularly Greek yogurt), tofu, soy nuts, soy protein products    Vitamin C: Citrus fruits and juices, strawberries, tomatoes, tomato juice, peppers, baked potatoes, spinach, broccoli, cauliflower, [place] sprouts, cabbage    Vitamin A: Dark green, leafy vegetables, orange or yellow vegetables, cantaloupe, fortified dairy products, liver, fortified cereals    Zinc: Fortified cereals, red meats, seafood    Consider Kartik by [employer] (These are essential branch chain amino acids that help with tissue building and wound healing) and take 2 packets/day. you can order online at [employer] or [employer]    ADDITIONAL REMINDERS:    The treatment plan has been discussed at length with you and your provider. Follow all instructions carefully, it is very important. If you do not follow all instructions, you are at  risk of your wound not healing, infection, possible loss of limb and even end of life.  Please call the clinic during regular business hours ( 7:30 AM - 5:30 PM) if you notice increased bleeding, redness, warmth, pain or pus like drainage or start running a fever greater than 100.3.    For after hour emergencies, please call your primary physician or go to the nearest emergency room.      Patient/Caregiver Education: There are no barriers to learning. Medical education for above diagnosis given.   Answered all questions.    Outcome: Patient verbalizes understanding. Patient is notified to call with any questions, complications, allergies, or worsening or changing symptoms.  Patient is to call with any side effects or complications as a result of the treatments today.      DOCUMENTATION OF TIME SPENT: Code selection for this visit was based on time spent : 30 min on date of service in preparing to see the patient, obtaining and/or reviewing separately obtained history, performing a medically appropriate examination, counseling and educating the patient/family/caregiver, ordering medications or testing,  referring and communicating with other healthcare providers, documenting clinical information in the E HR, independently interpreting results and communicating results to the patient/family/caregiver and care coordination with the patient's other providers.    Followup: Return in about 3 weeks (around 10/18/2024) for Wound followup.      Note to Patient:  The 21st Century Cures Act makes medical notes like these available to patients in the interest of transparency. However, be advised this is a medical document and is intended as [id number] communication; it is written in medical language and may appear blunt, direct, or contain abbreviations or verbiage that are unfamiliar. Medical documents are intended to carry relevant information, facts as evident, and the clinical opinion of the practitioner.    Also, please note that this report has been produced using speech recognition software and may contain errors related to that system including, but not limited to, errors in grammar, punctuation, and spelling, as well as words and phrases that possibly may have been recognized inappropriately.  If there are any questions or concerns, contact the dictating provider for clarification.      Gayle Drake MD  9/27/2024  4:23 PM

## 2024-09-27 NOTE — PROGRESS NOTES
Weekly Wound Education Note    Teaching Provided To: Patient;Family  Training Topics: Cleasing and general instructions;Compression;Discharge instructions;Dressing;Edema control  Training Method: Explain/Verbal  Training Response: Patient responds and understands;Reinforcement needed        Notes: Improving. Endoform, hydrofera transfer, ABD pad, unna boot 20-30mmhg. Orders faxed to [facility].

## 2024-09-27 NOTE — PATIENT INSTRUCTIONS
Return 2-3 weeks  Continue  - for dressing change and wrap change - 3 times a week.   Low salt diet.   Elevate lower extremities.   Diuretics as tolerated       Wound Cleaning and Dressings:    Shower with protection  Use SHOWER BOOT / CAST COVER       DRESSINGS: HF transfer on wound base - coloplast periwound  Change dressing 3 times a week.     Compression Therapy :  Decrease compressino - Unna 20-30 mm hg  Compression Therapy Instructions:  1. Okay to wear overnight      2.  Avoid prolonged standing in one place.  It is better to have your calf muscles moving       to pump fluid out of the legs.    3.  Elevate leg(s) above the level of the heart when sitting or as much as possible.    4.  Take your diuretics as directed by your provider.  Do not skip doses or change doses      unless instructed to do so by your provider.    5. Do not get leg(s) with compression wrap wet. If wraps are too tight as indicated        By pain, numbness/tingling or discoloration of toes remove wrap completely       and call the   wound center.     Off-Loading:    Miscellaneous Instructions:  Supplement with a daily multivitamin   Low salt diet  Intense blood sugar control - Goal Blood sugar below 180 at all times recommended.  Increase protein intake / consider protein supplements - see below  Elevate extremities at all times when sitting / laying down.    DIETARY MODIFICATIONS TO HELP WITH WOUND HEALING:    Protein: Meats, beans, eggs, milk and yogurt particularly Greek yogurt), tofu, soy nuts, soy protein products    Vitamin C: Citrus fruits and juices, strawberries, tomatoes, tomato juice, peppers, baked potatoes, spinach, broccoli, cauliflower, [place] sprouts, cabbage    Vitamin A: Dark green, leafy vegetables, orange or yellow vegetables, cantaloupe, fortified dairy products, liver, fortified cereals    Zinc: Fortified cereals, red meats, seafood    Consider Kartik by [employer] (These are essential branch chain amino acids  that help with tissue building and wound healing) and take 2 packets/day. you can order online at [employer] or [employer]    ADDITIONAL REMINDERS:    The treatment plan has been discussed at length with you and your provider. Follow all instructions carefully, it is very important. If you do not follow all instructions, you are at  risk of your wound not healing, infection, possible loss of limb and even end of life.  Please call the clinic during regular business hours ( 7:30 AM - 5:30 PM) if you notice increased bleeding, redness, warmth, pain or pus like drainage or start running a fever greater than 100.3.    For after hour emergencies, please call your primary physician or go to the nearest emergency room.

## 2024-10-10 ENCOUNTER — TELEPHONE (OUTPATIENT)
Dept: WOUND CARE | Facility: HOSPITAL | Age: 84
End: 2024-10-10

## 2024-10-11 ENCOUNTER — APPOINTMENT (OUTPATIENT)
Dept: WOUND CARE | Facility: HOSPITAL | Age: 84
End: 2024-10-11
Attending: INTERNAL MEDICINE
Payer: MEDICARE

## 2024-10-16 ENCOUNTER — APPOINTMENT (OUTPATIENT)
Dept: WOUND CARE | Facility: HOSPITAL | Age: 84
End: 2024-10-16
Attending: PEDIATRICS
Payer: MEDICARE

## 2024-10-18 ENCOUNTER — OFFICE VISIT (OUTPATIENT)
Dept: WOUND CARE | Facility: HOSPITAL | Age: 84
End: 2024-10-18
Attending: PEDIATRICS
Payer: MEDICARE

## 2024-10-18 VITALS
SYSTOLIC BLOOD PRESSURE: 121 MMHG | DIASTOLIC BLOOD PRESSURE: 70 MMHG | TEMPERATURE: 98 F | RESPIRATION RATE: 16 BRPM | HEART RATE: 76 BPM

## 2024-10-18 DIAGNOSIS — I87.332 IDIOPATHIC CHRONIC VENOUS HYPERTENSION OF LEFT LOWER EXTREMITY WITH ULCER AND INFLAMMATION (HCC): ICD-10-CM

## 2024-10-18 DIAGNOSIS — Z79.01 LONG TERM (CURRENT) USE OF ANTICOAGULANTS: ICD-10-CM

## 2024-10-18 DIAGNOSIS — I73.9 PERIPHERAL ARTERIAL DISEASE (HCC): ICD-10-CM

## 2024-10-18 DIAGNOSIS — L98.499 DIABETES MELLITUS WITH SKIN ULCER (HCC): ICD-10-CM

## 2024-10-18 DIAGNOSIS — L97.322 NON-PRESSURE CHRONIC ULCER OF LEFT ANKLE WITH FAT LAYER EXPOSED (HCC): Primary | ICD-10-CM

## 2024-10-18 DIAGNOSIS — M79.89 LEFT LEG SWELLING: ICD-10-CM

## 2024-10-18 DIAGNOSIS — E11.622 DIABETES MELLITUS WITH SKIN ULCER (HCC): ICD-10-CM

## 2024-10-18 LAB — GLUCOSE BLD-MCNC: 97 MG/DL (ref 70–99)

## 2024-10-18 PROCEDURE — 82962 GLUCOSE BLOOD TEST: CPT | Performed by: INTERNAL MEDICINE

## 2024-10-18 PROCEDURE — 29581 APPL MULTLAYER CMPRN SYS LEG: CPT

## 2024-10-18 NOTE — PROGRESS NOTES
[facility] PROGRESS NOTE  JOSE ANTONIO BEE MD  10/18/2024    Chief Complaint:   Chief Complaint   Patient presents with    Wound Care     Pt here for follow up wound care visit to left lateral ankle wound. Pt denies any concerns or issues, pt rates pain at 4 or 5 on 0-10 scale, pt states he believes its the dressing that may have moved. PT arrived with calamine unna boot in place.        HPI:   Subjective   Tayo Alcantara is a 83 year old male coming in for a follow-up visit.    HPI    Wound improved. Dimensions smaller.   He is tolerating UNNA 20-30 very ell with no adverse symptoms.   He is open to trying higher compression.   No s/o infection.     Complaint with low salt diet and leg elevation.     Review of Systems  Negative except HPI   Denies chest pain / SOB / palpitations  Denies fever.     Allergies  Allergies[1]    Current Meds:  Current Outpatient Medications   Medication Sig Dispense Refill    gentamicin 0.1 % External Ointment Apply 1 Application topically 3 (three) times daily. 30 g 3    doxycycline 100 MG Oral Cap Take 1 capsule (100 mg total) by mouth 2 (two) times daily. 14 capsule 0    furosemide 20 MG Oral Tab Take 1 tablet (20 mg total) by mouth daily.      Potassium Chloride ER 10 MEQ Oral Tab CR Take 1 tablet (10 mEq total) by mouth daily.      Betamethasone Dipropionate Aug (DIPROLENE AF) 0.05 % External Cream Apply 1 Application topically daily. 50 g 1    Sodium Chloride (SALINE WOUND WASH) 0.9 % External Solution Apply 10 mL topically daily. 210 mL 3    methotrexate 2.5 MG Oral Tab Take 1 tablet (2.5 mg total) by mouth daily.      clobetasol 0.05 % External Ointment Apply 1 Application topically 2 (two) times daily as needed.      omeprazole 20 MG Oral Capsule Delayed Release Take 1 capsule (20 mg total) by mouth daily.      predniSONE 10 MG Oral Tab Take 1 tablet (10 mg total) by mouth daily.      apixaban (ELIQUIS) 5 MG Oral Tab Take 1 tablet (5 mg total) by mouth 2 (two) times  daily. 60 tablet 8    folic acid 1 MG Oral Tab Take 1 tablet (1 mg total) by mouth daily.      MetFORMIN HCl 500 MG Oral Tab Take 1 tablet (500 mg total) by mouth 2 (two) times daily with meals.      simvastatin 20 MG Oral Tab Take 1 tablet (20 mg total) by mouth nightly. (Patient taking differently: Take 1 tablet (20 mg total) by mouth every morning.) 90 tablet 1    lisinopril 2.5 MG Oral Tab Take 1 tablet (2.5 mg total) by mouth daily. 90 tablet 1    glimepiride 1 MG Oral Tab Take 1 tablet (1 mg total) by mouth daily with breakfast. 90 tablet 1         EXAM:   Objective   Objective    Physical Exam    Vital Signs  Vitals:    10/18/24 0700   BP: 121/70   Pulse: 76   Resp: 16   Temp: 98.2 °F (36.8 °C)       Wound Assessment  Wound 05/03/24 #2 Ankle Left;Lateral (Active)   Date First Assessed/Time First Assessed: 05/03/24 0914    Wound Number (Wound Clinic Only): #2  Primary Wound Type: Diabetic Ulcer  Location: Ankle  Wound Location Orientation: Left;Lateral      Assessments 5/3/2024  9:17 AM 10/18/2024  9:31 AM   Wound Image       Drainage Amount Unable to assess Scant   Drainage Description -- Serosanguineous   Treatments Compression (spandagrip E) --   Wound Length (cm) 1 cm 0.4 cm   Wound Width (cm) 0.5 cm 0.2 cm   Wound Surface Area (cm^2) 0.5 cm^2 0.08 cm^2   Wound Depth (cm) 0.1 cm 0.1 cm   Wound Volume (cm^3) 0.05 cm^3 0.008 cm^3   Wound Healing % -- 84   Margins Poorly defined Well-defined edges   Non-staged Wound Description Full thickness Full thickness   Peggy-wound Assessment Dry;Edema;Hemosiderin staining Edema;Dry   Wound Granulation Tissue -- Pink;Pale Grey;Firm   Wound Bed Granulation (%) -- 100 %   Wound Bed Epithelium (%) 50 % --   Wound Bed Slough (%) 50 % --   Wound Odor None None   Shape clustered --   Tunneling? No --   Undermining? No --   Sinus Tracts? No --   Josue Scale Grade 2 Grade 2       Inactive Orders   Date Order Priority Status Authorizing Provider   07/24/24 4890 Debridement  Diabetic Ulcer Left;Lateral Ankle Routine Completed Gayle Ng MD       Compression Wrap 05/17/24 Leg Left (Active)   Placement Date: 05/17/24   Location: Leg  Wound Location Orientation: Left      Assessments 5/17/2024 11:53 AM 9/27/2024  5:44 PM   Response to Treatment Well tolerated Well tolerated   Compression Layers Multilayer Multilayer   Compression Product Type Coflex (30-40mmhg) Unna Boot   Dressing Applied Yes Yes   Compression Wrap Location Toes to Knee Toes to Knee   Compression Wrap Status Clean;Dry;Intact Clean;Dry;Intact       No associated orders.                ASSESSMENT AND PLAN:     Assessment     Encounter Diagnosis  1. 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 Diabetes mellitus with skin ulcer (HCC)    4. Left leg swelling    5. Peripheral arterial disease (HCC)    6. Long term (current) use of anticoagulants      PROCEDURES:    Compression wrap application.       PLAN OF CARE:    Increase compression to 30-40 mmhg.   Continue current dressings.   Low salt diet  Leg elevation.   Diuretics per PCP.   Watch out for signs of early infection - counseled.   Plan of care discussed with patient in detail - All questions answered   Return in 3 week.     Orders  No orders of the defined types were placed in this encounter.      Meds & Refills for this Visit:  Requested Prescriptions      No prescriptions requested or ordered in this encounter         Patient Instructions     Return 3 weeks  Continue HH - for dressing change and wrap change - 3 times a week.   Low salt diet.   Elevate lower extremities.   Diuretics as tolerated       Wound Cleaning and Dressings:    Shower with protection  Use SHOWER BOOT / CAST COVER       DRESSINGS: endoform collagen / HF transfer on wound base - coloplast periwound  Change dressing 3 times a week.     Compression Therapy  :  Decrease compressino - Unna 20-30 mm hg  Compression Therapy Instructions:  1. Okay to wear overnight      2.  Avoid prolonged standing in one place.  It is better to have your calf muscles moving       to pump fluid out of the legs.    3.  Elevate leg(s) above the level of the heart when sitting or as much as possible.    4.  Take your diuretics as directed by your provider.  Do not skip doses or change doses      unless instructed to do so by your provider.    5. Do not get leg(s) with compression wrap wet. If wraps are too tight as indicated        By pain, numbness/tingling or discoloration of toes remove wrap completely       and call the   wound center.     Off-Loading:    Miscellaneous Instructions:  Supplement with a daily multivitamin   Low salt diet  Intense blood sugar control - Goal Blood sugar below 180 at all times recommended.  Increase protein intake / consider protein supplements - see below  Elevate extremities at all times when sitting / laying down.    DIETARY MODIFICATIONS TO HELP WITH WOUND HEALING:    Protein: Meats, beans, eggs, milk and yogurt particularly Greek yogurt), tofu, soy nuts, soy protein products    Vitamin C: Citrus fruits and juices, strawberries, tomatoes, tomato juice, peppers, baked potatoes, spinach, broccoli, cauliflower, [place] sprouts, cabbage    Vitamin A: Dark green, leafy vegetables, orange or yellow vegetables, cantaloupe, fortified dairy products, liver, fortified cereals    Zinc: Fortified cereals, red meats, seafood    Consider Kartik by [employer] (These are essential branch chain amino acids that help with tissue building and wound healing) and take 2 packets/day. you can order online at [employer] or [employer]    ADDITIONAL REMINDERS:    The treatment plan has been discussed at length with you and your provider. Follow all instructions carefully, it is very important. If you do not follow all instructions, you are at  risk of your wound not healing, infection,  possible loss of limb and even end of life.  Please call the clinic during regular business hours ( 7:30 AM - 5:30 PM) if you notice increased bleeding, redness, warmth, pain or pus like drainage or start running a fever greater than 100.3.    For after hour emergencies, please call your primary physician or go to the nearest emergency room.      Patient/Caregiver Education: There are no barriers to learning. Medical education for above diagnosis given.   Answered all questions.    Outcome: Patient verbalizes understanding. Patient is notified to call with any questions, complications, allergies, or worsening or changing symptoms.  Patient is to call with any side effects or complications as a result of the treatments today.      DOCUMENTATION OF TIME SPENT: Code selection for this visit was based on time spent : 35 min on date of service in preparing to see the patient, obtaining and/or reviewing separately obtained history, performing a medically appropriate examination, counseling and educating the patient/family/caregiver, ordering medications or testing, referring and communicating with other healthcare providers, documenting clinical information in the E HR, independently interpreting results and communicating results to the patient/family/caregiver and care coordination with the patient's other providers.    Followup: Return in about 3 weeks (around 11/8/2024) for Wound followup.      Note to Patient:  The 21st Century Cures Act makes medical notes like these available to patients in the interest of transparency. However, be advised this is a medical document and is intended as hrhq-le-bwot communication; it is written in medical language and may appear blunt, direct, or contain abbreviations or verbiage that are unfamiliar. Medical documents are intended to carry relevant information, facts as evident, and the clinical opinion of the practitioner.    Also, please note that this report has been produced using  speech recognition software and may contain errors related to that system including, but not limited to, errors in grammar, punctuation, and spelling, as well as words and phrases that possibly may have been recognized inappropriately.  If there are any questions or concerns, contact the dictating provider for clarification.      Gayle Drake MD  10/18/2024  9:50 AM                      [1]   Allergies  Allergen Reactions    Bacitracin-Neomycin-Polymyxin [Neomycin-Bacitracin-Polymyxin] RASH    Other OTHER (SEE COMMENTS)

## 2024-10-18 NOTE — PATIENT INSTRUCTIONS
Return 3 weeks  Continue HH - for dressing change and wrap change - 3 times a week.   Low salt diet.   Elevate lower extremities.   Diuretics as tolerated       Wound Cleaning and Dressings:    Shower with protection  Use SHOWER BOOT / CAST COVER       DRESSINGS: endoform collagen / HF transfer on wound base - coloplast periwound  Change dressing 3 times a week.     Compression Therapy :  Decrease compressino - Unna 20-30 mm hg  Compression Therapy Instructions:  1. Okay to wear overnight      2.  Avoid prolonged standing in one place.  It is better to have your calf muscles moving       to pump fluid out of the legs.    3.  Elevate leg(s) above the level of the heart when sitting or as much as possible.    4.  Take your diuretics as directed by your provider.  Do not skip doses or change doses      unless instructed to do so by your provider.    5. Do not get leg(s) with compression wrap wet. If wraps are too tight as indicated        By pain, numbness/tingling or discoloration of toes remove wrap completely       and call the   wound center.     Off-Loading:    Miscellaneous Instructions:  Supplement with a daily multivitamin   Low salt diet  Intense blood sugar control - Goal Blood sugar below 180 at all times recommended.  Increase protein intake / consider protein supplements - see below  Elevate extremities at all times when sitting / laying down.    DIETARY MODIFICATIONS TO HELP WITH WOUND HEALING:    Protein: Meats, beans, eggs, milk and yogurt particularly Greek yogurt), tofu, soy nuts, soy protein products    Vitamin C: Citrus fruits and juices, strawberries, tomatoes, tomato juice, peppers, baked potatoes, spinach, broccoli, cauliflower, [place] sprouts, cabbage    Vitamin A: Dark green, leafy vegetables, orange or yellow vegetables, cantaloupe, fortified dairy products, liver, fortified cereals    Zinc: Fortified cereals, red meats, seafood    Consider Kartik by [employer] (These are essential branch  chain amino acids that help with tissue building and wound healing) and take 2 packets/day. you can order online at [employer] or [employer]    ADDITIONAL REMINDERS:    The treatment plan has been discussed at length with you and your provider. Follow all instructions carefully, it is very important. If you do not follow all instructions, you are at  risk of your wound not healing, infection, possible loss of limb and even end of life.  Please call the clinic during regular business hours ( 7:30 AM - 5:30 PM) if you notice increased bleeding, redness, warmth, pain or pus like drainage or start running a fever greater than 100.3.    For after hour emergencies, please call your primary physician or go to the nearest emergency room.

## 2024-10-18 NOTE — PROGRESS NOTES
Weekly Wound Education Note    Weekly Wound Education Note    Teaching Provided To: Patient  Training Topics: Discharge instructions;Compression;Cleasing and general instructions;Edema control  Training Method: Demonstration;Explain/Verbal;Written  Training Response: Patient responds and understands              Notes: Cleanse left leg wound with saline or wound cleanser, apply zinc to periwound as needed for moisture control, apply endoform, hydrofera transfer, abd to wound, Apply 30/40 calamine unna from base of toes to just below knee Change dressing 3 times weekly. Reviewed elevating above level of heart for 30 minutes several times daily for edema control.

## 2024-11-06 ENCOUNTER — OFFICE VISIT (OUTPATIENT)
Dept: WOUND CARE | Facility: HOSPITAL | Age: 84
End: 2024-11-06
Attending: PEDIATRICS
Payer: MEDICARE

## 2024-11-06 VITALS
RESPIRATION RATE: 14 BRPM | TEMPERATURE: 98 F | DIASTOLIC BLOOD PRESSURE: 70 MMHG | HEART RATE: 78 BPM | SYSTOLIC BLOOD PRESSURE: 122 MMHG

## 2024-11-06 DIAGNOSIS — I83.893 VARICOSE VEINS OF BOTH LOWER EXTREMITIES WITH COMPLICATIONS: ICD-10-CM

## 2024-11-06 DIAGNOSIS — T14.8XXA INFECTED WOUND: ICD-10-CM

## 2024-11-06 DIAGNOSIS — Z79.01 LONG TERM (CURRENT) USE OF ANTICOAGULANTS: ICD-10-CM

## 2024-11-06 DIAGNOSIS — E11.622 DIABETES MELLITUS WITH SKIN ULCER (HCC): ICD-10-CM

## 2024-11-06 DIAGNOSIS — I82.5Z2 CHRONIC DEEP VEIN THROMBOSIS (DVT) OF DISTAL VEIN OF LEFT LOWER EXTREMITY (HCC): ICD-10-CM

## 2024-11-06 DIAGNOSIS — L97.322 NON-PRESSURE CHRONIC ULCER OF LEFT ANKLE WITH FAT LAYER EXPOSED (HCC): Primary | ICD-10-CM

## 2024-11-06 DIAGNOSIS — E11.40 CONTROLLED TYPE 2 DIABETES MELLITUS WITH DIABETIC NEUROPATHY, WITHOUT LONG-TERM CURRENT USE OF INSULIN (HCC): ICD-10-CM

## 2024-11-06 DIAGNOSIS — I73.9 PERIPHERAL ARTERIAL DISEASE (HCC): ICD-10-CM

## 2024-11-06 DIAGNOSIS — L98.499 DIABETES MELLITUS WITH SKIN ULCER (HCC): ICD-10-CM

## 2024-11-06 DIAGNOSIS — M79.89 LEFT LEG SWELLING: ICD-10-CM

## 2024-11-06 DIAGNOSIS — L08.9 INFECTED WOUND: ICD-10-CM

## 2024-11-06 DIAGNOSIS — I87.332 IDIOPATHIC CHRONIC VENOUS HYPERTENSION OF LEFT LOWER EXTREMITY WITH ULCER AND INFLAMMATION (HCC): ICD-10-CM

## 2024-11-06 LAB — GLUCOSE BLD-MCNC: 91 MG/DL (ref 70–99)

## 2024-11-06 PROCEDURE — 29581 APPL MULTLAYER CMPRN SYS LEG: CPT

## 2024-11-06 PROCEDURE — 82962 GLUCOSE BLOOD TEST: CPT | Performed by: INTERNAL MEDICINE

## 2024-11-06 NOTE — PROGRESS NOTES
[facility] PROGRESS NOTE  JOSE ANTONIO BEE MD  11/6/2024    Chief Complaint:   Chief Complaint   Patient presents with    Wound Care     Patient is here for a wound care follow up. He denies any pain to the wound.        HPI:   Subjective   Tayo Alcantara is a 83 year old male coming in for a follow-up visit.    HPI    Wound covered with scab - well adhered not able to remove it - unsure if shonda is mature epithelium underneath.   Attempt to remove scab limited due to pain.   No s/o infection.     Tolerating wrap Ok.     Admits compliance to leg elevation and low salt diet .    Accompanied by family    Review of Systems  Negative except HPI   Denies chest pain / SOB / palpitations  Denies fever.     Allergies  Allergies[1]    Current Meds:  Current Outpatient Medications   Medication Sig Dispense Refill    doxycycline 100 MG Oral Cap Take 1 capsule (100 mg total) by mouth 2 (two) times daily. 14 capsule 0    furosemide 20 MG Oral Tab Take 1 tablet (20 mg total) by mouth daily.      Potassium Chloride ER 10 MEQ Oral Tab CR Take 1 tablet (10 mEq total) by mouth daily.      Betamethasone Dipropionate Aug (DIPROLENE AF) 0.05 % External Cream Apply 1 Application topically daily. 50 g 1    Sodium Chloride (SALINE WOUND WASH) 0.9 % External Solution Apply 10 mL topically daily. 210 mL 3    methotrexate 2.5 MG Oral Tab Take 1 tablet (2.5 mg total) by mouth daily.      clobetasol 0.05 % External Ointment Apply 1 Application topically 2 (two) times daily as needed.      omeprazole 20 MG Oral Capsule Delayed Release Take 1 capsule (20 mg total) by mouth daily.      predniSONE 10 MG Oral Tab Take 1 tablet (10 mg total) by mouth daily.      apixaban (ELIQUIS) 5 MG Oral Tab Take 1 tablet (5 mg total) by mouth 2 (two) times daily. 60 tablet 8    folic acid 1 MG Oral Tab Take 1 tablet (1 mg total) by mouth daily.      MetFORMIN HCl 500 MG Oral Tab Take 1 tablet (500 mg total) by mouth 2 (two) times daily with meals.       simvastatin 20 MG Oral Tab Take 1 tablet (20 mg total) by mouth nightly. (Patient taking differently: Take 1 tablet (20 mg total) by mouth every morning.) 90 tablet 1    lisinopril 2.5 MG Oral Tab Take 1 tablet (2.5 mg total) by mouth daily. 90 tablet 1    glimepiride 1 MG Oral Tab Take 1 tablet (1 mg total) by mouth daily with breakfast. 90 tablet 1         EXAM:   Objective   Objective    Physical Exam    Vital Signs  Vitals:    11/06/24 1555   BP: 122/70   Pulse: 78   Resp: 14   Temp: 97.8 °F (36.6 °C)       Wound Assessment  Wound 05/03/24 #2 Ankle Left;Lateral (Active)   Date First Assessed/Time First Assessed: 05/03/24 0914    Wound Number (Wound Clinic Only): #2  Primary Wound Type: Diabetic Ulcer  Location: Ankle  Wound Location Orientation: Left;Lateral      Assessments 5/3/2024  9:17 AM 11/6/2024  4:01 PM   Wound Image       Drainage Amount Unable to assess None   Treatments Compression (spandagrip E) --   Wound Length (cm) 1 cm 0.4 cm   Wound Width (cm) 0.5 cm 0.2 cm   Wound Surface Area (cm^2) 0.5 cm^2 0.08 cm^2   Wound Depth (cm) 0.1 cm 0 cm   Wound Volume (cm^3) 0.05 cm^3 0 cm^3   Wound Healing % -- 100   Margins Poorly defined Well-defined edges   Non-staged Wound Description Full thickness Full thickness   Peggy-wound Assessment Dry;Edema;Hemosiderin staining Edema;Dry   Wound Bed Epithelium (%) 50 % --   Wound Bed Slough (%) 50 % --   Wound Odor None None   Shape clustered --   Tunneling? No No   Undermining? No No   Sinus Tracts? No No   Josue Scale Grade 2 Grade 2       Inactive Orders   Date Order Priority Status Authorizing Provider   07/24/24 1625 Debridement Diabetic Ulcer Left;Lateral Ankle Routine Completed Gayle Ng MD       Compression Wrap 05/17/24 Leg Left (Active)   Placement Date: 05/17/24   Location: Leg  Wound Location Orientation: Left      Assessments 5/17/2024 11:53 AM 9/27/2024  5:44 PM   Response to Treatment Well tolerated Well tolerated   Compression Layers  Multilayer Multilayer   Compression Product Type Coflex (30-40mmhg) Unna Boot   Dressing Applied Yes Yes   Compression Wrap Location Toes to Knee Toes to Knee   Compression Wrap Status Clean;Dry;Intact Clean;Dry;Intact       No associated orders.                ASSESSMENT AND PLAN:     Assessment     Encounter Diagnosis  1. Non-pressure chronic ulcer of left ankle with fat layer exposed ([facility])    2. Idiopathic chronic venous hypertension of left lower extremity with ulcer and inflammation ([facility])    3. Diabetes mellitus with skin ulcer ([facility])    4. Left leg swelling    5. Peripheral arterial disease ([facility])    6. Long term (current) use of anticoagulants    7. Infected wound    8. Chronic deep vein thrombosis (DVT) of distal vein of left lower extremity ([facility])    9. Varicose veins of both lower extremities with complications    10. Controlled type 2 diabetes mellitus with diabetic neuropathy, without long-term current use of insulin ([facility])      PROCEDURES:          PLAN OF CARE:    Cover the wounded area with silver foam   Apply compression wrap.   Leg elevation    Low salt diet.   Watch out for signs of early infection - counseled.   Plan of care discussed with patient in detail - All questions answered   Return in 3 weeks.     Patient Instructions     Return 3 weeks  Continue HH - for dressing change and wrap change - 3 times a week.   Low salt diet.   Elevate lower extremities.   Diuretics as tolerated       Wound Cleaning and Dressings:    Shower with protection  Use SHOWER BOOT / CAST COVER       DRESSINGS: silver foam  Change dressing 3 times a week.     Compression Therapy :  Decrease compressino - Unna 20-30 mm hg  Compression Therapy Instructions:  1. Okay to wear overnight      2.  Avoid prolonged standing in one place.  It is better to have your calf muscles moving       to pump fluid out of the legs.    3.  Elevate leg(s) above the level of the heart when sitting or as much as possible.    4.  Take your  diuretics as directed by your provider.  Do not skip doses or change doses      unless instructed to do so by your provider.    5. Do not get leg(s) with compression wrap wet. If wraps are too tight as indicated        By pain, numbness/tingling or discoloration of toes remove wrap completely       and call the   wound center.     Off-Loading:    Miscellaneous Instructions:  Supplement with a daily multivitamin   Low salt diet  Intense blood sugar control - Goal Blood sugar below 180 at all times recommended.  Increase protein intake / consider protein supplements - see below  Elevate extremities at all times when sitting / laying down.    DIETARY MODIFICATIONS TO HELP WITH WOUND HEALING:    Protein: Meats, beans, eggs, milk and yogurt particularly Greek yogurt), tofu, soy nuts, soy protein products    Vitamin C: Citrus fruits and juices, strawberries, tomatoes, tomato juice, peppers, baked potatoes, spinach, broccoli, cauliflower, [place] sprouts, cabbage    Vitamin A: Dark green, leafy vegetables, orange or yellow vegetables, cantaloupe, fortified dairy products, liver, fortified cereals    Zinc: Fortified cereals, red meats, seafood    Consider Kartik by [employer] (These are essential branch chain amino acids that help with tissue building and wound healing) and take 2 packets/day. you can order online at [employer] or [employer]    ADDITIONAL REMINDERS:    The treatment plan has been discussed at length with you and your provider. Follow all instructions carefully, it is very important. If you do not follow all instructions, you are at  risk of your wound not healing, infection, possible loss of limb and even end of life.  Please call the clinic during regular business hours ( 7:30 AM - 5:30 PM) if you notice increased bleeding, redness, warmth, pain or pus like drainage or start running a fever greater than 100.3.    For after hour emergencies, please call your primary physician or go to the nearest emergency  room.      Patient/Caregiver Education: There are no barriers to learning. Medical education for above diagnosis given.   Answered all questions.    Outcome: Patient verbalizes understanding. Patient is notified to call with any questions, complications, allergies, or worsening or changing symptoms.  Patient is to call with any side effects or complications as a result of the treatments today.      DOCUMENTATION OF TIME SPENT: Code selection for this visit was based on time spent : 30 min on date of service in preparing to see the patient, obtaining and/or reviewing separately obtained history, performing a medically appropriate examination, counseling and educating the patient/family/caregiver, ordering medications or testing, referring and communicating with other healthcare providers, documenting clinical information in the E HR, independently interpreting results and communicating results to the patient/family/caregiver and care coordination with the patient's other providers.    Followup: Return in about 3 weeks (around 11/27/2024) for Wound followup.      Note to Patient:  The 21st Century Cures Act makes medical notes like these available to patients in the interest of transparency. However, be advised this is a medical document and is intended as eiwn-eq-tvap communication; it is written in medical language and may appear blunt, direct, or contain abbreviations or verbiage that are unfamiliar. Medical documents are intended to carry relevant information, facts as evident, and the clinical opinion of the practitioner.    Also, please note that this report has been produced using speech recognition software and may contain errors related to that system including, but not limited to, errors in grammar, punctuation, and spelling, as well as words and phrases that possibly may have been recognized inappropriately.  If there are any questions or concerns, contact the dictating provider for  clarification.      Gayle Drake MD  11/6/2024  4:16 PM                      [1]   Allergies  Allergen Reactions    Bacitracin-Neomycin-Polymyxin [Neomycin-Bacitracin-Polymyxin] RASH    Other OTHER (SEE COMMENTS)

## 2024-11-06 NOTE — PATIENT INSTRUCTIONS
Return 3 weeks  Continue  - for dressing change and wrap change - 3 times a week.   Low salt diet.   Elevate lower extremities.   Diuretics as tolerated       Wound Cleaning and Dressings:    Shower with protection  Use SHOWER BOOT / CAST COVER       DRESSINGS: silver foam  Change dressing 3 times a week.     Compression Therapy :  Decrease compressino - Unna 20-30 mm hg  Compression Therapy Instructions:  1. Okay to wear overnight      2.  Avoid prolonged standing in one place.  It is better to have your calf muscles moving       to pump fluid out of the legs.    3.  Elevate leg(s) above the level of the heart when sitting or as much as possible.    4.  Take your diuretics as directed by your provider.  Do not skip doses or change doses      unless instructed to do so by your provider.    5. Do not get leg(s) with compression wrap wet. If wraps are too tight as indicated        By pain, numbness/tingling or discoloration of toes remove wrap completely       and call the   wound center.     Off-Loading:    Miscellaneous Instructions:  Supplement with a daily multivitamin   Low salt diet  Intense blood sugar control - Goal Blood sugar below 180 at all times recommended.  Increase protein intake / consider protein supplements - see below  Elevate extremities at all times when sitting / laying down.    DIETARY MODIFICATIONS TO HELP WITH WOUND HEALING:    Protein: Meats, beans, eggs, milk and yogurt particularly Greek yogurt), tofu, soy nuts, soy protein products    Vitamin C: Citrus fruits and juices, strawberries, tomatoes, tomato juice, peppers, baked potatoes, spinach, broccoli, cauliflower, [place] sprouts, cabbage    Vitamin A: Dark green, leafy vegetables, orange or yellow vegetables, cantaloupe, fortified dairy products, liver, fortified cereals    Zinc: Fortified cereals, red meats, seafood    Consider Kartik by [employer] (These are essential branch chain amino acids that help with tissue building and  wound healing) and take 2 packets/day. you can order online at [employer] or [employer]    ADDITIONAL REMINDERS:    The treatment plan has been discussed at length with you and your provider. Follow all instructions carefully, it is very important. If you do not follow all instructions, you are at  risk of your wound not healing, infection, possible loss of limb and even end of life.  Please call the clinic during regular business hours ( 7:30 AM - 5:30 PM) if you notice increased bleeding, redness, warmth, pain or pus like drainage or start running a fever greater than 100.3.    For after hour emergencies, please call your primary physician or go to the nearest emergency room.

## 2024-11-06 NOTE — PROGRESS NOTES
Weekly Wound Education Note    Teaching Provided To: Patient  Training Topics: Cleasing and general instructions;Compression;Discharge instructions;Dressing;Edema control  Training Method: Explain/Verbal  Training Response: Patient responds and understands;Reinforcement needed        Notes: Stable. Silver foam and unna boot 20-30mmhg. Orders faxed to [facility].

## 2024-11-27 ENCOUNTER — APPOINTMENT (OUTPATIENT)
Dept: WOUND CARE | Facility: HOSPITAL | Age: 84
End: 2024-11-27
Attending: PEDIATRICS
Payer: MEDICARE

## 2024-11-29 ENCOUNTER — APPOINTMENT (OUTPATIENT)
Dept: WOUND CARE | Facility: HOSPITAL | Age: 84
End: 2024-11-29
Attending: PEDIATRICS
Payer: MEDICARE

## 2024-12-02 ENCOUNTER — OFFICE VISIT (OUTPATIENT)
Dept: WOUND CARE | Facility: HOSPITAL | Age: 84
End: 2024-12-02
Attending: INTERNAL MEDICINE
Payer: MEDICARE

## 2024-12-02 VITALS
RESPIRATION RATE: 16 BRPM | DIASTOLIC BLOOD PRESSURE: 56 MMHG | HEART RATE: 84 BPM | SYSTOLIC BLOOD PRESSURE: 115 MMHG | TEMPERATURE: 99 F

## 2024-12-02 DIAGNOSIS — L97.322 NON-PRESSURE CHRONIC ULCER OF LEFT ANKLE WITH FAT LAYER EXPOSED (HCC): Primary | ICD-10-CM

## 2024-12-02 DIAGNOSIS — Z79.01 LONG TERM (CURRENT) USE OF ANTICOAGULANTS: ICD-10-CM

## 2024-12-02 DIAGNOSIS — E11.622 DIABETES MELLITUS WITH SKIN ULCER (HCC): ICD-10-CM

## 2024-12-02 DIAGNOSIS — L98.499 DIABETES MELLITUS WITH SKIN ULCER (HCC): ICD-10-CM

## 2024-12-02 DIAGNOSIS — M79.89 LEFT LEG SWELLING: ICD-10-CM

## 2024-12-02 DIAGNOSIS — I73.9 PERIPHERAL ARTERIAL DISEASE (HCC): ICD-10-CM

## 2024-12-02 DIAGNOSIS — I87.332 IDIOPATHIC CHRONIC VENOUS HYPERTENSION OF LEFT LOWER EXTREMITY WITH ULCER AND INFLAMMATION (HCC): ICD-10-CM

## 2024-12-02 LAB — GLUCOSE BLD-MCNC: 84 MG/DL (ref 70–99)

## 2024-12-02 PROCEDURE — 99214 OFFICE O/P EST MOD 30 MIN: CPT

## 2024-12-02 PROCEDURE — 82962 GLUCOSE BLOOD TEST: CPT | Performed by: INTERNAL MEDICINE

## 2024-12-02 NOTE — PATIENT INSTRUCTIONS
Wear compression garment at all times when not in bed.   Low salt diet  Avoid prolonged standing.   Leg elevation.   W/o s/o reopening / early infection.

## 2024-12-02 NOTE — PROGRESS NOTES
.Weekly Wound Education Note    Teaching Provided To: Patient  Training Topics: Dressing;Edema control;Discharge instructions;Cleasing and general instructions;Compression  Training Method: Explain/Verbal;Written  Training Response: Patient responds and understands;Reinforcement needed            Wound is healed.  Patient to cover with a protective dressing for a couple weeks.  Wear compression stocking.  Discharged from clinic.

## 2024-12-02 NOTE — PROGRESS NOTES
[facility] PROGRESS NOTE  JOSE ANTONIO BEE MD  12/2/2024    Chief Complaint:   Chief Complaint   Patient presents with    Wound Care     Patient is here for a wound care follow up. He denies any pain to the wound.        HPI:   Grey Alcantara is a 83 year old male coming in for a follow-up visit.    HPI    Wound closed  Skin mature.   He has brought his compression garment today.     Review of Systems  Negative except HPI   Denies chest pain / SOB / palpitations  Denies fever.     Allergies  Allergies[1]    Current Meds:  Current Outpatient Medications   Medication Sig Dispense Refill    doxycycline 100 MG Oral Cap Take 1 capsule (100 mg total) by mouth 2 (two) times daily. 14 capsule 0    furosemide 20 MG Oral Tab Take 1 tablet (20 mg total) by mouth daily.      Potassium Chloride ER 10 MEQ Oral Tab CR Take 1 tablet (10 mEq total) by mouth daily.      Betamethasone Dipropionate Aug (DIPROLENE AF) 0.05 % External Cream Apply 1 Application topically daily. 50 g 1    Sodium Chloride (SALINE WOUND WASH) 0.9 % External Solution Apply 10 mL topically daily. 210 mL 3    methotrexate 2.5 MG Oral Tab Take 1 tablet (2.5 mg total) by mouth daily.      clobetasol 0.05 % External Ointment Apply 1 Application topically 2 (two) times daily as needed.      omeprazole 20 MG Oral Capsule Delayed Release Take 1 capsule (20 mg total) by mouth daily.      predniSONE 10 MG Oral Tab Take 1 tablet (10 mg total) by mouth daily.      apixaban (ELIQUIS) 5 MG Oral Tab Take 1 tablet (5 mg total) by mouth 2 (two) times daily. 60 tablet 8    folic acid 1 MG Oral Tab Take 1 tablet (1 mg total) by mouth daily.      MetFORMIN HCl 500 MG Oral Tab Take 1 tablet (500 mg total) by mouth 2 (two) times daily with meals.      simvastatin 20 MG Oral Tab Take 1 tablet (20 mg total) by mouth nightly. (Patient taking differently: Take 1 tablet (20 mg total) by mouth every morning.) 90 tablet 1    lisinopril 2.5 MG Oral Tab Take 1  tablet (2.5 mg total) by mouth daily. 90 tablet 1    glimepiride 1 MG Oral Tab Take 1 tablet (1 mg total) by mouth daily with breakfast. 90 tablet 1         EXAM:   Objective   Objective    Physical Exam    Vital Signs  Vitals:    12/02/24 1507   BP: 115/56   Pulse: 84   Resp: 16   Temp: 99.1 °F (37.3 °C)       Wound Assessment  Wound 05/03/24 #2 Ankle Left;Lateral (Active)   Date First Assessed/Time First Assessed: 05/03/24 0914    Wound Number (Wound Clinic Only): #2  Primary Wound Type: Diabetic Ulcer  Location: Ankle  Wound Location Orientation: Left;Lateral      Assessments 5/3/2024  9:17 AM 12/2/2024  3:12 PM   Wound Image       Drainage Amount Unable to assess None   Treatments Compression (spandagrip E) --   Wound Length (cm) 1 cm 0.1 cm   Wound Width (cm) 0.5 cm 0.1 cm   Wound Surface Area (cm^2) 0.5 cm^2 0.01 cm^2   Wound Depth (cm) 0.1 cm 0 cm   Wound Volume (cm^3) 0.05 cm^3 0 cm^3   Wound Healing % -- 100   Margins Poorly defined Well-defined edges   Non-staged Wound Description Full thickness Full thickness   Peggy-wound Assessment Dry;Edema;Hemosiderin staining Edema;Dry   Wound Granulation Tissue -- Firm;Pink   Wound Bed Granulation (%) -- 100 %   Wound Bed Epithelium (%) 50 % --   Wound Bed Slough (%) 50 % --   Wound Odor None None   Shape clustered --   Tunneling? No No   Undermining? No No   Sinus Tracts? No No   Josue Scale Grade 2 Grade 2       Inactive Orders   Date Order Priority Status Authorizing Provider   07/24/24 1625 Debridement Diabetic Ulcer Left;Lateral Ankle Routine Completed Gayle Ng MD       Compression Wrap 05/17/24 Leg Left (Active)   Placement Date: 05/17/24   Location: Leg  Wound Location Orientation: Left      Assessments 5/17/2024 11:53 AM 11/6/2024  5:24 PM   Response to Treatment Well tolerated Well tolerated   Compression Layers Multilayer Multilayer   Compression Product Type Coflex (30-40mmhg) Unna Boot (20-30mmhg)   Dressing Applied Yes Yes   Compression Wrap  Location Toes to Knee Toes to Knee   Compression Wrap Status Clean;Dry;Intact Clean;Dry;Intact       No associated orders.          ASSESSMENT AND PLAN:     Assessment     Encounter Diagnosis  1. 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 Diabetes mellitus with skin ulcer (HCC)    4. Left leg swelling    5. Peripheral arterial disease (HCC)    6. Long term (current) use of anticoagulants      PLAN OF CARE:    Edema control  Compression garment / low salt diet / leg elevation.   Watch out for signs of wound reopening / infection - counseled  Plan of care discussed with patient in detail - All questions answered   Return as needed only.     Patient Instructions   Wear compression garment at all times when not in bed.   Low salt diet  Avoid prolonged standing.   Leg elevation.   W/o s/o reopening / early infection.       Patient/Caregiver Education: There are no barriers to learning. Medical education for above diagnosis given.   Answered all questions.    Outcome: Patient verbalizes understanding. Patient is notified to call with any questions, complications, allergies, or worsening or changing symptoms.  Patient is to call with any side effects or complications as a result of the treatments today.      DOCUMENTATION OF TIME SPENT: Code selection for this visit was based on time spent : 25 min on date of service in preparing to see the patient, obtaining and/or reviewing separately obtained history, performing a medically appropriate examination, counseling and educating the patient/family/caregiver, ordering medications or testing, referring and communicating with other healthcare providers, documenting clinical information in the E HR, independently interpreting results and communicating results to the patient/family/caregiver and care coordination with the patient's other  providers.    Followup: Return for if wound reopens - Discharge from Wound clinic..      Note to Patient:  The 21st Century Cures Act makes medical notes like these available to patients in the interest of transparency. However, be advised this is a medical document and is intended as [id number] communication; it is written in medical language and may appear blunt, direct, or contain abbreviations or verbiage that are unfamiliar. Medical documents are intended to carry relevant information, facts as evident, and the clinical opinion of the practitioner.    Also, please note that this report has been produced using speech recognition software and may contain errors related to that system including, but not limited to, errors in grammar, punctuation, and spelling, as well as words and phrases that possibly may have been recognized inappropriately.  If there are any questions or concerns, contact the dictating provider for clarification.      Gayle Drake MD  12/2/2024  3:36 PM                      [1]   Allergies  Allergen Reactions    Bacitracin-Neomycin-Polymyxin [Neomycin-Bacitracin-Polymyxin] RASH    Other OTHER (SEE COMMENTS)

## 2024-12-05 ENCOUNTER — OFFICE VISIT (OUTPATIENT)
Dept: HEMATOLOGY/ONCOLOGY | Facility: HOSPITAL | Age: 84
End: 2024-12-05
Attending: INTERNAL MEDICINE
Payer: MEDICARE

## 2024-12-05 VITALS
WEIGHT: 212.81 LBS | HEART RATE: 72 BPM | RESPIRATION RATE: 16 BRPM | BODY MASS INDEX: 32.25 KG/M2 | DIASTOLIC BLOOD PRESSURE: 76 MMHG | TEMPERATURE: 97 F | SYSTOLIC BLOOD PRESSURE: 126 MMHG | HEIGHT: 67.99 IN

## 2024-12-05 DIAGNOSIS — D63.1 ANEMIA ASSOCIATED WITH CHRONIC RENAL FAILURE: ICD-10-CM

## 2024-12-05 DIAGNOSIS — D3A.090 CARCINOID TUMOR OF LEFT LUNG (HCC): ICD-10-CM

## 2024-12-05 DIAGNOSIS — N18.9 ANEMIA ASSOCIATED WITH CHRONIC RENAL FAILURE: ICD-10-CM

## 2024-12-05 DIAGNOSIS — Z79.01 CHRONIC ANTICOAGULATION: ICD-10-CM

## 2024-12-05 DIAGNOSIS — D56.3 THALASSEMIA CARRIER: ICD-10-CM

## 2024-12-05 DIAGNOSIS — D50.9 MICROCYTIC ANEMIA: ICD-10-CM

## 2024-12-05 DIAGNOSIS — Z85.51 HISTORY OF BLADDER CANCER: ICD-10-CM

## 2024-12-05 DIAGNOSIS — I82.4Y2 ACUTE DEEP VEIN THROMBOSIS (DVT) OF PROXIMAL VEIN OF LEFT LOWER EXTREMITY (HCC): Primary | ICD-10-CM

## 2024-12-05 LAB
ALBUMIN SERPL-MCNC: 4.7 G/DL (ref 3.2–4.8)
ALBUMIN/GLOB SERPL: 1.6 {RATIO} (ref 1–2)
ALP LIVER SERPL-CCNC: 102 U/L
ALT SERPL-CCNC: 12 U/L
ANION GAP SERPL CALC-SCNC: 12 MMOL/L (ref 0–18)
AST SERPL-CCNC: 14 U/L (ref ?–34)
BASOPHILS # BLD AUTO: 0.04 X10(3) UL (ref 0–0.2)
BASOPHILS NFR BLD AUTO: 0.5 %
BILIRUB SERPL-MCNC: 1 MG/DL (ref 0.2–1.1)
BUN BLD-MCNC: 63 MG/DL (ref 9–23)
CALCIUM BLD-MCNC: 10.3 MG/DL (ref 8.7–10.4)
CHLORIDE SERPL-SCNC: 105 MMOL/L (ref 98–112)
CO2 SERPL-SCNC: 22 MMOL/L (ref 21–32)
CREAT BLD-MCNC: 2.36 MG/DL
DEPRECATED HBV CORE AB SER IA-ACNC: 311 NG/ML
EGFRCR SERPLBLD CKD-EPI 2021: 27 ML/MIN/1.73M2 (ref 60–?)
EOSINOPHIL # BLD AUTO: 0.23 X10(3) UL (ref 0–0.7)
EOSINOPHIL NFR BLD AUTO: 2.9 %
ERYTHROCYTE [DISTWIDTH] IN BLOOD BY AUTOMATED COUNT: 20.5 %
GLOBULIN PLAS-MCNC: 2.9 G/DL (ref 2–3.5)
GLUCOSE BLD-MCNC: 148 MG/DL (ref 70–99)
HCT VFR BLD AUTO: 33 %
HGB BLD-MCNC: 10.4 G/DL
IMM GRANULOCYTES # BLD AUTO: 0.09 X10(3) UL (ref 0–1)
IMM GRANULOCYTES NFR BLD: 1.1 %
IRON SATN MFR SERPL: 8 %
IRON SERPL-MCNC: 21 UG/DL
LDH SERPL L TO P-CCNC: 170 U/L
LYMPHOCYTES # BLD AUTO: 1.01 X10(3) UL (ref 1–4)
LYMPHOCYTES NFR BLD AUTO: 12.9 %
MCH RBC QN AUTO: 21.5 PG (ref 26–34)
MCHC RBC AUTO-ENTMCNC: 31.5 G/DL (ref 31–37)
MCV RBC AUTO: 68.2 FL
MONOCYTES # BLD AUTO: 0.43 X10(3) UL (ref 0.1–1)
MONOCYTES NFR BLD AUTO: 5.5 %
NEUTROPHILS # BLD AUTO: 6.05 X10 (3) UL (ref 1.5–7.7)
NEUTROPHILS # BLD AUTO: 6.05 X10(3) UL (ref 1.5–7.7)
NEUTROPHILS NFR BLD AUTO: 77.1 %
OSMOLALITY SERPL CALC.SUM OF ELEC: 309 MOSM/KG (ref 275–295)
PLATELET # BLD AUTO: 192 10(3)UL (ref 150–450)
PLATELETS.RETICULATED NFR BLD AUTO: 3.5 % (ref 0–7)
POTASSIUM SERPL-SCNC: 4.1 MMOL/L (ref 3.5–5.1)
PROT SERPL-MCNC: 7.6 G/DL (ref 5.7–8.2)
RBC # BLD AUTO: 4.84 X10(6)UL
SODIUM SERPL-SCNC: 139 MMOL/L (ref 136–145)
TOTAL IRON BINDING CAPACITY: 264 UG/DL (ref 250–425)
TRANSFERRIN SERPL-MCNC: 205 MG/DL (ref 215–365)
VIT B12 SERPL-MCNC: 596 PG/ML (ref 211–911)
WBC # BLD AUTO: 7.9 X10(3) UL (ref 4–11)

## 2024-12-05 PROCEDURE — 99214 OFFICE O/P EST MOD 30 MIN: CPT | Performed by: INTERNAL MEDICINE

## 2024-12-05 NOTE — PROGRESS NOTES
[facility] Progress Note    Patient Name: Thiago Alcantara   YOB: 1940   Medical Record Number: [id number]   CSN: [id number]   Attending Physician: Alba Harvey M.D.   Referring Physician: MD Dr. Won Albrecht Dr.    Date of Visit: 12/5/2024     Chief Complaint:  Chief Complaint   Patient presents with    Follow - Up     Pt here for for yearly f/u. He is feeling better, energy is okay, eating well, denies SOB/cough. Continues on eliquis, denies bleeding, some bruising. Was hospitalized in spring with sepsis due to leg infection. C/o arthritic pain in hands.         Diagnosis:  1. H/o recurrent VTE     - Distal DVT distal left popliteal vein diagnosed 6/28/20     -  slipped and fell at a grocery store 5/2020. He hit is head and the left side of his body. He was brought to the ED 5/7/20 by paramedics. Imaging done showed no fractures or any acute findings. He said he had a lot of pain on his left side after the fall and was fairly sedentary. He had LLE pain since then which persisted and started noting some increasing redness and presented to the ED for evaluation.      He said he continued to take warfarin w/o fail and his INRs have been therapeutic. On admission his INR was 2.44. Dopplers done of his LLE showed a distal acute DVT of the left popliteal vein. He was started on heparin and subsequently admitted. He was eventually switched to Eliquis and discharged home.       - Presented to the ED 6/28/20 c/o bruise on the back of his thigh. Denied any trauma or falls recently. Last fall was back in May. US of the LLE was done which showed DVT was unchanged. Also seen was a hematoma within the posterior right thigh measuring 19.3 x 3.4 x 3.8. His Eliquis was held for a couple of days. He was seen by one of our APNs for f/u. He continued to c/o significant pain of his left leg. He does not know if it was worse but was not better. He was worried about not being on a blood  thinner given his history. After discussing with him decision was to continue to hold Eliquis and place an IVC filter. This was placed by IR on 7/1/20.    He was seen for re-evaluation by one of our APNs 7/6. He continues to c/o of pain and swelling of the LLE. Not worse but still pretty significant and is frustrated. At his insistence as he was worried about being off anticoagulation despite having a filter in place, Eliquis was resumed. MRI of the LLE was ordered to evaluate his leg. This was attempted but could not tolerate the exam as he could not lay flat for a prolonged period. This was then switched to a CT which he had done 7/10/20 which showed a tear of the semi-tendinosis muscle with intramuscular fluid collection felt to represent a chronic hematoma.       - Saw vascular surgery for LLE for h/o DVT, hematoma s/p fall with underlying venous insuffiiciency and possible lymphedema. He recommended repeat doppler US of the LLE with plan of having IVC filter removed in 3-6 months. He then saw ortho who discussed treatment options for the hematoma. As he is on blood thinners aspiration under US to help with his pain was discussed. If not successful then consideration for open incision and drainage.         -IVC filter was removed by IR 9/3/20.     - h/o previous PE >20 years ago which he described as extensive and was recommended indefinite anticoagulation.            2. 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 Followed by     3. H/o elevated PSA with BPH with obstruction    4. 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 H/o thalassemia trait but baseline Hb usually low normal/normal.      - found to be anemic on admission 6/2020 (see above) with a Hb of 8.2 Back in 1/2020 was 12.6.     - Work-up revealed iron and folate deficiency. TSH and B12 normal.  LDH and retic elevated, haptoglobin low. ROCIO poly positive, IgG negative and C3D positive. Serum immunoglobulins were normal, SPEP and GENNY showed no monoclonal protein. Kappa light chains were elevated. Cold agglutinin titer was 128. Findings c/w cold agglutinin disease (CAD) which can be associated with venous thromboembolism. EGD/c-scope showed tiny gastric erosions. Bowel prep was poor in the sigmoid and descending colon and procedure was aborted. Colonoscopy was done 6/14/20 which showed extensive pan-diverticulosis and a small TC polyp.            History of Present Illness:  Hospitalized in April for sepsis with Salmonella and C diff diarrhea and TONJA. Also had LLE cellulitis. Lost a lot of weight back then but says he is doing much better now. He feels he is back to baseline. Appetite is good. Some bruising but no bleeding. Continues on Eliquis he said w/o problems. Denies SOB, chest or abdominal pain, change in urinary habits, headaches, dizziness or visual symptoms. Energy better. Joint pain which he says is from arthritis.       Current Medications:    Current Outpatient Medications:     doxycycline 100 MG Oral Cap, Take 1 capsule (100 mg total) by mouth 2 (two) times daily., Disp: 14 capsule, Rfl: 0    furosemide 20 MG Oral Tab, Take 1 tablet (20 mg total) by mouth daily., Disp: , Rfl:     Potassium Chloride ER 10 MEQ Oral Tab CR, Take 1 tablet (10 mEq total) by mouth daily., Disp: , Rfl:     Betamethasone Dipropionate Aug (DIPROLENE AF) 0.05 % External Cream, Apply 1 Application topically daily., Disp: 50 g, Rfl: 1    methotrexate 2.5 MG Oral Tab, Take 1 tablet (2.5 mg total) by mouth daily., Disp: , Rfl:     clobetasol 0.05 % External Ointment, Apply 1 Application topically 2 (two) times daily as needed., Disp: , Rfl:     omeprazole 20 MG Oral Capsule Delayed Release, Take 1 capsule (20 mg total) by mouth daily., Disp: , Rfl:     predniSONE 10 MG Oral Tab, Take 1 tablet (10 mg total) by mouth daily.,  Disp: , Rfl:     apixaban (ELIQUIS) 5 MG Oral Tab, Take 1 tablet (5 mg total) by mouth 2 (two) times daily., Disp: 60 tablet, Rfl: 8    folic acid 1 MG Oral Tab, Take 1 tablet (1 mg total) by mouth daily., Disp: , Rfl:     MetFORMIN HCl 500 MG Oral Tab, Take 1 tablet (500 mg total) by mouth 2 (two) times daily with meals., Disp: , Rfl:     simvastatin 20 MG Oral Tab, Take 1 tablet (20 mg total) by mouth nightly. (Patient taking differently: Take 1 tablet (20 mg total) by mouth every morning.), Disp: 90 tablet, Rfl: 1    lisinopril 2.5 MG Oral Tab, Take 1 tablet (2.5 mg total) by mouth daily., Disp: 90 tablet, Rfl: 1    glimepiride 1 MG Oral Tab, Take 1 tablet (1 mg total) by mouth daily with breakfast., Disp: 90 tablet, Rfl: 1    Past Medical History:  Past Medical History:    Anesthesia complication    broke out in rashes generalized - unknown source (after every anaesthesia)    Aneurysm of abdominal aorta (HCC)    Bladder tumor    recesected July/2015    Cancer (HCC)    SKIN CANCERS ON ARM AND NOSE    Deep vein thrombosis (HCC)    Erectile dysfunction    Gall stones    High blood pressure    High cholesterol    History of blood clots    Neuropathy    HAnds from DM    Obesity    Osteoarthritis    Personal history of antineoplastic chemotherapy    JUly/2015    Pulmonary embolism (HCC)    ON COUMADIN NOW    TIA (transient ischemic attack)    Type II or unspecified type diabetes mellitus without mention of complication, not stated as uncontrolled    Varicose veins of both lower extremities with complications    Visual impairment    4-5 yrs ago had blurriness prob, evaled. no diagnosis       Past Surgical History:  Past Surgical History:   Procedure Laterality Date    Cabg      Colonoscopy      Colonoscopy      x3, polpypectomy x1, third one clear    Colonoscopy N/A 07/18/2017    Procedure: COLONOSCOPY, POSSIBLE BIOPSY, POSSIBLE POLYPECTOMY 43565;  Surgeon: Marisela Morris MD;  Location: [facility] [place] [facility]     Colonoscopy N/A 2020    Procedure: COLONOSCOPY;  Surgeon: Jewel Lane MD;  Location:  ENDOSCOPY    Colonoscopy N/A 2020    adenoma- repeat 5 yrs    Eye surgery  plastic sx eye lids    Fracture surgery      left leg-screws,pins    Hc closed tx distal tibia fracture w manipulation Left     Hc debridement skin up to 10% Left     leg    Other surgical history  2015    cysto, stent removal - Dr. Campos    Other surgical history  10/14/2015    BTS Cystoscopy -Dr Campos    Other surgical history  2015    BTS Cystoscopy -Dr Campos    Other surgical history  2016    BTS Cystoscopy-Dr Campos    Other surgical history  08/15/2016    Cysto- Dr. Campos    Other surgical history  2017    Cysto- Dr. Campos    Other surgical history  2017    Cystoscopy- Dr. Campos    Other surgical history  2019     BTS cysto - dr. campos     Other surgical history  2020    Cysto Dr. Campos    Other surgical history  2021    Cystoscopy- Dr. Campos     Skin graft procedure      multiple to Left leg    Tonsillectomy      Vein bypass graft,fem-fem      to left leg       Family Medical History:  Family History   Problem Relation Age of Onset    Heart Disorder Father     Hypertension Mother     Arthritis Mother     Heart Disorder Son     Other (kidney stone) Brother     Other (hernia) Brother        Psychosocial History:  Social History     Socioeconomic History    Marital status:      Spouse name: Not on file    Number of children: Not on file    Years of education: Not on file    Highest education level: Not on file   Occupational History    Not on file   Tobacco Use    Smoking status: Former     Current packs/day: 0.00     Average packs/day: 1 pack/day for 30.0 years (30.0 ttl pk-yrs)     Types: Cigarettes     Start date: 1965     Quit date: 1995     Years since quittin.4    Smokeless tobacco: Never   Vaping Use    Vaping status:  Never Used   Substance and Sexual Activity    Alcohol use: Not Currently    Drug use: No    Sexual activity: Not on file   Other Topics Concern    Not on file   Social History Narrative    Not on file     Social Drivers of Health     Financial Resource Strain: Not on file   Food Insecurity: No Food Insecurity (4/21/2024)    Food Insecurity     Food Insecurity: Never true   Transportation Needs: No Transportation Needs (4/21/2024)    Transportation Needs     Lack of Transportation: No   Physical Activity: Not on file   Stress: Not on file   Social Connections: Not on file   Housing Stability: Low Risk  (4/21/2024)    Housing Stability     Housing Instability: No     Housing Instability Emergency: Not on file     Crib or Bassinette: Not on file         Allergies:  Allergies   Allergen Reactions    Bacitracin-Neomycin-Polymyxin [Neomycin-Bacitracin-Polymyxin] RASH    Other OTHER (SEE COMMENTS)        Review of Systems:  A 14-point ROS was done with pertinent positives and negative per the HPI    Vital Signs:  /76 (BP Location: Left arm, Patient Position: Sitting, Cuff Size: large)   Pulse 72   Temp 97.1 °F (36.2 °C) (Temporal)   Resp 16   Ht 1.727 m (5' 7.99\")   Wt 96.5 kg (212 lb 12.8 oz)   BMI 32.36 kg/m²       Current Medications:    Current Outpatient Medications:     doxycycline 100 MG Oral Cap, Take 1 capsule (100 mg total) by mouth 2 (two) times daily., Disp: 14 capsule, Rfl: 0    furosemide 20 MG Oral Tab, Take 1 tablet (20 mg total) by mouth daily., Disp: , Rfl:     Potassium Chloride ER 10 MEQ Oral Tab CR, Take 1 tablet (10 mEq total) by mouth daily., Disp: , Rfl:     Betamethasone Dipropionate Aug (DIPROLENE AF) 0.05 % External Cream, Apply 1 Application topically daily., Disp: 50 g, Rfl: 1    methotrexate 2.5 MG Oral Tab, Take 1 tablet (2.5 mg total) by mouth daily., Disp: , Rfl:     clobetasol 0.05 % External Ointment, Apply 1 Application topically 2 (two) times daily as needed., Disp: , Rfl:      omeprazole 20 MG Oral Capsule Delayed Release, Take 1 capsule (20 mg total) by mouth daily., Disp: , Rfl:     predniSONE 10 MG Oral Tab, Take 1 tablet (10 mg total) by mouth daily., Disp: , Rfl:     apixaban (ELIQUIS) 5 MG Oral Tab, Take 1 tablet (5 mg total) by mouth 2 (two) times daily., Disp: 60 tablet, Rfl: 8    folic acid 1 MG Oral Tab, Take 1 tablet (1 mg total) by mouth daily., Disp: , Rfl:     MetFORMIN HCl 500 MG Oral Tab, Take 1 tablet (500 mg total) by mouth 2 (two) times daily with meals., Disp: , Rfl:     simvastatin 20 MG Oral Tab, Take 1 tablet (20 mg total) by mouth nightly. (Patient taking differently: Take 1 tablet (20 mg total) by mouth every morning.), Disp: 90 tablet, Rfl: 1    lisinopril 2.5 MG Oral Tab, Take 1 tablet (2.5 mg total) by mouth daily., Disp: 90 tablet, Rfl: 1    glimepiride 1 MG Oral Tab, Take 1 tablet (1 mg total) by mouth daily with breakfast., Disp: 90 tablet, Rfl: 1    Allergies:  Allergies   Allergen Reactions    Bacitracin-Neomycin-Polymyxin [Neomycin-Bacitracin-Polymyxin] RASH    Other OTHER (SEE COMMENTS)        Review of Systems:  A 14-point ROS was done with pertinent positives and negative per the HPI    Vital Signs:  /76 (BP Location: Left arm, Patient Position: Sitting, Cuff Size: large)   Pulse 72   Temp 97.1 °F (36.2 °C) (Temporal)   Resp 16   Ht 1.727 m (5' 7.99\")   Wt 96.5 kg (212 lb 12.8 oz)   BMI 32.36 kg/m²       Physical Examination:  General: Patient is alert and oriented x 3, not in acute distress.  Psych:  Mood and affect appropriate  HEENT: EOMs intact. PERRL. Oropharynx is clear.   Neck: No JVD. No palpable lymphadenopathy. Neck is supple.  Lymphatics: There is no palpable lymphadenopathy   Chest: Incision site left well healed. CTA  Heart: Regular rate and rhythm.   Abdomen: Soft, non tender with good bowel sounds.  No hepatosplenomegaly.  No palpable mass.  Extremities: Pedal pulses are present.  Some BLE edema L>>R. Compression  stockings on  Neurological: Grossly intact.       Laboratory:  Lab Results   Component Value Date    WBC 7.9 12/05/2024    RBC 4.84 12/05/2024    HGB 10.4 (L) 12/05/2024    HCT 33.0 (L) 12/05/2024    .0 12/05/2024    MCV 68.2 (L) 12/05/2024    MCH 21.5 (L) 12/05/2024    MCHC 31.5 12/05/2024    RDW 20.5 12/05/2024    NEPRELIM 6.05 12/05/2024    NEUTABS 10.65 (H) 04/24/2024    LYMPHABS 2.27 04/24/2024    EOSABS 0.28 04/24/2024    BASABS 0.00 04/24/2024    NEUT 75 04/24/2024    LYMPH 16 04/24/2024    MON 7 04/24/2024    EOS 2 04/24/2024    BASO 0 04/24/2024    NEPERCENT 77.1 12/05/2024    LYPERCENT 12.9 12/05/2024    MOPERCENT 5.5 12/05/2024    EOPERCENT 2.9 12/05/2024    BAPERCENT 0.5 12/05/2024    NE 6.05 12/05/2024    LYMABS 1.01 12/05/2024    MOABSO 0.43 12/05/2024    EOABSO 0.23 12/05/2024    BAABSO 0.04 12/05/2024     Lab Results   Component Value Date     (H) 12/05/2024    BUN 63 (H) 12/05/2024    BUNCREA 19.4 08/06/2020    CREATSERUM 2.36 (H) 12/05/2024    ANIONGAP 12 12/05/2024    GFR 51 (L) 09/20/2017    GFRNAA 31 (L) 12/17/2020    GFRAA 36 (L) 12/17/2020    CA 10.3 12/05/2024    OSMOCALC 309 (H) 12/05/2024    ALKPHO 102 12/05/2024    AST 14 12/05/2024    ALT 12 12/05/2024    BILT 1.0 12/05/2024    TP 7.6 12/05/2024    ALB 4.7 12/05/2024    GLOBULIN 2.9 12/05/2024     12/05/2024    K 4.1 12/05/2024     12/05/2024    CO2 22.0 12/05/2024      [facility] Reference Range & Units 12/05/24 10:34    - 246 U/L 170   Iron, Serum 65 - 175 ug/dL 21 (L)   Transferrin 215 - 365 mg/dL 205 (L)   Iron Bind.Cap.(TIBC) 250 - 425 ug/dL 264   Iron Saturation 20 - 50 % 8 (L)   FERRITIN 50 - 336 ng/mL 311   Vitamin B12 211 - 911 pg/mL 596   (L): Data is abnormally low  Lab Results   Component Value Date    WBC 9.5 07/24/2024    RBC 4.16 07/24/2024    HGB 10.8 (L) 07/24/2024    HCT 30.9 (L) 07/24/2024    .0 (L) 07/24/2024    MCV 74.3 (L) 07/24/2024    MCH 26.0 07/24/2024    MCHC 35.0 07/24/2024     RDW 18.6 07/24/2024    NEPRELIM 6.23 07/24/2024    NEUTABS 10.65 (H) 04/24/2024    LYMPHABS 2.27 04/24/2024    EOSABS 0.28 04/24/2024    BASABS 0.00 04/24/2024    NEUT 75 04/24/2024    LYMPH 16 04/24/2024    MON 7 04/24/2024    EOS 2 04/24/2024    BASO 0 04/24/2024    NEPERCENT 65.8 07/24/2024    LYPERCENT 19.9 07/24/2024    MOPERCENT 9.8 07/24/2024    EOPERCENT 2.0 07/24/2024    BAPERCENT 0.8 07/24/2024    NE 6.23 07/24/2024    LYMABS 1.89 07/24/2024    MOABSO 0.93 07/24/2024    EOABSO 0.19 07/24/2024    BAABSO 0.08 07/24/2024     Radiology:  PROCEDURE:  US VENOUS DOPPLER LEG LEFT - DIAG IMG (CPT=93971)     COMPARISON:  US [facility], US LEG LEFT LIMITED (CPT=76882), 6/28/2020, 5:37 PM.  [facility] US, US VENOUS DOPPLER LEG LEFT - DIAG IMG (CPT=93971), 6/28/2020, 5:18 PM.     INDICATIONS:  eval for DVT, redness to left leg, pain     TECHNIQUE:  Real time, grey scale, and duplex ultrasound was used to evaluate the lower extremity venous system. B-mode two-dimensional images of the vascular structures, Doppler spectral analysis, and color flow.  Doppler imaging were performed.  The  following veins were imaged:  Common, deep, and superficial femoral, popliteal, sapheno-femoral junction, posterior tibial veins, and the contralateral common femoral vein.     PATIENT STATED HISTORY: (As transcribed by Technologist)           FINDINGS:    EXTREMITY EXAMINED:  Left lower extremity  THROMBI:  None visible.  COMPRESSION:  Normal compressibility, phasicity, and augmentation.  OTHER:  Negative.                   Impression   CONCLUSION:  No acute deep vein thrombosis.        LOCATION:  [id number]           Dictated by (CST): Claire Antonio MD on 4/19/2024 at 2:16 PM      Finalized by (CST): Claire Antonio MD on 4/19/2024 at 2:18 PM         Narrative   PROCEDURE:  CTA PELVIS W CTA BILAT LEG RUNOFF W IV CONTRAST(CPT=75635)     COMPARISON:  [facility], CT CHEST+ABDOMEN+PELVIS(CPT=71250/78099), 10/26/2022, 7:19 AM.  [facility],  CT CHEST+ABDOMEN+PELVIS(ALL CNTRST ONLY)(CPT=71260/47053), 7/29/2020, 5:46 PM.     INDICATIONS:  left leg pain     TECHNIQUE:  CT images of the pelvis, and lower extremities were obtained pre- and post- injection of non-ionic intravenous contrast material. Multi-planar reformatted/3-D images were created to optimize visualization of vascular anatomy. Dose reduction  techniques were used. Dose information is transmitted to the ACR (American College of Radiology) NRDR (National Radiology Data Registry) which includes the Dose Index Registry.     PATIENT STATED HISTORY:(As transcribed by Technologist)        CONTRAST USED:       FINDINGS:       LIVER:  Only a portion liver is visualized.  Hepatic steatosis.  BILIARY:  Cholelithiasis with no gallbladder wall thickening.  KIDNEYS:  Kidneys are symmetrical in size without evidence of hydronephrosis.  There is left ureteral dilatation without obstructing urolithiasis.  BOWEL/MESENTERY:  Bowel is normal in caliber. No evidence of obstruction.  The appendix is normal appearance.  Colonic diverticulosis without evidence of diverticulitis.  Portions the colon are not visualized on this examination.  PELVIS:  Diffuse bladder wall thickening.  Prostatic calcifications.  No free pelvic fluid.  Surgical clips within the left inguinal region.  There is a prominent left inguinal lymph node measuring 2.8 x 1.9 cm with surrounding inflammatory stranding.    There is scrotal wall thickening.    BONES:  Normal.  No bony lesion or fracture.     VASCULATURE:       ABDOMEN/PELVIS:  There is ectasia of the infrarenal abdominal aorta measuring 3.3 x 3.2 cm.  Atheromatous calcifications of the aorta.  The common iliac, internal iliac, and external iliac arteries are patent without significant stenosis.     RIGHT LOWER EXTREMITY:  The right common femoral artery is patent.  The profunda femoral and superficial femoral arteries are patent without significant stenosis.  There are atheromatous  calcifications present.  The popliteal artery is patent.  Atheromatous calcifications is  within the lower leg are present.  Poor contrast opacification limits evaluation as there is considerable motion artifact on initial run within the lower leg.  The anterior and posterior tibial artery appear to provide some flow to the foot.  The  peroneal artery terminates within the distal calf.     LEFT LOWER EXTREMITY:  Considerable edema within the subcutaneous fat of the left lower extremity.  The femoral, superficial femoral, and profunda femoral arteries are patent.  There is motion artifact which limits interpretation.  The popliteal artery is patent.  Atheromatous   calcifications and poor contrast opacification within lower legs limits evaluation.  Anterior tibial artery does appear patent to the foot.  The peroneal artery likely terminates within distal calf.  The posterior tibial artery appears to provide flow  to the foot.                      Impression   CONCLUSION:    1. Vasculature as described above.  There is poor evaluation of the vessels within the lower leg secondary to motion artifact and poor contrast opacification.  2. Soft tissue edema noted within the left lower leg which is nonspecific but could represent cellulitis.  3. Diffuse bladder wall thickening.  Correlation with urinalysis is recommended.  4. Scrotal wall thickening which is nonspecific but could be secondary to infectious etiology.  5. Prominent left inguinal lymph node with surrounding inflammatory stranding measuring 2.8 x 1.9 cm which may be reactive in nature.  6. Ectasia of the infrarenal abdominal aorta measuring 3.3 x 3.2 cm.  7. Hepatic steatosis.  8. Dilated left ureter without evidence of obstructing urolithiasis.  9. Colonic diverticulosis without evidence of diverticulitis.          LOCATION:  [id number]        Dictated by (CST): Claire Antonio MD on 4/19/2024 at 3:14 PM      Finalized by (CST): Claire Antonio MD on 4/19/2024  at 3:23 PM         PROCEDURE:  CT CHEST+ABDOMEN+PELVIS(CPT=71250/95090)     LOCATION:  [facility]     COMPARISON:  [facility] , CT, CT CHEST+ABDOMEN+PELVIS(ALL CNTRST ONLY)(CPT=71260/99205), 7/29/2020, 5:46 PM.  [facility] , CT, CT CHEST (CPT=71250), 7/26/2021, 7:09 AM.     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  Dose reduction techniques were used. Dose information is transmitted to the ACR (American College of Radiology)  NRDR (National Radiology Data Registry) which includes the Dose Index Registry.     PATIENT STATED HISTORY: (As transcribed by Technologist)  States he feels well.         FINDINGS:       CHEST:    LUNGS:  Stable postsurgical changes of left upper lobectomy with mild peripheral scarring in the left lung.  No new pulmonary opacity.  MEDIASTINUM:  No mass or adenopathy.    ALEX:  No mass or adenopathy.    CARDIAC:  Coronary artery calcium.  PLEURA:  No mass or effusion.    CHEST WALL:  No mass or axillary adenopathy.    AORTA:  Trace atherosclerosis.  No aneurysm or dissection.    VASCULATURE:  Thrombi cannot be detected without IV contrast.     ABDOMEN/PELVIS:  LIVER:  No enlargement, atrophy, abnormal density, or significant focal lesion.    BILIARY:  Calcified gallstones in gallbladder lumen.  No bile duct dilatation.  PANCREAS:  Multiple punctate pancreatic calcifications again demonstrated suggestive of chronic pancreatitis.  No mass, fluid collection or ductal dilatation.  SPLEEN:  No enlargement or focal lesion.    KIDNEYS:  Interval development of moderate left hydronephrosis and hydroureter into the pelvis.  No calculus or obstructing mass is identified.  ADRENALS:  Stable low-attenuation left adrenal mass consistent with  benign lipid rich adenoma and measuring 2.7 x 2.6 cm.  AORTA:  Atherosclerosis.  Ectasia of the infrarenal abdominal aorta measures up to 3.4 cm AP at the level of the inferior mesenteric artery, previously 3.2 cm.  Retroaortic left renal vein.  Interval removal of IVC filter.  RETROPERITONEUM:  No mass or adenopathy.    BOWEL/MESENTERY:  Numerous uncomplicated colonic diverticula.  No bowel distention or bowel wall thickening.  Normal appendix.  ABDOMINAL WALL:  No mass or hernia.    URINARY BLADDER:  No visible focal wall thickening, lesion, or calculus.    PELVIC NODES:  No adenopathy.    PELVIC ORGANS:  Mildly enlarged prostate.  BONES:  Stable degenerative changes of spine with bridging osteophytes in mid and lower thoracic spine.  Stable grade 1 retrolisthesis at L2-3 and mild chronic collapse of L5 vertebral body.                   Impression   CONCLUSION:    1. Development of moderate left hydronephrosis and hydroureter into the pelvis with no obstructing lesion identified.  Consider further evaluation with cystoscopy or CT urogram.    2. Slightly increased infrarenal abdominal aortic ectasia, measuring up to 3.4 cm.  3. Interval removal of IVC filter.  4. Findings are otherwise unchanged.  5. Stable postsurgical changes in the chest with no acute chest pathology.  6. Uncomplicated colonic diverticula, cholelithiasis, mildly enlarged prostate, benign lipid rich left adrenal adenoma, and changes of chronic pancreatitis are again demonstrated.          Dictated by (CST): Lio Lilly MD on 10/26/2022 at 8:15 AM      Finalized by (CST): Lio Lilly MD on 10/26/2022 at 8:38 AM       PROCEDURE:  CT CHEST (CPT=71250)       COMPARISON:  [facility] , CT, CT CHEST+ABDOMEN+PELVIS(ALL CNTRST ONLY)(CPT=71260/03069), 7/29/2020, 5:46 PM.       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  Dose reduction techniques were used. Dose  information is transmitted to the ACR (American College of Radiology) NRDR (National Radiology Data Registry) which includes the Dose    Index Registry.       PATIENT STATED HISTORY: (As transcribed by Technologist)  History of carcinoid  tumor of lung.            FINDINGS:  Please note that evaluation the chest is somewhat limited without intravenous contrast.   LUNGS:  Motion artifact slightly limits assessment of the lungs.  There is an overall stable appearance curvilinear opacity noted at the left lung base.  This likely represents scarring and is unchanged from prior exams.  No new airspace opacity is seen.   VASCULATURE:  Thrombus cannot be excluded without intravenous contrast.     ALEX:  No mass or adenopathy.     MEDIASTINUM:  No mass or adenopathy.     CARDIAC:  No enlargement, pericardial thickening, or significant calcification.   PLEURA:  No mass or effusion.     THORACIC AORTA:  Unremarkable as seen on non-contrast imaging.   CHEST WALL:  No mass or axillary adenopathy.     LIMITED ABDOMEN:  Limited views of the upper abdomen demonstrate a few gallstones with probable gallbladder sludge noted.  There is also stable enlargement of the left adrenal gland which is similar to prior exams.  This likely represents underlying   adenoma or myelolipoma.   BONES:  Mild multilevel degenerative changes of the thoracic spine are noted.                        Impression   CONCLUSION:  Overall stable appearance of the chest.  No new lesions are seen.  Stable appearance of some probable scarring noted at the left lung base.  Continued follow-up is suggested.           Dictated by (CST): Montez Tamayo MD on 7/26/2021 at 8:27 AM       Finalized by (CST): Montez Tamayo MD on 7/26/2021 at 8:31 AM        Impression and Plan:  1. LLE DVT  - developed acute LLE distal DVT- after a fall in 2020. 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 Has seen various consultants and no intervention recommended.   - IVC filter was placed when he developed hematoma with significant symptoms and anticoagulation had to be stopped.    - he has since resumed Eliquis and continues on this w/o problems  - it appears that he suffered a muscle tear which bled while he was on anticoagulation. Unsure when he developed the tear but could be from his fall in May.  No intervention recommended by Ortho  - repeat dopplers showed chronic clot nonocclusive clot. No hematoma described.   - IVC filter successfully removed 9/3/20  - On eliquis and has not had recurrent VTE on this and will continue       2. Microcytic anemia  - has h/o thalassemia trait but baseline Hgb usually low normal/normal.  - Work-up during his hospital stay in June 2020 revealed iron and folate deficiency. TSH and B12 normal. LDH and retic elevated, haptoglobin low. ROCIO poly positive, IgG negative and C3D positive. Serum immunoglobulins were normal, SPEP and GENNY showed no monoclonal protein. Kappa light chains were elevated. Cold agglutinin titer was 128.   - Findings c/w cold agglutinin disease (CAD) which can be associated with venous thromboembolism.   - w/u to determine whether CAD is primary or secondary. DESIRAE is negative and unlikely autoimmune. No monoclonal protein seen, IgM levels normal and unlikely associated with lymphoid malignancy. He reports no prior/recent infection. Scans w/o evidence of malignancy  - Haptoglobin, LDH and retic have normalized and does not appear to be actively hemolyzing. As levels improved will continue monitoring with expectant management   - Last EGD/c-scope showed tiny gastric erosions. Bowel prep was poor in the sigmoid and descending colon and procedure was aborted. Colonoscopy was done 6/14/20 which showed extensive pan-diverticulosis and a  small TC polyp.   - continues on folic acid and B12.  - anemia felt to be mainly from h/o thalassemia and chronic renal disease with recent acute illness contributing. Hgb had dropped to 9s and now better. Iron studies c/w chronic disease. Monitor         3. Lung carcinoid  - Has been clinically ANA.   - Last scans show no evidence of recurrence. Behind in having this done- CT ordered.       4. Bladder ca  - Followed by .   - scans last year showed development of moderate left hydronephrosis and hydroureter with no obstructing lesion identified.   - Most recent cysto 10/11/24. Cytology negative. Cystitis changes seen.    5. Hepatic steatosis  - noted on imaging  - LFTs normal  - Discussed general measures in those with fatty liver:  Abstain from alcohol   Consider immunizations for hepatitis  Make lifestyle changes, weight loss  Modify risk factors for cardiovascular disease     6. Left inguinal node  - seen on imaging during last hospitalization  - prominent on imaging with inflammatory changes felt to be reactive. Had LLE cellulitis (on top of Salmonella and C dif sepsis)    Labs and imaging reviewed  Renew Apixaban  RTC 1 year      Alba Harvey MD  [facility]

## 2024-12-09 ENCOUNTER — HOSPITAL ENCOUNTER (OUTPATIENT)
Dept: CT IMAGING | Facility: HOSPITAL | Age: 84
Discharge: HOME | End: 2024-12-09
Attending: INTERNAL MEDICINE
Payer: MEDICARE

## 2024-12-09 DIAGNOSIS — D3A.090 CARCINOID TUMOR OF LEFT LUNG (HCC): ICD-10-CM

## 2024-12-09 PROCEDURE — 71250 CT THORAX DX C-: CPT | Performed by: INTERNAL MEDICINE

## 2024-12-10 ENCOUNTER — TELEPHONE (OUTPATIENT)
Dept: HEMATOLOGY/ONCOLOGY | Facility: HOSPITAL | Age: 84
End: 2024-12-10

## 2024-12-10 NOTE — TELEPHONE ENCOUNTER
JASPER [phone] Would like a return call.   This is regarding his last office visit. Thanks Ellen

## 2024-12-10 NOTE — TELEPHONE ENCOUNTER
Pt calling for CT and lab results  Per MQ- CT is good  Elevated creatinine- pt instructed to fu with PCP re results. Otherwise other lab results are good per MQ

## 2024-12-15 ENCOUNTER — APPOINTMENT (OUTPATIENT)
Dept: CT IMAGING | Facility: HOSPITAL | Age: 84
End: 2024-12-15
Attending: EMERGENCY MEDICINE
Payer: MEDICARE

## 2024-12-15 ENCOUNTER — HOSPITAL ENCOUNTER (INPATIENT)
Facility: HOSPITAL | Age: 84
LOS: 5 days | Discharge: HOME | End: 2024-12-20
Attending: EMERGENCY MEDICINE | Admitting: HOSPITALIST
Payer: MEDICARE

## 2024-12-15 ENCOUNTER — APPOINTMENT (OUTPATIENT)
Dept: GENERAL RADIOLOGY | Facility: HOSPITAL | Age: 84
End: 2024-12-15
Attending: EMERGENCY MEDICINE
Payer: MEDICARE

## 2024-12-15 DIAGNOSIS — N10 ACUTE PYELONEPHRITIS: ICD-10-CM

## 2024-12-15 DIAGNOSIS — N39.0 URINARY TRACT INFECTION WITHOUT HEMATURIA, SITE UNSPECIFIED: Primary | ICD-10-CM

## 2024-12-15 LAB
ALBUMIN SERPL-MCNC: 4.3 G/DL (ref 3.2–4.8)
ALBUMIN/GLOB SERPL: 1.5 {RATIO} (ref 1–2)
ALP LIVER SERPL-CCNC: 95 U/L
ALT SERPL-CCNC: 8 U/L
ANION GAP SERPL CALC-SCNC: 12 MMOL/L (ref 0–18)
APTT PPP: 27.5 SECONDS (ref 23–36)
AST SERPL-CCNC: 12 U/L (ref ?–34)
BASOPHILS # BLD AUTO: 0.05 X10(3) UL (ref 0–0.2)
BASOPHILS NFR BLD AUTO: 0.7 %
BILIRUB SERPL-MCNC: 0.7 MG/DL (ref 0.2–1.1)
BILIRUB UR QL STRIP.AUTO: NEGATIVE
BUN BLD-MCNC: 55 MG/DL (ref 9–23)
CALCIUM BLD-MCNC: 10.2 MG/DL (ref 8.7–10.4)
CHLORIDE SERPL-SCNC: 105 MMOL/L (ref 98–112)
CO2 SERPL-SCNC: 20 MMOL/L (ref 21–32)
CREAT BLD-MCNC: 2.41 MG/DL
EGFRCR SERPLBLD CKD-EPI 2021: 26 ML/MIN/1.73M2 (ref 60–?)
EOSINOPHIL # BLD AUTO: 0.16 X10(3) UL (ref 0–0.7)
EOSINOPHIL NFR BLD AUTO: 2.4 %
ERYTHROCYTE [DISTWIDTH] IN BLOOD BY AUTOMATED COUNT: 22.2 %
EST. AVERAGE GLUCOSE BLD GHB EST-MCNC: 163 MG/DL (ref 68–126)
GLOBULIN PLAS-MCNC: 2.9 G/DL (ref 2–3.5)
GLUCOSE BLD-MCNC: 100 MG/DL (ref 70–99)
GLUCOSE BLD-MCNC: 136 MG/DL (ref 70–99)
GLUCOSE UR STRIP.AUTO-MCNC: NORMAL MG/DL
HBA1C MFR BLD: 7.3 % (ref ?–5.7)
HCT VFR BLD AUTO: 26.2 %
HGB BLD-MCNC: 9.3 G/DL
IMM GRANULOCYTES # BLD AUTO: 0.19 X10(3) UL (ref 0–1)
IMM GRANULOCYTES NFR BLD: 2.8 %
INR BLD: 1.28 (ref 0.8–1.2)
KETONES UR STRIP.AUTO-MCNC: NEGATIVE MG/DL
LACTATE SERPL-SCNC: 1.6 MMOL/L (ref 0.5–2)
LEUKOCYTE ESTERASE UR QL STRIP.AUTO: 500
LYMPHOCYTES # BLD AUTO: 1.16 X10(3) UL (ref 1–4)
LYMPHOCYTES NFR BLD AUTO: 17.2 %
MCH RBC QN AUTO: 25.6 PG (ref 26–34)
MCHC RBC AUTO-ENTMCNC: 35.5 G/DL (ref 31–37)
MCV RBC AUTO: 72.2 FL
MONOCYTES # BLD AUTO: 1.16 X10(3) UL (ref 0.1–1)
MONOCYTES NFR BLD AUTO: 17.2 %
NEUTROPHILS # BLD AUTO: 4.02 X10 (3) UL (ref 1.5–7.7)
NEUTROPHILS # BLD AUTO: 4.02 X10(3) UL (ref 1.5–7.7)
NEUTROPHILS NFR BLD AUTO: 59.7 %
NITRITE UR QL STRIP.AUTO: NEGATIVE
NT-PROBNP SERPL-MCNC: 262 PG/ML (ref ?–450)
OSMOLALITY SERPL CALC.SUM OF ELEC: 301 MOSM/KG (ref 275–295)
PH UR STRIP.AUTO: 6 [PH] (ref 5–8)
PLATELET # BLD AUTO: 261 10(3)UL (ref 150–450)
PLATELET MORPHOLOGY: NORMAL
POTASSIUM SERPL-SCNC: 5.4 MMOL/L (ref 3.5–5.1)
PROT SERPL-MCNC: 7.2 G/DL (ref 5.7–8.2)
PROT UR STRIP.AUTO-MCNC: 70 MG/DL
PROTHROMBIN TIME: 16.2 SECONDS (ref 11.6–14.8)
RBC # BLD AUTO: 3.63 X10(6)UL
RBC #/AREA URNS AUTO: >10 /HPF
SODIUM SERPL-SCNC: 137 MMOL/L (ref 136–145)
SP GR UR STRIP.AUTO: 1.01 (ref 1–1.03)
TROPONIN I SERPL HS-MCNC: 3 NG/L
UROBILINOGEN UR STRIP.AUTO-MCNC: NORMAL MG/DL
WBC # BLD AUTO: 6.7 X10(3) UL (ref 4–11)
WBC #/AREA URNS AUTO: >50 /HPF
WBC CLUMPS UR QL AUTO: PRESENT /HPF

## 2024-12-15 PROCEDURE — 71045 X-RAY EXAM CHEST 1 VIEW: CPT | Performed by: EMERGENCY MEDICINE

## 2024-12-15 PROCEDURE — 99223 1ST HOSP IP/OBS HIGH 75: CPT | Performed by: HOSPITALIST

## 2024-12-15 PROCEDURE — 74176 CT ABD & PELVIS W/O CONTRAST: CPT | Performed by: EMERGENCY MEDICINE

## 2024-12-15 RX ORDER — POLYETHYLENE GLYCOL 3350 17 G/17G
17 POWDER, FOR SOLUTION ORAL DAILY PRN
Status: DISCONTINUED | OUTPATIENT
Start: 2024-12-15 | End: 2024-12-20

## 2024-12-15 RX ORDER — ACETAMINOPHEN 500 MG
500 TABLET ORAL EVERY 4 HOURS PRN
Status: DISCONTINUED | OUTPATIENT
Start: 2024-12-15 | End: 2024-12-20

## 2024-12-15 RX ORDER — DOCUSATE SODIUM 100 MG/1
100 CAPSULE, LIQUID FILLED ORAL 2 TIMES DAILY
Status: DISCONTINUED | OUTPATIENT
Start: 2024-12-15 | End: 2024-12-20

## 2024-12-15 RX ORDER — ONDANSETRON 2 MG/ML
4 INJECTION INTRAMUSCULAR; INTRAVENOUS EVERY 6 HOURS PRN
Status: DISCONTINUED | OUTPATIENT
Start: 2024-12-15 | End: 2024-12-20

## 2024-12-15 RX ORDER — PRAVASTATIN SODIUM 10 MG
10 TABLET ORAL DAILY
Status: DISCONTINUED | OUTPATIENT
Start: 2024-12-16 | End: 2024-12-20

## 2024-12-15 RX ORDER — NICOTINE POLACRILEX 4 MG
15 LOZENGE BUCCAL
Status: DISCONTINUED | OUTPATIENT
Start: 2024-12-15 | End: 2024-12-20

## 2024-12-15 RX ORDER — PREDNISONE 10 MG/1
10 TABLET ORAL
Status: DISCONTINUED | OUTPATIENT
Start: 2024-12-16 | End: 2024-12-20

## 2024-12-15 RX ORDER — FOLIC ACID 1 MG/1
1 TABLET ORAL DAILY
Status: DISCONTINUED | OUTPATIENT
Start: 2024-12-16 | End: 2024-12-20

## 2024-12-15 RX ORDER — ACETAMINOPHEN 500 MG
1000 TABLET ORAL EVERY 6 HOURS PRN
Status: DISCONTINUED | OUTPATIENT
Start: 2024-12-15 | End: 2024-12-20

## 2024-12-15 RX ORDER — POLYETHYLENE GLYCOL 3350 17 G/17G
17 POWDER, FOR SOLUTION ORAL DAILY
Status: DISCONTINUED | OUTPATIENT
Start: 2024-12-16 | End: 2024-12-20

## 2024-12-15 RX ORDER — BISACODYL 10 MG
10 SUPPOSITORY, RECTAL RECTAL
Status: DISCONTINUED | OUTPATIENT
Start: 2024-12-15 | End: 2024-12-20

## 2024-12-15 RX ORDER — DEXTROSE MONOHYDRATE 25 G/50ML
50 INJECTION, SOLUTION INTRAVENOUS
Status: DISCONTINUED | OUTPATIENT
Start: 2024-12-15 | End: 2024-12-20

## 2024-12-15 RX ORDER — SENNOSIDES 8.6 MG
17.2 TABLET ORAL NIGHTLY PRN
Status: DISCONTINUED | OUTPATIENT
Start: 2024-12-15 | End: 2024-12-20

## 2024-12-15 RX ORDER — SODIUM CHLORIDE 9 MG/ML
INJECTION, SOLUTION INTRAVENOUS CONTINUOUS
Status: DISCONTINUED | OUTPATIENT
Start: 2024-12-15 | End: 2024-12-18

## 2024-12-15 RX ORDER — METOCLOPRAMIDE HYDROCHLORIDE 5 MG/ML
5 INJECTION INTRAMUSCULAR; INTRAVENOUS EVERY 8 HOURS PRN
Status: DISCONTINUED | OUTPATIENT
Start: 2024-12-15 | End: 2024-12-20

## 2024-12-15 RX ORDER — PHENAZOPYRIDINE HYDROCHLORIDE 100 MG/1
100 TABLET, FILM COATED ORAL
Status: DISCONTINUED | OUTPATIENT
Start: 2024-12-16 | End: 2024-12-16

## 2024-12-15 RX ORDER — SENNOSIDES 8.6 MG
8.6 TABLET ORAL 2 TIMES DAILY
Status: DISCONTINUED | OUTPATIENT
Start: 2024-12-15 | End: 2024-12-20

## 2024-12-15 RX ORDER — PANTOPRAZOLE SODIUM 20 MG/1
20 TABLET, DELAYED RELEASE ORAL
Status: DISCONTINUED | OUTPATIENT
Start: 2024-12-16 | End: 2024-12-20

## 2024-12-15 RX ORDER — NICOTINE POLACRILEX 4 MG
30 LOZENGE BUCCAL
Status: DISCONTINUED | OUTPATIENT
Start: 2024-12-15 | End: 2024-12-20

## 2024-12-15 NOTE — CONSULTS
[facility]  Report of Consultation    [facility]    Thiago Alcantara Patient Status:  Emergency    1940 MRN [id number]   Location [facility] EMERGENCY DEPARTMENT Attending Samaria Sage,    Hosp Day # 0 PCP Tiffany Pierce MD     Impression and Plan:  Dysuria related to suspected UTI.  History of bladder cancer.  Chronic contracted, left hydronephrosis related to vesicoureteral reflux secondary to bladder tumor resections.  Recommend antibiotic treatment. If creatinine worsens, he will need a cystogram to confirm patency of ureter.  No urologic intervention anticipated this admission.   Lio Dennis MD  12/15/2024  5:51 PM      History of Present Illness:  Thiago Alcantara was admited with dysuria.  Urinalysis shows 1+ bacteriuria with a negative nitrate test. Urine is grossly cloudy and malodorous. He was given a dose of Rocephin in the emergency room.  A postvoid residual, measured twice in the emergency room, suggested that he empties very well.  He also has a refluxing left ureter related to successful treatment of noninvasive bladder cancer.  He is followed by one of my associates, Dr. Adal mcgee and a cystoscopy on 10/11/2024 showed no recurrence of the bladder cancer.  He is also followed for BPH, erectile dysfunction and low testosterone.  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       History:  Past Medical History:    Anesthesia complication    broke out in rashes generalized - unknown source (after every anaesthesia)    Aneurysm of abdominal aorta (HCC)    Bladder tumor    recesected 2015    Cancer (HCC)    SKIN CANCERS ON  ARM AND NOSE    Deep vein thrombosis (HCC)    Erectile dysfunction    Gall stones    High blood pressure    High cholesterol    History of blood clots    Neuropathy    HAnds from DM    Obesity    Osteoarthritis    Personal history of antineoplastic chemotherapy    JUly/2015    Pulmonary embolism (HCC)    ON COUMADIN NOW    TIA (transient ischemic attack)    Type II or unspecified type diabetes mellitus without mention of complication, not stated as uncontrolled    Varicose veins of both lower extremities with complications    Visual impairment    4-5 yrs ago had blurriness prob, evaled. no diagnosis       Past Surgical History:   Procedure Laterality Date    Cabg      Colonoscopy      Colonoscopy      x3, polpypectomy x1, third one clear    Colonoscopy N/A 07/18/2017    Procedure: COLONOSCOPY, POSSIBLE BIOPSY, POSSIBLE POLYPECTOMY 73642;  Surgeon: Marisela Morris MD;  [facility] [facility] [place]    Colonoscopy N/A 06/14/2020    Procedure: COLONOSCOPY;  Surgeon: Jewel Lane MD;  Location:  ENDOSCOPY    Colonoscopy N/A 06/13/2020    adenoma- repeat 5 yrs    Eye surgery  plastic sx eye lids    Fracture surgery      left leg-screws,pins    Hc closed tx distal tibia fracture w manipulation Left     Hc debridement skin up to 10% Left     leg    Other surgical history  07/29/2015    cysto, stent removal - Dr. Campos    Other surgical history  10/14/2015    BTS Cystoscopy -Dr Campos    Other surgical history  01/16/2015    BTS Cystoscopy -Dr Campos    Other surgical history  05/13/2016    BTS Cystoscopy-Dr Campos    Other surgical history  08/15/2016    Cysto- Dr. Campos    Other surgical history  02/17/2017    Cysto- Dr. Campos    Other surgical history  08/18/2017    Cystoscopy- Dr. Campos    Other surgical history  09/16/2019     BTS cysto - dr. campos     Other surgical history  09/21/2020    Cysto Dr. Campos    Other surgical history  09/27/2021    Cystoscopy- Dr. Campos      Skin graft procedure      multiple to Left leg    Tonsillectomy      Vein bypass graft,fem-fem      to left leg       Family History   Problem Relation Age of Onset    Heart Disorder Father     Hypertension Mother     Arthritis Mother     Heart Disorder Son     Other (kidney stone) Brother     Other (hernia) Brother        Social History     Socioeconomic History    Marital status:      Spouse name: Not on file    Number of children: Not on file    Years of education: Not on file    Highest education level: Not on file   Occupational History    Not on file   Tobacco Use    Smoking status: Former     Current packs/day: 0.00     Average packs/day: 1 pack/day for 30.0 years (30.0 ttl pk-yrs)     Types: Cigarettes     Start date: 1965     Quit date: 1995     Years since quittin.4    Smokeless tobacco: Never   Vaping Use    Vaping status: Never Used   Substance and Sexual Activity    Alcohol use: Not Currently    Drug use: No    Sexual activity: Not on file   Other Topics Concern    Not on file   Social History Narrative    Not on file     Social Drivers of Health     Financial Resource Strain: Not on file   Food Insecurity: No Food Insecurity (2024)    Food Insecurity     Food Insecurity: Never true   Transportation Needs: No Transportation Needs (2024)    Transportation Needs     Lack of Transportation: No   Physical Activity: Not on file   Stress: Not on file   Social Connections: Not on file   Housing Stability: Low Risk  (2024)    Housing Stability     Housing Instability: No     Housing Instability Emergency: Not on file     Crib or Bassinette: Not on file       Review of Systems:  No SOB, angina, fevers, focal weakness, and as in HPI.    Physical Exam:  /63   Pulse 76   Temp 97 °F (36.1 °C) (Temporal)   Resp 16   Ht 5' 8.5\" (1.74 m)   Wt 212 lb (96.2 kg)   SpO2 100%   BMI 31.77 kg/m²   General: Alert, orientated x3.  Cooperative.  No apparent distress.  HEENT:  Exam is unremarkable.   Lungs: Clear to auscultation bilaterally.  Cardiac: Regular rate and rhythm. No murmur.  Abdomen:  [place]vese. Soft, non-distended, non-tender, with no rebound or guarding.  No peritoneal signs. No ascites.  Liver is within normal limits.  Spleen is not palpable.    Genitourinary: Penis; no lesions. Testicles;no masses. No flank tenderness.   Extremities:  No lower extremity edema noted.  Without clubbing or cyanosis.    Skin: Normal texture and turgor.  Neurologic:  Motor strength and sensory examination is grossly normal.  No focal neurologic deficit.    Laboratory Data:  Lab Results   Component Value Date    WBC 6.7 12/15/2024    HGB 9.3 12/15/2024    .0 12/15/2024    CREATSERUM 2.41 12/15/2024    K 5.4 12/15/2024    CA 10.2 12/15/2024       Lab Results   Component Value Date    COLORUR Light-Yellow 12/15/2024    CLARITY Ex.Turbid 12/15/2024    SPECGRAVITY 1.007 12/15/2024    GLUUR Normal 12/15/2024    BILUR Negative 12/15/2024    KETUR Negative 12/15/2024    BLOODURINE 2+ 12/15/2024    PHURINE 6.0 12/15/2024    PROUR 70 12/15/2024    UROBILINOGEN Normal 12/15/2024    NITRITE Negative 12/15/2024    LEUUR 500 12/15/2024       PSA:    Lab Results   Component Value Date    PSA 2.86 09/06/2019    PSA 4.18 (H) 08/10/2018    PSA 2.900 08/18/2017    PSA 3.220 02/17/2017    PSA 2.39 02/15/2016       PSA Screen:    Lab Results   Component Value Date    PSAS 3.38 08/19/2020       PSA Ultra Sensitivity:  No results found for: \"PSAULTRA\"              [1]   Allergies  Allergen Reactions    Bacitracin-Neomycin-Polymyxin [Neomycin-Bacitracin-Polymyxin] RASH    Other OTHER (SEE COMMENTS)   [2]   No outpatient medications have been marked as taking for the 12/15/24 encounter (Hospital Encounter).

## 2024-12-15 NOTE — ED INITIAL ASSESSMENT (HPI)
Weakness for a couple days, pt states he feels like he's going to pass out, pt has increased work of breathing, pt also reports \"I can't pee, I can't have a BM\" denies CP, c/o pain to urinary tract

## 2024-12-15 NOTE — ED QUICK NOTES
Orders for admission, patient is aware of plan and ready to go upstairs. Any questions, please call ED RN zaida at extension [phone].     Patient Covid vaccination status: Fully vaccinated     COVID Test Ordered in ED: None    COVID Suspicion at Admission: N/A    Running Infusions:  None    Mental Status/LOC at time of transport: a/ox3    Other pertinent information:   CIWA score: N/A   NIH score:  N/A

## 2024-12-15 NOTE — H&P
[facility]     Thiago Alcantara Patient Status:  Emergency    1940 MRN [id number]   Location [facility] EMERGENCY DEPARTMENT Attending Samaria Sage,    Hosp Day # 0 PCP Tiffany Pierce MD     Chief Complaint: Dysuria    Subjective:    History of Present Illness:     Thiago Alcantara is a 83 year old male with CAD sp CABG, essential hypertension, dyslipidemia, AAA, cerebrovascular disease, diabetes mellitus, chronic kidney disease, 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 Patient states he has had dysuria for 3 days. Associated was urinary frequency, low urine output. He has been nauseated, but not vomiting. He complains of constipation.   No fevers.  No chest pain or shortness of breath.     History/Other:    Past Medical History:  Past Medical History:    Anesthesia complication    broke out in rashes generalized - unknown source (after every anaesthesia)    Aneurysm of abdominal aorta (HCC)    Bladder tumor    recesected 2015    Cancer (HCC)    SKIN CANCERS ON ARM AND NOSE    Deep vein thrombosis (HCC)    Erectile dysfunction    Gall stones    High blood pressure    High cholesterol    History of blood clots    Neuropathy    HAnds from DM    Obesity    Osteoarthritis    Personal history of antineoplastic chemotherapy    2015    Pulmonary embolism (HCC)    ON COUMADIN NOW    TIA (transient ischemic attack)    Type II or unspecified type diabetes mellitus without mention of complication, not stated as uncontrolled    Varicose veins of both lower extremities with complications    Visual impairment    4-5 yrs ago had blurriness prob, evaled. no diagnosis     Past Surgical History:   Past Surgical History:   Procedure Laterality Date    Cabg      Colonoscopy      Colonoscopy      x3, polpypectomy x1, third one clear    Colonoscopy N/A 2017    Procedure:  COLONOSCOPY, POSSIBLE BIOPSY, POSSIBLE POLYPECTOMY 48043;  Surgeon: Marisela Morris MD;  [facility]    Colonoscopy N/A 06/14/2020    Procedure: COLONOSCOPY;  Surgeon: Jewel Lane MD;  Location:  ENDOSCOPY    Colonoscopy N/A 06/13/2020    adenoma- repeat 5 yrs    Eye surgery  plastic sx eye lids    Fracture surgery      left leg-screws,pins    Hc closed tx distal tibia fracture w manipulation Left     Hc debridement skin up to 10% Left     leg    Other surgical history  07/29/2015    cysto, stent removal - Dr. Campos    Other surgical history  10/14/2015    BTS Cystoscopy -Dr Campos    Other surgical history  01/16/2015    BTS Cystoscopy -Dr Campos    Other surgical history  05/13/2016    BTS Cystoscopy-Dr Campos    Other surgical history  08/15/2016    Cysto- Dr. Campos    Other surgical history  02/17/2017    Cysto- Dr. Campos    Other surgical history  08/18/2017    Cystoscopy- Dr. Campos    Other surgical history  09/16/2019     BTS cysto - dr. campos     Other surgical history  09/21/2020    Cysto Dr. Campos    Other surgical history  09/27/2021    Cystoscopy- Dr. Campos     Skin graft procedure      multiple to Left leg    Tonsillectomy      Vein bypass graft,fem-fem      to left leg      Family History:   Family History   Problem Relation Age of Onset    Heart Disorder Father     Hypertension Mother     Arthritis Mother     Heart Disorder Son     Other (kidney stone) Brother     Other (hernia) Brother      Social History:    reports that he quit smoking about 29 years ago. His smoking use included cigarettes. He started smoking about 59 years ago. He has a 30 pack-year smoking history. He has never used smokeless tobacco. He reports that he does not currently use alcohol. He reports that he does not use drugs.     Allergies: Allergies[1]    Medications:  Medications Ordered Prior to Encounter[2]    Review of Systems:   A comprehensive review of  systems was completed.    Pertinent positives and negatives noted in the HPI.    Objective:   Physical Exam:    /63   Pulse 76   Temp 97 °F (36.1 °C) (Temporal)   Resp 16   Ht 5' 8.5\" (1.74 m)   Wt 212 lb (96.2 kg)   SpO2 100%   BMI 31.77 kg/m²   General: No acute distress, Alert  Respiratory: No rhonchi, no wheezes  Cardiovascular: S1, S2. Regular rate and rhythm  Abdomen: Soft, Non-tender, non-distended, positive bowel sounds  Neuro: No new focal deficits  Extremities: No pitting edema    Results:    Labs:      Labs Last 24 Hours:    Recent Labs   Lab 12/15/24  1404   RBC 3.63*   HGB 9.3*   HCT 26.2*   MCV 72.2*   MCH 25.6*   MCHC 35.5   RDW 22.2   NEPRELIM 4.02   WBC 6.7   .0       Recent Labs   Lab 12/15/24  1404   *   BUN 55*   CREATSERUM 2.41*   EGFRCR 26*   CA 10.2   ALB 4.3      K 5.4*      CO2 20.0*   ALKPHO 95   AST 12   ALT 8*   BILT 0.7   TP 7.2       Lab Results   Component Value Date    INR 1.28 (H) 12/15/2024    INR 1.31 (H) 04/19/2024    INR 1.0 09/03/2020       Recent Labs   Lab 12/15/24  1404   TROPHS 3       Recent Labs   Lab 12/15/24  1404   PBNP 262       No results for input(s): \"PCT\" in the last 168 hours.    Imaging: Imaging data reviewed in Epic.    Assessment & Plan:      #Acute pyelonephritis, moderated left hydronephrosis and hydroureter, stricture?  #Bladder cancer  -Admit  -IVF  -IV abx  -Follow-up cultures  -Urology consult     #Hyperkalemia  #Acute kidney injury on chronic kidney disease  -IVF  -Monitor UOP, creatinine and electrolytes    #Constipation  -Bowel care     #Right adrenal myelolipoma left adrenal adenoma  -Will need outpatient follow-up with oncologist     #CAD sp CABG  #Essential hypertension  #Dyslipidemia  #AAA  -Hold Lisinopril and Lasix  -Statin  -Monitor hemodynamics  -Telemetry     #Diabetes mellitus  -Correctional scale  -Carb scale  -Hold oral agents     #Cerebrovascular disease    #Carcinoid tumor sp SOMMER  lobectomy  #Thalassemia trait  #Anemia    #Rheumatoid arthritis  -Prednisone  -Hold Methotrexate    #VTE on DOAC  -DOAC    Await med rec.    Plan of care discussed with patient and ER.    Elia Church MD    Supplementary Documentation:     The 21st Century Cures Act makes medical notes like these available to patients in the interest of transparency. Please be advised this is a medical document. Medical documents are intended to carry relevant information, facts as evident, and the clinical opinion of the practitioner. The medical note is intended as peer to peer communication and may appear blunt or direct. It is written in medical language and may contain abbreviations or verbiage that are unfamiliar.                                     [1]   Allergies  Allergen Reactions    Bacitracin-Neomycin-Polymyxin [Neomycin-Bacitracin-Polymyxin] RASH    Other OTHER (SEE COMMENTS)   [2]   No current facility-administered medications on file prior to encounter.     Current Outpatient Medications on File Prior to Encounter   Medication Sig Dispense Refill    apixaban (ELIQUIS) 5 MG Oral Tab Take 1 tablet (5 mg total) by mouth 2 (two) times daily. 60 tablet 11    doxycycline 100 MG Oral Cap Take 1 capsule (100 mg total) by mouth 2 (two) times daily. 14 capsule 0    furosemide 20 MG Oral Tab Take 1 tablet (20 mg total) by mouth daily.      Potassium Chloride ER 10 MEQ Oral Tab CR Take 1 tablet (10 mEq total) by mouth daily.      Betamethasone Dipropionate Aug (DIPROLENE AF) 0.05 % External Cream Apply 1 Application topically daily. 50 g 1    methotrexate 2.5 MG Oral Tab Take 1 tablet (2.5 mg total) by mouth daily.      clobetasol 0.05 % External Ointment Apply 1 Application topically 2 (two) times daily as needed.      omeprazole 20 MG Oral Capsule Delayed Release Take 1 capsule (20 mg total) by mouth daily.      predniSONE 10 MG Oral Tab Take 1 tablet (10 mg total) by mouth daily.      folic acid 1 MG Oral Tab Take 1 tablet  (1 mg total) by mouth daily.      MetFORMIN HCl 500 MG Oral Tab Take 1 tablet (500 mg total) by mouth 2 (two) times daily with meals.      simvastatin 20 MG Oral Tab Take 1 tablet (20 mg total) by mouth nightly. (Patient taking differently: Take 1 tablet (20 mg total) by mouth every morning.) 90 tablet 1    lisinopril 2.5 MG Oral Tab Take 1 tablet (2.5 mg total) by mouth daily. 90 tablet 1    glimepiride 1 MG Oral Tab Take 1 tablet (1 mg total) by mouth daily with breakfast. 90 tablet 1

## 2024-12-15 NOTE — ED PROVIDER NOTES
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  Patient states that he feels like he cannot urinate and only a little bit of urine trickles out.  He also reports generalized weakness and constipation.  He denies any fevers, chest pain, abdominal pain, flank pain.  Patient reports that he feels short of breath only due to pain but denies any significant trouble breathing.  Denies any back pain, focal weakness or numbness, saddle anesthesia.      From chart review, pt has history of bladder cancer s/p resection with L sided hydronephrosis. Recent outpatient labs showed creatinine of 2.36 ten days ago which is mildly elevated from a few months ago.     Objective:     Past Medical History:    Anesthesia complication    broke out in rashes generalized - unknown source (after every anaesthesia)    Aneurysm of abdominal aorta (HCC)    Bladder tumor    recesected July/2015    Cancer (HCC)    SKIN CANCERS ON ARM AND NOSE    Deep vein thrombosis (HCC)    Erectile dysfunction    Gall stones    High blood pressure    High cholesterol    History of blood clots    Neuropathy    HAnds from DM    Obesity    Osteoarthritis    Personal history of antineoplastic chemotherapy    JUly/2015    Pulmonary embolism (HCC)    ON COUMADIN NOW    TIA (transient ischemic attack)    Type II or unspecified type diabetes mellitus without mention of complication, not stated as uncontrolled    Varicose veins of both lower extremities with complications    Visual impairment    4-5 yrs ago had blurriness prob, evaled. no diagnosis              Past Surgical History:    Procedure Laterality Date    Cabg      Colonoscopy      Colonoscopy      x3, polpypectomy x1, third one clear    Colonoscopy N/A 2017    Procedure: COLONOSCOPY, POSSIBLE BIOPSY, POSSIBLE POLYPECTOMY 23645;  Surgeon: Marisela Morris MD;  [place] [facility]    Colonoscopy N/A 2020    Procedure: COLONOSCOPY;  Surgeon: Jewel Lane MD;  Location:  ENDOSCOPY    Colonoscopy N/A 2020    adenoma- repeat 5 yrs    Eye surgery  plastic sx eye lids    Fracture surgery      left leg-screws,pins    Hc closed tx distal tibia fracture w manipulation Left     Hc debridement skin up to 10% Left     leg    Other surgical history  2015    cysto, stent removal - Dr. Campos    Other surgical history  10/14/2015    BTS Cystoscopy -Dr Campos    Other surgical history  2015    BTS Cystoscopy -Dr Campos    Other surgical history  2016    BTS Cystoscopy-Dr Campos    Other surgical history  08/15/2016    Cysto- Dr. Campos    Other surgical history  2017    Cysto- Dr. Campos    Other surgical history  2017    Cystoscopy- Dr. Campos    Other surgical history  2019     BTS cysto - dr. campos     Other surgical history  2020    Cysto Dr. Campos    Other surgical history  2021    Cystoscopy- Dr. Campos     Skin graft procedure      multiple to Left leg    Tonsillectomy      Vein bypass graft,fem-fem      to left leg                Social History     Socioeconomic History    Marital status:    Tobacco Use    Smoking status: Former     Current packs/day: 0.00     Average packs/day: 1 pack/day for 30.0 years (30.0 ttl pk-yrs)     Types: Cigarettes     Start date: 1965     Quit date: 1995     Years since quittin.4    Smokeless tobacco: Never   Vaping Use    Vaping status: Never Used   Substance and Sexual Activity    Alcohol use: Not Currently    Drug use: No     Social Drivers of Health     Food Insecurity: No  Food Insecurity (4/21/2024)    Food Insecurity     Food Insecurity: Never true   Transportation Needs: No Transportation Needs (4/21/2024)    Transportation Needs     Lack of Transportation: No   Housing Stability: Low Risk  (4/21/2024)    Housing Stability     Housing Instability: No                  Physical Exam     ED Triage Vitals   BP 12/15/24 1331 113/60   Pulse 12/15/24 1331 84   Resp 12/15/24 1331 (!) 30   Temp 12/15/24 1331 97 °F (36.1 °C)   Temp src 12/15/24 1331 Temporal   SpO2 12/15/24 1331 100 %   O2 Device 12/15/24 1409 None (Room air)       Current Vitals:   Vital Signs  BP: 113/61  Pulse: 73  Resp: 17  Temp: 97 °F (36.1 °C)  Temp src: Temporal    Oxygen Therapy  SpO2: 100 %  O2 Device: None (Room air)        Physical Exam  Vitals and nursing note reviewed.   Constitutional:       General: He is not in acute distress.  HENT:      Head: Normocephalic and atraumatic.      Nose: Nose normal.      Mouth/Throat:      Mouth: Mucous membranes are moist.   Eyes:      Extraocular Movements: Extraocular movements intact.      Pupils: Pupils are equal, round, and reactive to light.   Cardiovascular:      Rate and Rhythm: Normal rate and regular rhythm.      Pulses: Normal pulses.   Pulmonary:      Effort: Pulmonary effort is normal. No respiratory distress.      Breath sounds: No wheezing.   Abdominal:      General: There is no distension.      Palpations: Abdomen is soft.   Musculoskeletal:         General: No swelling. Normal range of motion.      Cervical back: Normal range of motion.   Skin:     General: Skin is warm and dry.      Capillary Refill: Capillary refill takes less than 2 seconds.   Neurological:      General: No focal deficit present.      Mental Status: He is alert.   Psychiatric:         Mood and Affect: Mood normal.             ED Course     Labs Reviewed   COMP METABOLIC PANEL (14) - Abnormal; Notable for the following components:       Result Value    Glucose 136 (*)     Potassium 5.4 (*)      CO2 20.0 (*)     BUN 55 (*)     Creatinine 2.41 (*)     Calculated Osmolality 301 (*)     eGFR-Cr 26 (*)     ALT 8 (*)     All other components within normal limits   CBC WITH DIFFERENTIAL WITH PLATELET - Abnormal; Notable for the following components:    RBC 3.63 (*)     HGB 9.3 (*)     HCT 26.2 (*)     MCV 72.2 (*)     MCH 25.6 (*)     Monocyte Absolute 1.16 (*)     All other components within normal limits   PROTHROMBIN TIME (PT) - Abnormal; Notable for the following components:    PT 16.2 (*)     INR 1.28 (*)     All other components within normal limits   RBC MORPHOLOGY SCAN - Abnormal; Notable for the following components:    RBC Morphology See morphology below (*)     Microcytosis 2+ (*)     All other components within normal limits   URINALYSIS WITH CULTURE REFLEX - Abnormal; Notable for the following components:    Clarity Urine Ex.Turbid (*)     Blood Urine 2+ (*)     Protein Urine 70 (*)     Leukocyte Esterase Urine 500 (*)     WBC Urine >50 (*)     RBC Urine >10 (*)     Bacteria Urine 1+ (*)     WBC Clump Present (*)     All other components within normal limits   TROPONIN I HIGH SENSITIVITY - Normal   PRO BETA NATRIURETIC PEPTIDE - Normal   PTT, ACTIVATED - Normal   LACTIC ACID, PLASMA - Normal   RAINBOW DRAW LAVENDER   RAINBOW DRAW LIGHT GREEN   RAINBOW DRAW BLUE   URINE CULTURE, ROUTINE   BLOOD CULTURE   BLOOD CULTURE     EKG #1    Rate, intervals and axes as noted on EKG Report.  Rate: 77  Rhythm: Sinus Rhythm  Reading: NSR with ventricular rate of 77, no acute ST elevations or depressions, nonspecific intra ventricular block similar to prior.    EKG #2    Rate, intervals and axes as noted on EKG Report.  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  Patient arrives to the ED afebrile, hemodynamically stable, satting well on room air but tachypneic.  Patient appears uncomfortable.  He has no abdominal tenderness.  Differential diagnosis includes but limited to UTI, urinary tension, urolithiasis, intra-abdominal infection.     ED Course:   -CBC shows no leukocytosis, hemoglobin 0.3 which is similar to recent.  CMP shows TONJA with creatinine of 2.4 which is similar to 10 days ago but increased from prior a few months   Potassium 5.4, otherwise unremarkable.  -Troponin negative, BNP normal.  Chest x-ray independently reviewed shows no acute process.  -PVR 0  -UA shows a urinary tract infection.  While in the ED, patient had significant discomfort with urinary urgency.  Ordered IV Rocephin  -CT abd/pelvis independently reviewed, shows L sided hydronephrosis, no stone. Pt has history of similar findings in past.     Admitted to [facility] on consult.     Admission disposition: 12/15/2024  6:13 PM           Medical Decision Making  Amount and/or Complexity of Data Reviewed  External Data Reviewed: labs and notes.     Details: Recent labs 10 days ago  Labs: ordered. Decision-making details documented in ED Course.  Radiology: ordered and independent interpretation performed. Decision-making details documented in ED Course.  ECG/medicine tests: ordered and independent interpretation performed. Decision-making details documented in ED Course.  Discussion of management or test interpretation with external provider(s): Uro, hospitalist    Risk  Decision regarding hospitalization.        Disposition and Plan     Clinical Impression:  1. Urinary tract infection without hematuria, site unspecified         Disposition:  Admit  12/15/2024  6:13 pm    Follow-up:  No follow-up provider specified.        Medications Prescribed:  Current Discharge  Medication List              Supplementary Documentation:         Hospital Problems       Present on Admission  Date Reviewed: 12/9/2024            ICD-10-CM Noted POA    * (Principal) Urinary tract infection without hematuria, site unspecified N39.0 12/15/2024 Unknown

## 2024-12-16 LAB
ALBUMIN SERPL-MCNC: 3.9 G/DL (ref 3.2–4.8)
ALBUMIN/GLOB SERPL: 1.5 {RATIO} (ref 1–2)
ALP LIVER SERPL-CCNC: 83 U/L
ALT SERPL-CCNC: 8 U/L
ANION GAP SERPL CALC-SCNC: 9 MMOL/L (ref 0–18)
AST SERPL-CCNC: 8 U/L (ref ?–34)
ATRIAL RATE: 76 BPM
ATRIAL RATE: 77 BPM
BILIRUB SERPL-MCNC: 0.4 MG/DL (ref 0.2–1.1)
BUN BLD-MCNC: 63 MG/DL (ref 9–23)
CALCIUM BLD-MCNC: 9.5 MG/DL (ref 8.7–10.4)
CHLORIDE SERPL-SCNC: 111 MMOL/L (ref 98–112)
CO2 SERPL-SCNC: 20 MMOL/L (ref 21–32)
CREAT BLD-MCNC: 2.3 MG/DL
DEPRECATED HBV CORE AB SER IA-ACNC: 263 NG/ML
EGFRCR SERPLBLD CKD-EPI 2021: 28 ML/MIN/1.73M2 (ref 60–?)
ERYTHROCYTE [DISTWIDTH] IN BLOOD BY AUTOMATED COUNT: 20.2 %
FOLATE SERPL-MCNC: 14.4 NG/ML (ref 5.4–?)
GLOBULIN PLAS-MCNC: 2.6 G/DL (ref 2–3.5)
GLUCOSE BLD-MCNC: 114 MG/DL (ref 70–99)
GLUCOSE BLD-MCNC: 123 MG/DL (ref 70–99)
GLUCOSE BLD-MCNC: 126 MG/DL (ref 70–99)
GLUCOSE BLD-MCNC: 146 MG/DL (ref 70–99)
GLUCOSE BLD-MCNC: 149 MG/DL (ref 70–99)
HCT VFR BLD AUTO: 27.6 %
HGB BLD-MCNC: 8.7 G/DL
IRON SATN MFR SERPL: 10 %
IRON SERPL-MCNC: 23 UG/DL
MCH RBC QN AUTO: 21.1 PG (ref 26–34)
MCHC RBC AUTO-ENTMCNC: 31.5 G/DL (ref 31–37)
MCV RBC AUTO: 66.8 FL
OSMOLALITY SERPL CALC.SUM OF ELEC: 309 MOSM/KG (ref 275–295)
P AXIS: -4 DEGREES
P AXIS: 53 DEGREES
P-R INTERVAL: 186 MS
P-R INTERVAL: 186 MS
PLATELET # BLD AUTO: 237 10(3)UL (ref 150–450)
PLATELETS.RETICULATED NFR BLD AUTO: 3.9 % (ref 0–7)
POTASSIUM SERPL-SCNC: 4.8 MMOL/L (ref 3.5–5.1)
PROT SERPL-MCNC: 6.5 G/DL (ref 5.7–8.2)
Q-T INTERVAL: 408 MS
Q-T INTERVAL: 416 MS
QRS DURATION: 136 MS
QRS DURATION: 150 MS
QTC CALCULATION (BEZET): 459 MS
QTC CALCULATION (BEZET): 470 MS
R AXIS: 72 DEGREES
R AXIS: 73 DEGREES
RBC # BLD AUTO: 4.13 X10(6)UL
SODIUM SERPL-SCNC: 140 MMOL/L (ref 136–145)
T AXIS: 0 DEGREES
T AXIS: 8 DEGREES
TOTAL IRON BINDING CAPACITY: 234 UG/DL (ref 250–425)
TRANSFERRIN SERPL-MCNC: 172 MG/DL (ref 215–365)
VENTRICULAR RATE: 76 BPM
VENTRICULAR RATE: 77 BPM
WBC # BLD AUTO: 7.4 X10(3) UL (ref 4–11)

## 2024-12-16 PROCEDURE — 99233 SBSQ HOSP IP/OBS HIGH 50: CPT | Performed by: INTERNAL MEDICINE

## 2024-12-16 RX ORDER — PHENAZOPYRIDINE HYDROCHLORIDE 100 MG/1
100 TABLET, FILM COATED ORAL 2 TIMES DAILY
Status: DISCONTINUED | OUTPATIENT
Start: 2024-12-16 | End: 2024-12-17

## 2024-12-16 RX ORDER — LIDOCAINE HYDROCHLORIDE 20 MG/ML
10 JELLY TOPICAL AS NEEDED
Status: DISCONTINUED | OUTPATIENT
Start: 2024-12-16 | End: 2024-12-20

## 2024-12-16 NOTE — OCCUPATIONAL THERAPY NOTE
OCCUPATIONAL THERAPY EVALUATION - INPATIENT     Room Number: 407/407-A  Evaluation Date: 12/16/2024  Type of Evaluation: Initial  Presenting Problem: UTI    Physician Order: IP Consult to Occupational Therapy  Reason for Therapy: ADL/IADL Dysfunction and Discharge Planning    OCCUPATIONAL THERAPY ASSESSMENT   Patient is currently functioning below baseline with toileting, lower body dressing, dynamic standing balance, and performing household tasks. Prior to admission, patient's baseline is independent without device, drives, lives with spouse and son.  Patient is requiring supervision to max assist as a result of the following impairments: decreased functional reach, decreased endurance, and tremoring of RUE . Occupational Therapy will continue to follow for duration of hospitalization.    Patient will benefit from continued skilled OT Services for duration of hospitalization, however, given the patient is functioning near baseline level do not anticipate skilled therapy needs at discharge     History Related to Current Admission: Patient is a 83 year old male admitted on 12/15/2024 with Presenting Problem: UTI. Co-Morbidities : h/o bladder cancer, CVA, HTN, CKD, VTE on AC, DM, anemia, CAD/CABG, RA    Admitted from home with difficulty in urination; DX with UTI    WEIGHT BEARING RESTRICTION       Recommendations for nursing staff:   Transfers: 1 person, gait belt  Toileting location: bathroom     EVALUATION SESSION:  Patient Start of Session: supine    FUNCTIONAL TRANSFER ASSESSMENT  Sit to Stand: Edge of Bed  Edge of Bed: Contact Guard Assist    BED MOBILITY  Supine to Sit : Supervision    BALANCE ASSESSMENT     FUNCTIONAL ADL ASSESSMENT  LB Dressing Seated: Maximum Assist    ACTIVITY TOLERANCE: Appears to be short of breath however patient denied                         O2 SATURATIONS       COGNITION  Arousal/Alertness:  appropriate responses to stimuli  Attention Span:  appears intact  Orientation Level:   oriented x4  Following Commands:  follows all commands and directions without difficulty  Initiation: appears intact  Not tested; appears to be intact/baseline    Upper Extremity   ROM: within functional limits   Strength: within functional limits   Coordination  Gross motor: wfl  Fine motor: wfl  Sensation:  denied UE deficits    EDUCATION PROVIDED  Patient Education : Role of Occupational Therapy; Plan of Care; DME Recommendations  Patient's Response to Education: Verbalized Understanding    Equipment used: gait belt  Demonstrates functional use, Would benefit from additional trial yes     Therapist comments: Patient transferred supine to sit SUP, sit to stand CGA; ambulated with CGA in pillai for simulated house  hold distance with CGA. Intermittent RUE tremoring/shaking noted. Patient declined going to bathroom. Able to navigate around bed and sit in recliner. Reported that he cannot manage socks today due to abdomen pain. OT educated patient on benefit of sock aide; he reported that his daughter had purchased one for him but he doesn't typically use it.  Patient left in chair with alarm on , needs met, call light in reach.    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 : A Little  -   Putting on and taking off regular upper body clothing?: A Little  -   Taking care of personal grooming such as brushing teeth?: None  -   Eating meals?: None    AM-PAC Score:  Score: 19  Approx Degree of Impairment: 42.8%  Standardized Score (AM-PAC Scale): 40.22    ADDITIONAL TESTS     NEUROLOGICAL FINDINGS      COGNITION ASSESSMENTS     PLAN  OT Device Recommendations: None  OT Treatment Plan: Endurance training;Functional transfer training;ADL training;Equipment eval/education;Patient/Family training;Patient/Family education;Compensatory technique education  Rehab Potential : Good  Frequency: 3-5x/week  Number of Visits to Meet Established Goals: 2    ADL Goals   Patient will perform grooming: with supervision and while standing at sink  Patient will perform lower body dressing:  with setup, with supervision, and with adaptive equipment PRN  Patient will perform toileting: with supervision    Functional Transfer Goals  Patient will transfer to toilet:  with supervision    Patient Evaluation Complexity Level:   Occupational Profile/Medical History LOW - Brief history including review of medical or therapy records    Specific performance deficits impacting engagement in ADL/IADL LOW  1 - 3 performance deficits    Client Assessment/Performance Deficits LOW - No comorbidities nor modifications of tasks    Clinical Decision Making LOW - Analysis of occupational profile, problem-focused assessments, limited treatment options    Overall Complexity LOW     OT Session Time: 10 minutes  Self-Care Home Management: 8 minutes

## 2024-12-16 NOTE — PROGRESS NOTES
[facility]  Urology Progress Note    Thiago Alcantara Patient Status:  Inpatient    1940 MRN [id number]   Location [facility] 4NW-A Attending Elia Church MD   Hosp Day # 1 PCP Tiffany Pierce MD     Subjective:  Thiago Alcantara is a(n) 83 year old male.    Current complaints: No nausea, vomiting, fever, or chills. +dysuria, urinary frequency/urgency.  No abdominal/flank pain.      Objective:  General appearance: alert, appears stated age, and cooperative  Blood pressure 129/54, pulse 79, temperature 97.9 °F (36.6 °C), temperature source Oral, resp. rate 23, height 5' 8.5\" (1.74 m), weight 209 lb (94.8 kg), SpO2 95%.  Lungs: non-labored respirations  Abdomen: soft, non-tender  No flank tenderness.    Lab Results   Component Value Date    WBC 6.7 12/15/2024    HGB 9.3 12/15/2024    HCT 26.2 12/15/2024    .0 12/15/2024    CREATSERUM 2.41 12/15/2024    BUN 55 12/15/2024     12/15/2024    K 5.4 12/15/2024     12/15/2024    CO2 20.0 12/15/2024     12/15/2024    CA 10.2 12/15/2024    ALB 4.3 12/15/2024    ALKPHO 95 12/15/2024    BILT 0.7 12/15/2024    TP 7.2 12/15/2024    AST 12 12/15/2024    ALT 8 12/15/2024    PTT 27.5 12/15/2024    INR 1.28 12/15/2024    PTP 16.2 12/15/2024    PGLU 100 12/15/2024       CT ABDOMEN+PELVIS(CPT=74176)    Result Date: 12/15/2024  CONCLUSION:   1. Moderate left-sided hydronephrosis and hydroureter could be due to a stricture of the distal ureter versus significant reflux disease.  Peggy nephric stranding on the left is suspicious for urinary tract infection and therefore pyelonephritis..  2. Sense of diverticulosis of the left colon without diverticulitis.   3. Cholelithiasis without biliary ductal obstruction.   4. Evidence of chronic pancreatitis.  5. Right adrenal myelolipoma left adrenal adenoma.   LOCATION:  [facility]   Dictated by (CST): Mitch Mendoza MD on 12/15/2024 at 3:57 PM     Finalized by (CST): Mitch Mendoza MD on 12/15/2024 at  4:03 PM       XR CHEST AP PORTABLE  (CPT=71045)    Result Date: 12/15/2024  CONCLUSION:  No acute disease.    LOCATION:  [facility]      Dictated by (CST): Mitch Mendoza MD on 12/15/2024 at 2:22 PM     Finalized by (CST): Mitch Mendoza MD on 12/15/2024 at 2:22 PM         Assessment:    DYSURIA, UTI  HISTORY OF BLADDER CANCER  LEFT HYDRONEPHROSIS RELATED TO VESICOURETERAL REFLUX SECONDARY TO BLADDER TUMOR RESECTIONS    Afebrile, VSS  Lactic acid 1.6  No leukocytosis  Serum creat 2.41 (was 2.36 on 12/5/24, 1.78 on 7/24/24)  Urine culture 12/15/24:  pending  2/2 blood cultures 12/15/24: pending  Bladder scan 0 mL    Plan:    Check final cultures  Abx per attending  Follow labs, temp  Check CT cystogram tomorrow to confirm patency of ureter    Above discussed with patient, Dr. Dennis  Updated Dr. Campos.      Annelise Barba PA-C  Pending sale to [facility]  Department of Urology  12/16/2024  5:33 AM

## 2024-12-16 NOTE — PROGRESS NOTES
[facility]   part of [facility]     Hospitalist Progress Note     Thiago Alcantara Patient Status:  Inpatient    1940 MRN [id number]   Location [facility] 4NW-A Attending Elia Church MD   Hosp Day # 1 PCP Tiffany Pierce MD     Chief Complaint: dysuria     Subjective:     Patient   up in chair  c/o pain and frequency  w/ voiding every 20 minutes, has normal aches for 85 y/o      Objective:    Review of Systems:   A comprehensive review of systems was completed; pertinent positive and negatives stated in subjective.    Vital signs:  Temp:  [97 °F (36.1 °C)-97.9 °F (36.6 °C)] 97.9 °F (36.6 °C)  Pulse:  [66-87] 70  Resp:  [16-30] 18  BP: (106-129)/(51-63) 113/51  SpO2:  [95 %-100 %] 100 %    Physical Exam:    General: No acute distress  AO   Respiratory: No wheezes, no rhonchi clear unlabored   Cardiovascular: S1, S2, regular rate and rhythm NSR   Abdomen: Soft, Non-tender, non-distended, positive bowel sounds  Neuro: No new focal deficits.   Extremities:  trace  edema   Has screws in left ankle  healed wounds left leg     Diagnostic Data:    Labs:  Recent Labs   Lab 12/15/24  1404 24  0532   WBC 6.7 7.4   HGB 9.3* 8.7*   MCV 72.2* 66.8*   .0 237.0   INR 1.28*  --        Recent Labs   Lab 12/15/24  1404 24  0549   * 114*   BUN 55* 63*   CREATSERUM 2.41* 2.30*   CA 10.2 9.5   ALB 4.3 3.9    140   K 5.4* 4.8    111   CO2 20.0* 20.0*   ALKPHO 95 83   AST 12 8   ALT 8* 8*   BILT 0.7 0.4   TP 7.2 6.5       Estimated Creatinine Clearance: 24.8 mL/min (A) (based on SCr of 2.3 mg/dL (H)).    Recent Labs   Lab 12/15/24  1404   TROPHS 3       Recent Labs   Lab 12/15/24  1404   PTP 16.2*   INR 1.28*                  Microbiology    Hospital Encounter on 12/15/24   1. Blood Culture     Status: Abnormal (Preliminary result)    Collection Time: 12/15/24  3:42 PM    Specimen: Blood,peripheral   Result Value Ref Range    Blood Culture Result Positive N/A    Blood  Smear Positive Blood Culture (A) N/A    Blood Smear Gram Negative Rods (A) N/A         Imaging: Reviewed in Epic.    Medications:    cefTRIAXone  2 g Intravenous Q24H    insulin aspart  1-68 Units Subcutaneous TID CC    insulin aspart  1-10 Units Subcutaneous TID AC and HS    apixaban  5 mg Oral BID    folic acid  1 mg Oral Daily    pantoprazole  20 mg Oral QAM AC    docusate sodium  100 mg Oral BID    polyethylene glycol (PEG 3350)  17 g Oral Daily    sennosides  8.6 mg Oral BID    pravastatin  10 mg Oral Daily    predniSONE  10 mg Oral Daily with breakfast       Assessment & Plan:      # bacteremia -   Blood cx P E coli by PCR   On zosyn   U cx P   Add ID consult  Dr Nielsen aware   Recently treated last 2 months w/ po abx for e coli per pt , had sepsis last spring was in isolation per pt       #Acute pyelonephritis, moderated left hydronephrosis and hydroureter, stricture?  #Bladder cancer  -IV abx rocephin   -Follow-up cultures  - blood +  ecoli  , U cx P   -Urology following   - ct cystogram Tues per , no procedures planned at this point, on eliquis      #Hyperkalemia resolved w/ IVF   #Acute kidney injury on chronic kidney disease baseline cr 2.0 approx  -Monitor UOP, creatinine and electrolytes   - reduce IVF rate  to help w/ pain     #Constipation resolved had BM Sunday per pt   -Bowel care   Refusing senokot      #Right adrenal myelolipoma left adrenal adenoma  -Will need outpatient follow-up with oncologist      #CAD sp CABG  #Essential hypertension  #Dyslipidemia  #AAA  -Hold Lisinopril and Lasix  -Statin  -Monitor hemodynamics  - follows w/ Dr Hernandez for AAA      #Diabetes mellitus  -Correctional scale  -Carb scale  -Hold oral agents   - A1c 7.3      #Cerebrovascular disease     #Carcinoid tumor sp SOMMER lobectomy  #Thalassemia trait     #Rheumatoid arthritis  -Prednisone  -Hold Methotrexate     #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 Alcantara is expected to be discharge to: home     The 21st Century Cures Act makes medical notes like these available to patients in the interest of transparency. Please be advised this is a medical document. Medical documents are intended to carry relevant information, facts as evident, and the clinical opinion of the practitioner. The medical note is intended as peer to peer communication and may appear blunt or direct. It is written in medical language and may contain abbreviations or verbiage that are unfamiliar.        Addendum:    Agree with above note except as documented below. Patient reports significant dysuria.     Gen: NAD  CVS: s1s2  Resp: CTA  Abd: soft    #Ecoli bacteremia, complicated UTI/pyelonephritis  #Moderate left hydronephrosis/hydroureter 2/2 chronic reflux from prior bladder tumor resections  #TONJA/CKD stage III (baseline SCR 1.7-1.9) - 2.36 on admission  #Hx of bladder cancer s/p cysto 10/11/24  #DMII  #RA on prednisone, methotrexate  #Acute on chronic anemia  #VTE on doac   #Hx of carcinoid tumor s/p SOMMER lobectomy   #CAD sp CABG  #CVD with hx of CVA  #HTN  #AAA    Plan:   -IV iron, trend hgb. Patient has history of cold agluttinin disease   -IV antibiotics; appreciate ID input   -No urologic intervention   -Follow cultures    Pt seen and examined independently. Chart reviewed. Labs and imaging over the last 24 hours have been personally reviewed.  I personally made/approved 100% of the management plan for this patient and take  full responsibility for the patient management.   Note has been reviewed by me and modified as needed.  Exam and Impression/ Recs as noted above.  D/w staff.    Marisol Bazan MD

## 2024-12-16 NOTE — PHYSICAL THERAPY NOTE
PHYSICAL THERAPY EVALUATION - INPATIENT     Room Number: 407/407-A  Evaluation Date: 12/16/2024  Type of Evaluation: Initial  Physician Order: PT Eval and Treat    Presenting Problem: UTI     Reason for Therapy: Mobility Dysfunction and Discharge Planning    PHYSICAL THERAPY ASSESSMENT   Patient is a 83 year old male admitted 12/15/2024 for UTI.  Prior to admission, patient's baseline is ambulatory without RW.  Patient is currently functioning near baseline with bed mobility, transfers, and gait.  Patient is requiring contact guard assist as a result of the following impairments: decreased endurance/aerobic capacity.  Physical Therapy will continue to follow for duration of hospitalization.    Patient will benefit from continued skilled PT Services with no additional skilled services but increased support at home.    PLAN DURING HOSPITALIZATION  Nursing Mobility Recommendation : 1 Assist     PT Treatment Plan: Bed mobility;Body mechanics;Energy conservation;Endurance;Patient education;Family education;Gait training;Range of motion;Strengthening;Stair training;Transfer training;Balance training  Rehab Potential : Good  Frequency (Obs):  (2-3x/week)     CURRENT GOALS    Goal #1    Goal #2 Patient is able to negotiate 1 flight of stairs with supervision to ensure safety at dc.   Goal #3 Patient is able to ambulate 300 feet with assist device: none at assistance level: supervision     Goal #4    Goal #5    Goal #6    Goal Comments: Goals established on 12/16/2024      PHYSICAL THERAPY MEDICAL/SOCIAL HISTORY  History related to current admission: Patient is a 83 year old male admitted on 12/15/2024 from home for UTI.    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  Pt reports works full time (3am-5pm) as a .      SUBJECTIVE  \"Some of the time I'm  just sitting at home waiting for a call!\"    OBJECTIVE  Precautions: Bed/chair alarm  Fall Risk: Standard fall risk    WEIGHT BEARING RESTRICTION     PAIN ASSESSMENT  Rating: Unable to rate  Location: abdomen  Management Techniques: Activity promotion;Body mechanics;Relaxation;Repositioning    COGNITION  Overall Cognitive Status:  WFL - within functional limits    RANGE OF MOTION AND STRENGTH ASSESSMENT  Upper extremity ROM and strength are within functional limits     Lower extremity ROM is within functional limits     Lower extremity strength is within functional limits     BALANCE  Static Sitting: Good  Dynamic Sitting: Good  Static Standing: Good  Dynamic Standing: Fair +    ADDITIONAL TESTS                                    ACTIVITY TOLERANCE                         O2 WALK       NEUROLOGICAL FINDINGS                        AM-PAC '6-Clicks' INPATIENT SHORT FORM - BASIC MOBILITY  How much difficulty does the patient currently have...  Patient Difficulty: Turning over in bed (including adjusting bedclothes, sheets and blankets)?: None   Patient Difficulty: Sitting down on and standing up from a chair with arms (e.g., wheelchair, bedside commode, etc.): None   Patient Difficulty: Moving from lying on back to sitting on the side of the bed?: None   How much help from another person does the patient currently need...   Help from Another: Moving to and from a bed to a chair (including a wheelchair)?: A Little   Help from Another: Need to walk in hospital room?: A Little   Help from Another: Climbing 3-5 steps with a railing?: A Little     AM-PAC Score:  Raw Score: 21   Approx Degree of Impairment: 28.97%   Standardized Score (AM-PAC Scale): 50.25   CMS Modifier (G-Code): CJ    FUNCTIONAL ABILITY STATUS  Gait Assessment   Functional Mobility/Gait Assessment  Gait Assistance: Contact guard assist  Distance (ft): 200  Assistive Device: None    Skilled Therapy Provided     Bed Mobility:  Rolling: ind  Supine to sit:  ind   Sit to supine: NT     Transfer Mobility:  Sit to stand: supervision   Stand to sit: supervision  Gait = CGA    Therapist's Comments: Noted R hand to intermittently tremor while ambulating with increased R lateral lean at times.      Exercise/Education Provided:  Bed mobility  Body mechanics  Energy conservation  Functional activity tolerated  Gait training  Posture  Transfer training    Patient End of Session: Up in chair;Needs met;Call light within reach;RN aware of session/findings;All patient questions and concerns addressed;Hospital anti-slip socks;Alarm set;Discussed recommendations with /      Patient Evaluation Complexity Level:  History Low - no personal factors and/or co-morbidities   Examination of body systems Low -  addressing 1-2 elements   Clinical Presentation Low- Stable   Clinical Decision Making Low Complexity       PT Session Time: 20 minutes    Therapeutic Activity: 12 minutes

## 2024-12-16 NOTE — CONSULTS
[facility]   part of [facility] ID CONSULT NOTE    Thiago Alcantara Patient Status:  Inpatient    1940 MRN [id number]   Location [facility] 4NW-A Attending Elia Church MD   Hosp Day # 1 PCP Tiffany Pierce MD     Consulted by Negar Ray NP    Reason for Consultation:  Bacteremia    History of Present Illness:  Thiago Alcantara is an 83 year old male with a history of DM II, HTN, AAA, bladder cancer, RA and carcinoid tumor sp SOMMER lobectomy. Patient presented with dysuria and urinary frequency/urgency with only small amount of urine output that started 2 days prior to admission. Denies any fevers or chills. Reports some constipation that is now improving. Denies any abdominal pain or flank pain. Denies any nausea or vomiting.     History of E. Coli UTIs. Ucx  10/25 and  with E. Coli S to cefazolin (JOE 4). R to bactrim and amp. Reports he has been off abx for about 2 weeks prior to admission.     E. Coli noted in blood and ID consulted.    Afebrile. WBC wnl.     History:  Past Medical History:    Anesthesia complication    broke out in rashes generalized - unknown source (after every anaesthesia)    Aneurysm of abdominal aorta (HCC)    Bladder tumor    recesected 2015    Cancer (HCC)    SKIN CANCERS ON ARM AND NOSE    Deep vein thrombosis (HCC)    Erectile dysfunction    Gall stones    High blood pressure    High cholesterol    History of blood clots    Neuropathy    HAnds from DM    Obesity    Osteoarthritis    Personal history of antineoplastic chemotherapy    2015    Pulmonary embolism (HCC)    ON COUMADIN NOW    TIA (transient ischemic attack)    Type II or unspecified type diabetes mellitus without mention of complication, not stated as uncontrolled    Varicose veins of both lower extremities with complications    Visual impairment    4-5 yrs ago had blurriness prob, evaled. no diagnosis     Past Surgical History:   Procedure Laterality Date    Cabg       Colonoscopy      Colonoscopy      x3, polpypectomy x1, third one clear    Colonoscopy N/A 07/18/2017    Procedure: COLONOSCOPY, POSSIBLE BIOPSY, POSSIBLE POLYPECTOMY 49757;  Surgeon: Marisela Morris MD;  Location: [facility]    Colonoscopy N/A 06/14/2020    Procedure: COLONOSCOPY;  Surgeon: Jewel Lane MD;  Location:  ENDOSCOPY    Colonoscopy N/A 06/13/2020    adenoma- repeat 5 yrs    Eye surgery  plastic sx eye lids    Fracture surgery      left leg-screws,pins    Hc closed tx distal tibia fracture w manipulation Left     Hc debridement skin up to 10% Left     leg    Other surgical history  07/29/2015    cysto, stent removal - Dr. Campos    Other surgical history  10/14/2015    BTS Cystoscopy -Dr Campos    Other surgical history  01/16/2015    BTS Cystoscopy -Dr Campos    Other surgical history  05/13/2016    BTS Cystoscopy-Dr Campos    Other surgical history  08/15/2016    Cysto- Dr. Campos    Other surgical history  02/17/2017    Cysto- Dr. Campos    Other surgical history  08/18/2017    Cystoscopy- Dr. Campos    Other surgical history  09/16/2019     BTS cysto - dr. campos     Other surgical history  09/21/2020    Cysto Dr. Campos    Other surgical history  09/27/2021    Cystoscopy- Dr. Campos     Skin graft procedure      multiple to Left leg    Tonsillectomy      Vein bypass graft,fem-fem      to left leg     Family History   Problem Relation Age of Onset    Heart Disorder Father     Hypertension Mother     Arthritis Mother     Heart Disorder Son     Other (kidney stone) Brother     Other (hernia) Brother       reports that he quit smoking about 29 years ago. His smoking use included cigarettes. He started smoking about 59 years ago. He has a 30 pack-year smoking history. He has never used smokeless tobacco. He reports that he does not currently use alcohol. He reports that he does not use drugs.    Allergies:  Allergies[1]    Medications:    Current  Facility-Administered Medications:     sodium ferric gluconate (Ferrlecit) 125 mg in sodium chloride 0.9% 100mL IVPB premix, 125 mg, Intravenous, Daily    glucose (Dex4) 15 GM/59ML oral liquid 15 g, 15 g, Oral, Q15 Min PRN **OR** glucose (Glutose) 40% oral gel 15 g, 15 g, Oral, Q15 Min PRN **OR** glucose-vitamin C (Dex-4) chewable tab 4 tablet, 4 tablet, Oral, Q15 Min PRN **OR** dextrose 50% injection 50 mL, 50 mL, Intravenous, Q15 Min PRN **OR** glucose (Dex4) 15 GM/59ML oral liquid 30 g, 30 g, Oral, Q15 Min PRN **OR** glucose (Glutose) 40% oral gel 30 g, 30 g, Oral, Q15 Min PRN **OR** glucose-vitamin C (Dex-4) chewable tab 8 tablet, 8 tablet, Oral, Q15 Min PRN    cefTRIAXone (Rocephin) 2 g in sodium chloride 0.9% 100 mL IVPB-ADDV, 2 g, Intravenous, Q24H    sodium chloride 0.9% infusion, , Intravenous, Continuous    acetaminophen (Tylenol Extra Strength) tab 1,000 mg, 1,000 mg, Oral, Q6H PRN    ondansetron (Zofran) 4 MG/2ML injection 4 mg, 4 mg, Intravenous, Q6H PRN    metoclopramide (Reglan) 5 mg/mL injection 5 mg, 5 mg, Intravenous, Q8H PRN    polyethylene glycol (PEG 3350) (Miralax) 17 g oral packet 17 g, 17 g, Oral, Daily PRN    sennosides (Senokot) tab 17.2 mg, 17.2 mg, Oral, Nightly PRN    bisacodyl (Dulcolax) 10 MG rectal suppository 10 mg, 10 mg, Rectal, Daily PRN    insulin aspart (NovoLOG) 100 Units/mL FlexPen 1-68 Units, 1-68 Units, Subcutaneous, TID CC    insulin aspart (NovoLOG) 100 Units/mL FlexPen 1-10 Units, 1-10 Units, Subcutaneous, TID AC and HS    apixaban (Eliquis) tab 5 mg, 5 mg, Oral, BID    folic acid (Folvite) tab 1 mg, 1 mg, Oral, Daily    pantoprazole (Protonix) DR tab 20 mg, 20 mg, Oral, QAM AC    docusate sodium (Colace) cap 100 mg, 100 mg, Oral, BID    polyethylene glycol (PEG 3350) (Miralax) 17 g oral packet 17 g, 17 g, Oral, Daily    sennosides (Senokot) tab 8.6 mg, 8.6 mg, Oral, BID    pravastatin (Pravachol) tab 10 mg, 10 mg, Oral, Daily    predniSONE (Deltasone) tab 10 mg, 10 mg,  Oral, Daily with breakfast    acetaminophen (Tylenol Extra Strength) tab 500 mg, 500 mg, Oral, Q4H PRN    Review of Systems:  CONSTITUTIONAL: + fatigue.  HEENT:  Eyes:  No visual loss. Ears, Nose, Throat:  No hearing loss.  SKIN:  No rash or itching.  CARDIOVASCULAR:  No chest pain  RESPIRATORY:  No shortness of breath, cough  GASTROINTESTINAL:  No nausea, vomiting or diarrhea. No abdominal pain  GENITOURINARY:  No Burning on urination.   NEUROLOGICAL:  No headache  MUSCULOSKELETAL:  No back pain  HEMATOLOGIC:  No bleeding.  ALLERGIES:  No history of asthma    Physical Exam:  Vital signs: Blood pressure 113/51, pulse 70, temperature 97.9 °F (36.6 °C), resp. rate 18, height 174 cm (5' 8.5\"), weight 209 lb (94.8 kg), SpO2 100%.    General: Alert, oriented, NAD, on room air.   HEENT: Moist mucous membranes.   Neck: No lymphadenopathy.  Supple.  Cardiovascular: RRR  Respiratory: Clear to auscultation bilaterally.  No wheezes. No rhonchi.  Abdomen: Soft, nontender, nondistended.   Musculoskeletal: Movement of all extremities.  Integument: No lesions. No erythema.    Laboratory Data:  Recent Labs   Lab 12/15/24  1404 12/16/24  0532   RBC 3.63* 4.13   HGB 9.3* 8.7*   HCT 26.2* 27.6*   MCV 72.2* 66.8*   MCH 25.6* 21.1*   MCHC 35.5 31.5   RDW 22.2 20.2   NEPRELIM 4.02  --    WBC 6.7 7.4   .0 237.0     Recent Labs   Lab 12/15/24  1404 12/16/24  0549   * 114*   BUN 55* 63*   CREATSERUM 2.41* 2.30*   CA 10.2 9.5   ALB 4.3 3.9    140   K 5.4* 4.8    111   CO2 20.0* 20.0*   ALKPHO 95 83   AST 12 8   ALT 8* 8*   BILT 0.7 0.4   TP 7.2 6.5       Microbiology: Reviewed in EMR    Radiology: Reviewed.    PROCEDURE:  CT ABDOMEN+PELVIS (CPT=74176)     COMPARISON:  [facility] , CT, CTA PELVIS W CTA BILAT LEG RUNOFF W IV CONTRAST(CPT=75635), 4/19/2024, 2:45 PM.  [facility] , CT, CT CHEST+ABDOMEN (ALL CNTRST ONLY) (VZW=29917/04222), 9/08/2023, 7:07 AM.  [facility] , CT, CT CHEST+ABDOMEN+PELVIS(CPT=71250/17171),  10/26/2022,  7:19 AM.  [facility] , CT, CT CHEST+ABDOMEN+PELVIS(ALL CNTRST ONLY)(CPT=71260/91925), 7/29/2020, 5:46 PM.     INDICATIONS:  Abd pain, difficulty with urination     TECHNIQUE:  Unenhanced multislice CT scanning was performed from the dome of the diaphragm to the pubic symphysis.  Dose reduction techniques were used. Dose information is transmitted to the ACR (American College of Radiology) NRDR (National Radiology  Data Registry) which includes the Dose Index Registry.     PATIENT STATED HISTORY: (As transcribed by Technologist)  Patient with diffuse abdomen pain and difficulty urinating.      FINDINGS:    LIVER:  No enlargement, atrophy, abnormal density, or significant focal lesion.    BILIARY:  Multiple calcified stones throughout the gallbladder.  No ductal dilatation.  PANCREAS:  Diffuse atrophy of the pancreas with multiple coarse calcifications consistent chronic pancreatitis.  SPLEEN:  No enlargement or focal lesion.    KIDNEYS:  The right kidney is unremarkable.  The left kidney demonstrates moderate hydronephrosis and hydroureter to the level of the UVJ.  There is no evidence of stone or mass lesion obstructing the ureter.  This could be due to a stricture of the  distal ureter but could also be seen with reflux disease.  There is left perinephric stranding which is new finding which could be related to pyelonephritis.  ADRENALS:  Left adrenal nodule measuring 27 x 24 mm is stable since previous study may represent adenoma.  There is an right adrenal myelolipoma which is stable measuring 8 mm.  AORTA/VASCULAR:    Aneurysmal dilatation of the abdominal aorta to 31 mm.  RETROPERITONEUM:  No mass or adenopathy.    BOWEL/MESENTERY:  No visible mass, obstruction, or bowel wall thickening.  Diverticulosis of the sigmoid and left colon as well as the transverse colon without ends of diverticulitis.  The appendix is unremarkable.  ABDOMINAL WALL:  No mass or hernia.    URINARY BLADDER:  No visible focal wall  thickening, lesion, or calculus.    PELVIC NODES:  No adenopathy.    PELVIC ORGANS:  There is moderate prostatic hypertrophy.  BONES:  No bony lesion or fracture.    LUNG BASES:  No visible pulmonary or pleural disease.    OTHER:  Negative.      Impression:    CONCLUSION:       1. Moderate left-sided hydronephrosis and hydroureter could be due to a stricture of the distal ureter versus significant reflux disease.  Peggy nephric stranding on the left is suspicious for urinary tract infection and therefore pyelonephritis..     2. Sense of diverticulosis of the left colon without diverticulitis.       3. Cholelithiasis without biliary ductal obstruction.       4. Evidence of chronic pancreatitis.     5. Right adrenal myelolipoma left adrenal adenoma.     LOCATION:  [facility]     Dictated by (CST): Mitch Mendoza MD on 12/15/2024 at 3:57 PM      Finalized by (CST): Mitch Mendoza MD on 12/15/2024 at 4:03 PM      PROCEDURE:  XR CHEST AP PORTABLE  (CPT=71045)     TECHNIQUE:  AP chest radiograph was obtained.     COMPARISON:  [facility] , XR, XR CHEST AP PORTABLE  (CPT=71045), 4/19/2024, 2:08 PM.     INDICATIONS:  CAITLYN     PATIENT STATED HISTORY: (As transcribed by Technologist)  Patient has been having difficulty breathing.     FINDINGS:  The heart is normal in size.  The lungs are clear.  There are no pleural effusions.  The bones are unremarkable.    Impression:    CONCLUSION:  No acute disease.       LOCATION:  [facility]     Dictated by (CST): Mitch Mendoza MD on 12/15/2024 at 2:22 PM      Finalized by (CST): Mitch Mendoza MD on 12/15/2024 at 2:22 PM    ASSESSMENT:  E. Coli bacteremia. 1/2 Bcx + 12/15, assume urinary source.  UTI/L pyelonephritis   Moderate left hydronephrosis/hydroureter. H/o chronic hydronephrosis d/t vesicoureteral reflux 2/2 bladder tumor resections.   H/o bladder cancer. s/p cysto 10/11/24 without tumor noted.   DM II, hgb A1C 7.3  RA, on prednisone and methotrexate prior to admission.   TONJA on CKD,  improving  H/o carcinoid tumor sp SOMMER lobectomy  CAD, HTN, AAA    PLAN:  - continue IV CTX  - await final blood cultures  - await urine culture  - follow temps and wbc  - urology eval appreciated, noted plans for CT cystogram tomorrow  - follow creatinine  - reviewed labs, micro, imaging reports, available old records    Discussed case with RN and patient.     Thank you for allowing us to participate in the care of this patient. Please do not hesitate to call if you have any questions.   We will continue to follow with you and will make further recommendations based on his progress.    [facility]  [phone]  12/16/2024         [1]   Allergies  Allergen Reactions    Bacitracin-Neomycin-Polymyxin [Neomycin-Bacitracin-Polymyxin] RASH    Other OTHER (SEE COMMENTS)

## 2024-12-16 NOTE — PROGRESS NOTES
NURSING ADMISSION NOTE      Patient admitted via Cart  Oriented to room.  Safety precautions initiated.  Bed in low position.  Call light in reach.  Admitted for increased frequency, burning sensation when urinating. History of CAD sp CABG, hypertension, dyslipidemia, AAA, cerebrovascular disease, diabetes mellitus, chronic kidney disease, VTE on eliquis, bladder cancer, carcinoid tumor sp SOMMER lobectomy, rheumatoid arthritis.c/o constipation. Stool softener given. Bladder scan 0 ml. IVF infusing. Encouraged fluid intakes. Bed alarm on.

## 2024-12-17 LAB
ANION GAP SERPL CALC-SCNC: 11 MMOL/L (ref 0–18)
BASOPHILS # BLD: 0.07 X10(3) UL (ref 0–0.2)
BASOPHILS NFR BLD: 1 %
BUN BLD-MCNC: 60 MG/DL (ref 9–23)
CALCIUM BLD-MCNC: 9.4 MG/DL (ref 8.7–10.4)
CHLORIDE SERPL-SCNC: 114 MMOL/L (ref 98–112)
CO2 SERPL-SCNC: 18 MMOL/L (ref 21–32)
CREAT BLD-MCNC: 2.08 MG/DL
EGFRCR SERPLBLD CKD-EPI 2021: 31 ML/MIN/1.73M2 (ref 60–?)
EOSINOPHIL # BLD: 0 X10(3) UL (ref 0–0.7)
EOSINOPHIL NFR BLD: 0 %
ERYTHROCYTE [DISTWIDTH] IN BLOOD BY AUTOMATED COUNT: 18.7 %
GLUCOSE BLD-MCNC: 103 MG/DL (ref 70–99)
GLUCOSE BLD-MCNC: 116 MG/DL (ref 70–99)
GLUCOSE BLD-MCNC: 124 MG/DL (ref 70–99)
GLUCOSE BLD-MCNC: 131 MG/DL (ref 70–99)
GLUCOSE BLD-MCNC: 139 MG/DL (ref 70–99)
HCT VFR BLD AUTO: 26.8 %
HGB BLD-MCNC: 8.2 G/DL
LYMPHOCYTES NFR BLD: 1.97 X10(3) UL (ref 1–4)
LYMPHOCYTES NFR BLD: 29 %
MCH RBC QN AUTO: 20.2 PG (ref 26–34)
MCHC RBC AUTO-ENTMCNC: 30.6 G/DL (ref 31–37)
MCV RBC AUTO: 66 FL
MONOCYTES # BLD: 0.68 X10(3) UL (ref 0.1–1)
MONOCYTES NFR BLD: 10 %
NEUTROPHILS # BLD AUTO: 3.48 X10 (3) UL (ref 1.5–7.7)
NEUTROPHILS NFR BLD: 58 %
NEUTS BAND NFR BLD: 2 %
NEUTS HYPERSEG # BLD: 4.08 X10(3) UL (ref 1.5–7.7)
OSMOLALITY SERPL CALC.SUM OF ELEC: 313 MOSM/KG (ref 275–295)
PLATELET # BLD AUTO: 252 10(3)UL (ref 150–450)
PLATELET MORPHOLOGY: NORMAL
POTASSIUM SERPL-SCNC: 4 MMOL/L (ref 3.5–5.1)
RBC # BLD AUTO: 4.06 X10(6)UL
SODIUM SERPL-SCNC: 143 MMOL/L (ref 136–145)
TOTAL CELLS COUNTED BLD: 100
WBC # BLD AUTO: 6.8 X10(3) UL (ref 4–11)

## 2024-12-17 PROCEDURE — 99232 SBSQ HOSP IP/OBS MODERATE 35: CPT | Performed by: INTERNAL MEDICINE

## 2024-12-17 RX ORDER — TAMSULOSIN HYDROCHLORIDE 0.4 MG/1
0.4 CAPSULE ORAL NIGHTLY
Status: DISCONTINUED | OUTPATIENT
Start: 2024-12-17 | End: 2024-12-20

## 2024-12-17 RX ORDER — CEPHALEXIN 500 MG/1
500 CAPSULE ORAL 3 TIMES DAILY
Qty: 60 CAPSULE | Refills: 0 | Status: SHIPPED | OUTPATIENT
Start: 2024-12-17 | End: 2025-01-06

## 2024-12-17 RX ORDER — TRAMADOL HYDROCHLORIDE 50 MG/1
50 TABLET ORAL EVERY 12 HOURS PRN
Status: DISCONTINUED | OUTPATIENT
Start: 2024-12-17 | End: 2024-12-20

## 2024-12-17 RX ORDER — LIDOCAINE HYDROCHLORIDE 20 MG/ML
10 JELLY TOPICAL ONCE
Status: DISCONTINUED | OUTPATIENT
Start: 2024-12-17 | End: 2024-12-20

## 2024-12-17 NOTE — PROGRESS NOTES
[facility]   part of [facility] ID PROGRESS NOTE    Thiago Alcantara Patient Status:  Inpatient    1940 MRN [id number]   [facility] 4NW-A Attending Elia Church MD   Hosp Day # 2 PCP Tiffany Pierce MD     Abx: IV CTX (12/15-)    Subjective: Patient seen and examined today. Noted to have urinary retention this am and overnight--> harding placed with am by urology.     Reports pain from harding but otherwise no complaints. Denies any abdominal pain or flank pain. Afebrile. On room air.     Allergies:  Allergies[1]    Medications:    Current Facility-Administered Medications:     tamsulosin (Flomax) cap 0.4 mg, 0.4 mg, Oral, Nightly    lidocaine (Urojet) 2 % urethral jelly 10 mL, 10 mL, Urethral, Once    traMADol (Ultram) tab 50 mg, 50 mg, Oral, Q12H PRN    sodium ferric gluconate (Ferrlecit) 125 mg in sodium chloride 0.9% 100mL IVPB premix, 125 mg, Intravenous, Daily    lidocaine (Urojet) 2 % urethral jelly 10 mL, 10 mL, Urethral, PRN    glucose (Dex4) 15 GM/59ML oral liquid 15 g, 15 g, Oral, Q15 Min PRN **OR** glucose (Glutose) 40% oral gel 15 g, 15 g, Oral, Q15 Min PRN **OR** glucose-vitamin C (Dex-4) chewable tab 4 tablet, 4 tablet, Oral, Q15 Min PRN **OR** dextrose 50% injection 50 mL, 50 mL, Intravenous, Q15 Min PRN **OR** glucose (Dex4) 15 GM/59ML oral liquid 30 g, 30 g, Oral, Q15 Min PRN **OR** glucose (Glutose) 40% oral gel 30 g, 30 g, Oral, Q15 Min PRN **OR** glucose-vitamin C (Dex-4) chewable tab 8 tablet, 8 tablet, Oral, Q15 Min PRN    cefTRIAXone (Rocephin) 2 g in sodium chloride 0.9% 100 mL IVPB-ADDV, 2 g, Intravenous, Q24H    sodium chloride 0.9% infusion, , Intravenous, Continuous    acetaminophen (Tylenol Extra Strength) tab 1,000 mg, 1,000 mg, Oral, Q6H PRN    ondansetron (Zofran) 4 MG/2ML injection 4 mg, 4 mg, Intravenous, Q6H PRN    metoclopramide (Reglan) 5 mg/mL injection 5 mg, 5 mg, Intravenous, Q8H PRN    polyethylene glycol (PEG 3350) (Miralax) 17 g  oral packet 17 g, 17 g, Oral, Daily PRN    sennosides (Senokot) tab 17.2 mg, 17.2 mg, Oral, Nightly PRN    bisacodyl (Dulcolax) 10 MG rectal suppository 10 mg, 10 mg, Rectal, Daily PRN    insulin aspart (NovoLOG) 100 Units/mL FlexPen 1-68 Units, 1-68 Units, Subcutaneous, TID CC    insulin aspart (NovoLOG) 100 Units/mL FlexPen 1-10 Units, 1-10 Units, Subcutaneous, TID AC and HS    apixaban (Eliquis) tab 5 mg, 5 mg, Oral, BID    folic acid (Folvite) tab 1 mg, 1 mg, Oral, Daily    pantoprazole (Protonix) DR tab 20 mg, 20 mg, Oral, QAM AC    docusate sodium (Colace) cap 100 mg, 100 mg, Oral, BID    polyethylene glycol (PEG 3350) (Miralax) 17 g oral packet 17 g, 17 g, Oral, Daily    sennosides (Senokot) tab 8.6 mg, 8.6 mg, Oral, BID    pravastatin (Pravachol) tab 10 mg, 10 mg, Oral, Daily    predniSONE (Deltasone) tab 10 mg, 10 mg, Oral, Daily with breakfast    acetaminophen (Tylenol Extra Strength) tab 500 mg, 500 mg, Oral, Q4H PRN    Review of Systems:  Completed. See pertinent positives and negatives above.     Physical Exam:  Vital signs: Blood pressure 113/58, pulse 64, temperature 97.4 °F (36.3 °C), temperature source Oral, resp. rate 18, height 174 cm (5' 8.5\"), weight 209 lb (94.8 kg), SpO2 100%.    General: Alert, oriented, NAD, on room air.   HEENT: Moist mucous membranes.   Neck: No lymphadenopathy.  Supple.  Cardiovascular: RRR  Respiratory: Clear to auscultation bilaterally.  No wheezes. No rhonchi.  Abdomen: Soft, nontender, nondistended.   Musculoskeletal: Movement of all extremities.  Integument: No lesions. No erythema.    Laboratory Data:  Recent Labs   Lab 12/17/24  0426   RBC 4.06   HGB 8.2*   HCT 26.8*   MCV 66.0*   MCH 20.2*   MCHC 30.6*   RDW 18.7   NEPRELIM 3.48   WBC 6.8   .0   NEUT 58   LYMPH 29   MON 10   EOS 0     Recent Labs   Lab 12/15/24  1404 12/16/24  0549 12/17/24  0426   * 114* 103*   BUN 55* 63* 60*   CREATSERUM 2.41* 2.30* 2.08*   CA 10.2 9.5 9.4   ALB 4.3 3.9  --    NA  137 140 143   K 5.4* 4.8 4.0    111 114*   CO2 20.0* 20.0* 18.0*   ALKPHO 95 83  --    AST 12 8  --    ALT 8* 8*  --    BILT 0.7 0.4  --    TP 7.2 6.5  --        Microbiology: Reviewed in EMR    Radiology: Reviewed.    PROCEDURE:  CT ABDOMEN+PELVIS (CPT=74176)     COMPARISON:  [facility] , CT, CTA PELVIS W CTA BILAT LEG RUNOFF W IV CONTRAST(CPT=75635), 4/19/2024, 2:45 PM.  [facility] , CT, CT CHEST+ABDOMEN (ALL CNTRST ONLY) (YNI=94133/68967), 9/08/2023, 7:07 AM.  [facility] , CT, CT CHEST+ABDOMEN+PELVIS(CPT=71250/60213),  10/26/2022, 7:19 AM.  [facility] , CT, CT CHEST+ABDOMEN+PELVIS(ALL CNTRST ONLY)(CPT=71260/50153), 7/29/2020, 5:46 PM.     INDICATIONS:  Abd pain, difficulty with urination     TECHNIQUE:  Unenhanced multislice CT scanning was performed from the dome of the diaphragm to the pubic symphysis.  Dose reduction techniques were used. Dose information is transmitted to the ACR (American College of Radiology) NRDR (National Radiology  Data Registry) which includes the Dose Index Registry.     PATIENT STATED HISTORY: (As transcribed by Technologist)  Patient with diffuse abdomen pain and difficulty urinating.      FINDINGS:    LIVER:  No enlargement, atrophy, abnormal density, or significant focal lesion.    BILIARY:  Multiple calcified stones throughout the gallbladder.  No ductal dilatation.  PANCREAS:  Diffuse atrophy of the pancreas with multiple coarse calcifications consistent chronic pancreatitis.  SPLEEN:  No enlargement or focal lesion.    KIDNEYS:  The right kidney is unremarkable.  The left kidney demonstrates moderate hydronephrosis and hydroureter to the level of the UVJ.  There is no evidence of stone or mass lesion obstructing the ureter.  This could be due to a stricture of the  distal ureter but could also be seen with reflux disease.  There is left perinephric stranding which is new finding which could be related to pyelonephritis.  ADRENALS:  Left adrenal nodule measuring 27 x 24 mm is stable since  previous study may represent adenoma.  There is an right adrenal myelolipoma which is stable measuring 8 mm.  AORTA/VASCULAR:    Aneurysmal dilatation of the abdominal aorta to 31 mm.  RETROPERITONEUM:  No mass or adenopathy.    BOWEL/MESENTERY:  No visible mass, obstruction, or bowel wall thickening.  Diverticulosis of the sigmoid and left colon as well as the transverse colon without ends of diverticulitis.  The appendix is unremarkable.  ABDOMINAL WALL:  No mass or hernia.    URINARY BLADDER:  No visible focal wall thickening, lesion, or calculus.    PELVIC NODES:  No adenopathy.    PELVIC ORGANS:  There is moderate prostatic hypertrophy.  BONES:  No bony lesion or fracture.    LUNG BASES:  No visible pulmonary or pleural disease.    OTHER:  Negative.      Impression:    CONCLUSION:       1. Moderate left-sided hydronephrosis and hydroureter could be due to a stricture of the distal ureter versus significant reflux disease.  Peggy nephric stranding on the left is suspicious for urinary tract infection and therefore pyelonephritis..     2. Sense of diverticulosis of the left colon without diverticulitis.       3. Cholelithiasis without biliary ductal obstruction.       4. Evidence of chronic pancreatitis.     5. Right adrenal myelolipoma left adrenal adenoma.     LOCATION:  [facility]     Dictated by (CST): Mitch Mendoza MD on 12/15/2024 at 3:57 PM      Finalized by (CST): Mitch Mendoza MD on 12/15/2024 at 4:03 PM      PROCEDURE:  XR CHEST AP PORTABLE  (CPT=71045)     TECHNIQUE:  AP chest radiograph was obtained.     COMPARISON:  [facility] , XR, XR CHEST AP PORTABLE  (CPT=71045), 4/19/2024, 2:08 PM.     INDICATIONS:  CAITLYN     PATIENT STATED HISTORY: (As transcribed by Technologist)  Patient has been having difficulty breathing.     FINDINGS:  The heart is normal in size.  The lungs are clear.  There are no pleural effusions.  The bones are unremarkable.    Impression:    CONCLUSION:  No acute disease.       LOCATION:   [facility]     Dictated by (CST): Mitch Mendoza MD on 12/15/2024 at 2:22 PM      Finalized by (CST): Mitch Mendoza MD on 12/15/2024 at 2:22 PM    ASSESSMENT:  E. Coli bacteremia. 1/2 Bcx + 12/15, d/t #2  UTI/L pyelonephritis   Moderate left hydronephrosis/hydroureter with urinary retention. S/p harding placement 12/17.  H/o chronic hydronephrosis d/t vesicoureteral reflux 2/2 bladder tumor resections per urology.   H/o bladder cancer. s/p cysto 10/11/24 without tumor noted.   DM II, hgb A1C 7.3  RA, on prednisone and methotrexate prior to admission.   TONJA on CKD, improving  H/o carcinoid tumor sp SOMMER lobectomy  CAD, HTN, AAA    PLAN:  - continue IV CTX  - await final blood cultures  - follow temps  - harding management as per urology  - follow creatinine    Discussed case with RN and patient.     [facility]  [phone]         [1]   Allergies  Allergen Reactions    Bacitracin-Neomycin-Polymyxin [Neomycin-Bacitracin-Polymyxin] RASH    Other OTHER (SEE COMMENTS)

## 2024-12-17 NOTE — PROGRESS NOTES
Called by nursing staff that patient is voiding every 20 to 30 minutes small volumes.  He also has a history of recurrent UTIs.  Postvoid residual was greater than 800 by ultrasound.  The nursing staff tried regular Waterman and coudé catheter.  I am here at the bedside with the patient.  I repeated the bladder scan and it shows 820 mL.  Patient is refusing any catheterization.  Suggested that we could sedate him.  He says he is in too much pain.  Will start him on Flomax and see what his residuals show tomorrow.  This explains the hydro on the CT.     AJ Montanez

## 2024-12-17 NOTE — PROGRESS NOTES
[facility]  Urology Progress Note    Thiago Alcantara Patient Status:  Inpatient    1940 MRN [id number]   Location [facility] 4NW-A Attending Elia Church MD   Hosp Day # 2 PCP Tiffany Pierce MD     Subjective:  Thiago Alcantara is a(n) 83 year old male.    Current complaints: Nursing staff tried to insert harding/coude catheter overnight. Dr. Montanez saw the patient - bladder scan 820 mL - patient refusing catheter.    Bladder scan 1156 mL - some difficulty voiding, voiding every 20-30 minutes.    Objective:  General appearance: alert, appears stated age, and cooperative  Blood pressure 137/63, pulse 87, temperature 97.2 °F (36.2 °C), temperature source Oral, resp. rate 18, height 5' 8.5\" (1.74 m), weight 209 lb (94.8 kg), SpO2 95%.  Lungs: non-labored respirations  Abdomen: soft with some SP discomfort/distention  Lab Results   Component Value Date    WBC 6.8 2024    HGB 8.2 2024    HCT 26.8 2024    .0 2024    CREATSERUM 2.08 2024    BUN 60 2024     2024    K 4.0 2024     2024    CO2 18.0 2024     2024    CA 9.4 2024    ALB 3.9 2024    ALKPHO 83 2024    BILT 0.4 2024    TP 6.5 2024    AST 8 2024    ALT 8 2024    PGLU 116 2024       Hospital Encounter on 12/15/24   1. Blood Culture     Status: None (Preliminary result)    Collection Time: 12/15/24  3:42 PM    Specimen: Blood,peripheral   Result Value Ref Range    Blood Culture Result No Growth 1 Day N/A   2. Urine Culture, Routine     Status: Abnormal (Preliminary result)    Collection Time: 12/15/24  2:44 PM    Specimen: Urine, clean catch   Result Value Ref Range    Urine Culture >100,000 CFU/ML Escherichia coli (A) N/A        CT ABDOMEN+PELVIS(CPT=74176)    Result Date: 12/15/2024  CONCLUSION:   1. Moderate left-sided hydronephrosis and hydroureter could be due to a stricture of the distal ureter  versus significant reflux disease.  Peggy nephric stranding on the left is suspicious for urinary tract infection and therefore pyelonephritis..  2. Sense of diverticulosis of the left colon without diverticulitis.   3. Cholelithiasis without biliary ductal obstruction.   4. Evidence of chronic pancreatitis.  5. Right adrenal myelolipoma left adrenal adenoma.   LOCATION:  [facility]   Dictated by (CST): Mitch Mendoza MD on 12/15/2024 at 3:57 PM     Finalized by (CST): Mitch Mendoza MD on 12/15/2024 at 4:03 PM       XR CHEST AP PORTABLE  (CPT=71045)    Result Date: 12/15/2024  CONCLUSION:  No acute disease.    LOCATION:  [facility]      Dictated by (CST): Mitch Mendoza MD on 12/15/2024 at 2:22 PM     Finalized by (CST): Mitch Mendoza MD on 12/15/2024 at 2:22 PM         Assessment:    UTI  BACTEREMIA  URINARY RETENTION  HISTORY OF BLADDER CANCER  LEFT HYDRONEPHROSIS RELATED TO VESICOURETERAL REFLUX SECONDARY TO BLADDER TUMOR RESECTIONS    Afebrile, VSS  Lactic acid 1.6  No leukocytosis  Serum creat 2.41 (was 2.36 on 12/5/24, 1.78 on 7/24/24)  Urine culture 12/15/24:  >100K CFU/mL E. coli  Blood culture 12/15/24: prelim E. Coli  Blood culture 12/15/24: prelim no growth after 1 day  Blood culture PCR 12/15/24: E. Coli by PCR detected, CTX-M (ESBL gene) by PCR not detected.     Plan:    -16 F coude harding catheter inserted with aseptic technique without difficulty.  ~1300 mL of urine drained from bladder.  Urojet utilized prior to harding catheter insertion. Will need to be discharged with harding catheter.  -Check final cultures  -Abx per ID  -Follow labs, temp, UOP  -CT cystogram cancelled as hydronephrosis likely related to urinary retention/reflux.  Renal US in 2-3 day - discussed with Dr. Campos.    -CPM with tamsulosin    Above discussed with patient, nurse, Dr. Campos.    Annelise Barba PA-C  [facility]  Department of Urology  12/17/2024  5:19 AM

## 2024-12-17 NOTE — PROGRESS NOTES
Patient is alert and oriented x4. Room air. Medications given per MAR. IVF infusing. PT/OT worked with the patient. Plan for CT cystogram tomorrow. Safety precautions in place. Call light within reach.

## 2024-12-17 NOTE — PROGRESS NOTES
[facility]   part of [facility]     Hospitalist Progress Note     Thiago Alcantara Patient Status:  Inpatient    1940 MRN [id number]   Location [facility] 4NW-A Attending ASHVIN Bazan MD   Hosp Day # 2 PCP Tiffany Pierce MD     Chief Complaint: dysuria     Subjective:     Patient   c/o burning  - harding inserted   Harding able to be inserted this am by    CT cancelled as retention likely cause hydroneph     Objective:    Review of Systems:   A comprehensive review of systems was completed; pertinent positive and negatives stated in subjective.    Vital signs:  Temp:  [97.2 °F (36.2 °C)-97.8 °F (36.6 °C)] 97.4 °F (36.3 °C)  Pulse:  [64-87] 64  Resp:  [16-18] 18  BP: (113-137)/(54-63) 113/58  SpO2:  [95 %-100 %] 100 %    Physical Exam:    General: No acute distress  AO   Respiratory: No wheezes, no rhonchi clear unlabored   Cardiovascular: S1, S2, regular rate and rhythm NSR   Abdomen: Soft, Non-tender, non-distended, positive bowel sounds  Neuro: No new focal deficits.   Extremities:  trace  edema   Has screws in left ankle  healed wounds left leg     Diagnostic Data:    Labs:  Recent Labs   Lab 12/15/24  1404 24  0532 24  0426   WBC 6.7 7.4 6.8   HGB 9.3* 8.7* 8.2*   MCV 72.2* 66.8* 66.0*   .0 237.0 252.0   BAND  --   --  2   INR 1.28*  --   --        Recent Labs   Lab 12/15/24  1404 24  0549 24  0426   * 114* 103*   BUN 55* 63* 60*   CREATSERUM 2.41* 2.30* 2.08*   CA 10.2 9.5 9.4   ALB 4.3 3.9  --     140 143   K 5.4* 4.8 4.0    111 114*   CO2 20.0* 20.0* 18.0*   ALKPHO 95 83  --    AST 12 8  --    ALT 8* 8*  --    BILT 0.7 0.4  --    TP 7.2 6.5  --        Estimated Creatinine Clearance: 27.4 mL/min (A) (based on SCr of 2.08 mg/dL (H)).    Recent Labs   Lab 12/15/24  1404   TROPHS 3       Recent Labs   Lab 12/15/24  1404   PTP 16.2*   INR 1.28*                  Microbiology    Hospital Encounter on 12/15/24   1. Blood Culture     Status: None  (Preliminary result)    Collection Time: 12/15/24  3:42 PM    Specimen: Blood,peripheral   Result Value Ref Range    Blood Culture Result No Growth 1 Day N/A   2. Urine Culture, Routine     Status: Abnormal    Collection Time: 12/15/24  2:44 PM    Specimen: Urine, clean catch   Result Value Ref Range    Urine Culture >100,000 CFU/ML Escherichia coli (A) N/A       Susceptibility    Escherichia coli -  (no method available)     Amikacin <=2 Sensitive      Ampicillin >=32 Resistant      Ampicillin + Sulbactam 8 Sensitive      Cefazolin <=4 Sensitive      Ciprofloxacin >=4 Resistant      Gentamicin >=16 Resistant      Meropenem <=0.25 Sensitive      Levofloxacin >=8 Resistant      Nitrofurantoin <=16 Sensitive      Piperacillin + Tazobactam <=4 Sensitive      Tobramycin 8 Intermediate      Trimethoprim/Sulfa >=320 Resistant          Imaging: Reviewed in Epic.    Medications:    tamsulosin  0.4 mg Oral Nightly    lidocaine  10 mL Urethral Once    sodium ferric gluconate  125 mg Intravenous Daily    cefTRIAXone  2 g Intravenous Q24H    insulin aspart  1-68 Units Subcutaneous TID CC    insulin aspart  1-10 Units Subcutaneous TID AC and HS    apixaban  5 mg Oral BID    folic acid  1 mg Oral Daily    pantoprazole  20 mg Oral QAM AC    docusate sodium  100 mg Oral BID    polyethylene glycol (PEG 3350)  17 g Oral Daily    sennosides  8.6 mg Oral BID    pravastatin  10 mg Oral Daily    predniSONE  10 mg Oral Daily with breakfast       Assessment & Plan:      # bacteremia -   Blood cx P E coli by PCR   On zosyn   U cx  e coli    ID  following    Recently treated last 2 months w/ po abx for e coli per pt , had sepsis last spring was in isolation per pt       #Acute pyelonephritis, moderated left hydronephrosis and hydroureter, stricture?  #Bladder cancer  -IV abx rocephin   -Follow-up cultures  - blood +  ecoli  , U cx  e coli    -Urology following   - ct cystogram  dc'ed as retention  - harding inserted, flomax , prydrium added    Labs in am           #Hyperkalemia resolved w/ IVF   #Acute kidney injury on chronic kidney disease baseline cr 2.0 approx  -Monitor UOP, creatinine and electrolytes   - IVF  cont to help flush kidneys      #Constipation resolved had BM Sunday per pt   -Bowel care      #Right adrenal myelolipoma left adrenal adenoma  -Will need outpatient follow-up with oncologist      #CAD sp CABG  #Essential hypertension  #Dyslipidemia  #AAA  -Hold Lisinopril and Lasix  -Statin  -Monitor hemodynamics  - follows w/ Dr Hernandez for AAA      #Diabetes mellitus  -Correctional scale  -Carb scale  -Hold oral agents   - A1c 7.3      #Cerebrovascular disease     #Carcinoid tumor sp SOMMER lobectomy  #Thalassemia trait     #Rheumatoid arthritis  -Prednisone  -Hold Methotrexate     #VTE on DOAC  -DOAC  eliquis     # Anemia  iron edf/ folate def  Repeat iron studies  - low    IV iron while here  Follows w/ Dr Harvey as outpt     POC  Hgb 8.2   Cr 2.08   cont IVF   Waterman inserted   flomax started , pyridium also  prn pain meds   Ct cancelled   Blood cx 12/15 + e coli   ID following   Labs in am   Add ultram i  Will update da again  spoke w/ Hanane Ray NP    Supplementary Documentation:     Quality:  DVT Mechanical Prophylaxis:        DVT Pharmacologic Prophylaxis   Medication    apixaban (Eliquis) tab 5 mg                Code Status: Full Code  Waterman: Waterman catheter in place  Waterman Duration (in days):   Central line:    YONG:     Discharge is dependent on: clinical course  At this point Mr. Alcantara is expected to be discharge to: home     The 21st Century Cures Act makes medical notes like these available to patients in the interest of transparency. Please be advised this is a medical document. Medical documents are intended to carry relevant information, facts as evident, and the clinical opinion of the practitioner. The medical note is intended as peer to peer communication and may appear blunt or direct. It is written in  medical language and may contain abbreviations or verbiage that are unfamiliar.        Addendum:     Agree with above note except as documented below.   Harding placed overnight. Patient is unhappy about going home with harding.      Gen: NAD  CVS: s1s2  Resp: CTA  Abd: soft     #Ecoli bacteremia, complicated UTI/pyelonephritis  #Moderate left hydronephrosis/hydroureter 2/2 chronic reflux from prior bladder tumor resections  #TONJA/CKD stage III (baseline SCR 1.7-1.9) - 2.36 on admission  #Hx of bladder cancer s/p cysto 10/11/24  #DMII  #RA on prednisone, methotrexate  #Acute on chronic anemia  #VTE on doac   #Hx of carcinoid tumor s/p SOMMER lobectomy   #CAD sp CABG  #CVD with hx of CVA  #HTN  #AAA     Plan:   -Harding   -IV antibiotics; appreciate ID input   -No urologic intervention   -Follow cultures     Pt seen and examined independently. Chart reviewed. Labs and imaging over the last 24 hours have been personally reviewed.  I personally made/approved 100% of the management plan for this patient and take full responsibility for the patient management.   Note has been reviewed by me and modified as needed.  Exam and Impression/ Recs as noted above.  D/w staff.     Marisol Bazan MD

## 2024-12-17 NOTE — PLAN OF CARE
Assumed care at 1930  Received patient voiding in the urinal almost every 30mins with 50cc output, having pain in urinating.Informed Urology on call. Ordered bladder scan, if its 250cc, insert harding catheter.Ordered Pyridum too.  Patient voided 50cc, bladdder scan done - 723cc.Explained to patient of urinary retention and harding insertion but refused at this time.  2330:Patient agreed to insert harding. Patient was yelling and cursing when harding was being inserted, unable to advance. Coude used but still unable. Informed Urology.   Urology came in, bladder scan done 800ml.Patient refused catheter insertion.MD ordered Flomax, given. Will do bladder scan at 0600.

## 2024-12-17 NOTE — PLAN OF CARE
0600:Bladder scan 1200ml. Annelise APN inserted harding.Urine output 1400cc.Pt c/o pain in the penis, Tylenol given.  Problem: Patient/Family Goals  Goal: Patient/Family Long Term Goal  Description: Patient's Long Term Goal:resolve urinary retention    Interventions:  - harding catheter   -urology consult  -medicines  - See additional Care Plan goals for specific interventions  Outcome: Progressing  Goal: Patient/Family Short Term Goal  Description: Patient's Short Term Goal: pain controlled    Interventions:   - pain medicine as needed  - See additional Care Plan goals for specific interventions  Outcome: Progressing     Problem: PAIN - ADULT  Goal: Verbalizes/displays adequate comfort level or patient's stated pain goal  Description: INTERVENTIONS:  - Encourage pt to monitor pain and request assistance  - Assess pain using appropriate pain scale  - Administer analgesics based on type and severity of pain and evaluate response  - Implement non-pharmacological measures as appropriate and evaluate response  - Consider cultural and social influences on pain and pain management  - Manage/alleviate anxiety  - Utilize distraction and/or relaxation techniques  - Monitor for opioid side effects  - Notify MD/LIP if interventions unsuccessful or patient reports new pain  - Anticipate increased pain with activity and pre-medicate as appropriate  Outcome: Progressing     Problem: GENITOURINARY - ADULT  Goal: Absence of urinary retention  Description: INTERVENTIONS:  - Assess patient’s ability to void and empty bladder  - Monitor intake/output and perform bladder scan as needed  - Follow urinary retention protocol/standard of care  - Consider collaborating with pharmacy to review patient's medication profile  - Implement strategies to promote bladder emptying  Outcome: Progressing

## 2024-12-17 NOTE — CM/SW NOTE
12/17/24 1000   CM/SW Referral Data   Referral Source Social Work (self-referral)   Reason for Referral Discharge planning   Informant EMR   Patient Info   Patient's Home Environment House   Discharge Needs   Anticipated D/C needs No anticipated discharge needs     SW and RN discussed patient during morning rounds. At this time, there are no identified discharge planning needs per nursing.    Please place consult for social work if any needs are determined.     SW will continue to follow for plan of care changes and remain available for any additional DC needs or concerns.     Vida Muhammad MSW, LSW  Discharge Planner   [phone]

## 2024-12-17 NOTE — PLAN OF CARE
Patient is A/O x 4. VSS. Afebrile. Room air. Complaints of pain; PRN medication given with adequate relief. Harding catheter patent. Patient ambulates with assist of walker. Continent. 1800 ADA diet; tolerating well. All medications given per MAR.  IVF/abx infusing per orders. Safety precautions in place. Call light within reach.    Problem: Patient/Family Goals  Goal: Patient/Family Long Term Goal  Description: Patient's Long Term Goal:resolve urinary retention    Interventions:  - harding catheter   -urology consult  -medicines  - See additional Care Plan goals for specific interventions  12/17/2024 1450 by Brandi Pate RN  Outcome: Progressing  12/17/2024 1449 by Brandi Pate RN  Outcome: Progressing  Goal: Patient/Family Short Term Goal  Description: Patient's Short Term Goal: pain controlled    Interventions:   - pain medicine as needed  - See additional Care Plan goals for specific interventions  12/17/2024 1450 by Brandi Pate RN  Outcome: Progressing  12/17/2024 1449 by Brandi Pate RN  Outcome: Progressing     Problem: PAIN - ADULT  Goal: Verbalizes/displays adequate comfort level or patient's stated pain goal  Description: INTERVENTIONS:  - Encourage pt to monitor pain and request assistance  - Assess pain using appropriate pain scale  - Administer analgesics based on type and severity of pain and evaluate response  - Implement non-pharmacological measures as appropriate and evaluate response  - Consider cultural and social influences on pain and pain management  - Manage/alleviate anxiety  - Utilize distraction and/or relaxation techniques  - Monitor for opioid side effects  - Notify MD/LIP if interventions unsuccessful or patient reports new pain  - Anticipate increased pain with activity and pre-medicate as appropriate  12/17/2024 1450 by Brandi Pate RN  Outcome: Progressing  12/17/2024 1449 by Brandi Pate RN  Outcome: Progressing     Problem: GENITOURINARY - ADULT  Goal: Absence of urinary  retention  Description: INTERVENTIONS:  - Assess patient’s ability to void and empty bladder  - Monitor intake/output and perform bladder scan as needed  - Follow urinary retention protocol/standard of care  - Consider collaborating with pharmacy to review patient's medication profile  - Implement strategies to promote bladder emptying  12/17/2024 1450 by Brandi Pate, RN  Outcome: Progressing  12/17/2024 1449 by Brandi Pate, RN  Outcome: Progressing

## 2024-12-18 LAB
ANION GAP SERPL CALC-SCNC: 7 MMOL/L (ref 0–18)
BASOPHILS # BLD: 0.19 X10(3) UL (ref 0–0.2)
BASOPHILS NFR BLD: 2 %
BUN BLD-MCNC: 44 MG/DL (ref 9–23)
CALCIUM BLD-MCNC: 9.5 MG/DL (ref 8.7–10.4)
CHLORIDE SERPL-SCNC: 115 MMOL/L (ref 98–112)
CO2 SERPL-SCNC: 23 MMOL/L (ref 21–32)
CREAT BLD-MCNC: 1.67 MG/DL
EGFRCR SERPLBLD CKD-EPI 2021: 41 ML/MIN/1.73M2 (ref 60–?)
EOSINOPHIL # BLD: 0 X10(3) UL (ref 0–0.7)
EOSINOPHIL NFR BLD: 0 %
ERYTHROCYTE [DISTWIDTH] IN BLOOD BY AUTOMATED COUNT: 19.1 %
GLUCOSE BLD-MCNC: 117 MG/DL (ref 70–99)
GLUCOSE BLD-MCNC: 118 MG/DL (ref 70–99)
GLUCOSE BLD-MCNC: 136 MG/DL (ref 70–99)
GLUCOSE BLD-MCNC: 95 MG/DL (ref 70–99)
GLUCOSE BLD-MCNC: 96 MG/DL (ref 70–99)
HCT VFR BLD AUTO: 26.6 %
HGB BLD-MCNC: 8.2 G/DL
LYMPHOCYTES NFR BLD: 2.66 X10(3) UL (ref 1–4)
LYMPHOCYTES NFR BLD: 28 %
MCH RBC QN AUTO: 20.7 PG (ref 26–34)
MCHC RBC AUTO-ENTMCNC: 30.8 G/DL (ref 31–37)
MCV RBC AUTO: 67.2 FL
MONOCYTES # BLD: 0.86 X10(3) UL (ref 0.1–1)
MONOCYTES NFR BLD: 9 %
MYELOCYTES # BLD: 0.1 X10(3) UL
MYELOCYTES NFR BLD: 1 %
NEUTROPHILS # BLD AUTO: 5.07 X10 (3) UL (ref 1.5–7.7)
NEUTROPHILS NFR BLD: 60 %
NEUTS HYPERSEG # BLD: 5.7 X10(3) UL (ref 1.5–7.7)
NRBC BLD MANUAL-RTO: 1 %
OSMOLALITY SERPL CALC.SUM OF ELEC: 311 MOSM/KG (ref 275–295)
PLATELET # BLD AUTO: 263 10(3)UL (ref 150–450)
PLATELET MORPHOLOGY: NORMAL
POTASSIUM SERPL-SCNC: 4.6 MMOL/L (ref 3.5–5.1)
RBC # BLD AUTO: 3.96 X10(6)UL
SODIUM SERPL-SCNC: 145 MMOL/L (ref 136–145)
TOTAL CELLS COUNTED BLD: 100
WBC # BLD AUTO: 9.5 X10(3) UL (ref 4–11)

## 2024-12-18 PROCEDURE — 99233 SBSQ HOSP IP/OBS HIGH 50: CPT | Performed by: HOSPITALIST

## 2024-12-18 RX ORDER — TAMSULOSIN HYDROCHLORIDE 0.4 MG/1
0.4 CAPSULE ORAL NIGHTLY
Qty: 30 CAPSULE | Refills: 2 | Status: SHIPPED | OUTPATIENT
Start: 2024-12-18

## 2024-12-18 RX ORDER — SIMETHICONE 80 MG
80 TABLET,CHEWABLE ORAL EVERY 6 HOURS PRN
Status: DISCONTINUED | OUTPATIENT
Start: 2024-12-18 | End: 2024-12-20

## 2024-12-18 RX ORDER — SODIUM PHOSPHATE, DIBASIC AND SODIUM PHOSPHATE, MONOBASIC 7; 19 G/230ML; G/230ML
1 ENEMA RECTAL ONCE AS NEEDED
Status: COMPLETED | OUTPATIENT
Start: 2024-12-18 | End: 2024-12-18

## 2024-12-18 NOTE — PROGRESS NOTES
[facility]   part of [facility]     Hospitalist Progress Note     Thiago Alcantara Patient Status:  Inpatient    1940 MRN [id number]   Location [facility] 4NW-A Attending ASHVIN Bazan MD   Hosp Day # 3 PCP Tiffany Pierce MD     Chief Complaint: dysuria     Subjective:     No acute events, no new complaints, pain controlled, harding in.    Objective:    Review of Systems:   A comprehensive review of systems was completed; pertinent positive and negatives stated in subjective.    Vital signs:  Temp:  [97.8 °F (36.6 °C)-98.5 °F (36.9 °C)] 97.8 °F (36.6 °C)  Pulse:  [52-61] 52  Resp:  [16-18] 17  BP: (107-129)/(55-63) 107/55  SpO2:  [96 %-100 %] 99 %    Physical Exam:    General: No acute distress  AO   Respiratory: No wheezes, no rhonchi clear unlabored   Cardiovascular: S1, S2, regular rate and rhythm NSR   Abdomen: Soft, Non-tender, non-distended, positive bowel sounds, NO Bm since    Neuro: No new focal deficits.   Extremities:  trace  edema   Has screws in left ankle  healed wounds left leg     Diagnostic Data:    Labs:  Recent Labs   Lab 12/15/24  1404 24  0532 24  0426 24  0502   WBC 6.7 7.4 6.8 9.5   HGB 9.3* 8.7* 8.2* 8.2*   MCV 72.2* 66.8* 66.0* 67.2*   .0 237.0 252.0 263.0   BAND  --   --  2  --    INR 1.28*  --   --   --        Recent Labs   Lab 12/15/24  1404 24  0549 24  0426 24  0502   * 114* 103* 96   BUN 55* 63* 60* 44*   CREATSERUM 2.41* 2.30* 2.08* 1.67*   CA 10.2 9.5 9.4 9.5   ALB 4.3 3.9  --   --     140 143 145   K 5.4* 4.8 4.0 4.6    111 114* 115*   CO2 20.0* 20.0* 18.0* 23.0   ALKPHO 95 83  --   --    AST 12 8  --   --    ALT 8* 8*  --   --    BILT 0.7 0.4  --   --    TP 7.2 6.5  --   --        Estimated Creatinine Clearance: 34.2 mL/min (A) (based on SCr of 1.67 mg/dL (H)).    Recent Labs   Lab 12/15/24  1404   TROPHS 3       Recent Labs   Lab 12/15/24  1404   PTP 16.2*   INR 1.28*                   Microbiology    Hospital Encounter on 12/15/24   1. Blood Culture     Status: None (Preliminary result)    Collection Time: 12/15/24  3:42 PM    Specimen: Blood,peripheral   Result Value Ref Range    Blood Culture Result No Growth 2 Days N/A   2. Urine Culture, Routine     Status: Abnormal    Collection Time: 12/15/24  2:44 PM    Specimen: Urine, clean catch   Result Value Ref Range    Urine Culture >100,000 CFU/ML Escherichia coli (A) N/A       Susceptibility    Escherichia coli -  (no method available)     Amikacin <=2 Sensitive      Ampicillin >=32 Resistant      Ampicillin + Sulbactam 8 Sensitive      Cefazolin <=4 Sensitive      Ciprofloxacin >=4 Resistant      Gentamicin >=16 Resistant      Meropenem <=0.25 Sensitive      Levofloxacin >=8 Resistant      Nitrofurantoin <=16 Sensitive      Piperacillin + Tazobactam <=4 Sensitive      Tobramycin 8 Intermediate      Trimethoprim/Sulfa >=320 Resistant          Imaging: Reviewed in Epic.    Medications:    tamsulosin  0.4 mg Oral Nightly    lidocaine  10 mL Urethral Once    ceFAZolin  2 g Intravenous Q12H    sodium ferric gluconate  125 mg Intravenous Daily    insulin aspart  1-68 Units Subcutaneous TID CC    insulin aspart  1-10 Units Subcutaneous TID AC and HS    apixaban  5 mg Oral BID    folic acid  1 mg Oral Daily    pantoprazole  20 mg Oral QAM AC    docusate sodium  100 mg Oral BID    polyethylene glycol (PEG 3350)  17 g Oral Daily    sennosides  8.6 mg Oral BID    pravastatin  10 mg Oral Daily    predniSONE  10 mg Oral Daily with breakfast       Assessment & Plan:      # bacteremia -   Blood cx  E coli by PCR   On zosyn  changed to Ancef  will rio high dose keflex at dc per ID   U cx  e coli    ID  following    Recently treated last 2 months w/ po abx for e coli per pt , had sepsis last spring was in isolation per pt       #Acute pyelonephritis, moderated left hydronephrosis and hydroureter, stricture?  #Bladder cancer  -IV abx rocephin  changed to  ancef   -Follow-up cultures  - blood +  ecoli  , U cx  e coli    -Urology following   - ct cystogram  dc'ed as retention  - harding inserted, flomax , prydrium   - repeat CT scan per             #Hyperkalemia resolved w/ IVF   #Acute kidney injury on chronic kidney disease baseline cr 2.0 approx  -Monitor UOP, creatinine and electrolytes   - IVF  dc   CR lower       #Constipation  last Bm Sunday    -Bowel care   - enema if no BM today   - needs to walk      #Right adrenal myelolipoma left adrenal adenoma  -Will need outpatient follow-up with oncologist      #CAD sp CABG  #Essential hypertension  #Dyslipidemia  #AAA  -Hold Lisinopril and Lasix  -Statin  -Monitor hemodynamics  - follows w/ Dr Hernandez for AAA      #Diabetes mellitus  -Correctional scale  -Carb scale  -Hold oral agents   - A1c 7.3      #Cerebrovascular disease     #Carcinoid tumor sp SOMMER lobectomy  #Thalassemia trait     #Rheumatoid arthritis  -Prednisone  -Hold Methotrexate     #VTE on DOAC  -DOAC  eliquis     # Anemia  iron edf/ folate def  Repeat iron studies  - low    IV iron while here  Follows w/ Dr Harvey as outpt   Hgb 8.2 stable     POC  Cr lower 1.67  stop IVF   increased UO via harding last 24 hrs   Enema if no BM  Needs to walk   Repeat CT scan - per    Aware will be dc'ed w/ harding - not happy about that  Negar Ray NP    Supplementary Documentation:     Quality:  DVT Mechanical Prophylaxis:        DVT Pharmacologic Prophylaxis   Medication    apixaban (Eliquis) tab 5 mg                Code Status: Full Code  Harding: Harding catheter in place  Harding Duration (in days):   Central line:    YONG:     Discharge is dependent on: clinical course  At this point Mr. Alcantara is expected to be discharge to: home     The 21st Century Cures Act makes medical notes like these available to patients in the interest of transparency. Please be advised this is a medical document. Medical documents are intended to carry relevant information, facts as  evident, and the clinical opinion of the practitioner. The medical note is intended as peer to peer communication and may appear blunt or direct. It is written in medical language and may contain abbreviations or verbiage that are unfamiliar.     Addendum:    Pt seen and examined, agree with above.  No new complaints, no BM.    /55 (BP Location: Left arm)   Pulse 52   Temp 97.8 °F (36.6 °C) (Oral)   Resp 17   Ht 5' 8.5\" (1.74 m)   Wt 209 lb (94.8 kg)   SpO2 99%   BMI 31.32 kg/m²   AOx3, NAD  S1+S2, RRR  CTA b/l  Soft, NT, ND, +BS  No LE edema    IV abx adjusted per ID, repeat CT cancelled, US kidneys per urology, harding on discharge.    Nitesh Callahan MD  12/18/2024

## 2024-12-18 NOTE — PLAN OF CARE
Patient is A/O x 4. VSS. Afebrile. Room air. No complaints of pain. Harding catheter patent, draining clear, yellow urine. Patient ambulates with assist of walker. Up in chair, walking in hallway.  Continent. 1800 ADA diet; tolerating well. All medications given per MAR.  IVF/abx infusing per orders. PIV saline locked. Safety precautions in place. Call light within reach.  1345: Enema given. Patient tolerated well.  Patient complained of pain: prn Ultram given.   Patient has had several BMs  since enema given. (See flowsheets)    Problem: PAIN - ADULT  Goal: Verbalizes/displays adequate comfort level or patient's stated pain goal  Description: INTERVENTIONS:  - Encourage pt to monitor pain and request assistance  - Assess pain using appropriate pain scale  - Administer analgesics based on type and severity of pain and evaluate response  - Implement non-pharmacological measures as appropriate and evaluate response  - Consider cultural and social influences on pain and pain management  - Manage/alleviate anxiety  - Utilize distraction and/or relaxation techniques  - Monitor for opioid side effects  - Notify MD/LIP if interventions unsuccessful or patient reports new pain  - Anticipate increased pain with activity and pre-medicate as appropriate  Outcome: Progressing     Problem: Patient/Family Goals  Goal: Patient/Family Long Term Goal  Description: Patient's Long Term Goal:resolve urinary retention    Interventions:  - harding catheter   -urology consult  -medicines  - See additional Care Plan goals for specific interventions  Outcome: Progressing  Goal: Patient/Family Short Term Goal  Description: Patient's Short Term Goal: pain controlled    Interventions:   - pain medicine as needed  - See additional Care Plan goals for specific interventions  Outcome: Progressing     Problem: GENITOURINARY - ADULT  Goal: Absence of urinary retention  Description: INTERVENTIONS:  - Assess patient’s ability to void and empty bladder  -  Monitor intake/output and perform bladder scan as needed  - Follow urinary retention protocol/standard of care  - Consider collaborating with pharmacy to review patient's medication profile  - Implement strategies to promote bladder emptying  Outcome: Progressing

## 2024-12-18 NOTE — PHYSICAL THERAPY NOTE
Attempted to see pt for PT treatment session. Pt reports he just ambulated in pillai with nursing, is waiting for lunch, and waiting for an enema. Politely requests PT come back tomorrow. Will follow and re-attempt as able and appropriate.

## 2024-12-18 NOTE — PROGRESS NOTES
INFECTIOUS DISEASE  PROGRESS NOTE            [facility]    Thiago Alcantara Patient Status:  Inpatient    1940 MRN [id number]   [facility] 4NW-A Attending Nitesh Callahan MD   Hosp Day # 3 PCP Tiffany Pierce MD     Antibiotics: Cefazolin    Subjective:  : comfortable    Objective:  Temp:  [97.8 °F (36.6 °C)-98.5 °F (36.9 °C)] 97.8 °F (36.6 °C)  Pulse:  [52-61] 52  Resp:  [16-18] 17  BP: (107-129)/(55-63) 107/55  SpO2:  [96 %-100 %] 99 %    General: awake alert  Vital signs:Temp:  [97.8 °F (36.6 °C)-98.5 °F (36.9 °C)] 97.8 °F (36.6 °C)  Pulse:  [52-61] 52  Resp:  [16-18] 17  BP: (107-129)/(55-63) 107/55  SpO2:  [96 %-100 %] 99 %  HEENT: Moist mucous membranes. Extraocular muscles are intact.  Neck: supple no masses  Respiratory: Non labored, symmetric excursion, normal respirations  Cardiovascular: no irregularities in rhythm  Abdomen: Soft, nontender, nondistended.   Musculoskeletal: joints: no swelling   Integument: No lesions. No erythema. No open wounds.  Waterman in place  Labs:     COVID-19 Lab Results    COVID-19  Lab Results   Component Value Date    COVID19 Not Detected 2024    COVID19 Not Detected 2020    COVID19 Not Detected 2020       Pro-Calcitonin  No results for input(s): \"PCT\" in the last 168 hours.    Cardiac  Recent Labs   Lab 12/15/24  1404   PBNP 262       Creatinine Kinase  No results for input(s): \"CK\" in the last 168 hours.    Inflammatory Markers  Recent Labs   Lab 24  0549   ELIZABETH 263       Recent Labs   Lab 24  0502   RBC 3.96   HGB 8.2*   HCT 26.6*   MCV 67.2*   MCH 20.7*   MCHC 30.8*   RDW 19.1   NEPRELIM 5.07   WBC 9.5   .0   NEUT 60   LYMPH 28   MON 9   EOS 0   NRBC 1*         Recent Labs   Lab 12/15/24  1404 24  0549 24  0426 24  0502   * 114* 103* 96   BUN 55* 63* 60* 44*   CREATSERUM 2.41* 2.30* 2.08* 1.67*   CA 10.2 9.5 9.4 9.5   ALB 4.3 3.9  --   --     140 143 145   K 5.4* 4.8 4.0 4.6     111 114* 115*   CO2 20.0* 20.0* 18.0* 23.0   ALKPHO 95 83  --   --    AST 12 8  --   --    ALT 8* 8*  --   --    BILT 0.7 0.4  --   --    TP 7.2 6.5  --   --        Vancomycin Trough (ug/mL)   Date Value   04/22/2024 6.7 (L)     Microbiology    Hospital Encounter on 12/15/24   1. Blood Culture     Status: None (Preliminary result)    Collection Time: 12/15/24  3:42 PM    Specimen: Blood,peripheral   Result Value Ref Range    Blood Culture Result No Growth 2 Days N/A   2. Urine Culture, Routine     Status: Abnormal    Collection Time: 12/15/24  2:44 PM    Specimen: Urine, clean catch   Result Value Ref Range    Urine Culture >100,000 CFU/ML Escherichia coli (A) N/A       Susceptibility    Escherichia coli -  (no method available)     Amikacin <=2 Sensitive      Ampicillin >=32 Resistant      Ampicillin + Sulbactam 8 Sensitive      Cefazolin <=4 Sensitive      Ciprofloxacin >=4 Resistant      Gentamicin >=16 Resistant      Meropenem <=0.25 Sensitive      Levofloxacin >=8 Resistant      Nitrofurantoin <=16 Sensitive      Piperacillin + Tazobactam <=4 Sensitive      Tobramycin 8 Intermediate      Trimethoprim/Sulfa >=320 Resistant            Problem list reviewed:  Patient Active Problem List   Diagnosis    Carcinoid tumor of left lung (HCC)    History of bladder cancer    Hypogonadism in male    Erectile dysfunction    TIA (transient ischemic attack)    Varicose veins of both lower extremities with complications    Obesity    High cholesterol    Neuropathy    Aneurysm of abdominal aorta (HCC)    Bladder tumor    Controlled type 2 diabetes mellitus with diabetic neuropathy (HCC)    BPH with urinary obstruction    Type 2 diabetes mellitus (HCC)    Osteoarthritis    Elevated PSA    Hematuria    Hematuria, unspecified type    Pyelonephritis    Elevated INR    Gangrene (HCC)    Acute deep vein thrombosis (DVT) of proximal vein of left lower extremity (HCC)    Leg DVT (deep venous thromboembolism), acute, left (HCC)     Microcytic anemia    Benign carcinoid tumor of lung (HCC)    Urothelial cancer (HCC)    Iron deficiency anemia    Folic acid deficiency    Hemolytic anemia, acute (HCC)    Severe sepsis (HCC)    Cellulitis of left lower extremity    Chronic kidney disease, unspecified CKD stage    Hyperkalemia    Encephalopathy in sepsis    Acute cystitis without hematuria    Septic shock (HCC)    TONJA (acute kidney injury) (HCC)    Metabolic acidosis    Tachycardia    Pain of left lower extremity    Urinary tract infection without hematuria, site unspecified    Acute pyelonephritis             ASSESSMENT/PLAN:  E coli UTI and bacteremia in assocatiation with urinary retention/urethral stricutre  CT showing left sided hydro and pyelonephririts   following  Waterman was placed    Continue Cefazolin  -PO cephalexin when discharged  Repeat US in am per urology        Al Nielsen MD, MD [facility]  [phone]

## 2024-12-18 NOTE — PLAN OF CARE
Pt received A&Ox4. VSS. RA. Tele. Afebrile. Pt c/o constipation & penile pain, tramadol given prn. Medications given per MAR. Call light within reach. Fall precautions in place.    Problem: PAIN - ADULT  Goal: Verbalizes/displays adequate comfort level or patient's stated pain goal  Description: INTERVENTIONS:  - Encourage pt to monitor pain and request assistance  - Assess pain using appropriate pain scale  - Administer analgesics based on type and severity of pain and evaluate response  - Implement non-pharmacological measures as appropriate and evaluate response  - Consider cultural and social influences on pain and pain management  - Manage/alleviate anxiety  - Utilize distraction and/or relaxation techniques  - Monitor for opioid side effects  - Notify MD/LIP if interventions unsuccessful or patient reports new pain  - Anticipate increased pain with activity and pre-medicate as appropriate  Outcome: Progressing     Problem: GENITOURINARY - ADULT  Goal: Absence of urinary retention  Description: INTERVENTIONS:  - Assess patient’s ability to void and empty bladder  - Monitor intake/output and perform bladder scan as needed  - Follow urinary retention protocol/standard of care  - Consider collaborating with pharmacy to review patient's medication profile  - Implement strategies to promote bladder emptying  Outcome: Progressing

## 2024-12-18 NOTE — PROGRESS NOTES
[facility]   part of [facility] ID PROGRESS NOTE    Thiago Alcantara Patient Status:  Inpatient    1940 MRN [id number]   Location [facility] 4NW-A Attending Elia Church MD   Hosp Day # 3 PCP Tiffany Pierce MD     Abx: IV CTX (12/15-)--> IV Ancef (-)    Subjective: Patient seen and examined today. Feeling better today. Pain around harding improved. Denies any abdominal pain or flank pain but does note some constipation. Afebrile. On room air.     Allergies:  Allergies[1]    Medications:    Current Facility-Administered Medications:     fleet enema (Fleet) rectal enema 133 mL, 1 enema, Rectal, Once PRN    tamsulosin (Flomax) cap 0.4 mg, 0.4 mg, Oral, Nightly    lidocaine (Urojet) 2 % urethral jelly 10 mL, 10 mL, Urethral, Once    traMADol (Ultram) tab 50 mg, 50 mg, Oral, Q12H PRN    ceFAZolin (Ancef) 2g in 10mL IV syringe premix, 2 g, Intravenous, Q12H    sodium ferric gluconate (Ferrlecit) 125 mg in sodium chloride 0.9% 100mL IVPB premix, 125 mg, Intravenous, Daily    lidocaine (Urojet) 2 % urethral jelly 10 mL, 10 mL, Urethral, PRN    glucose (Dex4) 15 GM/59ML oral liquid 15 g, 15 g, Oral, Q15 Min PRN **OR** glucose (Glutose) 40% oral gel 15 g, 15 g, Oral, Q15 Min PRN **OR** glucose-vitamin C (Dex-4) chewable tab 4 tablet, 4 tablet, Oral, Q15 Min PRN **OR** dextrose 50% injection 50 mL, 50 mL, Intravenous, Q15 Min PRN **OR** glucose (Dex4) 15 GM/59ML oral liquid 30 g, 30 g, Oral, Q15 Min PRN **OR** glucose (Glutose) 40% oral gel 30 g, 30 g, Oral, Q15 Min PRN **OR** glucose-vitamin C (Dex-4) chewable tab 8 tablet, 8 tablet, Oral, Q15 Min PRN    acetaminophen (Tylenol Extra Strength) tab 1,000 mg, 1,000 mg, Oral, Q6H PRN    ondansetron (Zofran) 4 MG/2ML injection 4 mg, 4 mg, Intravenous, Q6H PRN    metoclopramide (Reglan) 5 mg/mL injection 5 mg, 5 mg, Intravenous, Q8H PRN    polyethylene glycol (PEG 3350) (Miralax) 17 g oral packet 17 g, 17 g, Oral, Daily PRN     sennosides (Senokot) tab 17.2 mg, 17.2 mg, Oral, Nightly PRN    bisacodyl (Dulcolax) 10 MG rectal suppository 10 mg, 10 mg, Rectal, Daily PRN    insulin aspart (NovoLOG) 100 Units/mL FlexPen 1-68 Units, 1-68 Units, Subcutaneous, TID CC    insulin aspart (NovoLOG) 100 Units/mL FlexPen 1-10 Units, 1-10 Units, Subcutaneous, TID AC and HS    apixaban (Eliquis) tab 5 mg, 5 mg, Oral, BID    folic acid (Folvite) tab 1 mg, 1 mg, Oral, Daily    pantoprazole (Protonix) DR tab 20 mg, 20 mg, Oral, QAM AC    docusate sodium (Colace) cap 100 mg, 100 mg, Oral, BID    polyethylene glycol (PEG 3350) (Miralax) 17 g oral packet 17 g, 17 g, Oral, Daily    sennosides (Senokot) tab 8.6 mg, 8.6 mg, Oral, BID    pravastatin (Pravachol) tab 10 mg, 10 mg, Oral, Daily    predniSONE (Deltasone) tab 10 mg, 10 mg, Oral, Daily with breakfast    acetaminophen (Tylenol Extra Strength) tab 500 mg, 500 mg, Oral, Q4H PRN    Review of Systems:  Completed. See pertinent positives and negatives above.     Physical Exam:  Vital signs: Blood pressure 107/55, pulse 52, temperature 97.8 °F (36.6 °C), temperature source Oral, resp. rate 17, height 174 cm (5' 8.5\"), weight 209 lb (94.8 kg), SpO2 99%.    General: Alert, oriented, NAD, on room air.   HEENT: Moist mucous membranes.   Neck: No lymphadenopathy.  Supple.  Cardiovascular: RRR  Respiratory: Clear to auscultation bilaterally.  No wheezes. No rhonchi.  Abdomen: Soft, nontender, nondistended.   Musculoskeletal: Movement of all extremities.  Integument: No lesions. No erythema.    Laboratory Data:  Recent Labs   Lab 12/18/24  0502   RBC 3.96   HGB 8.2*   HCT 26.6*   MCV 67.2*   MCH 20.7*   MCHC 30.8*   RDW 19.1   NEPRELIM 5.07   WBC 9.5   .0   NEUT 60   LYMPH 28   MON 9   EOS 0   NRBC 1*     Recent Labs   Lab 12/15/24  1404 12/16/24  0549 12/17/24  0426 12/18/24  0502   * 114* 103* 96   BUN 55* 63* 60* 44*   CREATSERUM 2.41* 2.30* 2.08* 1.67*   CA 10.2 9.5 9.4 9.5   ALB 4.3 3.9  --   --      140 143 145   K 5.4* 4.8 4.0 4.6    111 114* 115*   CO2 20.0* 20.0* 18.0* 23.0   ALKPHO 95 83  --   --    AST 12 8  --   --    ALT 8* 8*  --   --    BILT 0.7 0.4  --   --    TP 7.2 6.5  --   --        Microbiology: Reviewed in EMR    Radiology: Reviewed.    PROCEDURE:  CT ABDOMEN+PELVIS (CPT=74176)     COMPARISON:  [facility] , CT, CTA PELVIS W CTA BILAT LEG RUNOFF W IV CONTRAST(CPT=75635), 4/19/2024, 2:45 PM.  [facility] , CT, CT CHEST+ABDOMEN (ALL CNTRST ONLY) (CLH=43791/94093), 9/08/2023, 7:07 AM.  [facility] , CT, CT CHEST+ABDOMEN+PELVIS(CPT=71250/70332),  10/26/2022, 7:19 AM.  [facility] , CT, CT CHEST+ABDOMEN+PELVIS(ALL CNTRST ONLY)(CPT=71260/64244), 7/29/2020, 5:46 PM.     INDICATIONS:  Abd pain, difficulty with urination     TECHNIQUE:  Unenhanced multislice CT scanning was performed from the dome of the diaphragm to the pubic symphysis.  Dose reduction techniques were used. Dose information is transmitted to the ACR (American College of Radiology) NRDR (National Radiology  Data Registry) which includes the Dose Index Registry.     PATIENT STATED HISTORY: (As transcribed by Technologist)  Patient with diffuse abdomen pain and difficulty urinating.      FINDINGS:    LIVER:  No enlargement, atrophy, abnormal density, or significant focal lesion.    BILIARY:  Multiple calcified stones throughout the gallbladder.  No ductal dilatation.  PANCREAS:  Diffuse atrophy of the pancreas with multiple coarse calcifications consistent chronic pancreatitis.  SPLEEN:  No enlargement or focal lesion.    KIDNEYS:  The right kidney is unremarkable.  The left kidney demonstrates moderate hydronephrosis and hydroureter to the level of the UVJ.  There is no evidence of stone or mass lesion obstructing the ureter.  This could be due to a stricture of the  distal ureter but could also be seen with reflux disease.  There is left perinephric stranding which is new finding which could be related to pyelonephritis.  ADRENALS:  Left  adrenal nodule measuring 27 x 24 mm is stable since previous study may represent adenoma.  There is an right adrenal myelolipoma which is stable measuring 8 mm.  AORTA/VASCULAR:    Aneurysmal dilatation of the abdominal aorta to 31 mm.  RETROPERITONEUM:  No mass or adenopathy.    BOWEL/MESENTERY:  No visible mass, obstruction, or bowel wall thickening.  Diverticulosis of the sigmoid and left colon as well as the transverse colon without ends of diverticulitis.  The appendix is unremarkable.  ABDOMINAL WALL:  No mass or hernia.    URINARY BLADDER:  No visible focal wall thickening, lesion, or calculus.    PELVIC NODES:  No adenopathy.    PELVIC ORGANS:  There is moderate prostatic hypertrophy.  BONES:  No bony lesion or fracture.    LUNG BASES:  No visible pulmonary or pleural disease.    OTHER:  Negative.      Impression:    CONCLUSION:       1. Moderate left-sided hydronephrosis and hydroureter could be due to a stricture of the distal ureter versus significant reflux disease.  Peggy nephric stranding on the left is suspicious for urinary tract infection and therefore pyelonephritis..     2. Sense of diverticulosis of the left colon without diverticulitis.       3. Cholelithiasis without biliary ductal obstruction.       4. Evidence of chronic pancreatitis.     5. Right adrenal myelolipoma left adrenal adenoma.     LOCATION:  [facility]     Dictated by (CST): Mitch Mendoza MD on 12/15/2024 at 3:57 PM      Finalized by (CST): Mitch Mendoza MD on 12/15/2024 at 4:03 PM      PROCEDURE:  XR CHEST AP PORTABLE  (CPT=71045)     TECHNIQUE:  AP chest radiograph was obtained.     COMPARISON:  [facility] , XR, XR CHEST AP PORTABLE  (CPT=71045), 4/19/2024, 2:08 PM.     INDICATIONS:  CAITLYN     PATIENT STATED HISTORY: (As transcribed by Technologist)  Patient has been having difficulty breathing.     FINDINGS:  The heart is normal in size.  The lungs are clear.  There are no pleural effusions.  The bones are unremarkable.    Impression:     CONCLUSION:  No acute disease.       LOCATION:  [facility]     Dictated by (CST): Mitch Mendoza MD on 12/15/2024 at 2:22 PM      Finalized by (CST): Mitch Mendoza MD on 12/15/2024 at 2:22 PM    ASSESSMENT:  E. Coli bacteremia. 1/2 Bcx + 12/15, d/t #2  UTI/L pyelonephritis d/t E. coli  Moderate left hydronephrosis/hydroureter with urinary retention. S/p harding placement 12/17.  H/o chronic hydronephrosis d/t vesicoureteral reflux 2/2 bladder tumor resections per urology.   H/o bladder cancer. s/p cysto 10/11/24 without tumor noted.   DM II, hgb A1C 7.3  RA, on prednisone and methotrexate prior to admission.   TONJA on CKD, improving  H/o carcinoid tumor sp SOMMER lobectomy  CAD, HTN, AAA    PLAN:  - continue IV Ancef for E. Coli bacteremia/pyelonephritis  - follow temps  - harding management as per urology- noted plans to keep on dc.  - follow creatinine  - plans to transition to po keflex on dc    Discussed case with RN and patient.     JODY [facility]  [phone]       [1]   Allergies  Allergen Reactions    Bacitracin-Neomycin-Polymyxin [Neomycin-Bacitracin-Polymyxin] RASH    Other OTHER (SEE COMMENTS)

## 2024-12-18 NOTE — PROGRESS NOTES
[facility]  Urology Progress Note    Thiago Alcantara Patient Status:  Inpatient    1940 MRN [id number]   Location [facility] 4NW-A Attending Elia Church MD   Hosp Day # 3 PCP Tiffany Pierce MD     Subjective:  Thiago Alcantara is a(n) 83 year old male.    Current complaints: Reports discomfort from harding catheter.  No fever.     Objective:  General appearance: alert, appears stated age, and cooperative  Blood pressure 107/55, pulse 52, temperature 97.8 °F (36.6 °C), temperature source Oral, resp. rate 17, height 5' 8.5\" (1.74 m), weight 209 lb (94.8 kg), SpO2 99%.  Lungs: non-labored respirations  Abdomen: soft, non-tender  : harding catheter in place draining yellow urine (UOP - 4700 mL/24 hours)  Lab Results   Component Value Date    WBC 9.5 2024    HGB 8.2 2024    HCT 26.6 2024    .0 2024    CREATSERUM 1.67 2024    BUN 44 2024     2024    K 4.6 2024     2024    CO2 23.0 2024    GLU 96 2024    CA 9.5 2024    PGLU 95 2024       Hospital Encounter on 12/15/24   1. Blood Culture     Status: None (Preliminary result)    Collection Time: 12/15/24  3:42 PM    Specimen: Blood,peripheral   Result Value Ref Range    Blood Culture Result No Growth 2 Days N/A   2. Urine Culture, Routine     Status: Abnormal    Collection Time: 12/15/24  2:44 PM    Specimen: Urine, clean catch   Result Value Ref Range    Urine Culture >100,000 CFU/ML Escherichia coli (A) N/A       Susceptibility    Escherichia coli -  (no method available)     Amikacin <=2 Sensitive      Ampicillin >=32 Resistant      Ampicillin + Sulbactam 8 Sensitive      Cefazolin <=4 Sensitive      Ciprofloxacin >=4 Resistant      Gentamicin >=16 Resistant      Meropenem <=0.25 Sensitive      Levofloxacin >=8 Resistant      Nitrofurantoin <=16 Sensitive      Piperacillin + Tazobactam <=4 Sensitive      Tobramycin 8 Intermediate       Trimethoprim/Sulfa >=320 Resistant         No results found.     Assessment:    UTI  BACTEREMIA  URINARY RETENTION    -harding catheter placed 12/17/24 - ~1300 mL of urine drained from bladder  HISTORY OF BLADDER CANCER  LEFT HYDRONEPHROSIS RELATED TO VESICOURETERAL REFLUX SECONDARY TO BLADDER TUMOR RESECTIONS//URINARY RETENTION     Afebrile, VSS  Lactic acid 1.6  No leukocytosis  Serum creat 2.41 > 2.3 > 2.08 > 1.67 (was 2.36 on 12/5/24, 1.78 on 7/24/24)  Urine culture 12/15/24:  >100K CFU/mL E. coli  Blood culture 12/15/24: prelim E. Coli  Blood culture 12/15/24: prelim no growth after 2 days  Blood culture PCR 12/15/24: E. Coli by PCR detected, CTX-M (ESBL gene) by PCR not detected.     Plan:    Check final culture results  Abx per ID  Follow labs, temp, UOP  CPM with harding catheter  Renal US tomorrow  CPM with tamsulosin    Above discussed with patient    MARTIN Rivera [facility]  Department of Urology  12/18/2024  7:27 AM    Addendum:  Spoke with Dr. Campos - patient to follow-up with RN in 1 week for a voiding trial and with Dr. Campos in 4 weeks. TE sent through [employer] system to help coordinate appts.      Will update patient.    VALENTINO Hobbs  Urology

## 2024-12-19 ENCOUNTER — APPOINTMENT (OUTPATIENT)
Dept: ULTRASOUND IMAGING | Facility: HOSPITAL | Age: 84
End: 2024-12-19
Attending: PHYSICIAN ASSISTANT
Payer: MEDICARE

## 2024-12-19 LAB
GLUCOSE BLD-MCNC: 146 MG/DL (ref 70–99)
GLUCOSE BLD-MCNC: 156 MG/DL (ref 70–99)
GLUCOSE BLD-MCNC: 81 MG/DL (ref 70–99)
GLUCOSE BLD-MCNC: 97 MG/DL (ref 70–99)

## 2024-12-19 PROCEDURE — 76775 US EXAM ABDO BACK WALL LIM: CPT | Performed by: PHYSICIAN ASSISTANT

## 2024-12-19 PROCEDURE — 99233 SBSQ HOSP IP/OBS HIGH 50: CPT | Performed by: HOSPITALIST

## 2024-12-19 NOTE — PROGRESS NOTES
[facility]   part of [facility] ID PROGRESS NOTE    Thiago Alcantara Patient Status:  Inpatient    1940 MRN [id number]   Location [facility] 4NW-A Attending Elia Church MD   Hosp Day # 4 PCP Tiffany Pierce MD     Abx: IV CTX (12/15-)--> IV Ancef (-)    Subjective: Patient seen and examined today. Feeling better. Denies any abdominal pain or flank pain. Discomfort only at harding site if pulled on. Had BMs yesterday. Afebrile.     Allergies:  Allergies[1]    Medications:    Current Facility-Administered Medications:     simethicone (Mylicon) chewable tab 80 mg, 80 mg, Oral, Q6H PRN    tamsulosin (Flomax) cap 0.4 mg, 0.4 mg, Oral, Nightly    lidocaine (Urojet) 2 % urethral jelly 10 mL, 10 mL, Urethral, Once    traMADol (Ultram) tab 50 mg, 50 mg, Oral, Q12H PRN    ceFAZolin (Ancef) 2g in 10mL IV syringe premix, 2 g, Intravenous, Q12H    sodium ferric gluconate (Ferrlecit) 125 mg in sodium chloride 0.9% 100mL IVPB premix, 125 mg, Intravenous, Daily    lidocaine (Urojet) 2 % urethral jelly 10 mL, 10 mL, Urethral, PRN    glucose (Dex4) 15 GM/59ML oral liquid 15 g, 15 g, Oral, Q15 Min PRN **OR** glucose (Glutose) 40% oral gel 15 g, 15 g, Oral, Q15 Min PRN **OR** glucose-vitamin C (Dex-4) chewable tab 4 tablet, 4 tablet, Oral, Q15 Min PRN **OR** dextrose 50% injection 50 mL, 50 mL, Intravenous, Q15 Min PRN **OR** glucose (Dex4) 15 GM/59ML oral liquid 30 g, 30 g, Oral, Q15 Min PRN **OR** glucose (Glutose) 40% oral gel 30 g, 30 g, Oral, Q15 Min PRN **OR** glucose-vitamin C (Dex-4) chewable tab 8 tablet, 8 tablet, Oral, Q15 Min PRN    acetaminophen (Tylenol Extra Strength) tab 1,000 mg, 1,000 mg, Oral, Q6H PRN    ondansetron (Zofran) 4 MG/2ML injection 4 mg, 4 mg, Intravenous, Q6H PRN    metoclopramide (Reglan) 5 mg/mL injection 5 mg, 5 mg, Intravenous, Q8H PRN    polyethylene glycol (PEG 3350) (Miralax) 17 g oral packet 17 g, 17 g, Oral, Daily PRN    sennosides (Senokot) tab  17.2 mg, 17.2 mg, Oral, Nightly PRN    bisacodyl (Dulcolax) 10 MG rectal suppository 10 mg, 10 mg, Rectal, Daily PRN    insulin aspart (NovoLOG) 100 Units/mL FlexPen 1-68 Units, 1-68 Units, Subcutaneous, TID CC    insulin aspart (NovoLOG) 100 Units/mL FlexPen 1-10 Units, 1-10 Units, Subcutaneous, TID AC and HS    apixaban (Eliquis) tab 5 mg, 5 mg, Oral, BID    folic acid (Folvite) tab 1 mg, 1 mg, Oral, Daily    pantoprazole (Protonix) DR tab 20 mg, 20 mg, Oral, QAM AC    docusate sodium (Colace) cap 100 mg, 100 mg, Oral, BID    polyethylene glycol (PEG 3350) (Miralax) 17 g oral packet 17 g, 17 g, Oral, Daily    sennosides (Senokot) tab 8.6 mg, 8.6 mg, Oral, BID    pravastatin (Pravachol) tab 10 mg, 10 mg, Oral, Daily    predniSONE (Deltasone) tab 10 mg, 10 mg, Oral, Daily with breakfast    acetaminophen (Tylenol Extra Strength) tab 500 mg, 500 mg, Oral, Q4H PRN    Review of Systems:  Completed. See pertinent positives and negatives above.     Physical Exam:  Vital signs: Blood pressure 119/56, pulse 59, temperature 97.4 °F (36.3 °C), temperature source Oral, resp. rate 16, height 174 cm (5' 8.5\"), weight 209 lb (94.8 kg), SpO2 100%.    General: Alert, oriented, NAD, on room air.   HEENT: Moist mucous membranes.   Neck: No lymphadenopathy.  Supple.  Cardiovascular: RRR  Respiratory: Clear to auscultation bilaterally.  No wheezes. No rhonchi.  Abdomen: Soft, nontender, nondistended.   Musculoskeletal: Movement of all extremities.  Integument: No lesions. No erythema.  + Waterman with clear/yellow urine noted    Laboratory Data:  Recent Labs   Lab 12/18/24  0502   RBC 3.96   HGB 8.2*   HCT 26.6*   MCV 67.2*   MCH 20.7*   MCHC 30.8*   RDW 19.1   NEPRELIM 5.07   WBC 9.5   .0   NEUT 60   LYMPH 28   MON 9   EOS 0   NRBC 1*     Recent Labs   Lab 12/15/24  1404 12/16/24  0549 12/17/24  0426 12/18/24  0502   * 114* 103* 96   BUN 55* 63* 60* 44*   CREATSERUM 2.41* 2.30* 2.08* 1.67*   CA 10.2 9.5 9.4 9.5   ALB 4.3  3.9  --   --     140 143 145   K 5.4* 4.8 4.0 4.6    111 114* 115*   CO2 20.0* 20.0* 18.0* 23.0   ALKPHO 95 83  --   --    AST 12 8  --   --    ALT 8* 8*  --   --    BILT 0.7 0.4  --   --    TP 7.2 6.5  --   --        Microbiology: Reviewed in EMR    Radiology: Reviewed.    PROCEDURE:  CT ABDOMEN+PELVIS (CPT=74176)     COMPARISON:  [facility] , CT, CTA PELVIS W CTA BILAT LEG RUNOFF W IV CONTRAST(CPT=75635), 4/19/2024, 2:45 PM.  [facility] , CT, CT CHEST+ABDOMEN (ALL CNTRST ONLY) (UWK=96817/45884), 9/08/2023, 7:07 AM.  [facility] , CT, CT CHEST+ABDOMEN+PELVIS(CPT=71250/25570),  10/26/2022, 7:19 AM.  [facility] , CT, CT CHEST+ABDOMEN+PELVIS(ALL CNTRST ONLY)(CPT=71260/92821), 7/29/2020, 5:46 PM.     INDICATIONS:  Abd pain, difficulty with urination     TECHNIQUE:  Unenhanced multislice CT scanning was performed from the dome of the diaphragm to the pubic symphysis.  Dose reduction techniques were used. Dose information is transmitted to the ACR (American College of Radiology) NRDR (National Radiology  Data Registry) which includes the Dose Index Registry.     PATIENT STATED HISTORY: (As transcribed by Technologist)  Patient with diffuse abdomen pain and difficulty urinating.      FINDINGS:    LIVER:  No enlargement, atrophy, abnormal density, or significant focal lesion.    BILIARY:  Multiple calcified stones throughout the gallbladder.  No ductal dilatation.  PANCREAS:  Diffuse atrophy of the pancreas with multiple coarse calcifications consistent chronic pancreatitis.  SPLEEN:  No enlargement or focal lesion.    KIDNEYS:  The right kidney is unremarkable.  The left kidney demonstrates moderate hydronephrosis and hydroureter to the level of the UVJ.  There is no evidence of stone or mass lesion obstructing the ureter.  This could be due to a stricture of the  distal ureter but could also be seen with reflux disease.  There is left perinephric stranding which is new finding which could be related to  pyelonephritis.  ADRENALS:  Left adrenal nodule measuring 27 x 24 mm is stable since previous study may represent adenoma.  There is an right adrenal myelolipoma which is stable measuring 8 mm.  AORTA/VASCULAR:    Aneurysmal dilatation of the abdominal aorta to 31 mm.  RETROPERITONEUM:  No mass or adenopathy.    BOWEL/MESENTERY:  No visible mass, obstruction, or bowel wall thickening.  Diverticulosis of the sigmoid and left colon as well as the transverse colon without ends of diverticulitis.  The appendix is unremarkable.  ABDOMINAL WALL:  No mass or hernia.    URINARY BLADDER:  No visible focal wall thickening, lesion, or calculus.    PELVIC NODES:  No adenopathy.    PELVIC ORGANS:  There is moderate prostatic hypertrophy.  BONES:  No bony lesion or fracture.    LUNG BASES:  No visible pulmonary or pleural disease.    OTHER:  Negative.      Impression:    CONCLUSION:       1. Moderate left-sided hydronephrosis and hydroureter could be due to a stricture of the distal ureter versus significant reflux disease.  Peggy nephric stranding on the left is suspicious for urinary tract infection and therefore pyelonephritis..     2. Sense of diverticulosis of the left colon without diverticulitis.       3. Cholelithiasis without biliary ductal obstruction.       4. Evidence of chronic pancreatitis.     5. Right adrenal myelolipoma left adrenal adenoma.     LOCATION:  [facility]     Dictated by (CST): Mitch Mendoza MD on 12/15/2024 at 3:57 PM      Finalized by (CST): Mitch Mendoza MD on 12/15/2024 at 4:03 PM      PROCEDURE:  XR CHEST AP PORTABLE  (CPT=71045)     TECHNIQUE:  AP chest radiograph was obtained.     COMPARISON:  [facility] , XR, XR CHEST AP PORTABLE  (CPT=71045), 4/19/2024, 2:08 PM.     INDICATIONS:  CAITLYN     PATIENT STATED HISTORY: (As transcribed by Technologist)  Patient has been having difficulty breathing.     FINDINGS:  The heart is normal in size.  The lungs are clear.  There are no pleural effusions.  The bones are  unremarkable.    Impression:    CONCLUSION:  No acute disease.       LOCATION:  [facility]     Dictated by (CST): Mitch Mendoza MD on 12/15/2024 at 2:22 PM      Finalized by (CST): Mitch Mendoza MD on 12/15/2024 at 2:22 PM    ASSESSMENT:  E. Coli bacteremia. 1/2 Bcx + 12/15, d/t #2  UTI/L pyelonephritis d/t E. coli  Moderate left hydronephrosis/hydroureter with urinary retention. S/p harding placement 12/17.  H/o chronic hydronephrosis d/t vesicoureteral reflux 2/2 bladder tumor resections per urology.   H/o bladder cancer. s/p cysto 10/11/24 without tumor noted.   DM II, hgb A1C 7.3  RA, on prednisone and methotrexate prior to admission.   TONJA on CKD, improving  H/o carcinoid tumor sp SOMMER lobectomy  CAD, HTN, AAA    PLAN:  - continue IV Ancef for E. Coli bacteremia/pyelonephritis  - follow temps  - harding management as per urology- noted plans to keep on dc. Plans for renal US today.  - follow creatinine  - plans to transition to po keflex on dc    Discussed case with RN and patient.     JODY Harding   [facility]  [phone]         [1]   Allergies  Allergen Reactions    Bacitracin-Neomycin-Polymyxin [Neomycin-Bacitracin-Polymyxin] RASH    Other OTHER (SEE COMMENTS)

## 2024-12-19 NOTE — PLAN OF CARE
Pt received A&Ox4. VSS. RA. Tele. Afebrile. Pt c/o gas & penile pain, simethicone & tylenol given prn. Medications given per MAR. Call light within reach. Fall precautions in place. Waterman intact & draining.     Problem: PAIN - ADULT  Goal: Verbalizes/displays adequate comfort level or patient's stated pain goal  Description: INTERVENTIONS:  - Encourage pt to monitor pain and request assistance  - Assess pain using appropriate pain scale  - Administer analgesics based on type and severity of pain and evaluate response  - Implement non-pharmacological measures as appropriate and evaluate response  - Consider cultural and social influences on pain and pain management  - Manage/alleviate anxiety  - Utilize distraction and/or relaxation techniques  - Monitor for opioid side effects  - Notify MD/LIP if interventions unsuccessful or patient reports new pain  - Anticipate increased pain with activity and pre-medicate as appropriate  Outcome: Progressing     Problem: GENITOURINARY - ADULT  Goal: Absence of urinary retention  Description: INTERVENTIONS:  - Assess patient’s ability to void and empty bladder  - Monitor intake/output and perform bladder scan as needed  - Follow urinary retention protocol/standard of care  - Consider collaborating with pharmacy to review patient's medication profile  - Implement strategies to promote bladder emptying  Outcome: Progressing

## 2024-12-19 NOTE — PHYSICAL THERAPY NOTE
PHYSICAL THERAPY TREATMENT NOTE - INPATIENT    Room Number: 407/407-A     Session: 1     Number of Visits to Meet Established Goals: 3    Presenting Problem: UTI  Co-Morbidities : h/o bladder cancer, CVA, HTN, CKD, VTE on AC, DM, anemia, CAD/CABG, RA    PHYSICAL THERAPY ASSESSMENT   Patient demonstrates good  progress this session, goals  updated to reflect patient performance.      Patient is requiring supervision as a result of the following impairments: decreased functional strength and decreased endurance/aerobic capacity.     Patient continues to function near baseline with bed mobility and transfers.  Next session anticipate patient to progress gait and stair negotiation.  Physical Therapy will continue to follow patient for duration of hospitalization.  Pt declined to try stairs today. Will follow up one more session, if pt continues to decline, PT may sign off. Patient is amb with RW and sup at this time.   Patient continues to benefit from continued skilled PT services: with no additional skilled services but increased support at home.    PLAN DURING HOSPITALIZATION  Nursing Mobility Recommendation : 1 Assist     PT Treatment Plan: Bed mobility;Body mechanics;Energy conservation;Endurance;Patient education;Family education;Gait training;Range of motion;Strengthening;Stair training;Transfer training;Balance training  Frequency (Obs):  (2-3x/week)     CURRENT GOALS     Goal #1    Goal #2 Patient is able to negotiate 1 flight of stairs with supervision to ensure safety at [place].  Patient declined any need of stair training at 11 am on 12/19/2024   Goal #3 Patient is able to ambulate 300 feet with assist device: none at assistance level: supervision     Goal #4    Goal #5    Goal #6    Goal Comments: Goals established on 12/16/2024 12/19/2024 all goals ongoing.    SUBJECTIVE  \" I don't want to do stairs because I don't really have to, I can stay on one level.\"      OBJECTIVE  Precautions: Bed/chair  alarm    WEIGHT BEARING RESTRICTION     PAIN ASSESSMENT   Rating: Unable to rate  Location: abdomen  Management Techniques: Activity promotion;Body mechanics;Relaxation;Repositioning    BALANCE                                                                                                                       Static Sitting: Good  Dynamic Sitting: Good           Static Standing: Good  Dynamic Standing: Fair +    ACTIVITY TOLERANCE                         O2 WALK       AM-PAC '6-Clicks' INPATIENT SHORT FORM - BASIC MOBILITY  How much difficulty does the patient currently have...  Patient Difficulty: Turning over in bed (including adjusting bedclothes, sheets and blankets)?: None   Patient Difficulty: Sitting down on and standing up from a chair with arms (e.g., wheelchair, bedside commode, etc.): None   Patient Difficulty: Moving from lying on back to sitting on the side of the bed?: None   How much help from another person does the patient currently need...   Help from Another: Moving to and from a bed to a chair (including a wheelchair)?: A Little   Help from Another: Need to walk in hospital room?: A Little   Help from Another: Climbing 3-5 steps with a railing?: A Little     AM-PAC Score:  Raw Score: 21   Approx Degree of Impairment: 28.97%   Standardized Score (AM-PAC Scale): 50.25   CMS Modifier (G-Code): CJ    FUNCTIONAL ABILITY STATUS  Gait Assessment   Functional Mobility/Gait Assessment  Gait Assistance: Supervision  Distance (ft): 220  Assistive Device: Rolling walker  Pattern:  (slow dimas)    Skilled Therapy Provided    Bed Mobility:  Rolling: NT  Supine<>Sit: mod ind   Sit<>Supine: NT     Transfer Mobility:  Sit<>Stand: mod ind   Stand<>Sit: Mod ind, good eccentric control    Gait: sup with RW.   Slow dimas, proper upright posture and dec step length.   Therapist's Comments: patient with painful harding catheter site, pain with movement of the pipe. Patient's RN was consulted and placement of hook  pad changed by RN.   Patient adamantly declined stair training today.   Education provided on  Benefits of upright position  Promotion of walking with nursing staff  Exercises   Body mechanics       THERAPEUTIC EXERCISES  Lower Extremity Ankle pumps  Heel raises  LAQ     Upper Extremity      Position Sitting     Repetitions   10   Sets   1     Patient End of Session: Up in chair;Needs met;Call light within reach;RN aware of session/findings;All patient questions and concerns addressed;Alarm set    PT Session Time: 23 minutes  Gait Training: 15 minutes  Therapeutic Activity: 8 minutes

## 2024-12-19 NOTE — DISCHARGE INSTRUCTIONS
Caring for Your Catheter    A harding catheter has been placed into your bladder to drain your urine.  A small balloon in the catheter is inflated and keeps the catheter in your bladder.  Proper care of your catheter and the drainage bag can help to prevent infections.    Care of the Catheter:  The catheter should be securely taped to your thigh or abdomen to prevent pulling or increased tension on the urethra.  At least once daily, gently wash the catheter starting where the catheter enters your body and cleanse down the entire length of the catheter. Use a mild soap.  Do not use any astringents or antibacterial soap. It's best done in the shower. Make sure that any encrustations on the catheter are washed away.  Rinse well.   If you are an uncircumcised male, push the foreskin back to clean and after cleaning the catheter push the foreskin back to its normal position  Care of the Drainage Bag       Empty the drainage bag when it is ½ to 1/3 full.  The drainage bag must always be to gravity drainage and must be below the level of your bladder.  The bag should never be left lying on the floor.  Drainage bags should be cleaned each time you switch from a leg bag to a night drainage bag.  If you do not switch from a leg bag to a night drainage system, you should wash your drainage bag 1 x/week.  After disconnecting the tubing from the catheter, pour a solution of 1 part bleach to 10 parts water through the tubing and the bag. Rinse the inside and the outside of the drainage bag with water to remove all the bleach solution so that no injury to your skin will develop.  Empty the drainage bag when it is ½ to 1/3 full.  The drainage bag must always have gravity drainage and must be below the level of your bladder.  What To Do If You Leak Around the Catheter:  Check the connections between the catheter and the drainage system to make sure they are intact and not the source of the leak.  Make sure the catheter is securely  taped and holding the catheter securely and not pulling.  Readjust as necessary.  Make sure that the catheter is gravity drainage.  Call the office. You may be experiencing bladder spasms. You may be asked to come in and have the catheter checked or you may be given a medication that stops the spasms.  Special Instructions:  Increase your fluid intake so that your urine output is between 1500 and 2000cc’s/day.  Prevent constipation.  Increase daily fiber or use Miralax as needed.  Contact Our Office If:  If you see excessive blood or clots in your urine  If you have a temperature that is greater than 101.  If you don’t see any urine in your drainage bag after 3-4 hours.  Check the position of the drainage bag first and also make sure the catheter isn’t kinked.  If you have any burning, pain, purulent drainage or bleeding from around the  catheter site.  If you have persistent leaking around the catheter.

## 2024-12-19 NOTE — PROGRESS NOTES
[facility]  Urology Progress Note    Thiago Alcantara Patient Status:  Inpatient    1940 MRN [id number]   Location [facility] 4NW-A Attending Nitesh Callahan MD   Hosp Day # 4 PCP Tiffany Pierce MD     Subjective:  Thiago Alcantara is a(n) 83 year old male.    Current complaints: Gets discomfort from harding if pulled on.  No abdominal/flank pain.  No fever. +BM.      Objective:  General appearance: alert, appears stated age, and cooperative  Blood pressure 114/66, pulse 54, temperature 97.4 °F (36.3 °C), temperature source Oral, resp. rate 17, height 5' 8.5\" (1.74 m), weight 209 lb (94.8 kg), SpO2 90%.  Lungs: non-labored respirations  Abdomen: soft, non-tender  No flank tenderness  : harding catheter in place draining yellow urine (UOP - 2250 mL/24 hours)  Lab Results   Component Value Date    PGLU 97 2024       Hospital Encounter on 12/15/24   1. Blood Culture     Status: None (Preliminary result)    Collection Time: 12/15/24  3:42 PM    Specimen: Blood,peripheral   Result Value Ref Range    Blood Culture Result No Growth 3 Days N/A   2. Urine Culture, Routine     Status: Abnormal    Collection Time: 12/15/24  2:44 PM    Specimen: Urine, clean catch   Result Value Ref Range    Urine Culture >100,000 CFU/ML Escherichia coli (A) N/A       Susceptibility    Escherichia coli -  (no method available)     Amikacin <=2 Sensitive      Ampicillin >=32 Resistant      Ampicillin + Sulbactam 8 Sensitive      Cefazolin <=4 Sensitive      Ciprofloxacin >=4 Resistant      Gentamicin >=16 Resistant      Meropenem <=0.25 Sensitive      Levofloxacin >=8 Resistant      Nitrofurantoin <=16 Sensitive      Piperacillin + Tazobactam <=4 Sensitive      Tobramycin 8 Intermediate      Trimethoprim/Sulfa >=320 Resistant              Assessment:    UTI  BACTEREMIA  URINARY RETENTION    -harding catheter placed 24 - ~1300 mL of urine drained from bladder  HISTORY OF BLADDER CANCER  LEFT HYDRONEPHROSIS RELATED TO  VESICOURETERAL REFLUX SECONDARY TO BLADDER TUMOR RESECTIONS//URINARY RETENTION     Afebrile, VSS  Lactic acid 1.6  No leukocytosis  Serum creat 2.41 > 2.3 > 2.08 > 1.67 (was 2.36 on 12/5/24, 1.78 on 7/24/24)  Urine culture 12/15/24:  >100K CFU/mL E. coli  Blood culture 12/15/24: prelim E. Coli  Blood culture 12/15/24: prelim no growth after 3 days  Blood culture PCR 12/15/24: prelim E. Coli by PCR detected, CTX-M (ESBL gene) by PCR not detected.     Plan:    Check final culture results  Abx per ID  Follow labs, temp, UOP  CPM with harding catheter  Renal US today  CPM with tamsulosin  F/U with urology RN in 1 week for voiding trial and with Dr. Campos in 4 weeks.  TE sent through [employer] system to help coordinate appts.     Above discussed with patient.     MARTIN Rivera [facility]  Department of Urology  12/19/2024  8:20 AM

## 2024-12-19 NOTE — PROGRESS NOTES
[facility]   part of [facility]     Hospitalist Progress Note     Thiago Alcantara Patient Status:  Inpatient    1940 MRN [id number]   Location [facility] 4NW-A Attending Nitesh Callahan MD   Hosp Day # 4 PCP Tiffany Pierce MD     Chief Complaint: dysuria     Subjective:     No c/o this am  no pain unless harding is pulled   Worried about going home- wants to be well   Walked x2 yesterday, pt planning stairs today      Objective:    Review of Systems:   A comprehensive review of systems was completed; pertinent positive and negatives stated in subjective.    Vital signs:  Temp:  [97.2 °F (36.2 °C)-97.6 °F (36.4 °C)] 97.4 °F (36.3 °C)  Pulse:  [50-59] 54  Resp:  [16-18] 17  BP: (104-127)/(50-85) 114/66  SpO2:  [90 %-98 %] 90 %    Physical Exam:    General: No acute distress  AO   Respiratory: No wheezes, no rhonchi clear unlabored   Cardiovascular: S1, S2, regular rate and rhythm NSR   Abdomen: Soft, Non-tender, non-distended, positive bowel sounds, 3Bms  yesterday    Neuro: No new focal deficits.   Extremities:  no   edema   Has screws in left ankle  healed wounds left leg   Harding clear yellow   Diagnostic Data:    Labs:  Recent Labs   Lab 12/15/24  1404 24  0532 24  0426 24  0502   WBC 6.7 7.4 6.8 9.5   HGB 9.3* 8.7* 8.2* 8.2*   MCV 72.2* 66.8* 66.0* 67.2*   .0 237.0 252.0 263.0   BAND  --   --  2  --    INR 1.28*  --   --   --        Recent Labs   Lab 12/15/24  1404 24  0549 24  0426 24  0502   * 114* 103* 96   BUN 55* 63* 60* 44*   CREATSERUM 2.41* 2.30* 2.08* 1.67*   CA 10.2 9.5 9.4 9.5   ALB 4.3 3.9  --   --     140 143 145   K 5.4* 4.8 4.0 4.6    111 114* 115*   CO2 20.0* 20.0* 18.0* 23.0   ALKPHO 95 83  --   --    AST 12 8  --   --    ALT 8* 8*  --   --    BILT 0.7 0.4  --   --    TP 7.2 6.5  --   --        Estimated Creatinine Clearance: 34.2 mL/min (A) (based on SCr of 1.67 mg/dL (H)).    Recent Labs   Lab 12/15/24  1308    TROPHS 3       Recent Labs   Lab 12/15/24  1404   PTP 16.2*   INR 1.28*                  Microbiology    Hospital Encounter on 12/15/24   1. Blood Culture     Status: None (Preliminary result)    Collection Time: 12/15/24  3:42 PM    Specimen: Blood,peripheral   Result Value Ref Range    Blood Culture Result No Growth 3 Days N/A   2. Urine Culture, Routine     Status: Abnormal    Collection Time: 12/15/24  2:44 PM    Specimen: Urine, clean catch   Result Value Ref Range    Urine Culture >100,000 CFU/ML Escherichia coli (A) N/A       Susceptibility    Escherichia coli -  (no method available)     Amikacin <=2 Sensitive      Ampicillin >=32 Resistant      Ampicillin + Sulbactam 8 Sensitive      Cefazolin <=4 Sensitive      Ciprofloxacin >=4 Resistant      Gentamicin >=16 Resistant      Meropenem <=0.25 Sensitive      Levofloxacin >=8 Resistant      Nitrofurantoin <=16 Sensitive      Piperacillin + Tazobactam <=4 Sensitive      Tobramycin 8 Intermediate      Trimethoprim/Sulfa >=320 Resistant          Imaging: Reviewed in Epic.    Medications:    tamsulosin  0.4 mg Oral Nightly    lidocaine  10 mL Urethral Once    ceFAZolin  2 g Intravenous Q12H    sodium ferric gluconate  125 mg Intravenous Daily    insulin aspart  1-68 Units Subcutaneous TID CC    insulin aspart  1-10 Units Subcutaneous TID AC and HS    apixaban  5 mg Oral BID    folic acid  1 mg Oral Daily    pantoprazole  20 mg Oral QAM AC    docusate sodium  100 mg Oral BID    polyethylene glycol (PEG 3350)  17 g Oral Daily    sennosides  8.6 mg Oral BID    pravastatin  10 mg Oral Daily    predniSONE  10 mg Oral Daily with breakfast       Assessment & Plan:      # bacteremia -   Blood cx  E coli by PCR   On zosyn  changed to Ancef  will rio high dose keflex at dc per ID   U cx  e coli    ID  following    Recently treated last 2 months w/ po abx for e coli per pt , had sepsis last spring was in isolation per pt       #Acute pyelonephritis, moderated left  hydronephrosis and hydroureter, stricture?  #Bladder cancer  -IV abx rocephin  changed to ancef   -Follow-up cultures  - blood +  ecoli  , U cx  e coli    -Urology following   - ct cystogram  dc'ed as retention  - harding inserted, flomax ,   - renal US this am             #Hyperkalemia resolved w/ IVF   #Acute kidney injury on chronic kidney disease baseline cr 2.0 approx  Cr lower       #Constipation  last Bm Sunday  , resolved Wed   -Bowel care      #Right adrenal myelolipoma left adrenal adenoma  -Will need outpatient follow-up with oncologist      #CAD sp CABG  #Essential hypertension  #Dyslipidemia  #AAA  -Hold Lisinopril and Lasix   Bp stable off meds   -Statin  -Monitor hemodynamics  - follows w/ Dr Hernandez for AAA      #Diabetes mellitus  -Correctional scale  -Carb scale  -Hold oral agents   - A1c 7.3      #Cerebrovascular disease     #Carcinoid tumor sp SOMMER lobectomy  #Thalassemia trait     #Rheumatoid arthritis  -Prednisone  -Hold Methotrexate     #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  -DOAC  eliquis     # Anemia  iron edf/ folate def  Repeat iron studies  - low    IV iron while here  Follows w/ Dr Harvey as outpt   Hgb 8.2 stable     POC  Had BM  Renal US P  Stairs today   Poss dc later today w/ harding - pt very hesitant about dc   Will update daughter   left vm for da  she called me back updated   Negar Ray NP    Supplementary Documentation:     Quality:  DVT Mechanical Prophylaxis:        DVT Pharmacologic Prophylaxis   Medication    apixaban (Eliquis) tab 5 mg                Code Status: Full Code  Harding: Harding catheter in place  Harding Duration (in days):   Central line:    YONG:     Discharge is dependent on: clinical course  At this point Mr. Alcantara is expected to be discharge to: home     The 21st Century Cures Act makes medical notes like these available to patients in the interest of transparency. Please be advised this is a medical document. Medical documents are intended to carry relevant information, facts as  evident, and the clinical opinion of the practitioner. The medical note is intended as peer to peer communication and may appear blunt or direct. It is written in medical language and may contain abbreviations or verbiage that are unfamiliar.      Pt seen and examined, agree with above.  No acute events, denies pain, ambulating in the pillai without difficulty.    /51 (BP Location: Left arm)   Pulse 66   Temp 97.9 °F (36.6 °C) (Oral)   Resp 18   Ht 5' 8.5\" (1.74 m)   Wt 209 lb (94.8 kg)   SpO2 96%   BMI 31.32 kg/m²   AOx3, NAD  S1+S2, RRR  CTA b/l  Soft, NT, ND, +BS  No LE edema    Cont IV abx per ID, kidney US pending, dc planning.    Nitesh Callahan MD  12/19/2024

## 2024-12-20 VITALS
SYSTOLIC BLOOD PRESSURE: 113 MMHG | TEMPERATURE: 98 F | HEIGHT: 68.5 IN | HEART RATE: 78 BPM | RESPIRATION RATE: 18 BRPM | OXYGEN SATURATION: 96 % | DIASTOLIC BLOOD PRESSURE: 57 MMHG | BODY MASS INDEX: 31.31 KG/M2 | WEIGHT: 209 LBS

## 2024-12-20 LAB
ANION GAP SERPL CALC-SCNC: 12 MMOL/L (ref 0–18)
BASOPHILS # BLD: 0 X10(3) UL (ref 0–0.2)
BASOPHILS NFR BLD: 0 %
BUN BLD-MCNC: 29 MG/DL (ref 9–23)
CALCIUM BLD-MCNC: 9.9 MG/DL (ref 8.7–10.4)
CHLORIDE SERPL-SCNC: 111 MMOL/L (ref 98–112)
CO2 SERPL-SCNC: 22 MMOL/L (ref 21–32)
CREAT BLD-MCNC: 1.56 MG/DL
EGFRCR SERPLBLD CKD-EPI 2021: 44 ML/MIN/1.73M2 (ref 60–?)
EOSINOPHIL # BLD: 0.14 X10(3) UL (ref 0–0.7)
EOSINOPHIL NFR BLD: 1 %
ERYTHROCYTE [DISTWIDTH] IN BLOOD BY AUTOMATED COUNT: 22.6 %
GLUCOSE BLD-MCNC: 124 MG/DL (ref 70–99)
GLUCOSE BLD-MCNC: 145 MG/DL (ref 70–99)
GLUCOSE BLD-MCNC: 226 MG/DL (ref 70–99)
HCT VFR BLD AUTO: 30.8 %
HGB BLD-MCNC: 9.7 G/DL
LYMPHOCYTES NFR BLD: 18 %
LYMPHOCYTES NFR BLD: 2.48 X10(3) UL (ref 1–4)
MCH RBC QN AUTO: 22.5 PG (ref 26–34)
MCHC RBC AUTO-ENTMCNC: 31.5 G/DL (ref 31–37)
MCV RBC AUTO: 71.5 FL
METAMYELOCYTES # BLD: 0.14 X10(3) UL
METAMYELOCYTES NFR BLD: 1 %
MONOCYTES # BLD: 1.52 X10(3) UL (ref 0.1–1)
MONOCYTES NFR BLD: 11 %
MORPHOLOGY: NORMAL
MYELOCYTES # BLD: 0.41 X10(3) UL
MYELOCYTES NFR BLD: 3 %
NEUTROPHILS # BLD AUTO: 7.67 X10 (3) UL (ref 1.5–7.7)
NEUTROPHILS NFR BLD: 63 %
NEUTS BAND NFR BLD: 3 %
NEUTS HYPERSEG # BLD: 9.11 X10(3) UL (ref 1.5–7.7)
OSMOLALITY SERPL CALC.SUM OF ELEC: 307 MOSM/KG (ref 275–295)
PLATELET # BLD AUTO: 312 10(3)UL (ref 150–450)
PLATELET MORPHOLOGY: NORMAL
POTASSIUM SERPL-SCNC: 4.5 MMOL/L (ref 3.5–5.1)
RBC # BLD AUTO: 4.31 X10(6)UL
SODIUM SERPL-SCNC: 145 MMOL/L (ref 136–145)
TOTAL CELLS COUNTED BLD: 100
WBC # BLD AUTO: 13.8 X10(3) UL (ref 4–11)

## 2024-12-20 PROCEDURE — 99239 HOSP IP/OBS DSCHRG MGMT >30: CPT | Performed by: HOSPITALIST

## 2024-12-20 RX ORDER — TRAMADOL HYDROCHLORIDE 50 MG/1
50 TABLET ORAL EVERY 12 HOURS PRN
Qty: 20 TABLET | Refills: 0 | Status: SHIPPED | OUTPATIENT
Start: 2024-12-20

## 2024-12-20 NOTE — PLAN OF CARE
Problem: Patient/Family Goals  Goal: Patient/Family Long Term Goal  Description: Patient's Long Term Goal:resolve urinary retention    Interventions:  - harding catheter   -urology consult  -medicines  - See additional Care Plan goals for specific interventions  Outcome: Progressing  Goal: Patient/Family Short Term Goal  Description: Patient's Short Term Goal: pain controlled    Interventions:   - pain medicine as needed  - See additional Care Plan goals for specific interventions  Outcome: Progressing     Problem: PAIN - ADULT  Goal: Verbalizes/displays adequate comfort level or patient's stated pain goal  Description: INTERVENTIONS:  - Encourage pt to monitor pain and request assistance  - Assess pain using appropriate pain scale  - Administer analgesics based on type and severity of pain and evaluate response  - Implement non-pharmacological measures as appropriate and evaluate response  - Consider cultural and social influences on pain and pain management  - Manage/alleviate anxiety  - Utilize distraction and/or relaxation techniques  - Monitor for opioid side effects  - Notify MD/LIP if interventions unsuccessful or patient reports new pain  - Anticipate increased pain with activity and pre-medicate as appropriate  Outcome: Progressing     Problem: GENITOURINARY - ADULT  Goal: Absence of urinary retention  Description: INTERVENTIONS:  - Assess patient’s ability to void and empty bladder  - Monitor intake/output and perform bladder scan as needed  - Follow urinary retention protocol/standard of care  - Consider collaborating with pharmacy to review patient's medication profile  - Implement strategies to promote bladder emptying  Outcome: Progressing     Patient alert x4, VSS. Complaint of harding pain at insertions site. Pain meds given with moderate relief. US of kidney completed on NOC. Alspet well overnight. IV abx. Bed alarm

## 2024-12-20 NOTE — OCCUPATIONAL THERAPY NOTE
OCCUPATIONAL THERAPY TREATMENT NOTE - INPATIENT     Room Number: 407/407-A  Session: 1   Number of Visits to Meet Established Goals: 2    Presenting Problem: UTI    ASSESSMENT   Patient demonstrates good  progress this session, goals  met at .modified independent level .    Patient functions near baseline with  all ADL, except for use of harding. Pt was told that he might have to go home with it .   Occupational Therapy will discharge patient at this time as all goals have been met at Modified independent  .  TREATMENT SESSION:  Patient Start of Session: standing, just walked    FUNCTIONAL TRANSFER ASSESSMENT  Sit to Stand: Chair  Edge of Bed: Not Tested  Chair: Modified Independent  Toilet Transfer: Modified Independent    BED MOBILITY  Supine to Sit : Not tested  Sit to Supine (OT): Not Tested    BALANCE ASSESSMENT  Static Sitting: Independent  Static Standing: Modified Independent    FUNCTIONAL ADL ASSESSMENT  LB Dressing Seated: Modified Independent  LB Dressing Standing: Modified Independent  Toileting Seated: Independent  Toileting Standing: Modified Independent       EDUCATION PROVIDED  Patient Education : Plan of Care; Role of Occupational Therapy; Posture/Positioning; Energy Conservation; Proper Body Mechanics  Patient's Response to Education: Returned Demonstration; Verbalized Understanding    Equipment used: rw    Therapist comments: pt just returned from ambulating in hallway with his daughter. Using RW.   Pt commented,\"I am discouraged\" about possibility of going home with harding.  Provided active listening.  Educated the pt about energy conservation, LB dressing sequencing, and safe toilet transfer sequencing. Modified independent  With toilet transfer, hygiene care seated, gown management while standing, sink side hand hygiene.  Complete pt education about home safety in general. Verbalized understanding. Chair alarm on.    Patient End of Session: Up in chair;Needs met;Call light within reach;RN aware of  session/findings;All patient questions and concerns addressed;Family present;Alarm set      PAIN ASSESSMENT  Rating: Unable to rate  Location: harding catheter site        OBJECTIVE  Precautions: Bed/chair alarm    AM-PAC ‘6-Clicks’ Inpatient Daily Activity Short Form  -   Putting on and taking off regular lower body clothing?: None  -   Bathing (including washing, rinsing, drying)?: A Little  -   Toileting, which includes using toilet, bedpan or urinal? : None  -   Putting on and taking off regular upper body clothing?: None  -   Taking care of personal grooming such as brushing teeth?: None  -   Eating meals?: None    AM-PAC Score:  Score: 23  Approx Degree of Impairment: 15.86%  Standardized Score (AM-PAC Scale): 51.12    PLAN  OT Device Recommendations: None  OT Treatment Plan: Endurance training;Functional transfer training;ADL training;Equipment eval/education;Patient/Family training;Patient/Family education;Compensatory technique education  Rehab Potential : Good  Frequency: 3-5x/week    OT Goals:   ADL Goals   Patient will perform grooming: with supervision and while standing at sink  Patient will perform lower body dressing:  with setup, with supervision, and with adaptive equipment PRN  Patient will perform toileting: with supervision     Functional Transfer Goals  Patient will transfer to toilet:  with supervision    OT Session Time: 24 minutes  Self-Care Home Management: 24 minutes

## 2024-12-20 NOTE — PROGRESS NOTES
INFECTIOUS DISEASE  PROGRESS NOTE            [facility]    Thiago Alcantara Patient Status:  Inpatient    1940 MRN [id number]   [facility] 4NW-A Attending Nitesh Callahan MD   Hosp Day # 5 PCP Tiffany Pierce MD     Antibiotics: Cefazolin    Subjective:  : comfortable    Objective:  Temp:  [97.4 °F (36.3 °C)-98.2 °F (36.8 °C)] 97.9 °F (36.6 °C)  Pulse:  [58-66] 66  Resp:  [18] 18  BP: ()/(51-57) 118/51  SpO2:  [95 %-99 %] 96 %    General: awake alert  Vital signs:Temp:  [97.4 °F (36.3 °C)-98.2 °F (36.8 °C)] 97.9 °F (36.6 °C)  Pulse:  [58-66] 66  Resp:  [18] 18  BP: ()/(51-57) 118/51  SpO2:  [95 %-99 %] 96 %  HEENT: Moist mucous membranes. Extraocular muscles are intact.  Neck: supple no masses  Respiratory: Non labored, symmetric excursion, normal respirations  Cardiovascular: no irregularities in rhythm  Abdomen: Soft, nontender, nondistended.   Musculoskeletal: joints: no swelling   Integument: No lesions. No erythema. No open wounds.  Waterman in place  Labs:     COVID-19 Lab Results    COVID-19  Lab Results   Component Value Date    COVID19 Not Detected 2024    COVID19 Not Detected 2020    COVID19 Not Detected 2020       Pro-Calcitonin  No results for input(s): \"PCT\" in the last 168 hours.    Cardiac  Recent Labs   Lab 12/15/24  1404   PBNP 262       Creatinine Kinase  No results for input(s): \"CK\" in the last 168 hours.    Inflammatory Markers  Recent Labs   Lab 24  0549   ELIZABETH 263       Recent Labs   Lab 24  0502 24  0754   RBC 3.96 4.31   HGB 8.2* 9.7*   HCT 26.6* 30.8*   MCV 67.2* 71.5*   MCH 20.7* 22.5*   MCHC 30.8* 31.5   RDW 19.1 22.6   NEPRELIM 5.07 7.67   WBC 9.5 13.8*   .0 312.0   NEUT 60  --    LYMPH 28  --    MON 9  --    EOS 0  --    NRBC 1*  --          Recent Labs   Lab 12/15/24  1404 24  0549 24  0426 24  0502 24  0754   * 114* 103* 96 124*   BUN 55* 63* 60* 44* 29*   CREATSERUM  2.41* 2.30* 2.08* 1.67* 1.56*   CA 10.2 9.5 9.4 9.5 9.9   ALB 4.3 3.9  --   --   --     140 143 145 145   K 5.4* 4.8 4.0 4.6 4.5    111 114* 115* 111   CO2 20.0* 20.0* 18.0* 23.0 22.0   ALKPHO 95 83  --   --   --    AST 12 8  --   --   --    ALT 8* 8*  --   --   --    BILT 0.7 0.4  --   --   --    TP 7.2 6.5  --   --   --        Vancomycin Trough (ug/mL)   Date Value   04/22/2024 6.7 (L)     Microbiology    Hospital Encounter on 12/15/24   1. Blood Culture     Status: None (Preliminary result)    Collection Time: 12/15/24  3:42 PM    Specimen: Blood,peripheral   Result Value Ref Range    Blood Culture Result No Growth 4 Days N/A   2. Urine Culture, Routine     Status: Abnormal    Collection Time: 12/15/24  2:44 PM    Specimen: Urine, clean catch   Result Value Ref Range    Urine Culture >100,000 CFU/ML Escherichia coli (A) N/A       Susceptibility    Escherichia coli -  (no method available)     Amikacin <=2 Sensitive      Ampicillin >=32 Resistant      Ampicillin + Sulbactam 8 Sensitive      Cefazolin <=4 Sensitive      Ciprofloxacin >=4 Resistant      Gentamicin >=16 Resistant      Meropenem <=0.25 Sensitive      Levofloxacin >=8 Resistant      Nitrofurantoin <=16 Sensitive      Piperacillin + Tazobactam <=4 Sensitive      Tobramycin 8 Intermediate      Trimethoprim/Sulfa >=320 Resistant            Problem list reviewed:  Patient Active Problem List   Diagnosis    Carcinoid tumor of left lung (HCC)    History of bladder cancer    Hypogonadism in male    Erectile dysfunction    TIA (transient ischemic attack)    Varicose veins of both lower extremities with complications    Obesity    High cholesterol    Neuropathy    Aneurysm of abdominal aorta (HCC)    Bladder tumor    Controlled type 2 diabetes mellitus with diabetic neuropathy (HCC)    BPH with urinary obstruction    Type 2 diabetes mellitus (HCC)    Osteoarthritis    Elevated PSA    Hematuria    Hematuria, unspecified type    Pyelonephritis     Elevated INR    Gangrene (HCC)    Acute deep vein thrombosis (DVT) of proximal vein of left lower extremity (HCC)    Leg DVT (deep venous thromboembolism), acute, left (HCC)    Microcytic anemia    Benign carcinoid tumor of lung (HCC)    Urothelial cancer (HCC)    Iron deficiency anemia    Folic acid deficiency    Hemolytic anemia, acute (HCC)    Severe sepsis (HCC)    Cellulitis of left lower extremity    Chronic kidney disease, unspecified CKD stage    Hyperkalemia    Encephalopathy in sepsis    Acute cystitis without hematuria    Septic shock (HCC)    TONJA (acute kidney injury) (HCC)    Metabolic acidosis    Tachycardia    Pain of left lower extremity    Urinary tract infection without hematuria, site unspecified    Acute pyelonephritis             ASSESSMENT/PLAN:  E coli UTI and bacteremia in assocatiation with urinary retention/urethral stricutre  CT showing left sided hydro and pyelonephririts   following  Waterman was placed    Continue Cefazolin  -PO cephalexin when discharged  Repeat US in am per urology        Al Nielsen MD, MD [facility]  [phone]

## 2024-12-20 NOTE — PROGRESS NOTES
[facility]   part of [facility] ID PROGRESS NOTE    Thiago Alcantara Patient Status:  Inpatient    1940 MRN [id number]   [facility] 4NW-A Attending Elia Church MD   Hosp Day # 5 PCP Tiffany Pierce MD     Abx: IV CTX (12/15-)--> IV Ancef (-)    Subjective: Patient seen and examined today. No new complaints. Denies any abdominal pain, flank pain, nausea, vomiting or diarrhea. Afebrile.     Allergies:  Allergies[1]    Medications:    Current Facility-Administered Medications:     simethicone (Mylicon) chewable tab 80 mg, 80 mg, Oral, Q6H PRN    tamsulosin (Flomax) cap 0.4 mg, 0.4 mg, Oral, Nightly    lidocaine (Urojet) 2 % urethral jelly 10 mL, 10 mL, Urethral, Once    traMADol (Ultram) tab 50 mg, 50 mg, Oral, Q12H PRN    ceFAZolin (Ancef) 2g in 10mL IV syringe premix, 2 g, Intravenous, Q12H    sodium ferric gluconate (Ferrlecit) 125 mg in sodium chloride 0.9% 100mL IVPB premix, 125 mg, Intravenous, Daily    lidocaine (Urojet) 2 % urethral jelly 10 mL, 10 mL, Urethral, PRN    glucose (Dex4) 15 GM/59ML oral liquid 15 g, 15 g, Oral, Q15 Min PRN **OR** glucose (Glutose) 40% oral gel 15 g, 15 g, Oral, Q15 Min PRN **OR** glucose-vitamin C (Dex-4) chewable tab 4 tablet, 4 tablet, Oral, Q15 Min PRN **OR** dextrose 50% injection 50 mL, 50 mL, Intravenous, Q15 Min PRN **OR** glucose (Dex4) 15 GM/59ML oral liquid 30 g, 30 g, Oral, Q15 Min PRN **OR** glucose (Glutose) 40% oral gel 30 g, 30 g, Oral, Q15 Min PRN **OR** glucose-vitamin C (Dex-4) chewable tab 8 tablet, 8 tablet, Oral, Q15 Min PRN    acetaminophen (Tylenol Extra Strength) tab 1,000 mg, 1,000 mg, Oral, Q6H PRN    ondansetron (Zofran) 4 MG/2ML injection 4 mg, 4 mg, Intravenous, Q6H PRN    metoclopramide (Reglan) 5 mg/mL injection 5 mg, 5 mg, Intravenous, Q8H PRN    polyethylene glycol (PEG 3350) (Miralax) 17 g oral packet 17 g, 17 g, Oral, Daily PRN    sennosides (Senokot) tab 17.2 mg, 17.2 mg, Oral, Nightly  PRN    bisacodyl (Dulcolax) 10 MG rectal suppository 10 mg, 10 mg, Rectal, Daily PRN    insulin aspart (NovoLOG) 100 Units/mL FlexPen 1-68 Units, 1-68 Units, Subcutaneous, TID CC    insulin aspart (NovoLOG) 100 Units/mL FlexPen 1-10 Units, 1-10 Units, Subcutaneous, TID AC and HS    apixaban (Eliquis) tab 5 mg, 5 mg, Oral, BID    folic acid (Folvite) tab 1 mg, 1 mg, Oral, Daily    pantoprazole (Protonix) DR tab 20 mg, 20 mg, Oral, QAM AC    docusate sodium (Colace) cap 100 mg, 100 mg, Oral, BID    polyethylene glycol (PEG 3350) (Miralax) 17 g oral packet 17 g, 17 g, Oral, Daily    sennosides (Senokot) tab 8.6 mg, 8.6 mg, Oral, BID    pravastatin (Pravachol) tab 10 mg, 10 mg, Oral, Daily    predniSONE (Deltasone) tab 10 mg, 10 mg, Oral, Daily with breakfast    acetaminophen (Tylenol Extra Strength) tab 500 mg, 500 mg, Oral, Q4H PRN    Review of Systems:  Completed. See pertinent positives and negatives above.     Physical Exam:  Vital signs: Blood pressure 118/51, pulse 66, temperature 97.9 °F (36.6 °C), temperature source Oral, resp. rate 18, height 174 cm (5' 8.5\"), weight 209 lb (94.8 kg), SpO2 96%.    General: Alert, oriented, NAD, on room air.   HEENT: Moist mucous membranes.   Neck: No lymphadenopathy.  Supple.  Cardiovascular: RRR  Respiratory: Clear to auscultation bilaterally.  No wheezes. No rhonchi.  Abdomen: Soft, nontender, nondistended.   Musculoskeletal: Movement of all extremities.  Integument: No lesions. No erythema.  + Waterman with clear/yellow urine noted    Laboratory Data:  Recent Labs   Lab 12/18/24  0502 12/20/24  0754   RBC 3.96 4.31   HGB 8.2* 9.7*   HCT 26.6* 30.8*   MCV 67.2* 71.5*   MCH 20.7* 22.5*   MCHC 30.8* 31.5   RDW 19.1 22.6   NEPRELIM 5.07 7.67   WBC 9.5 13.8*   .0 312.0   NEUT 60 63   LYMPH 28 18   MON 9 11   EOS 0 1   NRBC 1*  --      Recent Labs   Lab 12/15/24  1404 12/16/24  0549 12/17/24  0426 12/18/24  0502 12/20/24  0754   * 114* 103* 96 124*   BUN 55* 63* 60*  44* 29*   CREATSERUM 2.41* 2.30* 2.08* 1.67* 1.56*   CA 10.2 9.5 9.4 9.5 9.9   ALB 4.3 3.9  --   --   --     140 143 145 145   K 5.4* 4.8 4.0 4.6 4.5    111 114* 115* 111   CO2 20.0* 20.0* 18.0* 23.0 22.0   ALKPHO 95 83  --   --   --    AST 12 8  --   --   --    ALT 8* 8*  --   --   --    BILT 0.7 0.4  --   --   --    TP 7.2 6.5  --   --   --        Microbiology: Reviewed in EMR    Radiology: Reviewed.    PROCEDURE:  CT ABDOMEN+PELVIS (CPT=74176)     COMPARISON:  [facility] , CT, CTA PELVIS W CTA BILAT LEG RUNOFF W IV CONTRAST(CPT=75635), 4/19/2024, 2:45 PM.  [facility] , CT, CT CHEST+ABDOMEN (ALL CNTRST ONLY) (TFP=19992/48496), 9/08/2023, 7:07 AM.  [facility] , CT, CT CHEST+ABDOMEN+PELVIS(CPT=71250/35437),  10/26/2022, 7:19 AM.  [facility] , CT, CT CHEST+ABDOMEN+PELVIS(ALL CNTRST ONLY)(CPT=71260/05562), 7/29/2020, 5:46 PM.     INDICATIONS:  Abd pain, difficulty with urination     TECHNIQUE:  Unenhanced multislice CT scanning was performed from the dome of the diaphragm to the pubic symphysis.  Dose reduction techniques were used. Dose information is transmitted to the ACR (American College of Radiology) NRDR (National Radiology  Data Registry) which includes the Dose Index Registry.     PATIENT STATED HISTORY: (As transcribed by Technologist)  Patient with diffuse abdomen pain and difficulty urinating.      FINDINGS:    LIVER:  No enlargement, atrophy, abnormal density, or significant focal lesion.    BILIARY:  Multiple calcified stones throughout the gallbladder.  No ductal dilatation.  PANCREAS:  Diffuse atrophy of the pancreas with multiple coarse calcifications consistent chronic pancreatitis.  SPLEEN:  No enlargement or focal lesion.    KIDNEYS:  The right kidney is unremarkable.  The left kidney demonstrates moderate hydronephrosis and hydroureter to the level of the UVJ.  There is no evidence of stone or mass lesion obstructing the ureter.  This could be due to a stricture of the  distal ureter but could also be  seen with reflux disease.  There is left perinephric stranding which is new finding which could be related to pyelonephritis.  ADRENALS:  Left adrenal nodule measuring 27 x 24 mm is stable since previous study may represent adenoma.  There is an right adrenal myelolipoma which is stable measuring 8 mm.  AORTA/VASCULAR:    Aneurysmal dilatation of the abdominal aorta to 31 mm.  RETROPERITONEUM:  No mass or adenopathy.    BOWEL/MESENTERY:  No visible mass, obstruction, or bowel wall thickening.  Diverticulosis of the sigmoid and left colon as well as the transverse colon without ends of diverticulitis.  The appendix is unremarkable.  ABDOMINAL WALL:  No mass or hernia.    URINARY BLADDER:  No visible focal wall thickening, lesion, or calculus.    PELVIC NODES:  No adenopathy.    PELVIC ORGANS:  There is moderate prostatic hypertrophy.  BONES:  No bony lesion or fracture.    LUNG BASES:  No visible pulmonary or pleural disease.    OTHER:  Negative.      Impression:    CONCLUSION:       1. Moderate left-sided hydronephrosis and hydroureter could be due to a stricture of the distal ureter versus significant reflux disease.  Peggy nephric stranding on the left is suspicious for urinary tract infection and therefore pyelonephritis..     2. Sense of diverticulosis of the left colon without diverticulitis.       3. Cholelithiasis without biliary ductal obstruction.       4. Evidence of chronic pancreatitis.     5. Right adrenal myelolipoma left adrenal adenoma.     LOCATION:  [facility]     Dictated by (CST): Mitch Mendoza MD on 12/15/2024 at 3:57 PM      Finalized by (CST): Mitch Mendoza MD on 12/15/2024 at 4:03 PM      PROCEDURE:  XR CHEST AP PORTABLE  (CPT=71045)     TECHNIQUE:  AP chest radiograph was obtained.     COMPARISON:  [facility] , XR, XR CHEST AP PORTABLE  (CPT=71045), 4/19/2024, 2:08 PM.     INDICATIONS:  CAITLYN     PATIENT STATED HISTORY: (As transcribed by Technologist)  Patient has been having difficulty breathing.      FINDINGS:  The heart is normal in size.  The lungs are clear.  There are no pleural effusions.  The bones are unremarkable.    Impression:    CONCLUSION:  No acute disease.       LOCATION:  [facility]     Dictated by (CST): Mitch Mendoza MD on 12/15/2024 at 2:22 PM      Finalized by (CST): Mitch Mendoza MD on 12/15/2024 at 2:22 PM    ASSESSMENT:  E. Coli bacteremia. 1/2 Bcx + 12/15, d/t #2  UTI/L pyelonephritis d/t E. coli  Moderate left hydronephrosis/hydroureter with urinary retention. S/p harding placement 12/17. Renal US with significant improvement in hydronephrosis s/p harding.  H/o chronic hydronephrosis d/t vesicoureteral reflux 2/2 bladder tumor resections per urology.   H/o bladder cancer. s/p cysto 10/11/24 without tumor noted.   DM II, hgb A1C 7.3  RA, on prednisone and methotrexate prior to admission.   TONJA on CKD, improving  H/o carcinoid tumor sp SOMMER lobectomy  CAD, HTN, AAA    PLAN:  - continue IV Ancef for E. Coli bacteremia/pyelonephritis  - follow temps  - harding management as per urology- noted plans to keep on dc. Plans for renal US today.  - follow creatinine  - ok for dc planning from ID standpoint with po keflex.    Discussed case with Dr. Nielsen, hospitalist APN and patient.     [facility]  [phone]         [1]   Allergies  Allergen Reactions    Bacitracin-Neomycin-Polymyxin [Neomycin-Bacitracin-Polymyxin] RASH    Other OTHER (SEE COMMENTS)

## 2024-12-20 NOTE — PROGRESS NOTES
[facility]  Urology Progress Note    Thiago Alcantara Patient Status:  Inpatient    1940 MRN [id number]   Location [facility] 4NW-A Attending Nitesh Callahan MD   Hosp Day # 5 PCP Tiffany Pierce MD     Subjective:  Thiago Alcantara is a(n) 83 year old male    Current complaints: No abdominal/flank pain.  No fever.     Objective:  General appearance: alert, appears stated age, and cooperative  Blood pressure 137/56, pulse 62, temperature 97.6 °F (36.4 °C), temperature source Oral, resp. rate 18, height 5' 8.5\" (1.74 m), weight 209 lb (94.8 kg), SpO2 96%.  Lungs: non-labored respirations  Abdomen: soft, non-tender  : harding catheter in place draining yellow urine (UOP - 3600 mL/24 hours)  Lab Results   Component Value Date    PGLU 145 2024       Hospital Encounter on 12/15/24   1. Blood Culture     Status: None (Preliminary result)    Collection Time: 12/15/24  3:42 PM    Specimen: Blood,peripheral   Result Value Ref Range    Blood Culture Result No Growth 4 Days N/A   2. Urine Culture, Routine     Status: Abnormal    Collection Time: 12/15/24  2:44 PM    Specimen: Urine, clean catch   Result Value Ref Range    Urine Culture >100,000 CFU/ML Escherichia coli (A) N/A       Susceptibility    Escherichia coli -  (no method available)     Amikacin <=2 Sensitive      Ampicillin >=32 Resistant      Ampicillin + Sulbactam 8 Sensitive      Cefazolin <=4 Sensitive      Ciprofloxacin >=4 Resistant      Gentamicin >=16 Resistant      Meropenem <=0.25 Sensitive      Levofloxacin >=8 Resistant      Nitrofurantoin <=16 Sensitive      Piperacillin + Tazobactam <=4 Sensitive      Tobramycin 8 Intermediate      Trimethoprim/Sulfa >=320 Resistant         US KIDNEYS (CPT=76775)    Result Date: 2024  CONCLUSION:  Significant improvement in hydronephrosis on the left status post decompression of the urinary bladder.   LOCATION:  [place] [facility]     Dictated by (CST): Mitch Mendoza MD on 2024 at 10:49 PM      Finalized by (CST): Mitch Mendoza MD on 12/19/2024 at 10:50 PM         Assessment:    UTI  BACTEREMIA  URINARY RETENTION    -harding catheter placed 12/17/24 - ~1300 mL of urine drained from bladder  HISTORY OF BLADDER CANCER  LEFT HYDRONEPHROSIS RELATED TO VESICOURETERAL REFLUX SECONDARY TO BLADDER TUMOR RESECTIONS//URINARY RETENTION     Afebrile, VSS  Lactic acid 1.6  No leukocytosis  Serum creat 2.41 > 2.3 > 2.08 > 1.67 (was 2.36 on 12/5/24, 1.78 on 7/24/24)  Urine culture 12/15/24:  >100K CFU/mL E. coli  Blood culture 12/15/24: prelim E. Coli  Blood culture 12/15/24: prelim no growth after 4 days  Blood culture PCR 12/15/24: prelim E. Coli by PCR detected, CTX-M (ESBL gene) by PCR not detected.     Renal US 12/19/24: Significant improvement in hydronephrosis on the left status post decompression of the urinary bladder.     Plan:    Check final culture results  Abx per ID  Follow labs, temp, UOP  Reviewed renal US results with patient  CPM with harding catheter  CPM with tamsulosin  F/U with urology RN in 1 week for voiding trial and with Dr. Campos in 4 weeks.  TE sent through [employer] system to help coordinate appts.      Will sign off; please call office if any further questions/concerns arise during this admission.      Above discussed with patient.     Annelise Barba PA-C  [facility]  Department of Urology  12/20/2024  5:57 AM

## 2024-12-20 NOTE — PROGRESS NOTES
[facility]   part of [facility]     Hospitalist Progress Note     Thiago Alcantara Patient Status:  Inpatient    1940 MRN [id number]   Location [facility] 4NW-A Attending Nitesh Callahan MD   Hosp Day # 5 PCP Tiffany Pierce MD     Chief Complaint: dysuria     Subjective:     Cleared by   for dc   US shows marked improvement  No new issues      Objective:    Review of Systems:   A comprehensive review of systems was completed; pertinent positive and negatives stated in subjective.    Vital signs:  Temp:  [97.4 °F (36.3 °C)-98.2 °F (36.8 °C)] 97.6 °F (36.4 °C)  Pulse:  [58-66] 62  Resp:  [18] 18  BP: ()/(53-57) 137/56  SpO2:  [95 %-99 %] 96 %    Physical Exam:    General: No acute distress  AO   Respiratory: No wheezes, no rhonchi clear unlabored   Cardiovascular: S1, S2, regular rate and rhythm NSR   Abdomen: Soft, Non-tender, non-distended, positive bowel sounds,     Neuro: No new focal deficits.   Extremities:  no   edema   Has screws in left ankle  healed wounds left leg   Waterman clear yellow   Diagnostic Data:    Labs:  Recent Labs   Lab 12/15/24  1404 24  0532 24  0426 24  0502 24  0754   WBC 6.7 7.4 6.8 9.5 13.8*   HGB 9.3* 8.7* 8.2* 8.2* 9.7*   MCV 72.2* 66.8* 66.0* 67.2* 71.5*   .0 237.0 252.0 263.0 312.0   BAND  --   --  2  --   --    INR 1.28*  --   --   --   --        Recent Labs   Lab 12/15/24  1404 24  0549 24  0426 24  0502   * 114* 103* 96   BUN 55* 63* 60* 44*   CREATSERUM 2.41* 2.30* 2.08* 1.67*   CA 10.2 9.5 9.4 9.5   ALB 4.3 3.9  --   --     140 143 145   K 5.4* 4.8 4.0 4.6    111 114* 115*   CO2 20.0* 20.0* 18.0* 23.0   ALKPHO 95 83  --   --    AST 12 8  --   --    ALT 8* 8*  --   --    BILT 0.7 0.4  --   --    TP 7.2 6.5  --   --        Estimated Creatinine Clearance: 34.2 mL/min (A) (based on SCr of 1.67 mg/dL (H)).    Recent Labs   Lab 12/15/24  1404   TROPHS 3       Recent Labs   Lab  12/15/24  1404   PTP 16.2*   INR 1.28*            Microbiology    Hospital Encounter on 12/15/24   1. Blood Culture     Status: None (Preliminary result)    Collection Time: 12/15/24  3:42 PM    Specimen: Blood,peripheral   Result Value Ref Range    Blood Culture Result No Growth 4 Days N/A   2. Urine Culture, Routine     Status: Abnormal    Collection Time: 12/15/24  2:44 PM    Specimen: Urine, clean catch   Result Value Ref Range    Urine Culture >100,000 CFU/ML Escherichia coli (A) N/A       Susceptibility    Escherichia coli -  (no method available)     Amikacin <=2 Sensitive      Ampicillin >=32 Resistant      Ampicillin + Sulbactam 8 Sensitive      Cefazolin <=4 Sensitive      Ciprofloxacin >=4 Resistant      Gentamicin >=16 Resistant      Meropenem <=0.25 Sensitive      Levofloxacin >=8 Resistant      Nitrofurantoin <=16 Sensitive      Piperacillin + Tazobactam <=4 Sensitive      Tobramycin 8 Intermediate      Trimethoprim/Sulfa >=320 Resistant          Imaging: Reviewed in Epic.    Medications:    tamsulosin  0.4 mg Oral Nightly    lidocaine  10 mL Urethral Once    ceFAZolin  2 g Intravenous Q12H    sodium ferric gluconate  125 mg Intravenous Daily    insulin aspart  1-68 Units Subcutaneous TID CC    insulin aspart  1-10 Units Subcutaneous TID AC and HS    apixaban  5 mg Oral BID    folic acid  1 mg Oral Daily    pantoprazole  20 mg Oral QAM AC    docusate sodium  100 mg Oral BID    polyethylene glycol (PEG 3350)  17 g Oral Daily    sennosides  8.6 mg Oral BID    pravastatin  10 mg Oral Daily    predniSONE  10 mg Oral Daily with breakfast       Assessment & Plan:      # bacteremia -  e coli    zosyn  changed to Ancef  will rio high dose keflex at dc per ID   # U cx  e coli    ID  following    - WBC sl higher no fevers        #Acute pyelonephritis, moderated left hydronephrosis and hydroureter, stricture- much better on US w/ harding   #Bladder cancer  -Urology following   - harding inserted, flomax ,   - renal  US      Significant improvement in hydronephrosis on the left status post decompression of the urinary bladder.           #Hyperkalemia resolved w/ IVF   #Acute kidney injury on chronic kidney disease baseline cr 2.0 approx  Sl better     #Constipation  last Bm Sunday  , resolved Wed   -Bowel care      #Right adrenal myelolipoma left adrenal adenoma  -Will need outpatient follow-up with oncologist      #CAD sp CABG  #Essential hypertension  #Dyslipidemia  #AAA  -Hold Lisinopril and Lasix     Resume ACE1 at dc hold lasix    -Statin  -- follows w/ Dr Hernandez for AAA      #Diabetes mellitus  -Correctional scale  -Carb scale  -Hold oral agents   - A1c 7.3      #Cerebrovascular disease     #Carcinoid tumor sp SOMMER lobectomy  #Thalassemia trait     #Rheumatoid arthritis  -Prednisone  -Hold Methotrexate     #VTE on DOAC  -DOAC  eliquis     # Anemia  iron edf/ folate def  Repeat iron studies  - low    IV iron while here  Follows w/ Dr Harvey as outpt   Hgb  better      POC  Home today   Labs- cr sl lower  WBC up 13.8   no fevers  on po abx    spoke w/ ID   US shows reduction in hydroneph.   Voiding trial 1 week  home w/ harding po abx   Pt doesn't want HH   Da update   Negar Ray NP    Supplementary Documentation:     Quality:  DVT Mechanical Prophylaxis:        DVT Pharmacologic Prophylaxis   Medication    apixaban (Eliquis) tab 5 mg                Code Status: Full Code  Harding: Harding catheter in place  Harding Duration (in days):   Central line:    YONG:     Discharge is dependent on: clinical course  At this point Mr. Alcantara is expected to be discharge to: home     The 21st Century Cures Act makes medical notes like these available to patients in the interest of transparency. Please be advised this is a medical document. Medical documents are intended to carry relevant information, facts as evident, and the clinical opinion of the practitioner. The medical note is intended as peer to peer communication and may appear  blunt or direct. It is written in medical language and may contain abbreviations or verbiage that are unfamiliar.      Pt seen and examined, agree with above.  No acute events, no new complaints, harding remains in, discharging home today.    /51 (BP Location: Left arm)   Pulse 66   Temp 97.9 °F (36.6 °C) (Oral)   Resp 18   Ht 5' 8.5\" (1.74 m)   Wt 209 lb (94.8 kg)   SpO2 96%   BMI 31.32 kg/m²   AOx3, NAD  S1+S2, RRR  CTA b/l  Soft, NT, ND, +BS  No LE edema    US with improvement, home on PO Keflex today, f/u urology in 1 week for voiding trial.    Nitesh Callahan MD  12/20/2024

## 2024-12-20 NOTE — DISCHARGE SUMMARY
[facility]     Thiago Alcantara Patient Status:  Inpatient    1940 MRN [id number]   Location [facility] 4NW-A Attending No att. providers found   Hosp Day # 5 PCP Tiffany Pierce MD     Date of Admission: 12/15/2024  Date of Discharge:  2024     Discharge Disposition: Home or Self Care    Discharge Diagnosis:  Bacteremia E. coli  UTI E. 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 Patient states he has had dysuria for 3 days. Associated was urinary frequency, low urine output. He has been nauseated, but not vomiting. He complains of constipation.   No fevers.  No chest pain or shortness of breath.     Brief Synopsis:   83-year-old male presents with severe dysuria for the last several days also frequency low urine output nausea.  Constipation.  Patient was admitted started on IV fluids found to have bacteremia and UTI E. coli and both organisms antibiotics have been started infectious  disease consulted as he had had a sepsis admission in the spring and has hardware in his ankle.  Antibiotics were de-escalated to Ancef.  Will need a prolonged course at discharge 20 days p.o. Keflex.  Urology was consulted, patient having severe dysuria ,frequency found to have residual postvoiding as voiding frequently but not very much.  Patient also had acute kidney injury on top of chronic kidney disease.  Waterman was placed initially had significant pain spasming.  This has resolved, IV fluids were adjusted.  Renal function has improved ,has made large amounts of urine.  Patient was given bowel prep and is finally had bowel movements.  Patient had follow-up ultrasound and has showed significant decrease in his hydronephrosis.  He was also started on Flomax.  Patient has a history of bladder cancer./Resection.  Patient will go home with Waterman ,needs voiding trial next week and follow-up with  in the office with RN and then follow-up with his urologist 1 month.  Also needs follow-up with PCP.  Lasix has been held at discharge, blood pressure stable, will resume his ACE inhibitor.  Will continue his other medications.  Patient given IV iron 4 doses while here.  Patient has been advised on care of Waterman, need for hydration and follow-up.  Patient has prolonged course of antibiotics have stressed the need to complete the course as prescribed.  Patient discharged home.  Diabetes was monitored while here and insulin sliding scale will resume his home regiment A1c 7.3.  Patient upset that he has to go home with Waterman as next week is his birthday..  Have discussed the need for it and it is a short temporary inconvenience and hopefully will improve his overall quality of life.    Lace+ Score: 79  59-90 High Risk  29-58 Medium Risk  0-28   Low Risk       TCM Follow-Up Recommendation:  LACE > 58: High Risk of readmission after discharge from the hospital.      Procedures during hospitalization:   CT abdomen pelvis  1.  Moderate left-sided hydronephrosis and hydroureter could be due to a stricture of the distal ureter versus significant reflux disease.  Peggy nephric stranding on the left is suspicious for urinary tract infection and therefore pyelonephritis..      2. Sense of diverticulosis of the left colon without diverticulitis.       3. Cholelithiasis without biliary ductal obstruction.       4. Evidence of chronic pancreatitis.      5. Right adrenal myelolipoma left adrenal adenoma.   Ultrasound kidneys    CONCLUSION:  Significant improvement in hydronephrosis on the left status post decompression of the urinary bladder.       Incidental or significant findings and recommendations (brief descriptions):  E. coli blood urine needs Keflex for 20 days 3 times a day  Continue to hold Lasix  Received IV iron x 4 days  Needs follow-up with urology next week for voiding trial  Maintain hydration, follow-up with urology MD 1 month  Started on Flomax  Ultram for pain      Lab/Test results pending at Discharge:   None    Consultants:  Urology, PT OT    Discharge Medication List:     Discharge Medications        START taking these medications        Instructions Prescription details   cephALEXin 500 MG Caps  Commonly known as: Keflex      Take 1 capsule (500 mg total) by mouth 3 (three) times daily for 20 days.   Stop taking on: January 6, 2025  Quantity: 60 capsule  Refills: 0     tamsulosin 0.4 MG Caps  Commonly known as: Flomax      Take 1 capsule (0.4 mg total) by mouth at bedtime.   Quantity: 30 capsule  Refills: 2     traMADol 50 MG Tabs  Commonly known as: Ultram      Take 1 tablet (50 mg total) by mouth every 12 (twelve) hours as needed for Pain.   Quantity: 20 tablet  Refills: 0            CHANGE how you take these medications        Instructions Prescription details   simvastatin 20 MG Tabs  Commonly known as: Zocor  What changed: when to take this      Take 1 tablet (20 mg total) by mouth nightly.   Quantity: 90  tablet  Refills: 1            CONTINUE taking these medications        Instructions Prescription details   apixaban 5 MG Tabs  Commonly known as: Eliquis      Take 1 tablet (5 mg total) by mouth 2 (two) times daily.   Quantity: 60 tablet  Refills: 11     Betamethasone Dipropionate Aug 0.05 % Crea  Commonly known as: Diprolene AF      Apply 1 Application topically daily.   Quantity: 50 g  Refills: 1     clobetasol 0.05 % Oint  Commonly known as: Temovate      Apply 1 Application topically 2 (two) times daily as needed.   Refills: 0     folic acid 1 MG Tabs  Commonly known as: Folvite      Take 1 tablet (1 mg total) by mouth daily.   Refills: 0     glimepiride 1 MG Tabs  Commonly known as: Amaryl      Take 1 tablet (1 mg total) by mouth daily with breakfast.   Quantity: 90 tablet  Refills: 1     lisinopril 2.5 MG Tabs  Commonly known as: Prinivil; Zestril      Take 1 tablet (2.5 mg total) by mouth daily.   Quantity: 90 tablet  Refills: 1     metFORMIN 500 MG Tabs  Commonly known as: Glucophage      Take 1 tablet (500 mg total) by mouth 2 (two) times daily with meals.   Refills: 0     methotrexate 2.5 MG Tabs  Commonly known as: Rheumatrex      Take 1 tablet (2.5 mg total) by mouth daily.   Refills: 0     predniSONE 5 MG Tabs  Commonly known as: Deltasone      Take 1 tablet (5 mg total) by mouth daily.   Refills: 0            STOP taking these medications      furosemide 20 MG Tabs  Commonly known as: Lasix        omeprazole 20 MG Cpdr  Commonly known as: PriLOSEC        Potassium Chloride ER 10 MEQ Tbcr                  Where to Get Your Medications        These medications were sent to [id number] - [place], [place] - [address] [phone], [phone]  [address] [facility] [address]      Phone: [phone]   cephALEXin 500 MG Caps  tamsulosin 0.4 MG Caps  traMADol 50 MG Tabs         [place] reviewed: Reviewed    Follow-up appointment:   Adal Campos DO  [address]   [address] [place] [address]  [place] [address]  [phone]    Follow up  follow-up with urology nurse in 1 week for harding catheter removal and with Dr. Campos in 4 weeks.  A message was sent to our office staff to contact you to arrange the appointments.    Tiffany Pierce MD  [address] [facility]  [place] [address]  [phone]    Follow up      Appointments for Next 30 Days 12/20/2024 - 1/19/2025      None            Vital signs:  Temp:  [97.4 °F (36.3 °C)-98.2 °F (36.8 °C)] 98.1 °F (36.7 °C)  Pulse:  [58-78] 78  Resp:  [18] 18  BP: (113-137)/(51-57) 113/57  SpO2:  [95 %-97 %] 96 %    Physical Exam:      General: No acute distress  AO   Respiratory: No wheezes, no rhonchi clear unlabored   Cardiovascular: S1, S2, regular rate and rhythm NSR   Abdomen: Soft, Non-tender, non-distended, positive bowel sounds,     Neuro: No new focal deficits.   Extremities:  no   edema   Has screws in left ankle  healed wounds left leg   Harding clear yellow     -----------------------------------------------------------------------------------------------  PATIENT DISCHARGE INSTRUCTIONS: See electronic chart    Negar Ray NP    Total time spent on discharge planning:  x minutes     The 21st Century Cures Act makes medical notes like these available to patients in the interest of transparency. Please be advised this is a medical document. Medical documents are intended to carry relevant information, facts as evident, and the clinical opinion of the practitioner. The medical note is intended as peer to peer communication and may appear blunt or direct. It is written in medical language and may contain abbreviations or verbiage that are unfamiliar.

## 2024-12-20 NOTE — PLAN OF CARE
NURSING DISCHARGE NOTE    Discharged Home via Wheelchair.  Accompanied by Family member  Belongings Taken by patient/family.    Pt a/o x4. VSS, remained afebrile. Meds given per MAR. Tramadol given for pain at harding insertion site. PIV removed prior to discharge. AVS reviewed with patient. Discharge RN explained how to empty harding bag.     Problem: PAIN - ADULT  Goal: Verbalizes/displays adequate comfort level or patient's stated pain goal  Description: INTERVENTIONS:  - Encourage pt to monitor pain and request assistance  - Assess pain using appropriate pain scale  - Administer analgesics based on type and severity of pain and evaluate response  - Implement non-pharmacological measures as appropriate and evaluate response  - Consider cultural and social influences on pain and pain management  - Manage/alleviate anxiety  - Utilize distraction and/or relaxation techniques  - Monitor for opioid side effects  - Notify MD/LIP if interventions unsuccessful or patient reports new pain  - Anticipate increased pain with activity and pre-medicate as appropriate  Outcome: Adequate for Discharge     Problem: GENITOURINARY - ADULT  Goal: Absence of urinary retention  Description: INTERVENTIONS:  - Assess patient’s ability to void and empty bladder  - Monitor intake/output and perform bladder scan as needed  - Follow urinary retention protocol/standard of care  - Consider collaborating with pharmacy to review patient's medication profile  - Implement strategies to promote bladder emptying  Outcome: Adequate for Discharge

## 2024-12-20 NOTE — PROGRESS NOTES
On iv ancef for UTI and bacteremia, Sched meds taken and tolerated well, Denies any pain or discomfort,Appetite is good,  Waterman cath to gravity w/ good output,Ambulates to bathroom  W/ standby assist,Attended needs.

## 2024-12-21 LAB
BACTERIA BLD CULT: POSITIVE
BLACTX-M ISLT/SPM QL: NOT DETECTED
E COLI DNA BLD POS QL NAA+NON-PROBE: DETECTED

## 2025-01-02 ENCOUNTER — LAB ENCOUNTER (OUTPATIENT)
Dept: LAB | Facility: HOSPITAL | Age: 85
End: 2025-01-02
Attending: INTERNAL MEDICINE
Payer: MEDICARE

## 2025-01-02 DIAGNOSIS — E78.00 HYPERCHOLESTEREMIA: Primary | ICD-10-CM

## 2025-01-04 ENCOUNTER — LAB ENCOUNTER (OUTPATIENT)
Dept: LAB | Facility: HOSPITAL | Age: 85
End: 2025-01-04
Attending: INTERNAL MEDICINE
Payer: MEDICARE

## 2025-01-04 DIAGNOSIS — E78.00 HYPERCHOLESTEREMIA: ICD-10-CM

## 2025-01-04 LAB
ALBUMIN SERPL-MCNC: 4.2 G/DL (ref 3.2–4.8)
ALBUMIN/GLOB SERPL: 1.4 {RATIO} (ref 1–2)
ALP LIVER SERPL-CCNC: 78 U/L
ALT SERPL-CCNC: 9 U/L
ANION GAP SERPL CALC-SCNC: 7 MMOL/L (ref 0–18)
AST SERPL-CCNC: 12 U/L (ref ?–34)
BILIRUB SERPL-MCNC: 0.4 MG/DL (ref 0.2–1.1)
BUN BLD-MCNC: 43 MG/DL (ref 9–23)
CALCIUM BLD-MCNC: 10.5 MG/DL (ref 8.7–10.4)
CHLORIDE SERPL-SCNC: 107 MMOL/L (ref 98–112)
CHOLEST SERPL-MCNC: 113 MG/DL (ref ?–200)
CO2 SERPL-SCNC: 28 MMOL/L (ref 21–32)
CREAT BLD-MCNC: 1.66 MG/DL
EGFRCR SERPLBLD CKD-EPI 2021: 41 ML/MIN/1.73M2 (ref 60–?)
FASTING PATIENT LIPID ANSWER: YES
FASTING STATUS PATIENT QL REPORTED: YES
GLOBULIN PLAS-MCNC: 2.9 G/DL (ref 2–3.5)
GLUCOSE BLD-MCNC: 134 MG/DL (ref 70–99)
HDLC SERPL-MCNC: 51 MG/DL (ref 40–59)
LDLC SERPL CALC-MCNC: 48 MG/DL (ref ?–100)
NONHDLC SERPL-MCNC: 62 MG/DL (ref ?–130)
OSMOLALITY SERPL CALC.SUM OF ELEC: 307 MOSM/KG (ref 275–295)
POTASSIUM SERPL-SCNC: 5.1 MMOL/L (ref 3.5–5.1)
PROT SERPL-MCNC: 7.1 G/DL (ref 5.7–8.2)
SODIUM SERPL-SCNC: 142 MMOL/L (ref 136–145)
TRIGL SERPL-MCNC: 65 MG/DL (ref 30–149)
VLDLC SERPL CALC-MCNC: 9 MG/DL (ref 0–30)

## 2025-01-04 PROCEDURE — 80061 LIPID PANEL: CPT

## 2025-01-04 PROCEDURE — 36415 COLL VENOUS BLD VENIPUNCTURE: CPT

## 2025-01-04 PROCEDURE — 80053 COMPREHEN METABOLIC PANEL: CPT

## 2025-01-25 NOTE — ED NOTES
Pt report called to floor rn, pt and family aware, of admit to unit. [facility]  [address], [address]  [place] [address]  [phone]     OUTPATIENT PSYCHOTHERAPY ENCOUNTER      PATIENT     Name: Yanelis Lyons  YOB: 1975     SERVICES PROVIDED    Date of Service: 1/23/2025   Time of Service: 1009 to 1110 am (61 Minutes)   Type of Service: 18692 Individual Psychotherapy (53+ min)   Service Provider: David Vega, , LP     TELEMEDICINE ENCOUNTER METHODS     Telemedicine psychotherapy was conducted due to the preference Yanelis during scheduling. The patient's condition was safely assessed and treated via synchronous audio and visual telemedicine encounter. A secure real time interactive audio and visual telecommunication system (videoconference via [email or web address]) was used for the visit.     Patient Visit Location: Home in [place]      Present For Encounter: Yanelis     Provider Visit Location: HIPAA compliant location within provider home       ENCOUNTER SYNOPSIS     Yanelis participated in a psychotherapy session today with David Vega, PhD, LP. Background information, history, current condition, and available medical record notes were reviewed, and a brief clinical interview was conducted with Yanelis to update the treatment focus and planning as indicated. There was opportunity for Yanelis to discuss and process recent life happenings. The session included providing Yanelis with an evidence-informed, strengths-based, whole-person mental health and wellbeing-focused model of psychotherapy (see Treatment Plan and Psychotherapy Interventions). Coordination of care was also planned with other healthcare providers working with Yanelis if indicated.     BACKGROUND INFORMATION (From 7/8/2024 Intake; Updated 7/18/2024)      Yanelis lives with her father and brother.  She has a BA degree in physics and a BA in English. 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    Yanelis is currently unemployed and her more recent work history is \"spotty.\"  She has reportedly been fired from many jobs due to ADHD symptoms.  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    Yanelis was diagnosed with breast cancer in 2022.  She was successfully treated with chemotherapy, mastectomy, and radiation.    Yanelis is receiving mental health care.  She receives psychiatric care from Dr. Leora Driscoll.  Her diagnosis is reported to be ADHD and she is currently on Adderall.  In addition, she participates in ADHD support groups.    Currently, Yanelis is seeking ADHD informed psychotherapy with the current provider.  Her goal is to become \"reliable and productive again\" and learn how to deal with her executive functioning challenges.    Psychiatric Symptoms         The following symptoms and concerns are to be targeted in the treatment of Yanelis.      Inattention/Hyperactivity/Impulsivity: Reported she was diagnosed with ADHD at the age of 34; even though she is on Adderall, Yanelis reported current symptoms of inattention and to some degree also hyperactivity/impulsivity; in particular she gets overwhelmed with work/tasks and shuts down; indicated her executive functioning is poor and especially being disorganized, having a hard time staying focused, and getting frustrated a lot   Depression: Reported mild recurrent depressive symptoms including recently sadness, irritability, decreased energy, low motivation, poor concentration, sleep disturbance, negative thoughts    Hypomania/Josephine: None reported    Anxiety: Reported recurrent symptoms of worrying, ruminating, nervousness, heightened physical tension, avoidance     Panic: None reported   Obsessions/Compulsions: None reported   Post-Traumatic Stress: None reported   Sleep " "Problems and Fatigue: Reported her sleep is a \"mess\" since being treated for cancer with side effects coming from the many medications she has been on; noted she uses trazodone intermittently for sleep as needed; had 2 prior sleep studies and was determined to be \"normal\" without apnea or restlessness  Alcohol Abuse: None reported   Drug Abuse: None reported     Psychotic: None reported     Suicidal Thoughts: None reported recently; had suicidal ideation as a teenager    Cutting/Self-Injury: None reported         Functional Concerns      The following functional concerns are to be targeted in the treatment of Yanelis.      Home: Reported struggling with household tasks and upkeep      School: Reported having difficulty being organized and completing homework and academic tasks in high school and college      Work: Reported is distractible, off task, forgets things, etc. on the job and she has been fired from several jobs as a result    Financial: Reported prior history of significant credit card debt due to overspending        History     The following reflect pertinent historical findings identified for Yanelis.      Developmental Delays: None reported    Family Problems: Reported that her mother was impatient with her growing up and often yelled at her; indicated that her mother passed away in 2021 from a lung disease; observed that her mother was \"the center of the family\" and the family is struggling without her    Child Abuse/Neglect/Trauma: None reported    Educational Problems: None reported    Mental Health Problems: Reported had undiagnosed depression during her teenage years and that she was officially diagnosed while she attended college; noted she has been struggling with recurrent depressive symptoms throughout adulthood    Physical Health Problems: Reported prior history of breast cancer that was successfully treated and is currently in remission; noted recently being diagnosed with postural " orthostatic tachycardia syndrome (POTS)  Family History of Mental Health Problems: Reported suspicion that her father has ADHD but this has not been officially diagnosed; believes her brother is depressed     Other Problems: None reported      Strengths and Protective Factors      The following reflect strengths and protective factors that could be leveraged in treatment of Yanelis.      Personal: Reported she is persistent and that she is good with knitting, sewing, cross stitching, and painting     Support System: Reported to be her father and brother      CHECK-IN FOR THIS ENCOUNTER     Recent Happenings     Checked in with Yanelis to review how it has been going recently     Noteworthy Events: Reported mostly routine events lately  Noteworthy Stressors/Problems: Reported ongoing concerns and stress related to health problems          Recent Health Concerns     Checked in with Yanelis to review current health concerns and rated them ranging from 0 = Currently Low Concern to 10 = Currently Very High Concern.      Emotional Health Concerns: Current level of concern about overall distress, anxiety, depression, or stress - Historical to Current: Moderate; moderate; moderate; moderate to high; moderate; moderate; moderate to high; moderate; moderate; low to moderate; low to moderate; moderate to high; moderate to high; moderate; moderate; moderate to high; moderate to high  Notes: Reported has been struggling with depression related to her health problems and significant distress related to the [place] having a new president, has been doom scrolling about it too           Physical Health Concerns: Current level of concern about overall physical health, physical activity, body weight, health habits, or pain - Historical to Current: Moderate; moderate; moderate; moderate; moderate; low to moderate; moderate; low; low; low; low; low; low; low to moderate; low; low to moderate; low to moderate  Notes: Reported again  feeling generally healthy and has continued to make some progress with her sleep         Living Health Concerns: Current level of concern about functioning at home, at school and/or work, and in important relationships - Historical to Current: NA  Notes: Did not review    Recent Safety Concerns    Self: None reported   Others: None reported       TREATMENT PLAN AND PSYCHOTHERAPY UPDATE FOR THIS ENCOUNTER     Diagnoses Being Treated      Yanelis qualifies for diagnoses of ADHD-I; major depression, recurrent, moderate (symptoms have been more severe across several encounters warranting a change from mild to moderate); anxiety disorder, unspecified     Treatment Symptoms Goals Addressed and Progress      Based on the Background Information above, the following personalized psychiatric symptom domains for Yanelis are targeted for improvement through psychotherapy. The goal is to achieve an 8 or higher over the course of treatment. Progress to date ranges from 0 Limited to 5 Some to 10 Significant. This progress is episodically reviewed with Yanelis to make treatment decisions.     Improve Understanding of Condition/Illness: Baseline estimated to be 6; 7; 8; 8; 8; 8  Reduce Inattention: Baseline estimated to be 5; 6; 6; 7; 6; 6  Reduce Depression: Baseline estimated to be 5; 6; 6; 6; 6; 5  Reduce Anxiety: Baseline estimated to be 6; 6; 6; 5; 6; 5  Reduce Sleep Problems and Fatigue: Baseline estimated to be 5; 6; 7; 7; 7; 6    Functional Goals Addressed and Progress      Based on the Background Information above, the following personalized functional domains for Yanelis are targeted for improvement through psychotherapy. The goal is to achieve an 8 or higher over the course of treatment. Progress to date ranges from 0 Limited to 5 Some to 10 Significant. This progress is episodically reviewed with Yanelis to make treatment decisions.     Home: Baseline estimated to be 5; 6; 7; 7; 6; 5  School: NA  currently  Work: NA currently  Financial: NA currently    Mental Health and Wellbeing Health Practices Addressed and Progress     Using a brief psychotherapy collaborative decision aid, Yanelis was guided to make self-ratings for perceived level of mastery in 12 Health Practices that are aligned with Four Worlds of Mental Health and Wellbeing. Ratings range from 0 = Not Good at This to 10 = Great at This. Ratings below were recorded on 7/8/2024.      Internal World: Regulation of Emotions, Thoughts, and Stress   Soothe Health Practice: I am calm and manage my emotions - Historical to Current: 5.5  Think Health Practice: I have positive, hopeful and helpful thoughts - Historical to Current: 5 (indicated being self-critical)  Rest Health Practice: I am well rested and energized - Historical to Current: 4    Physical World: Physical Health      Hydrate Health Practice: I drink enough water every day and stay hydrated - Historical to Current: 6  Nourish Health Practice: I eat healthy - Historical to Current: 4 (indicated does not eat breakfast)  Move Health Practice: I am physically active - Historical to Current: 2    External World: Social Relationships and Productive Behaviors   Express Health Practice: I listen to others and express myself well  - Historical to Current: 8  Connect Health Practice: I have positive, healthy and meaningful relationships - Historical to Current: 7  Activate Health Practice: I am responsible and successful at home, work, school - Historical to Current: 3      Spiritual World: Contemplation, Purpose and Meaning, and Living According to Beliefs   Observe Health Practice: I am thankful, accepting, and live in the moment - Historical to Current: 7.5  Seek Health Practice: I do things that give me meaning and purpose - Historical to Current: 7.5  Believe Health Practice: My values and beliefs guide how I am living - Historical to Current: 7.5    Psychotherapy Interventions Provided to  Improve Symptoms, Functioning, and Mental Health and Wellbeing      The first part of the session focused on Yanelis's ongoing concerns and struggles related to her health.  On the positive side, she noted just finishing a cancer drug medication that she was on for 2 years, and felt good about this, although she still is on another cancer drug for the time being.  She is looking for postmastectomy garments.  Validated and supported Yanelis and her struggles related to her health.  Also again encouraged her to keep up with all of her medical appointments and consider consuming less news so that it does not stress her out as much.            This session also focused on a strengths-based, whole-person mental health and wellbeing-focused model of psychotherapy for Christel. This approach incorporates empirically validated practice elements derived from cognitive behavioral therapy, acceptance and commitment therapy, positive psychology, mindfulness, and habit formation science. Specific psychotherapeutic modules within the model are personalized to help Yanelis work on and make progress with treatment goals. Culturally and trauma informed care principles are incorporated to contextualize therapeutic strategies to best fit the life experiences and views of Yanelis as indicated.     Based on above ratings and patient preference, Yanelis was guided to work on personalized psychotherapeutic modules within identified Health Practices that are italicized below to improve daily functioning and overall mental health and wellbeing. Instruction, training, guidance, along with traditional therapeutic processing, is used to promote growth in these areas for Yanelis.      Rest Health Practice: Renewing Your Energy, Being Well Rested, and Getting Good Sleep  Sleep Well Module: Building the habits and routines needed to sleep well every night (cognitive behavioral therapy and habit formation)    Conquer Insomnia Module:  Using proven strategies for overcoming sleeplessness (cognitive behavioral therapy and habit formation)  Generate More Energy Module: Having a routine of doing revitalizing activities (behavioral activation and cognitive behavioral therapy)    Notes: Reviewed and reinforced Yanelis again to utilize sleep hygiene habits with a focus on improving her sleep schedule of midnight to 8 AM      Soothe Health Practice: Being Calm and Managing Your Emotions   Calm Your Body and Mind Module: Calming your body and mind whenever you are stressed (cognitive behavioral therapy and acceptance commitment therapy)   Practice Meditation Module: Being calmer by practicing meditation for a few moments each day (mindfulness, cognitive behavioral therapy, and positive psychology)  Embrace Discomfort Module: Using skills to cope with distress (cognitive behavioral therapy and acceptance commitment therapy)   Notes: Reviewed and reinforced Yanelis again to utilize calm your body and mind strategy along with breathing meditation      Activate Health Practice: Being Responsible and Successful at Home, Work, and/or School  Conquer Avoidance Module: Avoiding less and actively doing more of what matters each day (behavioral activation and cognitive behavioral therapy)   Accomplish More Module: Doing what needs to be done each day (executive functioning skills, behavioral activation, and habit formation)  Solve Problems Module: Overcome everyday problems to be more successful in what matters to you (executive functioning skills and cognitive behavioral therapy)  Notes: Reviewed and reinforced Yanelis again for working on being more activated; she will be prioritizing sleep and healthier eating in the near future; eventually she will work on being more physically active      MENTAL STATUS EXAM     Yanelis was observed for the following indicators of mental status.     Appearance/Behavior: Adequate grooming, appropriate attire, good eye  contact, calm, cooperative, no abnormal movements   Speech: Rate, volume and tone appropriate; no push or pressure; no rapid speech     Mood/Affect: Calm, affect congruent to situation   Thought Process/Content: Coherent, linear; no reports or evidence of auditory hallucinations; no reports or evidence of visual hallucinations    Thought Associations: Logical, no looseness of associations, no flight of ideas, no tangentially, no circumstantiality   Insight/Judgment: Adequate insight into condition and need for treatment; judgement not impaired   Orientation: Alert and oriented to person place, time, and circumstance   Recent and Past Memory: Good    Attention Span/Concentration: Good     Language: English with estimated vocabulary to be average or above   Fund of Knowledge: Estimated to be average or above fund of knowledge      SAFETY PLAN - NA, no SI or SIB     RECOMMENDATIONS     Based on a medical records review and the results of this encounter, it is recommended that Yanelis participate in the following behavioral health services.      Mental Health Outpatient Psychotherapy: Individual-focused skills and habit training with therapeutic processing to improve mental health and wellbeing, conducted by Dr. Vega   Psychiatric Evaluation and Services: Continue consulting with Dr. Driscoll for psychiatric care including psychiatric diagnostic clarification, medication management, care coordination, and psychotherapy as needed    Primary and Specialty Care Services: Continue consulting with specialty care providers for cancer and PCP for healthcare, medication management, and care coordination as needed        DISPOSITION AND PLAN      Psychotherapy was conducted with Yanelis to make progress on treatment goals, enhance daily functioning, and improve mental health and wellbeing. Motivational interviewing was used to promote insight and engagement and to personalize psychotherapy interventions. If applicable,  a safety plan was formulated and reviewed with Yanelis.      The engagement of Yanelis during the session is assessed by this provider as: High     The progress of Etienne on treatment plan goals and objectives is assessed by this provider as: Moderate        In line with feedback informed therapy, Yanelis was guided to complete ratings of perceived Helpfulness and Hopefulness regarding the current encounter, and the results and are recorded below. The results were collaboratively discussed to make adjustments or improvements in subsequent psychotherapy encounters.      Degree of helpfulness of this visit on a scale ranging from 0 is Not Helpful to 10 is Very Helpful - Historical to Current: Did not review  Degree of hopefulness in improving mental health and wellbeing on a scale ranging from 0 Not Hopeful to 10 Very Hopeful - Historical to Current: Did not review    The plan is for Yanelis to follow up with this provider for outpatient psychotherapy and will seek additional recommended services as indicated to continue work on treatment goals.     If there are any questions regarding this information please contact the [facility] at [phone].    David Vega, PhD, LP   Professor of Psychiatry and Behavioral Sciences  [facility]

## (undated) DEVICE — GLOVE SURG SENSICARE SZ 7

## (undated) DEVICE — MEDI-VAC NON-CONDUCTIVE SUCTION TUBING: Brand: CARDINAL HEALTH

## (undated) DEVICE — ROTH NET FOOD BOLUS

## (undated) DEVICE — 3M™ RED DOT™ MONITORING ELECTRODE WITH FOAM TAPE AND STICKY GEL, 50/BAG, 20/CASE, 72/PLT 2570: Brand: RED DOT™

## (undated) DEVICE — FILTERLINE NASAL ADULT O2/CO2

## (undated) DEVICE — MEDI-VAC SUCTION HANDLE REGULAR CAPACITY: Brand: CARDINAL HEALTH

## (undated) DEVICE — FORCEP BIOPSY RJ4 LG CAP W/ND

## (undated) DEVICE — 1200CC GUARDIAN II: Brand: GUARDIAN

## (undated) DEVICE — MASK ETCO2 PANORAMIC

## (undated) DEVICE — ENDOCUFF ADULT GREEN

## (undated) DEVICE — ENDOSCOPY PACK - LOWER: Brand: MEDLINE INDUSTRIES, INC.

## (undated) DEVICE — Device: Brand: DEFENDO AIR/WATER/SUCTION AND BIOPSY VALVE

## (undated) DEVICE — TRAP 4 CPTR CHMBR N EZ INLN

## (undated) NOTE — LETTER
Date: 5/17/2024  Patient name: Thiago Alcantara  YOB: 1940  Medical Record Number: [id number]  Primary Coverage: Payor: MEDICARE / Plan: MEDICARE PART A&B / Product Type: *No Product type* /   [place]  Insurance ID: [id number]  Patient Address: [address] [place] [address] [place] [address]  Telephone Information:   Home Phone [phone]   Mobile [phone]       Encounter Date: 5/17/2024  Provider: Gayle Drake MD  Diagnosis:     ICD-10-CM   1. Non-pressure chronic ulcer of left ankle with fat layer exposed ([facility])  L97.322   2. Peripheral arterial disease ([facility])  I73.9   3. Left leg swelling  M79.89   4. Idiopathic chronic venous hypertension of left lower extremity with ulcer and inflammation ([facility])  I87.332   5. Diabetes mellitus with skin ulcer ([facility])  E11.622    L98.499   6. Long term (current) use of anticoagulants  Z79.01   7. Chronic deep vein thrombosis (DVT) of distal vein of left lower extremity ([facility])  I82.5Z2   8. Varicose veins of both lower extremities with complications  I83.893   9. Class 2 severe obesity due to excess calories with serious comorbidity and body mass index (BMI) of 35.0 to 35.9 in adult ([facility])  E66.01    Z68.35         Wound 05/03/24 # 1 Left Lateral Foot Foot Left;Lateral (Active)   Date First Assessed/Time First Assessed: 05/03/24 0913    Wound Number (Wound Clinic Only): # 1 Left Lateral Foot  Primary Wound Type: Diabetic Ulcer  Location: Foot  Wound Location Orientation: Left;Lateral      Assessments 5/3/2024  9:16 AM 5/17/2024  9:53 AM   Wound Image       Drainage Amount Unable to assess Unable to assess   Treatments Compression Compression   Wound Length (cm) 0.5 cm 1.2 cm   Wound Width (cm) 0.8 cm 0.6 cm   Wound Surface Area (cm^2) 0.4 cm^2 0.72 cm^2   Wound Depth (cm) 0.2 cm 0.2 cm   Wound Volume (cm^3) 0.08 cm^3 0.144 cm^3   Wound Healing % -- -80   Margins Well-defined edges Well-defined edges    Non-staged Wound Description Full thickness Full thickness   Peggy-wound Assessment Dry;Edema;Hemosiderin staining Dry;Edema;Hemosiderin staining   Wound Bed Slough (%) 100 % 100 %   Wound Odor None None   Tunneling? No No   Undermining? No No   Sinus Tracts? No No   Josue Scale Grade 2 Grade 2       No associated orders.       Wound 05/03/24 #2 Left Lateral Ankle Ankle Left;Lateral (Active)   Date First Assessed/Time First Assessed: 05/03/24 0914    Wound Number (Wound Clinic Only): #2 Left Lateral Ankle  Primary Wound Type: Diabetic Ulcer  Location: Ankle  Wound Location Orientation: Left;Lateral      Assessments 5/3/2024  9:17 AM 5/17/2024  9:54 AM   Wound Image        Drainage Amount Unable to assess Unable to assess   Treatments Compression Compression   Wound Length (cm) 1 cm 1.5 cm   Wound Width (cm) 0.5 cm 1.8 cm   Wound Surface Area (cm^2) 0.5 cm^2 2.7 cm^2   Wound Depth (cm) 0.1 cm 0.1 cm   Wound Volume (cm^3) 0.05 cm^3 0.27 cm^3   Wound Healing % -- -440   Margins Poorly defined Well-defined edges   Non-staged Wound Description Full thickness Full thickness   Peggy-wound Assessment Dry;Edema;Hemosiderin staining Dry;Edema;Hemosiderin staining   Wound Bed Epithelium (%) 50 % 75 %   Wound Bed Slough (%) 50 % 25 %   Wound Odor None None   Shape clustered clustered   Tunneling? No No   Undermining? No No   Sinus Tracts? No No   Josue Scale Grade 2 Grade 2       No associated orders.       Compression Wrap 05/17/24 Leg Left (Active)   Placement Date: 05/17/24   Location: Leg  Wound Location Orientation: Left      Assessments 5/17/2024 11:53 AM   Response to Treatment Well tolerated   Compression Layers Multilayer   Compression Product Type Coflex   Dressing Applied Yes   Compression Wrap Location Toes to Knee   Compression Wrap Status Clean;Dry;Intact       No associated orders.     Wound Cleaning and Dressings:  Showering directions: May shower and/or cleanse wound with mild soap and water  Wound cleansing:   Cleanse with normal saline or wound cleanser  Wound cleaning frequency: during dressing changes  Wound product: honey gel, honey alginate, kerramax, zinc to periwound, conforming gauze, and tape  Dressing change frequency:  Change dressing 3x per week  Enzymatic agent:  Honey    Compression Therapy:   Coflex 2 layers 30-40mmhg    Miscellaneous/Additional Orders:  Miscellaneous orders: Home health care nurse for wound care and Decrease sodium intake    Care Summary:  Care Summary: Discussed Plan of Care at beside with patient. Patient verbally acknowledges understanding of all instructions and all questions were answered.    Follow Up:  Return in about 1 week (around 5/24/2024) for Wound followup.    Additional Notes:   RN to see patient 2 times a week, pt to return in 1 week. Pt sent home with first wrap kit, please order supplies listed above.     PLEASE call if patient is approved or denied - [phone] opt.2.

## (undated) NOTE — LETTER
Date: 6/26/2024  Patient name: Thiago Alcantara  YOB: 1940  Medical Record Number: [id number]  Primary Coverage: Payor: MEDICARE / Plan: MEDICARE PART A&B / Product Type: *No Product type* /   [place]  Insurance ID: [id number]  Patient Address: [address] [place] [address] [place] [address]  Telephone Information:   Home Phone [phone]   Mobile [phone]       Encounter Date: 6/26/2024  Provider: Gayle Drake MD  Diagnosis:     ICD-10-CM   1. 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 Peripheral arterial disease (HCC)  I73.9   3. Left leg swelling  M79.89   4. 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 Diabetes mellitus with skin ulcer (HCC)  E11.622    L98.499   6. Long term (current) use of anticoagulants  Z79.01         Wound 05/03/24 # 1 Left Lateral Foot Foot Left;Lateral (Active)   Date First Assessed/Time First Assessed: 05/03/24 0913    Wound Number (Wound Clinic Only): # 1 Left Lateral Foot  Primary Wound Type: Diabetic Ulcer  Location: Foot  Wound Location Orientation: Left;Lateral      Assessments 5/3/2024  9:16 AM 6/26/2024  4:14 PM   Wound Image       Drainage Amount Unable to assess Small   Drainage Description -- Yellow;Serous   Treatments Compression Compression   Wound Length (cm) 0.5 cm 0.6 cm   Wound Width (cm) 0.8 cm 1.5 cm   Wound Surface Area (cm^2) 0.4 cm^2 0.9 cm^2   Wound Depth (cm) 0.2 cm 0.2 cm   Wound Volume (cm^3) 0.08 cm^3 0.18 cm^3   Wound Healing % -- -125   Margins Well-defined edges Well-defined edges   Non-staged Wound Description Full thickness Full thickness   Peggy-wound Assessment Dry;Edema;Hemosiderin staining Edema;Dry   Wound Bed Slough (%) 100 % 100 %   Wound Odor None None   Tunneling? No --   Undermining? No --   Sinus Tracts? No --   Josue Scale Grade 2 Grade 2       Inactive Orders    Date Order Priority Status Authorizing Provider   06/26/24 1636 Debridement Diabetic Ulcer Left;Lateral Foot Routine Completed Gayle Ng MD   06/12/24 1611 Debridement Diabetic Ulcer Left;Lateral Foot Routine Completed Gayle Ng MD   05/31/24 1515 Debridement Diabetic Ulcer Left;Lateral Foot Routine Completed Gayle Ng MD   05/24/24 1349 Debridement Diabetic Ulcer Left;Lateral Foot Routine Completed Gayle Ng MD       Wound 05/03/24 #2 Left Lateral Ankle Ankle Left;Lateral (Active)   Date First Assessed/Time First Assessed: 05/03/24 0914    Wound Number (Wound Clinic Only): #2 Left Lateral Ankle  Primary Wound Type: Diabetic Ulcer  Location: Ankle  Wound Location Orientation: Left;Lateral      Assessments 5/3/2024  9:17 AM 6/26/2024  4:12 PM   Wound Image       Drainage Amount Unable to assess Small   Drainage Description -- Serous;Yellow   Treatments Compression Compression   Wound Length (cm) 1 cm 0.4 cm   Wound Width (cm) 0.5 cm 0.3 cm   Wound Surface Area (cm^2) 0.5 cm^2 0.12 cm^2   Wound Depth (cm) 0.1 cm 0.1 cm   Wound Volume (cm^3) 0.05 cm^3 0.012 cm^3   Wound Healing % -- 76   Margins Poorly defined Well-defined edges   Non-staged Wound Description Full thickness Full thickness   Peggy-wound Assessment Dry;Edema;Hemosiderin staining Edema;Dry   Wound Granulation Tissue -- Firm;Pink   Wound Bed Granulation (%) -- 100 %   Wound Bed Epithelium (%) 50 % --   Wound Bed Slough (%) 50 % --   Wound Odor None None   Shape clustered --   Tunneling? No No   Undermining? No No   Sinus Tracts? No No   Josue Scale Grade 2 Grade 2       No associated orders.       Compression Wrap 05/17/24 Leg Left (Active)   Placement Date: 05/17/24   Location: Leg  Wound Location Orientation: Left      Assessments 5/17/2024 11:53 AM 6/26/2024  5:29 PM   Response to Treatment Well tolerated Well tolerated   Compression Layers Multilayer Multilayer   Compression Product Type Coflex Unna Boot    Dressing Applied Yes Yes   Compression Wrap Location Toes to Knee Toes to Knee   Compression Wrap Status Clean;Dry;Intact Clean;Dry;Intact       No associated orders.     Wound Cleaning and Dressings:  Showering directions: May shower with protection  Wound cleansing:  Cleanse with normal saline or wound cleanser  Wound cleaning frequency:  3 times a week  Wound product: Tiffany collagen, Hydrofera transfer, and ABD pad  Dressing change frequency:  Change dressing 3x per week  Enzymatic agent:  Not applicable    Compression Therapy:   Coflex calamine unna boot 30-40mmgh    Miscellaneous/Additional Orders:  Miscellaneous orders: Home health care nurse for wound care    Care Summary:  Care Summary: Discussed Plan of Care at beside with patient. Patient verbally acknowledges understanding of all instructions and all questions were answered.    Follow Up:  Return in about 1 week (around 7/3/2024) for Wound followup.

## (undated) NOTE — IP AVS SNAPSHOT
[facility] [place] [address] [place], [address] ~ [phone]                Discharge Summary   3/1/2017    Jana Khan           Admission Information        Provider Department    3/1/2017 Dc Travis MD  Rodrigo Strickland Inject 1 each into the muscle every 21 days. [    ]    [    ]    [    ]    [    ]       Warfarin Sodium 10 MG Tabs   Commonly known as:  COUMADIN        Take 1 tablet (10 mg total) by mouth nightly.     Rainer Luther     [    ]    [    ]    [    ] - If you have concerns related to behavioral health issues or thoughts of harming yourself, contact [address] [place] [address] at [phone].     - If you don’t have insurance, Kate Carvalho has partnered with [employer] [facility] What to report to your healthcare team:  Dizziness, muscle aches, constipation           Blood Thinners (Anticoagulants)     Warfarin Sodium (COUMADIN) 10 MG Oral Tab       Use: Prevent the development or progression of blood clots   Most common side effec

## (undated) NOTE — LETTER
Date: 5/17/2024  Patient name: Thiago Alcantara  YOB: 1940  Medical Record Number: [id number]  Primary Coverage: Payor: MEDICARE / Plan: MEDICARE PART A&B / Product Type: *No Product type* /   [place]  Insurance ID: [id number]  Patient Address: [address] [place] [address] [place] [address]  Telephone Information:   Home Phone [phone]   Mobile [phone]       Encounter Date: 5/17/2024  Provider: Gayle Drake MD  Diagnosis:     ICD-10-CM   1. 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 Peripheral arterial disease (HCC)  I73.9   3. 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 Long term (current) use of anticoagulants  Z79.01   5. Chronic deep vein thrombosis (DVT) of distal vein of left lower extremity ([facility])  I82.5Z2         Wound 05/03/24 # 1 Left Lateral Foot Foot Left;Lateral (Active)   Date First Assessed/Time First Assessed: 05/03/24 0913    Wound Number (Wound Clinic Only): # 1 Left Lateral Foot  Primary Wound Type: Diabetic Ulcer  Location: Foot  Wound Location Orientation: Left;Lateral      Assessments 5/3/2024  9:16 AM 5/17/2024  9:53 AM   Wound Image       Drainage Amount Unable to assess Unable to assess   Treatments Compression Compression   Wound Length (cm) 0.5 cm 1.2 cm   Wound Width (cm) 0.8 cm 0.6 cm   Wound Surface Area (cm^2) 0.4 cm^2 0.72 cm^2   Wound Depth (cm) 0.2 cm 0.2 cm   Wound Volume (cm^3) 0.08 cm^3 0.144 cm^3   Wound Healing % -- -80   Margins Well-defined edges Well-defined edges   Non-staged Wound Description Full thickness Full thickness   Peggy-wound Assessment Dry;Edema;Hemosiderin staining Dry;Edema;Hemosiderin staining   Wound Bed Slough (%) 100 % 100 %   Wound Odor None None   Tunneling? No No   Undermining? No No   Sinus Tracts? No No   Josue Scale Grade 2 Grade 2       No associated orders.        Wound 05/03/24 #2 Left Lateral Ankle Ankle Left;Lateral (Active)   Date First Assessed/Time First Assessed: 05/03/24 0914    Wound Number (Wound Clinic Only): #2 Left Lateral Ankle  Primary Wound Type: Diabetic Ulcer  Location: Ankle  Wound Location Orientation: Left;Lateral      Assessments 5/3/2024  9:17 AM 5/17/2024  9:54 AM   Wound Image        Drainage Amount Unable to assess Unable to assess   Treatments Compression Compression   Wound Length (cm) 1 cm 1.5 cm   Wound Width (cm) 0.5 cm 1.8 cm   Wound Surface Area (cm^2) 0.5 cm^2 2.7 cm^2   Wound Depth (cm) 0.1 cm 0.1 cm   Wound Volume (cm^3) 0.05 cm^3 0.27 cm^3   Wound Healing % -- -440   Margins Poorly defined Well-defined edges   Non-staged Wound Description Full thickness Full thickness   Peggy-wound Assessment Dry;Edema;Hemosiderin staining Dry;Edema;Hemosiderin staining   Wound Bed Epithelium (%) 50 % 75 %   Wound Bed Slough (%) 50 % 25 %   Wound Odor None None   Shape clustered clustered   Tunneling? No No   Undermining? No No   Sinus Tracts? No No   Josue Scale Grade 2 Grade 2       No associated orders.       Compression Wrap 05/17/24 Leg Left (Active)   Placement Date: 05/17/24   Location: Leg  Wound Location Orientation: Left      Assessments 5/17/2024 11:53 AM   Response to Treatment Well tolerated   Compression Layers Multilayer   Compression Product Type Coflex   Dressing Applied Yes   Compression Wrap Location Toes to Knee   Compression Wrap Status Clean;Dry;Intact       No associated orders.     Wound Number: All wounds    Wound Cleaning and Dressings:  Showering directions: May shower and/or cleanse wound with mild soap and water  Wound cleansing:  Cleanse with normal saline or wound cleanser  Wound cleaning frequency: during dressing changes  Wound product: honey gel, honey alginate, kerramax, zinc to periwound, conforming gauze, and tape  Dressing change frequency:  Change dressing 3x per week  Enzymatic agent:  Honey    Compression Therapy:    Coflex 2 layers 30-40mmhg    Miscellaneous/Additional Orders:  Miscellaneous orders: Home health care nurse for wound care and Decrease sodium intake    Care Summary:  Care Summary: Discussed Plan of Care at beside with patient. Patient verbally acknowledges understanding of all instructions and all questions were answered.    Follow Up:  Return in about 1 week (around 5/24/2024) for Wound followup.    Additional Notes:   RN to see patient 2 times a week, pt to return in 1 week. Pt sent home with first wrap kit, please order supplies listed above.     PLEASE call if patient is approved or denied - [phone] opt.2.

## (undated) NOTE — LETTER
Date: 7/24/2024  Patient name: Thiago Alcantara  YOB: 1940  Medical Record Number: [id number]  Primary Coverage: Payor: MEDICARE / Plan: MEDICARE PART A&B / Product Type: *No Product type* /   [place]  Insurance ID: [id number]  Patient Address: [address] [place] [address] [place] [address]  Telephone Information:   Home Phone [phone]   Mobile [phone]       Encounter Date: 7/24/2024  Provider: Gayle Drake MD  Diagnosis:     ICD-10-CM   1. Non-pressure chronic ulcer of left ankle with fat layer exposed ([facility])  L97.322   2. Cellulitis of left leg  L03.116   3. Infected wound  T14.8XXA    L08.9   4. 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 Diabetes mellitus with skin ulcer ([facility])  E11.622    L98.499   6. Left leg swelling  M79.89   7. Peripheral arterial disease ([facility])  I73.9   8. Long term (current) use of anticoagulants  Z79.01         Wound 05/03/24 #2 Left Lateral Ankle Ankle Left;Lateral (Active)   Date First Assessed/Time First Assessed: 05/03/24 0914    Wound Number (Wound Clinic Only): #2 Left Lateral Ankle  Primary Wound Type: Diabetic Ulcer  Location: Ankle  Wound Location Orientation: Left;Lateral      Assessments 5/3/2024  9:17 AM 7/24/2024  3:46 PM   Wound Image        Drainage Amount Unable to assess Small   Drainage Description -- Serosanguineous   Treatments Compression Compression   Wound Length (cm) 1 cm 1.8 cm   Wound Width (cm) 0.5 cm 0.7 cm   Wound Surface Area (cm^2) 0.5 cm^2 1.26 cm^2   Wound Depth (cm) 0.1 cm 0.1 cm   Wound Volume (cm^3) 0.05 cm^3 0.126 cm^3   Wound Healing % -- -152   Margins Poorly defined Well-defined edges   Non-staged Wound Description Full thickness Full thickness   Peggy-wound Assessment Dry;Edema;Hemosiderin staining Edema;Moist;Pink   Wound Bed Epithelium (%) 50 % 70 %   Wound Bed Slough (%) 50 % 30 %   Wound Odor None None    Shape clustered --   Tunneling? No No   Undermining? No No   Sinus Tracts? No No   Josue Scale Grade 2 Grade 2       Inactive Orders   Date Order Priority Status Authorizing Provider   07/24/24 1625 Debridement Diabetic Ulcer Left;Lateral Ankle Routine Completed Gayle Ng MD       Compression Wrap 05/17/24 Leg Left (Active)   Placement Date: 05/17/24   Location: Leg  Wound Location Orientation: Left      Assessments 5/17/2024 11:53 AM 7/24/2024  5:02 PM   Response to Treatment Well tolerated Well tolerated   Compression Layers Multilayer Multilayer   Compression Product Type Coflex Unna Boot   Dressing Applied Yes Yes   Compression Wrap Location Toes to Knee Toes to Knee   Compression Wrap Status Clean;Dry;Intact Clean;Dry;Intact       No associated orders.     Wound Cleaning and Dressings: Left Ankle  Showering directions: May shower with protection  Wound cleansing:  Cleanse with normal saline or wound cleanser  Wound cleaning frequency:  3 times a week  Wound product: Gentamicin ointment, Xeroform gauze (folded)  Dressing change frequency:  Change dressing 3x per week  Enzymatic agent:  Not applicable     Compression Therapy:   Coflex calamine unna boot 30-40mmhg     Miscellaneous/Additional Orders:  Miscellaneous orders: Home health care nurse for wound care     Care Summary:  Care Summary: Discussed Plan of Care at beside with patient. Patient verbally acknowledges understanding of all instructions and all questions were answered.    Follow Up:  Return in about 1 week (around 7/31/2024) for Wound followup.      Additional Notes: MWF dressing changes.

## (undated) NOTE — LETTER
Date: 7/31/2024  Patient name: Thiago Alcantara  YOB: 1940  Medical Record Number: [id number]  Primary Coverage: Payor: MEDICARE / Plan: MEDICARE PART A&B / Product Type: *No Product type* /   [place]  Insurance ID: [id number]  Patient Address: [address] [place] [address] [place] [address]  Telephone Information:   Home Phone [phone]   Mobile [phone]       Encounter Date: 7/31/2024  Provider: Gayle Drake MD  Diagnosis:     ICD-10-CM   1. Non-pressure chronic ulcer of left ankle with fat layer exposed ([facility])  L97.322   2. 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 Diabetes mellitus with skin ulcer ([facility])  E11.622    L98.499   4. Left leg swelling  M79.89   5. Infected wound  T14.8XXA    L08.9   6. Cellulitis of left leg  L03.116   7. Peripheral arterial disease ([facility])  I73.9   8. Long term (current) use of anticoagulants  Z79.01         Wound 05/03/24 #2 Left Lateral Ankle Ankle Left;Lateral (Active)   Date First Assessed/Time First Assessed: 05/03/24 0914    Wound Number (Wound Clinic Only): #2 Left Lateral Ankle  Primary Wound Type: Diabetic Ulcer  Location: Ankle  Wound Location Orientation: Left;Lateral      Assessments 5/3/2024  9:17 AM 7/31/2024  4:00 PM   Wound Image       Drainage Amount Unable to assess Small   Drainage Description -- Serous;Yellow   Treatments Compression Compression   Wound Length (cm) 1 cm 1.9 cm   Wound Width (cm) 0.5 cm 0.6 cm   Wound Surface Area (cm^2) 0.5 cm^2 1.14 cm^2   Wound Depth (cm) 0.1 cm 0.1 cm   Wound Volume (cm^3) 0.05 cm^3 0.114 cm^3   Wound Healing % -- -128   Margins Poorly defined Well-defined edges   Non-staged Wound Description Full thickness Full thickness   Peggy-wound Assessment Dry;Edema;Hemosiderin staining Edema;Dry   Wound Bed Granulation (%) -- 50 %   Wound Bed Epithelium (%) 50 % 20 %   Wound Bed Slough (%) 50 %  30 %   Wound Odor None None   Shape clustered clustered   Tunneling? No No   Undermining? No No   Sinus Tracts? No No   Josue Scale Grade 2 Grade 2       Inactive Orders   Date Order Priority Status Authorizing Provider   07/24/24 1625 Debridement Diabetic Ulcer Left;Lateral Ankle Routine Completed Gayle Ng MD       Compression Wrap 05/17/24 Leg Left (Active)   Placement Date: 05/17/24   Location: Leg  Wound Location Orientation: Left      Assessments 5/17/2024 11:53 AM 7/31/2024  5:20 PM   Response to Treatment Well tolerated Well tolerated   Compression Layers Multilayer Multilayer   Compression Product Type Coflex Unna Boot   Dressing Applied Yes Yes   Compression Wrap Location Toes to Knee Toes to Knee   Compression Wrap Status Clean;Dry;Intact Clean;Dry;Intact       No associated orders.     Wound Cleaning and Dressings: Left Ankle  Showering directions: May shower with protection  Wound cleansing:  Cleanse with normal saline or wound cleanser  Wound cleaning frequency:  3 times a week  Wound product: Gentamicin ointment, Xeroform gauze (folded)  Dressing change frequency:  Change dressing 3x per week  Enzymatic agent:  Not applicable     Compression Therapy:   Coflex calamine unna boot 30-40mmhg     Miscellaneous/Additional Orders:  Miscellaneous orders: Home health care nurse for wound care     Care Summary:  Care Summary: Discussed Plan of Care at beside with patient. Patient verbally acknowledges understanding of all instructions and all questions were answered.      Additional Notes: MWF dressing changes.  Follow Up:  Return in 2 weeks (on 8/14/2024) for Wound followup.

## (undated) NOTE — LETTER
Date: 9/13/2024  Patient name: Thiago Alcantara  YOB: 1940  Medical Record Number: [id number]  Primary Coverage: Payor: MEDICARE / Plan: MEDICARE PART A&B / Product Type: *No Product type* /   [place]  Insurance ID: [id number]  Patient Address: [address] [place] [address] [place] [address]  Telephone Information:   Home Phone [phone]   Mobile [phone]       Encounter Date: 9/13/2024  Provider: WOUND CARE NURSE  Diagnosis:     ICD-10-CM   1. Non-pressure chronic ulcer of left ankle with fat layer exposed ([facility])  L97.322   2. 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 Diabetes mellitus with skin ulcer ([facility])  E11.622    L98.499   4. Left leg swelling  M79.89   5. Infected wound  T14.8XXA    L08.9   6. Cellulitis of left leg  L03.116         Wound 05/03/24 #2 Left Lateral Ankle Ankle Left;Lateral (Active)   Date First Assessed/Time First Assessed: 05/03/24 0914    Wound Number (Wound Clinic Only): #2 Left Lateral Ankle  Primary Wound Type: Diabetic Ulcer  Location: Ankle  Wound Location Orientation: Left;Lateral      Assessments 5/3/2024  9:17 AM 9/13/2024  4:01 PM   Wound Image       Drainage Amount Unable to assess Small   Drainage Description -- Serosanguineous   Treatments Compression Compression   Wound Length (cm) 1 cm 0.5 cm   Wound Width (cm) 0.5 cm 0.4 cm   Wound Surface Area (cm^2) 0.5 cm^2 0.2 cm^2   Wound Depth (cm) 0.1 cm 0.1 cm   Wound Volume (cm^3) 0.05 cm^3 0.02 cm^3   Wound Healing % -- 60   Margins Poorly defined Well-defined edges   Non-staged Wound Description Full thickness Full thickness   Peggy-wound Assessment Dry;Edema;Hemosiderin staining Edema;Dry   Wound Granulation Tissue -- Pink;Spongy;Pale Grey   Wound Bed Granulation (%) -- 100 %   Wound Bed Epithelium (%) 50 % --   Wound Bed Slough (%) 50 % --   Wound Odor None None   Shape clustered --   Tunneling?  No No   Undermining? No No   Sinus Tracts? No No   Josue Scale Grade 2 Grade 2       Inactive Orders   Date Order Priority Status Authorizing Provider   07/24/24 1625 Debridement Diabetic Ulcer Left;Lateral Ankle Routine Completed Gayle Ng MD       Compression Wrap 05/17/24 Leg Left (Active)   Placement Date: 05/17/24   Location: Leg  Wound Location Orientation: Left      Assessments 5/17/2024 11:53 AM 9/13/2024  5:09 PM   Response to Treatment Well tolerated Well tolerated   Compression Layers Multilayer Multilayer   Compression Product Type Coflex Unna Boot   Dressing Applied Yes Yes   Compression Wrap Location Toes to Knee Toes to Knee   Compression Wrap Status Clean;Dry;Intact Clean;Dry;Intact       No associated orders.         Wound Cleaning and Dressings:  Showering directions: May shower with protection  Wound cleansing:  Cleanse with normal saline or wound cleanser and Clean with soap and water  Wound cleaning frequency:  during dressing changes  Wound product: Honey gel, honey alginate, gauze  Dressing change frequency:  Change dressing 3x per week  Enzymatic agent:  Honey     Compression Therapy:   Coflex calamine unna boot 20-30mmhg     Miscellaneous/Additional Orders:  Miscellaneous orders: Home health care nurse for wound care     Care Summary:  Care Summary: Discussed Plan of Care at beside with patient. Patient verbally acknowledges understanding of all instructions and all questions were answered    Follow-up: 9/27/24

## (undated) NOTE — LETTER
Date: 8/16/2024  Patient name: Thiago Alcantara  YOB: 1940  Medical Record Number: [id number]  Primary Coverage: Payor: MEDICARE / Plan: MEDICARE PART A&B / Product Type: *No Product type* /   [place]  Insurance ID: [id number]  Patient Address: [address] [place] [address] [place] [address]  Telephone Information:   Home Phone [phone]   Mobile [phone]       Encounter Date: 8/16/2024  Provider: Gayle Drake MD  Diagnosis:     ICD-10-CM   1. 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 Diabetes mellitus with skin ulcer ([facility])  E11.622    L98.499   4. Left leg swelling  M79.89   5. Infected wound  T14.8XXA    L08.9         Wound 05/03/24 #2 Left Lateral Ankle Ankle Left;Lateral (Active)   Date First Assessed/Time First Assessed: 05/03/24 0914    Wound Number (Wound Clinic Only): #2 Left Lateral Ankle  Primary Wound Type: Diabetic Ulcer  Location: Ankle  Wound Location Orientation: Left;Lateral      Assessments 5/3/2024  9:17 AM 8/16/2024  3:57 PM   Wound Image       Drainage Amount Unable to assess Small   Drainage Description -- Serosanguineous   Treatments Compression Compression   Wound Length (cm) 1 cm 0.6 cm   Wound Width (cm) 0.5 cm 0.7 cm   Wound Surface Area (cm^2) 0.5 cm^2 0.42 cm^2   Wound Depth (cm) 0.1 cm 0.1 cm   Wound Volume (cm^3) 0.05 cm^3 0.042 cm^3   Wound Healing % -- 16   Margins Poorly defined Well-defined edges   Non-staged Wound Description Full thickness Full thickness   Peggy-wound Assessment Dry;Edema;Hemosiderin staining Edema;Dry   Wound Granulation Tissue -- Firm;Pink;Pale Grey   Wound Bed Granulation (%) -- 100 %   Wound Bed Epithelium (%) 50 % --   Wound Bed Slough (%) 50 % --   Wound Odor None None   Shape clustered --   Tunneling? No No   Undermining? No No    Sinus Tracts? No No   Josue Scale Grade 2 Grade 2       Inactive Orders   Date Order Priority Status Authorizing Provider   07/24/24 1625 Debridement Diabetic Ulcer Left;Lateral Ankle Routine Completed Gayle Ng MD       Compression Wrap 05/17/24 Leg Left (Active)   Placement Date: 05/17/24   Location: Leg  Wound Location Orientation: Left      Assessments 5/17/2024 11:53 AM 8/16/2024  5:18 PM   Response to Treatment Well tolerated Well tolerated   Compression Layers Multilayer Multilayer   Compression Product Type Coflex Unna Boot   Dressing Applied Yes Yes   Compression Wrap Location Toes to Knee Toes to Knee   Compression Wrap Status Clean;Dry;Intact Clean;Dry;Intact       No associated orders.       Wound Cleaning and Dressings:Left ankle  Showering directions: May shower with protection  Wound cleansing:  Cleanse with normal saline or wound cleanser and Clean with soap and water  Wound cleaning frequency:  during dressing change  Wound product:  coloplast triad psate  Dressing change frequency:  Change dressing 3x per week  Enzymatic agent:  Not applicable    Compression Therapy:   Coflex calamine unna boot 20-30mmhg    Miscellaneous/Additional Orders:  Miscellaneous orders: Home health care nurse for wound care    Care Summary:  Care Summary: Discussed Plan of Care at beside with patient. Patient verbally acknowledges understanding of all instructions and all questions were answered.    Follow Up:  Return in about 2 weeks (around 8/30/2024) for Wound followup.

## (undated) NOTE — LETTER
Lelo Nelson [address]  [place], [address] [facility]    Consent for Operation  Date: __________________                                Time: _______________    1.  I authorize the performance upon Oniel Vernon the following operation:  Procedu procedure has been videotaped, the surgeon will obtain the original videotape. The hospital will not be responsible for storage or maintenance of this tape.   7. For the purpose of advancing medical education, I consent to the admittance of observers to the STATEMENTS REQUIRING INSERTION OR COMPLETION WERE FILLED IN.     Signature of Patient:   ___________________________    When the patient is a minor or mentally incompetent to give consent:  Signature of person authorized to consent for patient: ____________ supplements, and pills I can buy without a prescription (including street drugs/illegal medications). Failure to inform my anesthesiologist about these medicines may increase my risk of anesthetic complications. iv.  If I am allergic to anything or have ha Anesthesiologist Signature     Date   Time  I have discussed the procedure and information above with the patient (or patient’s representative) and answered their questions. The patient or their representative has agreed to have anesthesia services.     ___

## (undated) NOTE — LETTER
Lelo Nelson [address] [place] [address] [address]  [place], [address] [facility]    Consent for Operation  Date: ____06/12/20                                 Time: _______________    1.  I authorize the performance upon Gene Madrigal the following operation:  Procedure(s) procedure has been videotaped, the surgeon will obtain the original videotape. The hospital will not be responsible for storage or maintenance of this tape.   7. For the purpose of advancing medical education, I consent to the admittance of observers to the STATEMENTS REQUIRING INSERTION OR COMPLETION WERE FILLED IN.     Signature of Patient:   ___________________________    When the patient is a minor or mentally incompetent to give consent:  Signature of person authorized to consent for patient: ____________ supplements, and pills I can buy without a prescription (including street drugs/illegal medications). Failure to inform my anesthesiologist about these medicines may increase my risk of anesthetic complications. iv.  If I am allergic to anything or have ha Anesthesiologist Signature     Date   Time  I have discussed the procedure and information above with the patient (or patient’s representative) and answered their questions. The patient or their representative has agreed to have anesthesia services.     ___

## (undated) NOTE — LETTER
[facility] [address], [address]  Consent for Procedure/Sedation    Date:     Time:       I authorize the performance upon Brii Almaraz the following:INFERIOR VENA CAVA FILTER REMOVAL    1.  I authorize  __________________ ________________________________    ___________________    Witness: _________________________      Date: ___________________    Printed: 2020 9:57 AM  Patient Name: Ruby Tangxiao      : 1940      Medical Record #: [id number]

## (undated) NOTE — LETTER
Date: 7/10/2024  Patient name: Thiago Alcantara  YOB: 1940  Medical Record Number: [id number]  Primary Coverage: Payor: MEDICARE / Plan: MEDICARE PART A&B / Product Type: *No Product type* /   [place]  Insurance ID: [id number]  Patient Address: [address] [place] [address] [place] [address]  Telephone Information:   Home Phone [phone]   Mobile [phone]       Encounter Date: 7/10/2024  Provider: Gayle Drake MD  Diagnosis:     ICD-10-CM   1. 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 Diabetes mellitus with skin ulcer ([facility])  E11.622    L98.499   4. Left leg swelling  M79.89   5. Peripheral arterial disease ([facility])  I73.9   6. Long term (current) use of anticoagulants  Z79.01         Wound 05/03/24 # 1 Left Lateral Foot Foot Left;Lateral (Active)   Date First Assessed/Time First Assessed: 05/03/24 0913    Wound Number (Wound Clinic Only): # 1 Left Lateral Foot  Primary Wound Type: Diabetic Ulcer  Location: Foot  Wound Location Orientation: Left;Lateral      Assessments 5/3/2024  9:16 AM 7/10/2024  3:54 PM   Wound Image       Drainage Amount Unable to assess None   Treatments Compression Compression   Wound Length (cm) 0.5 cm 0.6 cm   Wound Width (cm) 0.8 cm 1 cm   Wound Surface Area (cm^2) 0.4 cm^2 0.6 cm^2   Wound Depth (cm) 0.2 cm 0 cm   Wound Volume (cm^3) 0.08 cm^3 0 cm^3   Wound Healing % -- 100   Margins Well-defined edges Well-defined edges   Non-staged Wound Description Full thickness Full thickness   Peggy-wound Assessment Dry;Edema;Hemosiderin staining Edema;Dry   Wound Bed Slough (%) 100 % --   Wound Odor None None   Shape -- Scabbed, uanble to view wound bed.   Tunneling? No No   Undermining? No No   Sinus Tracts? No No   Josue Scale Grade 2 Grade 2       Inactive Orders   Date  Order Priority Status Authorizing Provider   06/26/24 1636 Debridement Diabetic Ulcer Left;Lateral Foot Routine Completed Gayle Ng MD   06/12/24 1611 Debridement Diabetic Ulcer Left;Lateral Foot Routine Completed Gayle Ng MD   05/31/24 1515 Debridement Diabetic Ulcer Left;Lateral Foot Routine Completed Gayle Ng MD   05/24/24 1349 Debridement Diabetic Ulcer Left;Lateral Foot Routine Completed Gayle Ng MD       Wound 05/03/24 #2 Left Lateral Ankle Ankle Left;Lateral (Active)   Date First Assessed/Time First Assessed: 05/03/24 0914    Wound Number (Wound Clinic Only): #2 Left Lateral Ankle  Primary Wound Type: Diabetic Ulcer  Location: Ankle  Wound Location Orientation: Left;Lateral      Assessments 5/3/2024  9:17 AM 7/10/2024  3:54 PM   Wound Image       Drainage Amount Unable to assess Scant   Drainage Description -- Serous;Yellow   Treatments Compression Compression   Dressing -- Xeroform   Wound Length (cm) 1 cm 0.6 cm   Wound Width (cm) 0.5 cm 0.5 cm   Wound Surface Area (cm^2) 0.5 cm^2 0.3 cm^2   Wound Depth (cm) 0.1 cm 0 cm   Wound Volume (cm^3) 0.05 cm^3 0 cm^3   Wound Healing % -- 100   Margins Poorly defined Well-defined edges   Non-staged Wound Description Full thickness Full thickness   Peggy-wound Assessment Dry;Edema;Hemosiderin staining Edema;Dry   Wound Bed Epithelium (%) 50 % --   Wound Bed Slough (%) 50 % --   Wound Odor None None   Shape clustered Scabbed, unable to view wound bed.   Tunneling? No No   Undermining? No No   Sinus Tracts? No No   Josue Scale Grade 2 Grade 2       No associated orders.       Compression Wrap 05/17/24 Leg Left (Active)   Placement Date: 05/17/24   Location: Leg  Wound Location Orientation: Left      Assessments 5/17/2024 11:53 AM 7/10/2024  4:41 PM   Response to Treatment Well tolerated Well tolerated   Compression Layers Multilayer Multilayer   Compression Product Type Coflex Unna Boot   Dressing Applied Yes Yes    Compression Wrap Location Toes to Knee Toes to Knee   Compression Wrap Status Clean;Dry;Intact Clean;Dry;Intact       No associated orders.     Wound Number: All wounds    Wound Cleaning and Dressings:  Showering directions: May shower with protection  Wound cleansing:  Cleanse with normal saline or wound cleanser  Wound cleaning frequency:  3 times a week  Wound product: Xeroform gauze (folded)  Dressing change frequency:  Change dressing 3x per week  Enzymatic agent:  Not applicable    Compression Therapy:   Coflex calamine unna boot 30-40mmhg    Miscellaneous/Additional Orders:  Miscellaneous orders: Home health care nurse for wound care    Care Summary:  Care Summary: Discussed Plan of Care at beside with patient. Patient verbally acknowledges understanding of all instructions and all questions were answered.    Follow Up:  Return in 2 weeks (on 7/24/2024) for Wound followup.

## (undated) NOTE — LETTER
Date: 6/12/2024  Patient name: Thiago Alcantara  YOB: 1940  Medical Record Number: [id number]  Primary Coverage: Payor: MEDICARE / Plan: MEDICARE PART A&B / Product Type: *No Product type* /   [place]  Insurance ID: [id number]  Patient Address: [address] [place] [address] [place] [address]  Telephone Information:   Home Phone [phone]   Mobile [phone]       Encounter Date: 6/12/2024  Provider: Gayle Drake MD  Diagnosis:     ICD-10-CM   1. 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 Peripheral arterial disease (HCC)  I73.9   3. Left leg swelling  M79.89   4. 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 Diabetes mellitus with skin ulcer (HCC)  E11.622    L98.499   6. Long term (current) use of anticoagulants  Z79.01         Wound 05/03/24 # 1 Left Lateral Foot Foot Left;Lateral (Active)   Date First Assessed/Time First Assessed: 05/03/24 0913    Wound Number (Wound Clinic Only): # 1 Left Lateral Foot  Primary Wound Type: Diabetic Ulcer  Location: Foot  Wound Location Orientation: Left;Lateral      Assessments 5/3/2024  9:16 AM 6/12/2024  3:52 PM   Wound Image        Drainage Amount Unable to assess Moderate   Drainage Description -- Yellow;Serous   Treatments Compression Compression   Wound Length (cm) 0.5 cm 0.9 cm   Wound Width (cm) 0.8 cm 0.5 cm   Wound Surface Area (cm^2) 0.4 cm^2 0.45 cm^2   Wound Depth (cm) 0.2 cm 0.1 cm   Wound Volume (cm^3) 0.08 cm^3 0.045 cm^3   Wound Healing % -- 44   Margins Well-defined edges Well-defined edges   Non-staged Wound Description Full thickness Full thickness   Peggy-wound Assessment Dry;Edema;Hemosiderin staining Edema;Red;Dry   Wound Bed Slough (%) 100 % 100 %   Wound Odor None None   Tunneling? No No   Undermining? No No   Sinus Tracts? No No   Josue Scale Grade 2 Grade 2       Inactive  Orders   Date Order Priority Status Authorizing Provider   06/12/24 1611 Debridement Diabetic Ulcer Left;Lateral Foot Routine Completed Gayle Ng MD   05/31/24 1515 Debridement Diabetic Ulcer Left;Lateral Foot Routine Completed Gayle Ng MD   05/24/24 1349 Debridement Diabetic Ulcer Left;Lateral Foot Routine Completed Gayle Ng MD       Wound 05/03/24 #2 Left Lateral Ankle Ankle Left;Lateral (Active)   Date First Assessed/Time First Assessed: 05/03/24 0914    Wound Number (Wound Clinic Only): #2 Left Lateral Ankle  Primary Wound Type: Diabetic Ulcer  Location: Ankle  Wound Location Orientation: Left;Lateral      Assessments 5/3/2024  9:17 AM 6/12/2024  3:53 PM   Wound Image       Drainage Amount Unable to assess Small   Drainage Description -- Serosanguineous   Treatments Compression Compression   Wound Length (cm) 1 cm 1.4 cm   Wound Width (cm) 0.5 cm 1.8 cm   Wound Surface Area (cm^2) 0.5 cm^2 2.52 cm^2   Wound Depth (cm) 0.1 cm 0.2 cm   Wound Volume (cm^3) 0.05 cm^3 0.504 cm^3   Wound Healing % -- -908   Margins Poorly defined Well-defined edges   Non-staged Wound Description Full thickness Full thickness   Peggy-wound Assessment Dry;Edema;Hemosiderin staining Dry;Edema;Hemosiderin staining   Wound Granulation Tissue -- Red;Spongy   Wound Bed Granulation (%) -- 5 %   Wound Bed Epithelium (%) 50 % 50 %   Wound Bed Slough (%) 50 % 45 %   Wound Odor None None   Shape clustered clustered   Tunneling? No No   Undermining? No No   Sinus Tracts? No No   Josue Scale Grade 2 Grade 2       No associated orders.       Compression Wrap 05/17/24 Leg Left (Active)   Placement Date: 05/17/24   Location: Leg  Wound Location Orientation: Left      Assessments 5/17/2024 11:53 AM 6/12/2024  4:32 PM   Response to Treatment Well tolerated Well tolerated   Compression Layers Multilayer Multilayer   Compression Product Type Coflex Unna Boot   Dressing Applied Yes Yes   Compression Wrap Location Toes to  Knee Toes to Knee   Compression Wrap Status Clean;Dry;Intact Clean;Dry;Intact       No associated orders.     Wound Number: All wounds     Wound Cleaning and Dressings:  Showering directions: May shower with protection  Wound cleansing:  Cleanse with normal saline or wound cleanser  Wound cleaning frequency:  3 times a week  Wound product: honey gel, honey alginate, ABD pad  Dressing change frequency:  Change dressing 3x per week  Enzymatic agent:  Honey     Compression Therapy:   Coflex calamine unna boot 30-40mmhg     Miscellaneous/Additional Orders:  Miscellaneous orders: Home health care nurse for wound care     Care Summary:  Care Summary: Discussed Plan of Care at beside with patient. Patient verbally acknowledges understanding of all instructions and all questions were answered.    Follow Up:  Return in about 2 weeks (around 6/26/2024) for Wound followup.

## (undated) NOTE — LETTER
Date: 8/28/2024  Patient name: Thiago Alcantara  YOB: 1940  Medical Record Number: [id number]  Primary Coverage: Payor: MEDICARE / Plan: MEDICARE PART A&B / Product Type: *No Product type* /   [place]  Insurance ID: [id number]  Patient Address: [address] [place] [address] [place] [address]  Telephone Information:   Home Phone [phone]   Mobile [phone]       Encounter Date: 8/28/2024  Provider: Gayle Drake MD  Diagnosis:     ICD-10-CM   1. 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 Diabetes mellitus with skin ulcer ([facility])  E11.622    L98.499   4. Left leg swelling  M79.89         Wound 05/03/24 #2 Left Lateral Ankle Ankle Left;Lateral (Active)   Date First Assessed/Time First Assessed: 05/03/24 0914    Wound Number (Wound Clinic Only): #2 Left Lateral Ankle  Primary Wound Type: Diabetic Ulcer  Location: Ankle  Wound Location Orientation: Left;Lateral      Assessments 5/3/2024  9:17 AM 8/28/2024  3:54 PM   Wound Image       Drainage Amount Unable to assess Scant   Drainage Description -- Serous;Yellow   Treatments Compression Compression   Wound Length (cm) 1 cm 0.5 cm   Wound Width (cm) 0.5 cm 0.5 cm   Wound Surface Area (cm^2) 0.5 cm^2 0.25 cm^2   Wound Depth (cm) 0.1 cm 0.1 cm   Wound Volume (cm^3) 0.05 cm^3 0.025 cm^3   Wound Healing % -- 50   Margins Poorly defined Well-defined edges   Non-staged Wound Description Full thickness Full thickness   Peggy-wound Assessment Dry;Edema;Hemosiderin staining Edema;Dry   Wound Granulation Tissue -- Firm;Pink;Pale Grey   Wound Bed Granulation (%) -- 100 %   Wound Bed Epithelium (%) 50 % --   Wound Bed Slough (%) 50 % --   Wound Odor None None   Shape clustered --   Tunneling? No No   Undermining? No No   Sinus Tracts? No No   Josue Scale Grade 2  Grade 2       Inactive Orders   Date Order Priority Status Authorizing Provider   07/24/24 1625 Debridement Diabetic Ulcer Left;Lateral Ankle Routine Completed Gayle Ng MD       Compression Wrap 05/17/24 Leg Left (Active)   Placement Date: 05/17/24   Location: Leg  Wound Location Orientation: Left      Assessments 5/17/2024 11:53 AM 8/28/2024  4:59 PM   Response to Treatment Well tolerated Well tolerated   Compression Layers Multilayer Multilayer   Compression Product Type Coflex Unna Boot   Dressing Applied Yes Yes   Compression Wrap Location Toes to Knee Toes to Knee   Compression Wrap Status Clean;Dry;Intact Clean;Dry;Intact       No associated orders.       Wound Cleaning and Dressings:  Showering directions: May shower with protection  Wound cleansing:  Cleanse with normal saline or wound cleanser and Clean with soap and water  Wound cleaning frequency:  during dressing changes  Wound product: Honey gel, honey alginate, gauze  Dressing change frequency:  Change dressing 3x per week  Enzymatic agent:  Honey    Compression Therapy:   Coflex calamine unna boot 20-30mmhg    Miscellaneous/Additional Orders:  Miscellaneous orders: Home health care nurse for wound care    Care Summary:  Care Summary: Discussed Plan of Care at beside with patient. Patient verbally acknowledges understanding of all instructions and all questions were answered.    Follow Up:  Return in about 4 weeks (around 9/25/2024) for Wound followup.

## (undated) NOTE — LETTER
Date: 5/24/2024  Patient name: Thiago Alcantara  YOB: 1940  Medical Record Number: [id number]  Primary Coverage: Payor: MEDICARE / Plan: MEDICARE PART A&B / Product Type: *No Product type* /   [place]  Insurance ID: [id number]  Patient Address: [address] [place] [address] [place] [address]  Telephone Information:   Home Phone [phone]   Mobile [phone]       Encounter Date: 5/24/2024  Provider: Gayle Drake MD  Diagnosis:     ICD-10-CM   1. Non-pressure chronic ulcer of left ankle with fat layer exposed ([facility])  L97.322   2. Dermatitis  L30.9   3. Rash  R21   4. Peripheral arterial disease ([facility])  I73.9   5. Left leg swelling  M79.89   6. Idiopathic chronic venous hypertension of left lower extremity with ulcer and inflammation ([facility])  I87.332   7. Diabetes mellitus with skin ulcer ([facility])  E11.622    L98.499   8. Long term (current) use of anticoagulants  Z79.01   9. Chronic deep vein thrombosis (DVT) of distal vein of left lower extremity ([facility])  I82.5Z2   10. Varicose veins of both lower extremities with complications  I83.893         Wound 05/03/24 # 1 Left Lateral Foot Foot Left;Lateral (Active)   Date First Assessed/Time First Assessed: 05/03/24 0913    Wound Number (Wound Clinic Only): # 1 Left Lateral Foot  Primary Wound Type: Diabetic Ulcer  Location: Foot  Wound Location Orientation: Left;Lateral      Assessments 5/3/2024  9:16 AM 5/24/2024  1:24 PM   Wound Image        Drainage Amount Unable to assess Small   Drainage Description -- Serosanguineous   Treatments Compression Compression   Wound Length (cm) 0.5 cm 1.1 cm   Wound Width (cm) 0.8 cm 0.2 cm   Wound Surface Area (cm^2) 0.4 cm^2 0.22 cm^2   Wound Depth (cm) 0.2 cm 0.2 cm   Wound Volume (cm^3) 0.08 cm^3 0.044 cm^3   Wound Healing % -- 45   Margins Well-defined edges Well-defined edges   Non-staged Wound Description Full thickness Full thickness   Pgegy-wound Assessment  Dry;Edema;Hemosiderin staining Dry;Edema   Wound Granulation Tissue -- Red;Firm   Wound Bed Granulation (%) -- 5 %   Wound Bed Slough (%) 100 % 95 %   Wound Odor None None   Tunneling? No No   Undermining? No No   Sinus Tracts? No No   Josue Scale Grade 2 Grade 2       Inactive Orders   Date Order Priority Status Authorizing Provider   05/24/24 1349 Debridement Diabetic Ulcer Left;Lateral Foot Routine Completed Gayle Ng MD       Wound 05/03/24 #2 Left Lateral Ankle Ankle Left;Lateral (Active)   Date First Assessed/Time First Assessed: 05/03/24 0914    Wound Number (Wound Clinic Only): #2 Left Lateral Ankle  Primary Wound Type: Diabetic Ulcer  Location: Ankle  Wound Location Orientation: Left;Lateral      Assessments 5/3/2024  9:17 AM 5/24/2024  1:27 PM   Wound Image       Drainage Amount Unable to assess Scant   Drainage Description -- Serous;Yellow   Treatments Compression Compression   Wound Length (cm) 1 cm 1.3 cm   Wound Width (cm) 0.5 cm 2.1 cm   Wound Surface Area (cm^2) 0.5 cm^2 2.73 cm^2   Wound Depth (cm) 0.1 cm 0.2 cm   Wound Volume (cm^3) 0.05 cm^3 0.546 cm^3   Wound Healing % -- -992   Margins Poorly defined Well-defined edges   Non-staged Wound Description Full thickness Full thickness   Peggy-wound Assessment Dry;Edema;Hemosiderin staining Dry;Edema;Hemosiderin staining;Red   Wound Granulation Tissue -- Red;Firm   Wound Bed Granulation (%) -- 20 %   Wound Bed Epithelium (%) 50 % 60 %   Wound Bed Slough (%) 50 % 20 %   Wound Odor None None   Shape clustered clustered   Tunneling? No No   Undermining? No No   Sinus Tracts? No No   Josue Scale Grade 2 Grade 2       No associated orders.       Compression Wrap 05/17/24 Leg Left (Active)   Placement Date: 05/17/24   Location: Leg  Wound Location Orientation: Left      Assessments 5/17/2024 11:53 AM 5/24/2024  1:52 PM   Response to Treatment Well tolerated Well tolerated   Compression Layers Multilayer Multilayer   Compression Product Type  Coflex Unna Boot   Dressing Applied Yes Yes   Compression Wrap Location Toes to Knee Toes to Knee   Compression Wrap Status Clean;Dry;Intact Clean;Dry;Intact       No associated orders.     Wound Cleaning and Dressings:  Showering directions: May shower and/or cleanse wound with mild soap and water  Wound cleansing:  Cleanse with normal saline or wound cleanser  Wound cleaning frequency: during dressing changes  Wound product: honey gel, honey alginate, ABD pad, zinc to periwound, conforming gauze, and tape. Betamethasone to rash area.  Dressing change frequency:  Change dressing 3x per week  Enzymatic agent:  Honey     Compression Therapy:   Unna boot 30-40mmhg     Miscellaneous/Additional Orders:  Miscellaneous orders: Home health care nurse for wound care and Decrease sodium intake     Care Summary:  Care Summary: Discussed Plan of Care at beside with patient. Patient verbally acknowledges understanding of all instructions and all questions were answered.    Additional Notes:  HH RN to see patient 2 times a week, pt to return in 1 week. Pt sent home with first wrap kit, please order supplies listed above.      PLEASE call if patient is approved or denied - [phone] opt.2.    Follow Up:  Return in about 1 week (around 5/31/2024) for Followup.

## (undated) NOTE — LETTER
[facility] [address], [address] [place] [address]    Consent for Anesthesia   1.   IOctaviano agree to be cared for by a physician anesthesiologist alone and/or with a nurse anesthetist, who is specially trained to monitor me and give m allergic reactions to medications, injury to my airway, heart, lungs, vision, nerves, or muscles and in extremely rare instances death. 5. My doctor has explained to me other choices available to me for my care (alternatives).   6. Pregnant Patients (“epid Printed: 6/12/2020 at 2:43 PM    Medical Record #: [id number]                                            Page 1 of 1

## (undated) NOTE — LETTER
Date: 10/18/2024  Patient name: Thiago Alcantara  YOB: 1940  Medical Record Number: [id number]  Primary Coverage: Payor: MEDICARE / Plan: MEDICARE PART A&B / Product Type: *No Product type* /   [place]  Insurance ID: [id number]  Patient Address: [address] [place] [address] [place] [address]  Telephone Information:   Home Phone [phone]   Mobile [phone]       Encounter Date: 10/18/2024  Provider: Gayle Drake MD  Diagnosis:     ICD-10-CM   1. 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 Idiopathic chronic venous hypertension of left lower extremity with ulcer and inflammation ([facility])  I87.332   3. Diabetes mellitus with skin ulcer ([facility])  E11.622    L98.499   4. Left leg swelling  M79.89   5. Peripheral arterial disease ([facility])  I73.9   6. Long term (current) use of anticoagulants  Z79.01         Wound 05/03/24 #2 Ankle Left;Lateral (Active)   Date First Assessed/Time First Assessed: 05/03/24 0914    Wound Number (Wound Clinic Only): #2  Primary Wound Type: Diabetic Ulcer  Location: Ankle  Wound Location Orientation: Left;Lateral      Assessments 5/3/2024  9:17 AM 10/18/2024  9:31 AM   Wound Image       Drainage Amount Unable to assess Scant   Drainage Description -- Serosanguineous   Treatments Compression Compression   Wound Length (cm) 1 cm 0.4 cm   Wound Width (cm) 0.5 cm 0.2 cm   Wound Surface Area (cm^2) 0.5 cm^2 0.08 cm^2   Wound Depth (cm) 0.1 cm 0.1 cm   Wound Volume (cm^3) 0.05 cm^3 0.008 cm^3   Wound Healing % -- 84   Margins Poorly defined Well-defined edges   Non-staged Wound Description Full thickness Full thickness   Peggy-wound Assessment Dry;Edema;Hemosiderin staining Edema;Dry   Wound Granulation Tissue -- Pink;Pale Grey;Firm   Wound Bed Granulation (%) -- 100 %   Wound Bed Epithelium (%) 50 % --   Wound Bed Slough (%) 50 % --   Wound Odor None None   Shape clustered --   Tunneling? No  --   Undermining? No --   Sinus Tracts? No --   Josue Scale Grade 2 Grade 2       Inactive Orders   Date Order Priority Status Authorizing Provider   07/24/24 1625 Debridement Diabetic Ulcer Left;Lateral Ankle Routine Completed Gayle Ng MD       Compression Wrap 05/17/24 Leg Left (Active)   Placement Date: 05/17/24   Location: Leg  Wound Location Orientation: Left      Assessments 5/17/2024 11:53 AM 9/27/2024  5:44 PM   Response to Treatment Well tolerated Well tolerated   Compression Layers Multilayer Multilayer   Compression Product Type Coflex Unna Boot   Dressing Applied Yes Yes   Compression Wrap Location Toes to Knee Toes to Knee   Compression Wrap Status Clean;Dry;Intact Clean;Dry;Intact       No associated orders.           Wound Number: All wounds    Wound Cleaning and Dressings:  Wound product: Endoform collagen and Hydrofera transfer  Dressing change frequency:  Change dressing 3x per week  Compression Therapy:   Coflex calamine unna boot 30/40      Miscellaneous/Additional Orders:    Miscellaneous orders: Home health care nurse for wound care    Care Summary:  Care Summary: Discussed Plan of Care at beside with patient. Patient verbally acknowledges understanding of all instructions and all questions were answered.          Follow Up:  Return in about 3 weeks (around 11/8/2024) for Wound followup.          Additional home health DME: No

## (undated) NOTE — LETTER
Date: 11/6/2024  Patient name: Thiago Alcantara  YOB: 1940  Medical Record Number: [id number]  Primary Coverage: Payor: MEDICARE / Plan: MEDICARE PART A&B / Product Type: *No Product type* /   [place]  Insurance ID: [id number]  Patient Address: [address] [place] [address] [place] [address]  Telephone Information:   Home Phone [phone]   Mobile [phone]       Encounter Date: 11/6/2024  Provider: Gayle Drake MD  Diagnosis:     ICD-10-CM   1. Non-pressure chronic ulcer of left ankle with fat layer exposed ([facility])  L97.322   2. 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 Diabetes mellitus with skin ulcer ([facility])  E11.622    L98.499   4. Left leg swelling  M79.89   5. Peripheral arterial disease ([facility])  I73.9   6. Long term (current) use of anticoagulants  Z79.01   7. Infected wound  T14.8XXA    L08.9   8. Chronic deep vein thrombosis (DVT) of distal vein of left lower extremity ([facility])  I82.5Z2   9. Varicose veins of both lower extremities with complications  I83.893   10. Controlled type 2 diabetes mellitus with diabetic neuropathy, without long-term current use of insulin ([facility])  E11.40         Wound 05/03/24 #2 Ankle Left;Lateral (Active)   Date First Assessed/Time First Assessed: 05/03/24 0914    Wound Number (Wound Clinic Only): #2  Primary Wound Type: Diabetic Ulcer  Location: Ankle  Wound Location Orientation: Left;Lateral      Assessments 5/3/2024  9:17 AM 11/6/2024  4:01 PM   Wound Image       Drainage Amount Unable to assess None   Treatments Compression Compression   Wound Length (cm) 1 cm 0.4 cm   Wound Width (cm) 0.5 cm 0.2 cm   Wound Surface Area (cm^2) 0.5 cm^2 0.08 cm^2   Wound Depth (cm) 0.1 cm 0 cm   Wound Volume (cm^3) 0.05 cm^3 0 cm^3   Wound Healing % -- 100   Margins Poorly defined Well-defined edges   Non-staged Wound Description Full thickness Full thickness    Peggy-wound Assessment Dry;Edema;Hemosiderin staining Edema;Dry   Wound Bed Epithelium (%) 50 % --   Wound Bed Slough (%) 50 % --   Wound Odor None None   Shape clustered --   Tunneling? No No   Undermining? No No   Sinus Tracts? No No   Josue Scale Grade 2 Grade 2       Inactive Orders   Date Order Priority Status Authorizing Provider   24 1625 Debridement Diabetic Ulcer Left;Lateral Ankle Routine Completed Gayle Ng MD       Compression Wrap 24 Leg Left (Active)   Placement Date: 24   Location: Leg  Wound Location Orientation: Left      Assessments 2024 11:53 AM 2024  5:24 PM   Response to Treatment Well tolerated Well tolerated   Compression Layers Multilayer Multilayer   Compression Product Type Coflex Unna Boot   Dressing Applied Yes Yes   Compression Wrap Location Toes to Knee Toes to Knee   Compression Wrap Status Clean;Dry;Intact Clean;Dry;Intact       No associated orders.       Wound Cleaning and Dressings:  Showering directions: May shower with protection  Wound cleansing:  Cleanse with normal saline or wound cleanser  Wound cleanin-3 times a week  Dressing change frequency:  Change dressing 3x per week  Enzymatic agent:  Not applicable    Compression Therapy:   Coflex 2 layers    Miscellaneous/Additional Orders:  Miscellaneous orders: Home health care nurse for wound care    Care Summary:  Care Summary: Discussed Plan of Care at beside with patient. Patient verbally acknowledges understanding of all instructions and all questions were answered.    Follow Up:  Return in about 3 weeks (around 2024) for Wound followup.      Additional Notes:   RN to see patient 3 times a week for dressing changes. Silver foam and and unna boot 20-30mmhg

## (undated) NOTE — LETTER
[facility] [address], [address]  Consent for Procedure/Sedation    Date:     Time:       I authorize the performance upon Esa Ureña the following INFERIOR VENA CAVA FILTER IMPLANT    1.  I authorize  __________________ ________________________________    ___________________    Witness: _________________________      Date: ___________________    Printed: 2020 1:30 PM    Patient Name: Oliver Garcia      : 1940    Medical Record #: [id number]

## (undated) NOTE — LETTER
Date: 9/27/2024  Patient name: Thiago Alcantara  YOB: 1940  Medical Record Number: [id number]  Primary Coverage: Payor: MEDICARE / Plan: MEDICARE PART A&B / Product Type: *No Product type* /   [place]  Insurance ID: [id number]  Patient Address: [address] [place] [address] [place] [address]  Telephone Information:   Home Phone [phone]   Mobile [phone]       Encounter Date: 9/27/2024  Provider: Gayle Drake MD  Diagnosis:     ICD-10-CM   1. 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 Diabetes mellitus with skin ulcer ([facility])  E11.622    L98.499   4. Left leg swelling  M79.89   5. Peripheral arterial disease ([facility])  I73.9         Wound 05/03/24 #2 Left Lateral Ankle Ankle Left;Lateral (Active)   Date First Assessed/Time First Assessed: 05/03/24 0914    Wound Number (Wound Clinic Only): #2 Left Lateral Ankle  Primary Wound Type: Diabetic Ulcer  Location: Ankle  Wound Location Orientation: Left;Lateral      Assessments 5/3/2024  9:17 AM 9/27/2024  4:06 PM   Wound Image       Drainage Amount Unable to assess Small   Drainage Description -- Serosanguineous   Treatments Compression Compression   Wound Length (cm) 1 cm 1 cm   Wound Width (cm) 0.5 cm 1.2 cm   Wound Surface Area (cm^2) 0.5 cm^2 1.2 cm^2   Wound Depth (cm) 0.1 cm 0.1 cm   Wound Volume (cm^3) 0.05 cm^3 0.12 cm^3   Wound Healing % -- -140   Margins Poorly defined Well-defined edges   Non-staged Wound Description Full thickness Full thickness   Peggy-wound Assessment Dry;Edema;Hemosiderin staining Edema;Dry   Wound Granulation Tissue -- Red;Firm   Wound Bed Granulation (%) -- 100 %   Wound Bed Epithelium (%) 50 % --   Wound Bed Slough (%) 50 % --   Wound Odor None None   Shape clustered --   Tunneling? No No   Undermining? No No   Sinus  Tracts? No No   Josue Scale Grade 2 Grade 2       Inactive Orders   Date Order Priority Status Authorizing Provider   07/24/24 1625 Debridement Diabetic Ulcer Left;Lateral Ankle Routine Completed Gayle Ng MD       Compression Wrap 05/17/24 Leg Left (Active)   Placement Date: 05/17/24   Location: Leg  Wound Location Orientation: Left      Assessments 5/17/2024 11:53 AM 9/27/2024  5:44 PM   Response to Treatment Well tolerated Well tolerated   Compression Layers Multilayer Multilayer   Compression Product Type Coflex Unna Boot   Dressing Applied Yes Yes   Compression Wrap Location Toes to Knee Toes to Knee   Compression Wrap Status Clean;Dry;Intact Clean;Dry;Intact       No associated orders.     Return 2-3 weeks  Continue HH - for dressing change and wrap change - 3 times a week.   Low salt diet.   Elevate lower extremities.   Diuretics as tolerated       Wound Cleaning and Dressings:    Shower with protection  Use SHOWER BOOT / CAST COVER       DRESSINGS: endoform, HF transfer on wound base - coloplast periwound  Change dressing 3 times a week.     Compression Therapy :  Decrease compressino - Unna 20-30 mm hg, rosidal strip up anterior leg.     Follow Up:  Return in about 3 weeks (around 10/18/2024) for Wound followup.

## (undated) NOTE — LETTER
[facility] [address], [address] [place] [address]    Consent for Anesthesia   1.   IOlga agree to be cared for by a physician anesthesiologist alone and/or with a nurse anesthetist, who is specially trained to monitor me and give m allergic reactions to medications, injury to my airway, heart, lungs, vision, nerves, or muscles and in extremely rare instances death. 5. My doctor has explained to me other choices available to me for my care (alternatives).   6. Pregnant Patients (“epid Printed: 6/13/2020 at 1:13 PM    Medical Record #: [id number]                                            Page 1 of 1

## (undated) NOTE — LETTER
7/13/2020          [facility] [address]    Dear Sami Post,       Your colonoscopy showed a polyp in the colon that was removed. Here are the  biopsy/pathology findings from your recent Colonoscopy :     Adenoma

## (undated) NOTE — LETTER
Date: 5/31/2024  Patient name: Thiago Alcantara  YOB: 1940  Medical Record Number: [id number]  Primary Coverage: Payor: MEDICARE / Plan: MEDICARE PART A&B / Product Type: *No Product type* /   [place]  Insurance ID: [id number]  Patient Address: [address] [place] [address] [place] [address]  Telephone Information:   Home Phone [phone]   Mobile [phone]       Encounter Date: 5/31/2024  Provider: Gayle Drake MD  Diagnosis:     ICD-10-CM   1. 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 Peripheral arterial disease ([facility]CC)  I73.9   3. Left leg swelling  M79.89   4. 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 Diabetes mellitus with skin ulcer (HCC)  E11.622    L98.499   6. Long term (current) use of anticoagulants  Z79.01         Wound 05/03/24 # 1 Left Lateral Foot Foot Left;Lateral (Active)   Date First Assessed/Time First Assessed: 05/03/24 0913    Wound Number (Wound Clinic Only): # 1 Left Lateral Foot  Primary Wound Type: Diabetic Ulcer  Location: Foot  Wound Location Orientation: Left;Lateral      Assessments 5/3/2024  9:16 AM 5/31/2024  2:57 PM   Wound Image        Drainage Amount Unable to assess Scant   Drainage Description -- Yellow;Serous   Treatments Compression Compression   Wound Length (cm) 0.5 cm 1 cm   Wound Width (cm) 0.8 cm 0.5 cm   Wound Surface Area (cm^2) 0.4 cm^2 0.5 cm^2   Wound Depth (cm) 0.2 cm 0.2 cm   Wound Volume (cm^3) 0.08 cm^3 0.1 cm^3   Wound Healing % -- -25   Margins Well-defined edges Well-defined edges   Non-staged Wound Description Full thickness Full thickness   Peggy-wound Assessment Dry;Edema;Hemosiderin staining Dry;Edema   Wound Bed Slough (%) 100 % 100 %   Wound Odor None None   Tunneling? No No   Undermining? No No   Sinus Tracts? No No   Josue Scale Grade 2 Grade 2       Active Orders   Date  Order Priority Status Authorizing Provider   05/31/24 1515 Debridement Diabetic Ulcer Left;Lateral Foot Routine Active Gayle Ng MD       Inactive Orders   Date Order Priority Status Authorizing Provider   05/24/24 1349 Debridement Diabetic Ulcer Left;Lateral Foot Routine Completed Gayle Ng MD       Wound 05/03/24 #2 Left Lateral Ankle Ankle Left;Lateral (Active)   Date First Assessed/Time First Assessed: 05/03/24 0914    Wound Number (Wound Clinic Only): #2 Left Lateral Ankle  Primary Wound Type: Diabetic Ulcer  Location: Ankle  Wound Location Orientation: Left;Lateral      Assessments 5/3/2024  9:17 AM 5/31/2024  2:56 PM   Wound Image       Drainage Amount Unable to assess Scant   Drainage Description -- Serous;Yellow   Treatments Compression Compression   Wound Length (cm) 1 cm 1.5 cm   Wound Width (cm) 0.5 cm 1.5 cm   Wound Surface Area (cm^2) 0.5 cm^2 2.25 cm^2   Wound Depth (cm) 0.1 cm 0.2 cm   Wound Volume (cm^3) 0.05 cm^3 0.45 cm^3   Wound Healing % -- -800   Margins Poorly defined Well-defined edges   Non-staged Wound Description Full thickness Full thickness   Peggy-wound Assessment Dry;Edema;Hemosiderin staining Dry;Edema;Hemosiderin staining;Red   Wound Granulation Tissue -- Pink;Firm   Wound Bed Granulation (%) -- 10 %   Wound Bed Epithelium (%) 50 % 65 %   Wound Bed Slough (%) 50 % 25 %   Wound Odor None None   Shape clustered clustered   Tunneling? No No   Undermining? No No   Sinus Tracts? No No   Josue Scale Grade 2 Grade 2       No associated orders.       Compression Wrap 05/17/24 Leg Left (Active)   Placement Date: 05/17/24   Location: Leg  Wound Location Orientation: Left      Assessments 5/17/2024 11:53 AM 5/31/2024  3:19 PM   Response to Treatment Well tolerated Well tolerated   Compression Layers Multilayer Multilayer   Compression Product Type Coflex Unna Boot   Dressing Applied Yes Yes   Compression Wrap Location Toes to Knee Toes to Knee   Compression Wrap Status  Clean;Dry;Intact Clean;Dry;Intact       No associated orders.     Wound Number: All wounds    Wound Cleaning and Dressings:  Showering directions: May shower with protection  Wound cleansing:  Cleanse with normal saline or wound cleanser  Wound cleaning frequency:  3 times a week  Wound product: honey gel, honey alginate, ABD pad  Dressing change frequency:  Change dressing 3x per week  Enzymatic agent:  Honey    Compression Therapy:   Coflex calamine unna boot 30-40mmhg    Miscellaneous/Additional Orders:  Miscellaneous orders: Home health care nurse for wound care    Care Summary:  Care Summary: Discussed Plan of Care at beside with patient. Patient verbally acknowledges understanding of all instructions and all questions were answered.    Follow Up:  Return in about 2 weeks (around 6/14/2024) for Wound followup.